# Patient Record
Sex: FEMALE | Race: WHITE | Employment: FULL TIME | URBAN - METROPOLITAN AREA
[De-identification: names, ages, dates, MRNs, and addresses within clinical notes are randomized per-mention and may not be internally consistent; named-entity substitution may affect disease eponyms.]

---

## 2017-01-27 ENCOUNTER — OFFICE VISIT (OUTPATIENT)
Dept: FAMILY MEDICINE | Facility: CLINIC | Age: 52
End: 2017-01-27
Payer: COMMERCIAL

## 2017-01-27 VITALS
HEART RATE: 95 BPM | HEIGHT: 62 IN | TEMPERATURE: 98 F | DIASTOLIC BLOOD PRESSURE: 76 MMHG | BODY MASS INDEX: 45.82 KG/M2 | SYSTOLIC BLOOD PRESSURE: 137 MMHG | OXYGEN SATURATION: 96 % | WEIGHT: 249 LBS

## 2017-01-27 DIAGNOSIS — Z12.11 SPECIAL SCREENING FOR MALIGNANT NEOPLASMS, COLON: ICD-10-CM

## 2017-01-27 DIAGNOSIS — E78.5 HYPERLIPIDEMIA WITH TARGET LDL LESS THAN 100: ICD-10-CM

## 2017-01-27 DIAGNOSIS — E11.9 CONTROLLED TYPE 2 DIABETES MELLITUS WITHOUT COMPLICATION, WITHOUT LONG-TERM CURRENT USE OF INSULIN (H): ICD-10-CM

## 2017-01-27 LAB — HBA1C MFR BLD: 6.7 % (ref 4.3–6)

## 2017-01-27 PROCEDURE — 83036 HEMOGLOBIN GLYCOSYLATED A1C: CPT | Performed by: INTERNAL MEDICINE

## 2017-01-27 PROCEDURE — 36415 COLL VENOUS BLD VENIPUNCTURE: CPT | Performed by: INTERNAL MEDICINE

## 2017-01-27 PROCEDURE — 99214 OFFICE O/P EST MOD 30 MIN: CPT | Performed by: INTERNAL MEDICINE

## 2017-01-27 RX ORDER — PRAVASTATIN SODIUM 10 MG
10 TABLET ORAL DAILY
Qty: 90 TABLET | Refills: 2 | Status: CANCELLED | OUTPATIENT
Start: 2017-01-27

## 2017-01-27 RX ORDER — LEVOFLOXACIN 750 MG/1
750 TABLET, FILM COATED ORAL DAILY
Qty: 10 TABLET | Refills: 0 | Status: CANCELLED | OUTPATIENT
Start: 2017-01-27

## 2017-01-27 RX ORDER — LANCETS 28 GAUGE
EACH MISCELLANEOUS 2 TIMES DAILY
Status: CANCELLED | OUTPATIENT
Start: 2017-01-27

## 2017-01-27 RX ORDER — METFORMIN HCL 500 MG
TABLET, EXTENDED RELEASE 24 HR ORAL
Qty: 360 TABLET | Refills: 1 | Status: SHIPPED | OUTPATIENT
Start: 2017-01-27 | End: 2017-07-13

## 2017-01-27 NOTE — PROGRESS NOTES
SUBJECTIVE:                                                    Mary Grigsby is a 51 year old female who presents to clinic today for the following health issues:       Controlled type 2 diabetes mellitus without complication, without long-term current use of insulin (H)  Hyperlipidemia with target LDL less than 100  Special screening for malignant neoplasms, colon     A routine follow up office visit with a patient with mild and well controlled diabetes mellitus. She has issues with an obese body habitus and is working on this as much as possible. She has successfully dropped 10 pounds since 6 months ago. Body mass index is 46.29 kg/(m^2).     Wt Readings from Last 5 Encounters:   01/27/17 249 lb (112.946 kg)   06/20/16 258 lb (117.028 kg)   06/10/16 258 lb (117.028 kg)   05/27/16 258 lb (117.028 kg)   05/13/16 257 lb 9.6 oz (116.847 kg)     A1C      6.7   1/27/2017  A1C      6.8   5/27/2016  A1C      6.4   9/24/2015  A1C      7.1   5/22/2015  A1C      6.2   3/20/2014    BP Readings from Last 3 Encounters:   01/27/17 137/76   05/27/16 136/70   05/13/16 135/77     GFR ESTIMATE   Date Value Ref Range Status   05/27/2016 >90  Non  GFR Calc   >60 mL/min/1.7m2 Final   05/22/2015 >90  Non  GFR Calc   >60 mL/min/1.7m2 Final   03/20/2014 88 >60 mL/min/1.7m2 Final     GFR ESTIMATE IF BLACK   Date Value Ref Range Status   05/27/2016 >90   GFR Calc   >60 mL/min/1.7m2 Final   05/22/2015 >90   GFR Calc   >60 mL/min/1.7m2 Final   03/20/2014 >90 >60 mL/min/1.7m2 Final     Current with all diabetes mellitus benchmarks.    Diabetes Follow-up      Patient is checking blood sugars: not at all    Diabetic concerns: None     Symptoms of hypoglycemia (low blood sugar): none     Paresthesias (numbness or burning in feet) or sores: No     Date of last diabetic eye exam: 2 years ago      She has had some moderate gastrointestinal symptoms with metformin ( Glucophage) by  taking the full 2 grams of metformin ( Glucophage) XR [ extended release ] at bedtime and so she's going to try cutting her dose to 1000 2 times a day. Further follow up depending on how things go with this.      Amount of exercise or physical activity: 4-5 days/week for an average of 15-30 minutes    Problems taking medications regularly: No    Medication side effects: none    Diet: regular (no restrictions), low salt, low fat/cholesterol and diabetic    Problem list and histories reviewed & adjusted, as indicated.  Additional history: as documented    Patient Active Problem List   Diagnosis     Hypertension goal BP (blood pressure) < 140/90     Ex-smoker     Family history of diabetes mellitus     Hyperlipidemia with target LDL less than 100     Seasonal allergic rhinitis     Rhinitis, allergic to other allergen     House dust mite allergy     obesity     Severe dysplasia of cervix (GUSTABO III)     Family history of thyroid disease     Cervical lymphadenopathy     Controlled type 2 diabetes mellitus without complication, without long-term current use of insulin (H)     Bilateral lower extremity edema     Past Surgical History   Procedure Laterality Date     Tonsillectomy & adenoidectomy  1973     age 8     Hc repair of nasal septum  2/3/09-per Dr. Harley     antrostomy, ethmoidectomy, septoplasty and turbinate reduction     Hc colp cervix/upper vagina w bx cervix  1/2010, 3/2011     neg dysplasia, GUSTABO 1-2     Cryotherapy  3/30/2011     cervical     Laparoscopic assisted hysterectomy vaginal  3/12/12     [facility]       Social History   Substance Use Topics     Smoking status: Former Smoker -- 0.50 packs/day for 20 years     Types: Cigarettes     Start date: 06/01/1980     Quit date: 02/24/2013     Smokeless tobacco: Never Used      Comment: still has occasionally      Alcohol Use: No     Family History   Problem Relation Age of Onset     CANCER Father      Other Cancer Father      Lung     Hypertension  Father      age 72     Hypertension Sister      age 35     Thyroid Disease Sister      CEREBROVASCULAR DISEASE Maternal Grandmother      Asthma Son      Thyroid Disease Sister      Thyroid Disease Sister      Thyroid Disease Sister      Glaucoma No family hx of      Macular Degeneration No family hx of      DIABETES Mother      Hypertension Mother      age 34     Hypertension Sister      Thyroid Disease Sister      Asthma Son          Current Outpatient Prescriptions   Medication Sig Dispense Refill     metFORMIN (GLUCOPHAGE-XR) 500 MG 24 hr tablet TAKE FOUR TABLETS BY MOUTH EVERY DAY WITH DINNER 360 tablet 1     levofloxacin (LEVAQUIN) 750 MG tablet Take 1 tablet (750 mg) by mouth daily 10 tablet 0     pravastatin (PRAVACHOL) 10 MG tablet TAKE ONE TABLET BY MOUTH EVERY DAY 90 tablet 2     hydrochlorothiazide 12.5 MG TABS TAKE ONE TABLET BY MOUTH EVERY MORNING 90 tablet 3     losartan (COZAAR) 100 MG tablet TAKE ONE TABLET BY MOUTH EVERY DAY 90 tablet 3     felodipine ER (PLENDIL) 5 MG 24 hr tablet TAKE ONE TABLET BY MOUTH EVERY DAY 90 tablet 3     Ibuprofen (ADVIL PO) Take by mouth as needed for moderate pain       glucose blood VI test strips strip 1 strip 2 times daily. 100 strip 11     Fexofenadine HCl (ALLEGRA PO) Take  by mouth.       aspirin 81 MG tablet Take 1 tablet by mouth daily. 1 tablet 3     FREESTYLE LANCETS MISC 1 Device 2 times daily. 100 each 11     Blood Glucose Calibration (FREESTYLE CONTROL SOLUTION) LIQD 1 drop. As needed 1 Bottle 3     Hypertonic Nasal Wash (SINUS RINSE BOTTLE KIT NA) Spray 1 Bottle in nostril as needed.       MULTI-VITAMIN OR 1 tablet daily       [DISCONTINUED] metFORMIN (GLUCOPHAGE-XR) 500 MG 24 hr tablet TAKE FOUR TABLETS BY MOUTH EVERY DAY WITH DINNER 120 tablet 0     Allergies   Allergen Reactions     Amoxicillin Hives     Augmentin      Erythromycin      Recent Labs   Lab Test  01/27/17   1107  05/27/16   0714  09/24/15   1605  05/22/15   0753  03/20/14   1621   "03/08/13   0709   08/19/11   0756  05/25/10   0742   A1C  6.7*  6.8*  6.4*  7.1*  6.2*  5.4   < >  6.7*   --    LDL   --   103*   --   90  129  113   < >  130*  151*   HDL   --   40*   --   44*  38*  43*   < >  35*  31*   TRIG   --   149   --   133   --   109   < >  116  161*   ALT   --    --    --    --    --   38   --   52*  44   CR   --   0.50*   --   0.54  0.71  0.59   < >  0.61  0.67   GFRESTIMATED   --   >90  Non  GFR Calc     --   >90  Non  GFR Calc    88  >90   < >  >90  >90   GFRESTBLACK   --   >90   GFR Calc     --   >90   GFR Calc    >90  >90   < >  >90  >90   POTASSIUM   --   3.7   --   3.6  3.7  3.9   < >  3.8  3.5   TSH   --    --   3.62  4.50*  5.09*   --    < >   --   3.90    < > = values in this interval not displayed.      BP Readings from Last 3 Encounters:   01/27/17 137/76   05/27/16 136/70   05/13/16 135/77    Wt Readings from Last 3 Encounters:   01/27/17 249 lb (112.946 kg)   06/20/16 258 lb (117.028 kg)   06/10/16 258 lb (117.028 kg)                  Labs reviewed in EPIC  Problem list, Medication list, Allergies, and Medical/Social/Surgical histories reviewed in [facility] and updated as appropriate.    ROS:  Constitutional, HEENT, cardiovascular, pulmonary, gi and gu systems are negative, except as otherwise noted.    OBJECTIVE:                                                    /76 mmHg  Pulse 95  Temp(Src) 98  F (36.7  C) (Oral)  Ht 5' 1.5\" (1.562 m)  Wt 249 lb (112.946 kg)  BMI 46.29 kg/m2  SpO2 96%  LMP 06/24/2011  Breastfeeding? No  Body mass index is 46.29 kg/(m^2).  GENERAL APPEARANCE: healthy, alert and no distress  RESP: lungs clear to auscultation - no rales, rhonchi or wheezes  CV: regular rates and rhythm, normal S1 S2, no S3 or S4 and no murmur, click or rub    Diagnostic test results:  Diagnostic Test Results:  Orders Placed This Encounter   Procedures     Hemoglobin A1c     Fecal colorectal cancer screen " (FIT)          ASSESSMENT/PLAN:                                                    1. Controlled type 2 diabetes mellitus without complication, without long-term current use of insulin (H)  Her diabetes mellitus is well controlled and she is doing well ! Trial of changing dosing intervals update me with your results via mychart or other means   - metFORMIN (GLUCOPHAGE-XR) 500 MG 24 hr tablet; TAKE FOUR TABLETS BY MOUTH EVERY DAY WITH DINNER  Dispense: 360 tablet; Refill: 1    2. Hyperlipidemia with target LDL less than 100  Controlled within acceptable limits , continue current plan of care       3. Special screening for malignant neoplasms, colon  She has continued to decline a colonoscopy but agrees to the fecal occult blood testing   - Fecal colorectal cancer screen (FIT); Future      Follow up with Provider - 6 months      Dipak Hooper MD  [facility]

## 2017-01-27 NOTE — NURSING NOTE
"Chief Complaint   Patient presents with     Diabetes     Medication Refill       Initial /76 mmHg  Pulse 95  Temp(Src) 98  F (36.7  C) (Oral)  Ht 5' 1.5\" (1.562 m)  Wt 249 lb (112.946 kg)  BMI 46.29 kg/m2  SpO2 96%  LMP 06/24/2011  Breastfeeding? No Estimated body mass index is 46.29 kg/(m^2) as calculated from the following:    Height as of this encounter: 5' 1.5\" (1.562 m).    Weight as of this encounter: 249 lb (112.946 kg).  BP completed using cuff size: jennifer Argueta      "

## 2017-01-27 NOTE — MR AVS SNAPSHOT
After Visit Summary   1/27/2017    Mary Grigsby    MRN: [id number]           Patient Information     Date Of Birth          1965        Visit Information        Provider Department      1/27/2017 10:50 AM Dipak Hooper MD [facility]        Today's Diagnoses     Controlled type 2 diabetes mellitus without complication, without long-term current use of insulin (H)         Hyperlipidemia with target LDL less than 100         Special screening for malignant neoplasms, colon            Follow-ups after your visit        Future tests that were ordered for you today     Open Future Orders        Priority Expected Expires Ordered    Fecal colorectal cancer screen (FIT) Routine 2/17/2017 4/21/2017 1/27/2017            Who to contact     If you have questions or need follow up information about today's clinic visit or your schedule please contact [facility] directly at [phone].  Normal or non-critical lab and imaging results will be communicated to you by Orbeushart, letter or phone within 4 business days after the clinic has received the results. If you do not hear from us within 7 days, please contact the clinic through Orbeushart or phone. If you have a critical or abnormal lab result, we will notify you by phone as soon as possible.  Submit refill requests through Shawarmanji or call your pharmacy and they will forward the refill request to us. Please allow 3 business days for your refill to be completed.          Additional Information About Your Visit        MyChart Information     Sh[employer] gives you secure access to your electronic health record. If you see a primary care provider, you can also send messages to your care team and make appointments. If you have questions, please call your primary care clinic.  If you do not have a primary care provider, please call [phone] and they will assist you.        Care EveryWhere ID     This is your Care EveryWhere ID. This  "could be used by other organizations to access your [place] medical records  [id number]        Your Vitals Were     Pulse Temperature Height BMI (Body Mass Index) Pulse Oximetry Last Period    95 98  F (36.7  C) (Oral) 5' 1.5\" (1.562 m) 46.29 kg/m2 96% 06/24/2011    Breastfeeding?                   No            Blood Pressure from Last 3 Encounters:   01/27/17 137/76   05/27/16 136/70   05/13/16 135/77    Weight from Last 3 Encounters:   01/27/17 249 lb (112.946 kg)   06/20/16 258 lb (117.028 kg)   06/10/16 258 lb (117.028 kg)              We Performed the Following     Hemoglobin A1c          Today's Medication Changes          These changes are accurate as of: 1/27/17 11:35 AM.  If you have any questions, ask your nurse or doctor.               These medicines have changed or have updated prescriptions.        Dose/Directions    metFORMIN 500 MG 24 hr tablet   Commonly known as:  GLUCOPHAGE-XR   This may have changed:  See the new instructions.   Used for:  Controlled type 2 diabetes mellitus without complication, without long-term current use of insulin (H)   Changed by:  Dipak Hooper MD        TAKE FOUR TABLETS BY MOUTH EVERY DAY WITH DINNER   Quantity:  360 tablet   Refills:  1            Where to get your medicines      These medications were sent to [facility] - [place], [place] - [address] [place] [address] [address], [facility] [address]     Phone:  [phone]    - metFORMIN 500 MG 24 hr tablet             Primary Care Provider Office Phone # Fax #    Dipak Hooper [phone]       [address] [address]        Thank you!     Thank you for choosing [facility]  for your care. Our goal is always to provide you with excellent care. Hearing back from our patients is one way we can continue to improve our services. Please take a few minutes to complete the written survey that you may receive in the mail " after your visit with us. Thank you!             Your Updated Medication List - Protect others around you: Learn how to safely use, store and throw away your medicines at www.disposemymeds.org.          This list is accurate as of: 1/27/17 11:35 AM.  Always use your most recent med list.                   Brand Name Dispense Instructions for use    ADVIL PO      Take by mouth as needed for moderate pain       ALLEGRA PO      Take  by mouth.       aspirin 81 MG tablet     1 tablet    Take 1 tablet by mouth daily.       blood glucose calibration solution     1 Bottle    1 drop. As needed       blood glucose monitoring lancets     100 each    1 Device 2 times daily.       blood glucose monitoring test strip    no brand specified    100 strip    1 strip 2 times daily.       felodipine ER 5 MG 24 hr tablet    PLENDIL    90 tablet    TAKE ONE TABLET BY MOUTH EVERY DAY       hydrochlorothiazide 12.5 MG Tabs tablet     90 tablet    TAKE ONE TABLET BY MOUTH EVERY MORNING       levofloxacin 750 MG tablet    LEVAQUIN    10 tablet    Take 1 tablet (750 mg) by mouth daily       losartan 100 MG tablet    COZAAR    90 tablet    TAKE ONE TABLET BY MOUTH EVERY DAY       metFORMIN 500 MG 24 hr tablet    GLUCOPHAGE-XR    360 tablet    TAKE FOUR TABLETS BY MOUTH EVERY DAY WITH DINNER       MULTI-VITAMIN PO      1 tablet daily       pravastatin 10 MG tablet    PRAVACHOL    90 tablet    TAKE ONE TABLET BY MOUTH EVERY DAY       SINUS RINSE BOTTLE KIT NA      Spray 1 Bottle in nostril as needed.

## 2017-01-27 NOTE — Clinical Note
[facility]  [address] [place] [address]    January 30, 2017    Mary Grigsby  [address]  [facility] [address]          Dear Mary,    We discussed this at your appointment . Things look good Mary, keep up the good work !     Results for orders placed or performed in visit on 01/27/17   Hemoglobin A1c   Result Value Ref Range    Hemoglobin A1C 6.7 (H) 4.3 - 6.0 %       If you have any questions or concerns, please me or my clinic team at [phone].      Sincerely,        Dipak Hooper MD/bt

## 2017-03-21 DIAGNOSIS — E78.5 HYPERLIPIDEMIA WITH TARGET LDL LESS THAN 100: ICD-10-CM

## 2017-03-21 NOTE — TELEPHONE ENCOUNTER
pravastatin (PRAVACHOL) 10 MG tablet     Last Written Prescription Date: 6/24/16  Last Fill Quantity: 90, # refills: 2  Last Office Visit with G, P or [facility] prescribing provider: 1/27/17       Lab Results   Component Value Date    CHOL 173 05/27/2016     Lab Results   Component Value Date    HDL 40 05/27/2016     Lab Results   Component Value Date     05/27/2016     03/20/2014     Lab Results   Component Value Date    TRIG 149 05/27/2016     Lab Results   Component Value Date    CHOLHDLRATIO 3.7 05/22/2015

## 2017-03-23 RX ORDER — PRAVASTATIN SODIUM 10 MG
TABLET ORAL
Qty: 90 TABLET | Refills: 0 | Status: SHIPPED | OUTPATIENT
Start: 2017-03-23 | End: 2017-06-20

## 2017-06-20 DIAGNOSIS — I10 ESSENTIAL HYPERTENSION WITH GOAL BLOOD PRESSURE LESS THAN 140/90: ICD-10-CM

## 2017-06-20 DIAGNOSIS — E78.5 HYPERLIPIDEMIA WITH TARGET LDL LESS THAN 100: ICD-10-CM

## 2017-06-21 RX ORDER — LOSARTAN POTASSIUM 100 MG/1
TABLET ORAL
Qty: 90 TABLET | Refills: 0 | Status: SHIPPED | OUTPATIENT
Start: 2017-06-21 | End: 2017-07-13

## 2017-06-21 RX ORDER — PRAVASTATIN SODIUM 10 MG
TABLET ORAL
Qty: 90 TABLET | Refills: 0 | Status: SHIPPED | OUTPATIENT
Start: 2017-06-21 | End: 2017-07-13

## 2017-06-21 RX ORDER — HYDROCHLOROTHIAZIDE 12.5 MG/1
TABLET ORAL
Qty: 90 TABLET | Refills: 0 | Status: SHIPPED | OUTPATIENT
Start: 2017-06-21 | End: 2017-07-13

## 2017-06-21 RX ORDER — FELODIPINE 5 MG/1
TABLET, EXTENDED RELEASE ORAL
Qty: 90 TABLET | Refills: 0 | Status: SHIPPED | OUTPATIENT
Start: 2017-06-21 | End: 2017-07-13

## 2017-06-21 NOTE — TELEPHONE ENCOUNTER
Prescription approved per [facility] Ref[facility].   Patient due for office visit for further refills-patient has visit scheduled on  7/13/17.  Yu Zimmer RN

## 2017-06-21 NOTE — TELEPHONE ENCOUNTER
felodipine ER (PLENDIL) 5 MG 24 hr tablet      Last Written Prescription Date: 6/23/2016  Last Fill Quantity: 90, # refills: 3    Last Office Visit with [facility] or  Health prescribing provider:  1/27/2017   Future Office Visit:    Next 5 appointments (look out 90 days)     Jul 13, 2017  3:10 PM CDT   MyChart Long with Dipak Hooper MD   [facility] ([facility]    [address] [facility] [address]   [phone]                    BP Readings from Last 3 Encounters:   01/27/17 137/76   05/27/16 136/70   05/13/16 135/77     pravastatin (PRAVACHOL) 10 MG tablet     Last Written Prescription Date: 3/23/2017  Last Fill Quantity: 90, # refills: 0  Last Office Visit with [facility] or  Health prescribing provider: 1/27/2017  Next 5 appointments (look out 90 days)     Jul 13, 2017  3:10 PM CDT   MyChart Long with Dipak Hooper MD   [facility] ([facility])    [address] [place] [address]   [phone]                   Lab Results   Component Value Date    CHOL 173 05/27/2016     Lab Results   Component Value Date    HDL 40 05/27/2016     Lab Results   Component Value Date     05/27/2016     Lab Results   Component Value Date    TRIG 149 05/27/2016     Lab Results   Component Value Date    CHOLHDLRATIO 3.7 05/22/2015     hydrochlorothiazide 12.5 MG TABS      Last Written Prescription Date: 6/23/2016  Last Fill Quantity: 90, # refills: 3  Last Office Visit with [facility] or  Health prescribing provider: 1/27/2017  Next 5 appointments (look out 90 days)     Jul 13, 2017  3:10 PM CDT   MyChart Long with Dipak Hooper MD   [facility] ([facility])    [address] [facility] [address]   [phone]                   Potassium   Date Value Ref Range Status   05/27/2016 3.7 3.4 - 5.3 mmol/L Final     Creatinine   Date Value Ref Range Status   05/27/2016 0.50 (L) 0.52 - 1.04 mg/dL Final     BP Readings  from Last 3 Encounters:   01/27/17 137/76   05/27/16 136/70   05/13/16 135/77     losartan (COZAAR) 100 MG tablet      Last Written Prescription Date: 6/23/2016  Last Fill Quantity: 90, # refills: 3  Last Office Visit with G, P or [facility] prescribing provider: 1/27/2017  Next 5 appointments (look out 90 days)     Jul 13, 2017  3:10 PM CDT   Neema Lord with Dipak Hooper MD   [facility] ([facility])    [address] [facility] [address]   [phone]                   Potassium   Date Value Ref Range Status   05/27/2016 3.7 3.4 - 5.3 mmol/L Final     Creatinine   Date Value Ref Range Status   05/27/2016 0.50 (L) 0.52 - 1.04 mg/dL Final     BP Readings from Last 3 Encounters:   01/27/17 137/76   05/27/16 136/70   05/13/16 135/77

## 2017-06-30 NOTE — PROGRESS NOTES
SUBJECTIVE:                                                    Mary Grigsby is a 52 year old female who presents to clinic today for the following health issues:    Diabetes Follow-up      Patient is checking blood sugars: rarely.  Results range from 60 to 170    Diabetic concerns: None     Symptoms of hypoglycemia (low blood sugar): shaky, Lightheaded     Paresthesias (numbness or burning in feet) or sores: No     Date of last diabetic eye exam: Due  Patient has been compliant with 500 MG Metformin tid. Has intermittent bilateral foot paresthesia. She does not believe an ophthalmologist will be covered with her insurance.   Lab Results   Component Value Date    A1C 6.9 07/13/2017    A1C 6.7 01/27/2017    A1C 6.8 05/27/2016    A1C 6.4 09/24/2015    A1C 7.1 05/22/2015     Allergies -- She notes she does not like to take allergy medication all of the time because they make her feel ill. Her allergies have been worse this year.    Hyperlipidemia Follow-Up    Rate your low fat/cholesterol diet?: good    Taking statin?  Yes, possible muscle aches from statin    Other lipid medications/supplements?:  none  Recent Labs   Lab Test  05/27/16   0714  05/22/15   0753   03/08/13   0709   CHOL  173  161   < >  178   HDL  40*  44*   < >  43*   LDL  103*  90   < >  113   TRIG  149  133   --   109   CHOLHDLRATIO   --   3.7   --   4.1    < > = values in this interval not displayed.     Hypertension Follow-up    Outpatient blood pressures are not being checked.    Low Salt Diet: no added salt  BP Readings from Last 3 Encounters:   07/13/17 128/74   01/27/17 137/76   05/27/16 136/70     Additional Notes -- Patient states she has an OB/GYN that she plans to follow up with about a pap smear and mammogram.    Problem list and histories reviewed & adjusted, as indicated.  Additional history: as documented    Patient Active Problem List   Diagnosis     Hypertension goal BP (blood pressure) < 140/90     Ex-smoker     Family history  of diabetes mellitus     Hyperlipidemia with target LDL less than 100     Seasonal allergic rhinitis     Rhinitis, allergic to other allergen     House dust mite allergy     obesity     Severe dysplasia of cervix (GUSTABO III)     Family history of thyroid disease     Cervical lymphadenopathy     Controlled type 2 diabetes mellitus without complication, without long-term current use of insulin (H)     Bilateral lower extremity edema     Past Surgical History:   Procedure Laterality Date     CRYOTHERAPY  3/30/2011    cervical     HC COLP CERVIX/UPPER VAGINA W BX CERVIX  1/2010, 3/2011    neg dysplasia, GUSTABO 1-2     HC REPAIR OF NASAL SEPTUM  2/3/09-per Dr. Harley    antrostomy, ethmoidectomy, septoplasty and turbinate reduction     LAPAROSCOPIC ASSISTED HYSTERECTOMY VAGINAL  3/12/12    [facility]     TONSILLECTOMY & ADENOIDECTOMY  1973    age 8       Social History   Substance Use Topics     Smoking status: Former Smoker     Packs/day: 0.50     Years: 20.00     Types: Cigarettes     Start date: 6/1/1980     Quit date: 2/24/2013     Smokeless tobacco: Never Used      Comment: still has occasionally      Alcohol use No     Family History   Problem Relation Age of Onset     CANCER Father      Other Cancer Father      Lung     Hypertension Father      age 72     Hypertension Sister      age 35     Thyroid Disease Sister      CEREBROVASCULAR DISEASE Maternal Grandmother      Asthma Son      Thyroid Disease Sister      Thyroid Disease Sister      Thyroid Disease Sister      DIABETES Mother      Hypertension Mother      age 34     Hypertension Sister      Thyroid Disease Sister      Asthma Son      Glaucoma No family hx of      Macular Degeneration No family hx of          Current Outpatient Prescriptions   Medication Sig Dispense Refill     losartan (COZAAR) 100 MG tablet Take 1 tablet (100 mg) by mouth daily 90 tablet 1     hydrochlorothiazide 12.5 MG TABS tablet TAKE ONE TABLET BY MOUTH EVERY DAY IN THE MORNING 90  tablet 1     pravastatin (PRAVACHOL) 10 MG tablet Take 1 tablet (10 mg) by mouth daily 90 tablet 1     felodipine ER (PLENDIL) 5 MG 24 hr tablet Take 1 tablet (5 mg) by mouth daily 90 tablet 1     metFORMIN (GLUCOPHAGE-XR) 500 MG 24 hr tablet TAKE FOUR TABLETS BY MOUTH EVERY DAY WITH DINNER 360 tablet 1     Ibuprofen (ADVIL PO) Take by mouth as needed for moderate pain       glucose blood VI test strips strip 1 strip 2 times daily. 100 strip 11     Fexofenadine HCl (ALLEGRA PO) Take  by mouth.       aspirin 81 MG tablet Take 1 tablet by mouth daily. 1 tablet 3     FREESTYLE LANCETS MISC 1 Device 2 times daily. 100 each 11     Blood Glucose Calibration (FREESTYLE CONTROL SOLUTION) LIQD 1 drop. As needed 1 Bottle 3     Hypertonic Nasal Wash (SINUS RINSE BOTTLE KIT NA) Spray 1 Bottle in nostril as needed.       MULTI-VITAMIN OR 1 tablet daily       [DISCONTINUED] losartan (COZAAR) 100 MG tablet TAKE ONE TABLET BY MOUTH EVERY DAY 90 tablet 0     [DISCONTINUED] hydrochlorothiazide 12.5 MG TABS tablet TAKE ONE TABLET BY MOUTH EVERY DAY IN THE MORNING 90 tablet 0     [DISCONTINUED] pravastatin (PRAVACHOL) 10 MG tablet TAKE ONE TABLET BY MOUTH EVERY DAY 90 tablet 0     [DISCONTINUED] felodipine ER (PLENDIL) 5 MG 24 hr tablet TAKE ONE TABLET BY MOUTH EVERY DAY 90 tablet 0     [DISCONTINUED] metFORMIN (GLUCOPHAGE-XR) 500 MG 24 hr tablet TAKE FOUR TABLETS BY MOUTH EVERY DAY WITH DINNER 360 tablet 1     levofloxacin (LEVAQUIN) 750 MG tablet Take 1 tablet (750 mg) by mouth daily (Patient not taking: Reported on 7/13/2017) 10 tablet 0     Labs reviewed in EPIC    Reviewed and updated as needed this visit by clinical staff  Tobacco  Allergies  Meds  Med Hx  Surg Hx  Fam Hx  Soc Hx      Reviewed and updated as needed this visit by Provider         ROS:  Constitutional, HEENT, cardiovascular, pulmonary, GI, , musculoskeletal, neuro, skin, endocrine and psych systems are negative, except as otherwise noted.    This document  serves as a record of the services and decisions personally performed and made by Dipak Hooper MD. It was created on their behalf by Jhon Story, a trained medical scribe. The creation of this document is based the provider's statements to the medical scribe.  Jhon Story July 13, 2017 3:49 PM    OBJECTIVE:     /74  Pulse 97  Temp 98.5  F (36.9  C) (Oral)  Wt 115.7 kg (255 lb)  LMP 06/24/2011  SpO2 97%  BMI 47.4 kg/m2  Body mass index is 47.4 kg/(m^2).  GENERAL: healthy, alert and no distress, morbidly obese   EYES: Eyes grossly normal to inspection, PERRL and conjunctivae and sclerae normal  RESP: lungs clear to auscultation - no rales, rhonchi or wheezes  CV: regular rate and rhythm, normal S1 S2, no S3 or S4, no murmur, click or rub, no peripheral edema and peripheral pulses strong  MS: no gross musculoskeletal defects noted, 2-3+ bilateral lower extremity edema  SKIN: no suspicious lesions or rashes to visible skin  NEURO: mentation intact and speech normal  PSYCH: mentation appears normal, affect normal/bright    Diagnostic Test Results:  Results for orders placed or performed in visit on 07/13/17   HEMOGLOBIN A1C   Result Value Ref Range    Hemoglobin A1C 6.9 (H) 4.3 - 6.0 %     ASSESSMENT/PLAN:     (Z12.11) Special screening for malignant neoplasms, colon  (primary encounter diagnosis)  Comment: FIT test   Plan: see orders section of this encounter     (Z12.31) Visit for screening mammogram  Comment: healthcare maintenance recommended   Plan: MA SCREENING DIGITAL BILAT - Future  (s+30),         Fecal colorectal cancer screen (FIT)            (Z13.89) Screening for diabetic peripheral neuropathy  Comment: no diabetes neuropathy   Plan: FOOT EXAM  NO CHARGE [75742.114], HEMOGLOBIN         A1C            (I10) Essential hypertension with goal blood pressure less than 140/90  Comment: controlled within acceptable limits    Plan: Albumin Random Urine Quantitative, losartan         (COZAAR) 100  MG tablet, hydrochlorothiazide         12.5 MG TABS tablet, felodipine ER (PLENDIL) 5         MG 24 hr tablet            (E78.5) Hyperlipidemia with target LDL less than 100  Comment: continue current plan of care     Plan: pravastatin (PRAVACHOL) 10 MG tablet, Lipid         panel reflex to direct LDL, CANCELED: Lipid         panel reflex to direct LDL            (E11.9) Controlled type 2 diabetes mellitus without complication, without long-term current use of insulin (H)  Comment: we emphasized the need for annual eye exams not for vision check but for screening per American Diabetes Association for diabetes retinopathy   Plan: Albumin Random Urine Quantitative, metFORMIN         (GLUCOPHAGE-XR) 500 MG 24 hr tablet,         OPHTHALMOLOGY ADULT REFERRAL, Basic metabolic         panel              Patient Instructions   - Follow up with me in 12 months.     - You can follow up for a lab check up in 6 months.    The information in this document, created by the medical scribe for me, accurately reflects the services I personally performed and the decisions made by me. I have reviewed and approved this document for accuracy.   MD Dipak Jefferson MD  [facility]

## 2017-07-07 ENCOUNTER — TELEPHONE (OUTPATIENT)
Dept: FAMILY MEDICINE | Facility: CLINIC | Age: 52
End: 2017-07-07

## 2017-07-13 ENCOUNTER — OFFICE VISIT (OUTPATIENT)
Dept: FAMILY MEDICINE | Facility: CLINIC | Age: 52
End: 2017-07-13
Payer: COMMERCIAL

## 2017-07-13 VITALS
TEMPERATURE: 98.5 F | BODY MASS INDEX: 47.4 KG/M2 | OXYGEN SATURATION: 97 % | SYSTOLIC BLOOD PRESSURE: 128 MMHG | HEART RATE: 97 BPM | WEIGHT: 255 LBS | DIASTOLIC BLOOD PRESSURE: 74 MMHG

## 2017-07-13 DIAGNOSIS — Z12.31 VISIT FOR SCREENING MAMMOGRAM: ICD-10-CM

## 2017-07-13 DIAGNOSIS — I10 ESSENTIAL HYPERTENSION WITH GOAL BLOOD PRESSURE LESS THAN 140/90: ICD-10-CM

## 2017-07-13 DIAGNOSIS — Z12.11 SPECIAL SCREENING FOR MALIGNANT NEOPLASMS, COLON: ICD-10-CM

## 2017-07-13 DIAGNOSIS — Z13.89 SCREENING FOR DIABETIC PERIPHERAL NEUROPATHY: ICD-10-CM

## 2017-07-13 DIAGNOSIS — E78.5 HYPERLIPIDEMIA WITH TARGET LDL LESS THAN 100: ICD-10-CM

## 2017-07-13 DIAGNOSIS — E11.9 CONTROLLED TYPE 2 DIABETES MELLITUS WITHOUT COMPLICATION, WITHOUT LONG-TERM CURRENT USE OF INSULIN (H): Primary | ICD-10-CM

## 2017-07-13 LAB — HBA1C MFR BLD: 6.9 % (ref 4.3–6)

## 2017-07-13 PROCEDURE — 82043 UR ALBUMIN QUANTITATIVE: CPT | Performed by: INTERNAL MEDICINE

## 2017-07-13 PROCEDURE — 99207 C FOOT EXAM  NO CHARGE: CPT | Performed by: INTERNAL MEDICINE

## 2017-07-13 PROCEDURE — 36415 COLL VENOUS BLD VENIPUNCTURE: CPT | Performed by: INTERNAL MEDICINE

## 2017-07-13 PROCEDURE — 99214 OFFICE O/P EST MOD 30 MIN: CPT | Performed by: INTERNAL MEDICINE

## 2017-07-13 PROCEDURE — 83036 HEMOGLOBIN GLYCOSYLATED A1C: CPT | Performed by: INTERNAL MEDICINE

## 2017-07-13 PROCEDURE — 80048 BASIC METABOLIC PNL TOTAL CA: CPT | Performed by: INTERNAL MEDICINE

## 2017-07-13 RX ORDER — HYDROCHLOROTHIAZIDE 12.5 MG/1
TABLET ORAL
Qty: 90 TABLET | Refills: 1 | Status: SHIPPED | OUTPATIENT
Start: 2017-07-13 | End: 2018-03-14

## 2017-07-13 RX ORDER — LOSARTAN POTASSIUM 100 MG/1
100 TABLET ORAL DAILY
Qty: 90 TABLET | Refills: 1 | Status: SHIPPED | OUTPATIENT
Start: 2017-07-13 | End: 2018-03-14

## 2017-07-13 RX ORDER — METFORMIN HCL 500 MG
TABLET, EXTENDED RELEASE 24 HR ORAL
Qty: 360 TABLET | Refills: 1 | Status: SHIPPED | OUTPATIENT
Start: 2017-07-13 | End: 2018-06-13

## 2017-07-13 RX ORDER — FELODIPINE 5 MG/1
5 TABLET, EXTENDED RELEASE ORAL DAILY
Qty: 90 TABLET | Refills: 1 | Status: SHIPPED | OUTPATIENT
Start: 2017-07-13 | End: 2018-03-14

## 2017-07-13 RX ORDER — PRAVASTATIN SODIUM 10 MG
10 TABLET ORAL DAILY
Qty: 90 TABLET | Refills: 1 | Status: SHIPPED | OUTPATIENT
Start: 2017-07-13 | End: 2018-03-14

## 2017-07-13 ASSESSMENT — PAIN SCALES - GENERAL: PAINLEVEL: NO PAIN (0)

## 2017-07-13 NOTE — NURSING NOTE
"Chief Complaint   Patient presents with     Hypertension     Diabetes       Initial /74  Pulse 97  Temp 98.5  F (36.9  C) (Oral)  Wt 255 lb (115.7 kg)  LMP 06/24/2011  SpO2 97%  BMI 47.4 kg/m2 Estimated body mass index is 47.4 kg/(m^2) as calculated from the following:    Height as of 1/27/17: 5' 1.5\" (1.562 m).    Weight as of this encounter: 255 lb (115.7 kg).  Medication Reconciliation: complete   Patricia Taveras MA    "

## 2017-07-13 NOTE — PATIENT INSTRUCTIONS
- Follow up with me in 12 months.     - You can follow up for a lab check up in 6 months.    [facility]    If you have any questions regarding to your visit please contact your care team:     Team Pink:   Clinic Hours Telephone Number   Internal Medicine:  Dr. Lizzie Wray NP       7am-7pm  Monday - Thursday   7am-5pm  Fridays  [phone]  (Appointment scheduling available 24/7)    Questions about your visit?  Team Line  [phone]   Urgent Care - [place] - 11am-9pm Monday-Friday Saturday-Sunday- 9am-5pm   [place] - 5pm-9pm Monday-Friday Saturday-Sunday- 9am-5pm  [phone] - [place]   [phone] - [place]       What options do I have for visits at the clinic other than the traditional office visit?  To expand how we care for you, many of our providers are utilizing electronic visits (e-visits) and telephone visits, when medically appropriate, for interactions with their patients rather than a visit in the clinic.   We also offer nurse visits for many medical concerns. Just like any other service, we will bill your insurance company for this type of visit based on time spent on the phone with your provider. Not all insurance companies cover these visits. Please check with your medical insurance if this type of visit is covered. You will be responsible for any charges that are not paid by your insurance.      E-visits via [employer]:  generally incur a $35.00 fee.  Telephone visits:  Time spent on the phone: *charged based on time that is spent on the phone in increments of 10 minutes. Estimated cost:   5-10 mins $30.00   11-20 mins. $59.00   21-30 mins. $85.00   Use [employer] (secure email communication and access to your chart) to send your primary care provider a message or make an appointment. Ask someone on your Team how to sign up for [employer].    For a Price Quote for your services, please call our Consumer Price Line  at [phone].    As always, Thank you for trusting us with your health care needs!    Discharged By:  AARON BRADY

## 2017-07-13 NOTE — LETTER
[facility]  [address] [place] [address]    July 17, 2017    Mary Grigsby  [address]  [place] [address]          Dear Mary,    These labs show us the following things ; everything is great, Things look good !     Results for orders placed or performed in visit on 07/13/17   HEMOGLOBIN A1C   Result Value Ref Range    Hemoglobin A1C 6.9 (H) 4.3 - 6.0 %   Albumin Random Urine Quantitative   Result Value Ref Range    Creatinine Urine 201 mg/dL    Albumin Urine mg/L 26 mg/L    Albumin Urine mg/g Cr 12.89 0 - 25 mg/g Cr   Basic metabolic panel   Result Value Ref Range    Sodium 138 133 - 144 mmol/L    Potassium 3.9 3.4 - 5.3 mmol/L    Chloride 101 94 - 109 mmol/L    Carbon Dioxide 29 20 - 32 mmol/L    Anion Gap 8 3 - 14 mmol/L    Glucose 159 (H) 70 - 99 mg/dL    Urea Nitrogen 13 7 - 30 mg/dL    Creatinine 0.74 0.52 - 1.04 mg/dL    GFR Estimate 83 >60 mL/min/1.7m2    GFR Estimate If Black >90   GFR Calc   >60 mL/min/1.7m2    Calcium 9.2 8.5 - 10.1 mg/dL       If you have any questions or concerns, please me or my clinic team at [phone].      Sincerely,        Dipak Hooper MD/bt

## 2017-07-13 NOTE — MR AVS SNAPSHOT
After Visit Summary   7/13/2017    Mary Grigsby    MRN: [id number]           Patient Information     Date Of Birth          1965        Visit Information        Provider Department      7/13/2017 3:10 PM Dipak Hooper MD [facility]        Today's Diagnoses     Special screening for malignant neoplasms, colon    -  1    Visit for screening mammogram        Screening for diabetic peripheral neuropathy        Essential hypertension with goal blood pressure less than 140/90        Hyperlipidemia with target LDL less than 100        Controlled type 2 diabetes mellitus without complication, without long-term current use of insulin (H)          Care Instructions    - Follow up with me in 12 months.     - You can follow up for a lab check up in 6 months.    [facility]    If you have any questions regarding to your visit please contact your care team:     Team Pink:   Clinic Hours Telephone Number   Internal Medicine:  Dr. Lizzie Wray NP       7am-7pm  Monday - Thursday   7am-5pm  Fridays  [phone]  (Appointment scheduling available 24/7)    Questions about your visit?  Team Line  [phone]   Urgent Care - [place] - 11am-9pm Monday-Friday Saturday-Sunday- 9am-5pm   [place] - 5pm-9pm Monday-Friday Saturday-Sunday- 9am-5pm  [phone] - [place]   [phone] - [place]       What options do I have for visits at the clinic other than the traditional office visit?  To expand how we care for you, many of our providers are utilizing electronic visits (e-visits) and telephone visits, when medically appropriate, for interactions with their patients rather than a visit in the clinic.   We also offer nurse visits for many medical concerns. Just like any other service, we will bill your insurance company for this type of visit based on time spent on the phone with your provider. Not all insurance  companies cover these visits. Please check with your medical insurance if this type of visit is covered. You will be responsible for any charges that are not paid by your insurance.      E-visits via [employer]:  generally incur a $35.00 fee.  Telephone visits:  Time spent on the phone: *charged based on time that is spent on the phone in increments of 10 minutes. Estimated cost:   5-10 mins $30.00   11-20 mins. $59.00   21-30 mins. $85.00   Use [employer] (secure email communication and access to your chart) to send your primary care provider a message or make an appointment. Ask someone on your Team how to sign up for [employer].    For a Price Quote for your services, please call our [employer] Media Line at [phone].    As always, Thank you for trusting us with your health care needs!    Discharged By:  AARON BRADY            Follow-ups after your visit        Additional Services     OPHTHALMOLOGY ADULT REFERRAL       Your provider has referred you to: [facility]: [facility] [place] [phone]   [email or web address]    Please be aware that coverage of these services is subject to the terms and limitations of your health insurance plan.  Call member services at your health plan with any benefit or coverage questions.      Please bring the following with you to your appointment:    (1) Any X-Rays, CTs or MRIs which have been performed.  Contact the facility where they were done to arrange for  prior to your scheduled appointment.    (2) List of current medications  (3) This referral request   (4) Any documents/labs given to you for this referral                  Future tests that were ordered for you today     Open Future Orders        Priority Expected Expires Ordered    MA SCREENING DIGITAL BILAT - Future  (s+30) Routine  6/30/2018 7/13/2017    Fecal colorectal cancer screen (FIT) Routine 8/3/2017 10/5/2017 7/13/2017            Who to contact     If you have questions or need follow up  information about today's clinic visit or your schedule please contact [facility] directly at [phone].  Normal or non-critical lab and imaging results will be communicated to you by [employer], letter or phone within 4 business days after the clinic has received the results. If you do not hear from us within 7 days, please contact the clinic through [employer] or phone. If you have a critical or abnormal lab result, we will notify you by phone as soon as possible.  Submit refill requests through [employer] or call your pharmacy and they will forward the refill request to us. Please allow 3 business days for your refill to be completed.          Additional Information About Your Visit        [employer] Information     [employer] gives you secure access to your electronic health record. If you see a primary care provider, you can also send messages to your care team and make appointments. If you have questions, please call your primary care clinic.  If you do not have a primary care provider, please call [phone] and they will assist you.        Care EveryWhere ID     This is your Care EveryWhere ID. This could be used by other organizations to access your [facility] medical records  [id number]        Your Vitals Were     Pulse Temperature Last Period Pulse Oximetry BMI (Body Mass Index)       97 98.5  F (36.9  C) (Oral) 06/24/2011 97% 47.4 kg/m2        Blood Pressure from Last 3 Encounters:   07/13/17 128/74   01/27/17 137/76   05/27/16 136/70    Weight from Last 3 Encounters:   07/13/17 255 lb (115.7 kg)   01/27/17 249 lb (112.9 kg)   06/20/16 258 lb (117 kg)              We Performed the Following     Albumin Random Urine Quantitative     Basic metabolic panel     FOOT EXAM  NO CHARGE [30447.114]     HEMOGLOBIN A1C     Lipid panel reflex to direct LDL     OPHTHALMOLOGY ADULT REFERRAL          Today's Medication Changes          These changes are accurate as of: 7/13/17  3:57 PM.  If you have any questions,  ask your nurse or doctor.               These medicines have changed or have updated prescriptions.        Dose/Directions    felodipine ER 5 MG 24 hr tablet   Commonly known as:  PLENDIL   This may have changed:  See the new instructions.   Used for:  Essential hypertension with goal blood pressure less than 140/90   Changed by:  Dipak Hooper MD        Dose:  5 mg   Take 1 tablet (5 mg) by mouth daily   Quantity:  90 tablet   Refills:  1       hydrochlorothiazide 12.5 MG Tabs tablet   This may have changed:  See the new instructions.   Used for:  Essential hypertension with goal blood pressure less than 140/90   Changed by:  Dipak Hooper MD        TAKE ONE TABLET BY MOUTH EVERY DAY IN THE MORNING   Quantity:  90 tablet   Refills:  1       losartan 100 MG tablet   Commonly known as:  COZAAR   This may have changed:  See the new instructions.   Used for:  Essential hypertension with goal blood pressure less than 140/90   Changed by:  Dipak Hooper MD        Dose:  100 mg   Take 1 tablet (100 mg) by mouth daily   Quantity:  90 tablet   Refills:  1       pravastatin 10 MG tablet   Commonly known as:  PRAVACHOL   This may have changed:  See the new instructions.   Used for:  Hyperlipidemia with target LDL less than 100   Changed by:  Dipak Hooper MD        Dose:  10 mg   Take 1 tablet (10 mg) by mouth daily   Quantity:  90 tablet   Refills:  1            Where to get your medicines      These medications were sent to [facility] [place]  [place] - [address] [place] [address] [address], [place] [address]     Phone:  [phone]     felodipine ER 5 MG 24 hr tablet    hydrochlorothiazide 12.5 MG Tabs tablet    losartan 100 MG tablet    metFORMIN 500 MG 24 hr tablet    pravastatin 10 MG tablet                Primary Care Provider Office Phone # Fax #    Dipak Hooper [phone]       [address] [address]        Equal Access to  Services     [facility]: Hadii kodak lai susannavalentina Sogerardoali, waaxda luqadaha, qaybta kaalmada juanitowilliamgarcia, lazarus mon . So [facility] [phone].    ATENCIÓN: Si habla ry, tiene a de leon disposición servicios gratuitos de asistencia lingüística. Llame al [phone].    We comply with applicable federal civil rights laws and [place] laws. We do not discriminate on the basis of race, color, national origin, age, disability sex, sexual orientation or gender identity.            Thank you!     Thank you for choosing [facility]  for your care. Our goal is always to provide you with excellent care. Hearing back from our patients is one way we can continue to improve our services. Please take a few minutes to complete the written survey that you may receive in the mail after your visit with us. Thank you!             Your Updated Medication List - Protect others around you: Learn how to safely use, store and throw away your medicines at www.disposemymeds.org.          This list is accurate as of: 7/13/17  3:57 PM.  Always use your most recent med list.                   Brand Name Dispense Instructions for use Diagnosis    ADVIL PO      Take by mouth as needed for moderate pain        ALLEGRA PO      Take  by mouth.        aspirin 81 MG tablet     1 tablet    Take 1 tablet by mouth daily.    Type 2 diabetes, HbA1c goal < 7% (H)       blood glucose calibration solution     1 Bottle    1 drop. As needed    Type 2 diabetes, HbA1c goal < 7% (H)       blood glucose monitoring lancets     100 each    1 Device 2 times daily.    Type 2 diabetes, HbA1c goal < 7% (H)       blood glucose monitoring test strip    no brand specified    100 strip    1 strip 2 times daily.    Prediabetes       felodipine ER 5 MG 24 hr tablet    PLENDIL    90 tablet    Take 1 tablet (5 mg) by mouth daily    Essential hypertension with goal blood pressure less than 140/90       hydrochlorothiazide 12.5 MG Tabs  tablet     90 tablet    TAKE ONE TABLET BY MOUTH EVERY DAY IN THE MORNING    Essential hypertension with goal blood pressure less than 140/90       levofloxacin 750 MG tablet    LEVAQUIN    10 tablet    Take 1 tablet (750 mg) by mouth daily    Acute sialoadenitis       losartan 100 MG tablet    COZAAR    90 tablet    Take 1 tablet (100 mg) by mouth daily    Essential hypertension with goal blood pressure less than 140/90       metFORMIN 500 MG 24 hr tablet    GLUCOPHAGE-XR    360 tablet    TAKE FOUR TABLETS BY MOUTH EVERY DAY WITH DINNER    Controlled type 2 diabetes mellitus without complication, without long-term current use of insulin (H)       MULTI-VITAMIN PO      1 tablet daily        pravastatin 10 MG tablet    PRAVACHOL    90 tablet    Take 1 tablet (10 mg) by mouth daily    Hyperlipidemia with target LDL less than 100       SINUS RINSE BOTTLE KIT NA      Spray 1 Bottle in nostril as needed.    Post-nasal drainage

## 2017-07-14 LAB
ANION GAP SERPL CALCULATED.3IONS-SCNC: 8 MMOL/L (ref 3–14)
BUN SERPL-MCNC: 13 MG/DL (ref 7–30)
CALCIUM SERPL-MCNC: 9.2 MG/DL (ref 8.5–10.1)
CHLORIDE SERPL-SCNC: 101 MMOL/L (ref 94–109)
CO2 SERPL-SCNC: 29 MMOL/L (ref 20–32)
CREAT SERPL-MCNC: 0.74 MG/DL (ref 0.52–1.04)
CREAT UR-MCNC: 201 MG/DL
GFR SERPL CREATININE-BSD FRML MDRD: 83 ML/MIN/1.7M2
GLUCOSE SERPL-MCNC: 159 MG/DL (ref 70–99)
MICROALBUMIN UR-MCNC: 26 MG/L
MICROALBUMIN/CREAT UR: 12.89 MG/G CR (ref 0–25)
POTASSIUM SERPL-SCNC: 3.9 MMOL/L (ref 3.4–5.3)
SODIUM SERPL-SCNC: 138 MMOL/L (ref 133–144)

## 2017-09-19 ENCOUNTER — RADIANT APPOINTMENT (OUTPATIENT)
Dept: MAMMOGRAPHY | Facility: CLINIC | Age: 52
End: 2017-09-19
Attending: INTERNAL MEDICINE
Payer: COMMERCIAL

## 2017-09-19 DIAGNOSIS — Z12.31 VISIT FOR SCREENING MAMMOGRAM: ICD-10-CM

## 2017-09-19 PROCEDURE — G0202 SCR MAMMO BI INCL CAD: HCPCS | Mod: TC

## 2017-09-29 ENCOUNTER — OFFICE VISIT (OUTPATIENT)
Dept: OPTOMETRY | Facility: CLINIC | Age: 52
End: 2017-09-29
Payer: COMMERCIAL

## 2017-09-29 DIAGNOSIS — H52.203 HYPEROPIA OF BOTH EYES WITH ASTIGMATISM AND PRESBYOPIA: Primary | ICD-10-CM

## 2017-09-29 DIAGNOSIS — H52.4 HYPEROPIA OF BOTH EYES WITH ASTIGMATISM AND PRESBYOPIA: Primary | ICD-10-CM

## 2017-09-29 DIAGNOSIS — H52.03 HYPEROPIA OF BOTH EYES WITH ASTIGMATISM AND PRESBYOPIA: Primary | ICD-10-CM

## 2017-09-29 DIAGNOSIS — E11.9 TYPE 2 DIABETES MELLITUS WITHOUT RETINOPATHY (H): ICD-10-CM

## 2017-09-29 PROCEDURE — 92015 DETERMINE REFRACTIVE STATE: CPT | Performed by: OPTOMETRIST

## 2017-09-29 PROCEDURE — 92014 COMPRE OPH EXAM EST PT 1/>: CPT | Performed by: OPTOMETRIST

## 2017-09-29 ASSESSMENT — REFRACTION_MANIFEST
OD_SPHERE: +1.00
OS_AXIS: 030
OS_SPHERE: +0.25
OS_CYLINDER: +0.75
OD_AXIS: 165
OD_ADD: +2.00
OD_CYLINDER: +0.25
OS_ADD: +2.00

## 2017-09-29 ASSESSMENT — SLIT LAMP EXAM - LIDS
COMMENTS: NORMAL
COMMENTS: NORMAL

## 2017-09-29 ASSESSMENT — VISUAL ACUITY
OS_SC+: -1
OD_SC: 20/120
OS_SC: 20/20
OD_SC: 20/30
OD_SC+: -1
OS_SC: 20/80 -1
METHOD: SNELLEN - LINEAR

## 2017-09-29 ASSESSMENT — REFRACTION_WEARINGRX
OD_SPHERE: +0.75
OS_CYLINDER: +0.25
OS_SPHERE: +0.25
OD_AXIS: 163
OD_CYLINDER: +0.25
OD_ADD: +1.75
OS_SPHERE: +2.00
OS_CYLINDER: +0.25
OD_AXIS: 163
OS_AXIS: 029
SPECS_TYPE: READING ONLY
OD_SPHERE: +2.50
OS_AXIS: 029
OS_ADD: +1.75
OD_CYLINDER: +0.25

## 2017-09-29 ASSESSMENT — CONF VISUAL FIELD
OD_NORMAL: 1
OS_NORMAL: 1

## 2017-09-29 ASSESSMENT — TONOMETRY
OS_IOP_MMHG: 19
IOP_METHOD: APPLANATION
OD_IOP_MMHG: 19

## 2017-09-29 ASSESSMENT — EXTERNAL EXAM - RIGHT EYE: OD_EXAM: NORMAL

## 2017-09-29 ASSESSMENT — CUP TO DISC RATIO
OS_RATIO: 0.25
OD_RATIO: 0.3

## 2017-09-29 ASSESSMENT — EXTERNAL EXAM - LEFT EYE: OS_EXAM: NORMAL

## 2017-09-29 NOTE — PROGRESS NOTES
Chief Complaint   Patient presents with     Eye Exam For Diabetes     Accompanied by self  Lab Results   Component Value Date    A1C 6.9 07/13/2017    A1C 6.7 01/27/2017    A1C 6.8 05/27/2016    A1C 6.4 09/24/2015    A1C 7.1 05/22/2015       Last Eye Exam: 3 years ago  Dilated Previously: Yes    What are you currently using to see?  Readers. +1.25    Distance Vision Acuity: Satisfied with vision    Near Vision Acuity: Not satisfied     Eye Comfort: itchy  Do you use eye drops? : No  Occupation or Hobbies:     Alysia Mendoza, Optometric Tech     Medical, surgical and family histories reviewed and updated 9/29/2017.       OBJECTIVE: See Ophthalmology exam    ASSESSMENT:    ICD-10-CM    1. Hyperopia of both eyes with astigmatism and presbyopia H52.03 EYE EXAM (SIMPLE-NONBILLABLE)    H52.203 REFRACTION    H52.4    2. Type 2 diabetes mellitus without retinopathy (H) E11.9 EYE EXAM (SIMPLE-NONBILLABLE)      PLAN:  Monitor, Keep blood sugar under control  Sent letter to primary care provider regarding diabetes.  Mary Grigsby aware  eye exam results will be sent to Dipak Hooper.  A final glasses prescription was given.  Allow time for adaptation.  The glasses may cause dizziness and affect depth perception for awhile.  Return to clinic 1 year for Comprehensive Vision Exam      Mary Mahmood O.D  [address] [place]  [address]    [phone]

## 2017-09-29 NOTE — LETTER
[facility]  Optometry Department      9/29/2017    Re:  Mary Grigsby  1965   [id number]    Dear Dr. Hooper,    Your patient was seen in our office on 9/29/2017 for a dilated diabetic eye exam.      Findings:    No Retinopathy  OU - Both    Comments:  Mary should return for a comprehensive eye exam in 1 year.    Sincerely,        Mary Mahmood O.D  [address]  [place]  [address]    [phone]

## 2017-09-29 NOTE — PATIENT INSTRUCTIONS
Monitor, Keep blood sugar under control  Sent letter to primary care provider regarding diabetes  A final glasses prescription was given.  Allow time for adaptation.  The glasses may cause dizziness and affect depth perception for awhile.  Return to clinic 1 year for Comprehensive Vision Exam      Mary Mahmood O.D  [address] [place]  [address]    [phone]

## 2017-09-29 NOTE — MR AVS SNAPSHOT
After Visit Summary   9/29/2017    Mary Grigsby    MRN: [id number]           Patient Information     Date Of Birth          1965        Visit Information        Provider Department      9/29/2017 10:20 AM Mary Mahmood OD [facility]        Today's Diagnoses     Hyperopia of both eyes with astigmatism and presbyopia    -  1    Type 2 diabetes mellitus without retinopathy (H)          Care Instructions          Monitor, Keep blood sugar under control  Sent letter to primary care provider regarding diabetes  A final glasses prescription was given.  Allow time for adaptation.  The glasses may cause dizziness and affect depth perception for awhile.  Return to clinic 1 year for Comprehensive Vision Exam      Mary Mahmood O.D  [facility]  [address] [facility]. [place]  [address]    [phone]                    Follow-ups after your visit        Follow-up notes from your care team     Return in about 1 year (around 9/29/2018) for Eye Exam.      Who to contact     If you have questions or need follow up information about today's clinic visit or your schedule please contact [facility] directly at [phone].  Normal or non-critical lab and imaging results will be communicated to you by MyChart, letter or phone within 4 business days after the clinic has received the results. If you do not hear from us within 7 days, please contact the clinic through [employer] or phone. If you have a critical or abnormal lab result, we will notify you by phone as soon as possible.  Submit refill requests through [employer] or call your pharmacy and they will forward the refill request to us. Please allow 3 business days for your refill to be completed.          Additional Information About Your Visit        MyChart Information     [employer] gives you secure access to your electronic health record. If you see a primary care provider, you can also send messages to your  care team and make appointments. If you have questions, please call your primary care clinic.  If you do not have a primary care provider, please call [phone] and they will assist you.        Care EveryWhere ID     This is your Care EveryWhere ID. This could be used by other organizations to access your [facility] medical records  [id number]        Your Vitals Were     Last Period                   06/24/2011            Blood Pressure from Last 3 Encounters:   07/13/17 128/74   01/27/17 137/76   05/27/16 136/70    Weight from Last 3 Encounters:   07/13/17 115.7 kg (255 lb)   01/27/17 112.9 kg (249 lb)   06/20/16 117 kg (258 lb)              We Performed the Following     EYE EXAM (SIMPLE-NONBILLABLE)     REFRACTION        Primary Care Provider Office Phone # Fax #    Dipak Hooper [phone]       [address] [facility] [address]        Equal Access to Services     [facility]: Hadii aad ku hadasho Soomaali, waaxda luqadaha, qaybta kaalmada adeegyada, waxay idiin hayevelynn estela mon . So [facility] [phone].    ATENCIÓN: Si habla español, tiene a de leon disposición servicios gratuitos de asistencia lingüística. Llame al [phone].    We comply with applicable federal civil rights laws and [place] laws. We do not discriminate on the basis of race, color, national origin, age, disability sex, sexual orientation or gender identity.            Thank you!     Thank you for choosing [facility]  for your care. Our goal is always to provide you with excellent care. Hearing back from our patients is one way we can continue to improve our services. Please take a few minutes to complete the written survey that you may receive in the mail after your visit with us. Thank you!             Your Updated Medication List - Protect others around you: Learn how to safely use, store and throw away your medicines at www.disposemymeds.org.          This list is accurate as of: 9/29/17  11:37 AM.  Always use your most recent med list.                   Brand Name Dispense Instructions for use Diagnosis    ADVIL PO      Take by mouth as needed for moderate pain        ALLEGRA PO      Take  by mouth.        aspirin 81 MG tablet     1 tablet    Take 1 tablet by mouth daily.    Type 2 diabetes, HbA1c goal < 7% (H)       blood glucose calibration solution     1 Bottle    1 drop. As needed    Type 2 diabetes, HbA1c goal < 7% (H)       blood glucose monitoring lancets     100 each    1 Device 2 times daily.    Type 2 diabetes, HbA1c goal < 7% (H)       blood glucose monitoring test strip    no brand specified    100 strip    1 strip 2 times daily.    Prediabetes       felodipine ER 5 MG 24 hr tablet    PLENDIL    90 tablet    Take 1 tablet (5 mg) by mouth daily    Essential hypertension with goal blood pressure less than 140/90       hydrochlorothiazide 12.5 MG Tabs tablet     90 tablet    TAKE ONE TABLET BY MOUTH EVERY DAY IN THE MORNING    Essential hypertension with goal blood pressure less than 140/90       levofloxacin 750 MG tablet    LEVAQUIN    10 tablet    Take 1 tablet (750 mg) by mouth daily    Acute sialoadenitis       losartan 100 MG tablet    COZAAR    90 tablet    Take 1 tablet (100 mg) by mouth daily    Essential hypertension with goal blood pressure less than 140/90       metFORMIN 500 MG 24 hr tablet    GLUCOPHAGE-XR    360 tablet    TAKE FOUR TABLETS BY MOUTH EVERY DAY WITH DINNER    Controlled type 2 diabetes mellitus without complication, without long-term current use of insulin (H)       MULTI-VITAMIN PO      1 tablet daily        pravastatin 10 MG tablet    PRAVACHOL    90 tablet    Take 1 tablet (10 mg) by mouth daily    Hyperlipidemia with target LDL less than 100       SINUS RINSE BOTTLE KIT NA      Spray 1 Bottle in nostril as needed.    Post-nasal drainage

## 2017-10-08 ENCOUNTER — HEALTH MAINTENANCE LETTER (OUTPATIENT)
Age: 52
End: 2017-10-08

## 2017-11-02 ENCOUNTER — TELEPHONE (OUTPATIENT)
Dept: INTERNAL MEDICINE | Facility: CLINIC | Age: 52
End: 2017-11-02

## 2017-11-02 NOTE — TELEPHONE ENCOUNTER
Panel Management Review      Patient has the following on her problem list: None      Composite cancer screening  Chart review shows that this patient is due/due soon for the following Colonoscopy or FIT  Summary:    Patient is due/failing the following:   FIT or COLONOSCOPY    Action needed:   Patient needs office visit for FIT or Colonoscopy.    Type of outreach:    Sent letter.    Questions for provider review:    None                                                                                                                                    Kayla Correa CMA       Chart routed to  .

## 2017-11-02 NOTE — LETTER
[address]  [place] [address] [phone]        November 2, 2017          Mary Grigsby,  [address]  [place] [address]        Dear Mary Grigsby      Monitoring and managing your preventative and chronic health conditions are very important to us. Our records indicate that you have not scheduled for a Colonoscopy  which was recommended by Dr. Hooper for Colon Cancer screening but see this was postponed or declined at one of your past office visits. We are sending you this letter to see if you are interested in completing a Colonoscopy yet or if you would rather complete a Fecal Colorectal test (FIT).      If you have received your health care elsewhere, please call the clinic so the information can be documented in your chart.    Please call [phone] or message us through your [employer] account to schedule an appointment or provide information for your chart.     Feel free to contact us if you have any questions or concerns!    I look forward to seeing you and working with you on your health care needs.     Sincerely,         Dipak Hooper / Kayla Correa, CMA

## 2018-03-14 DIAGNOSIS — E78.5 HYPERLIPIDEMIA WITH TARGET LDL LESS THAN 100: ICD-10-CM

## 2018-03-14 DIAGNOSIS — I10 ESSENTIAL HYPERTENSION WITH GOAL BLOOD PRESSURE LESS THAN 140/90: ICD-10-CM

## 2018-03-14 RX ORDER — HYDROCHLOROTHIAZIDE 12.5 MG/1
TABLET ORAL
Qty: 90 TABLET | Refills: 1 | Status: SHIPPED | OUTPATIENT
Start: 2018-03-14 | End: 2018-06-13

## 2018-03-14 RX ORDER — FELODIPINE 5 MG/1
TABLET, EXTENDED RELEASE ORAL
Qty: 90 TABLET | Refills: 1 | Status: SHIPPED | OUTPATIENT
Start: 2018-03-14 | End: 2018-06-13

## 2018-03-14 RX ORDER — LOSARTAN POTASSIUM 100 MG/1
TABLET ORAL
Qty: 90 TABLET | Refills: 1 | Status: SHIPPED | OUTPATIENT
Start: 2018-03-14 | End: 2018-06-13

## 2018-03-14 NOTE — TELEPHONE ENCOUNTER
"Prescription approved per [facility] Refill Protocol.   Requested Prescriptions   Pending Prescriptions Disp Refills     losartan (COZAAR) 100 MG tablet [Pharmacy Med Name: LOSARTAN POTASSIUM 100MG TABS] 90 tablet 1     Sig: TAKE ONE TABLET BY MOUTH EVERY DAY    Angiotensin-II Receptors Passed    3/14/2018  9:00 AM       Passed - Blood pressure under 140/90 in past 12 months    BP Readings from Last 3 Encounters:   07/13/17 128/74   01/27/17 137/76   05/27/16 136/70                Passed - Recent (12 mo) or future (30 days) visit within the authorizing provider's specialty    Patient had office visit in the last 12 months or has a visit in the next 30 days with authorizing provider or within the authorizing provider's specialty.  See \"Patient Info\" tab in inbasket, or \"Choose Columns\" in Meds & Orders section of the refill encounter.           Passed - Patient is age 18 or older       Passed - No active pregnancy on record       Passed - Normal serum creatinine on file in past 12 months    Recent Labs   Lab Test  07/13/17   1525   CR  0.74            Passed - Normal serum potassium on file in past 12 months    Recent Labs   Lab Test  07/13/17   1525   POTASSIUM  3.9                   Passed - No positive pregnancy test in past 12 months        hydrochlorothiazide 12.5 MG TABS tablet [Pharmacy Med Name: HYDROCHLOROTHIAZIDE 12.5MG TABS] 90 tablet 1     Sig: TAKE ONE TABLET BY MOUTH EVERY MORNING    Diuretics (Including Combos) Protocol Passed    3/14/2018  9:00 AM       Passed - Blood pressure under 140/90 in past 12 months    BP Readings from Last 3 Encounters:   07/13/17 128/74   01/27/17 137/76   05/27/16 136/70                Passed - Recent (12 mo) or future (30 days) visit within the authorizing provider's specialty    Patient had office visit in the last 12 months or has a visit in the next 30 days with authorizing provider or within the authorizing provider's specialty.  See \"Patient Info\" tab in inbasket, or \"Choose " "Columns\" in Meds & Orders section of the refill encounter.           Passed - Patient is age 18 or older       Passed - No active pregancy on record       Passed - Normal serum creatinine on file in past 12 months    Recent Labs   Lab Test  07/13/17   1525   CR  0.74             Passed - Normal serum potassium on file in past 12 months    Recent Labs   Lab Test  07/13/17   1525   POTASSIUM  3.9                   Passed - Normal serum sodium on file in past 12 months    Recent Labs   Lab Test  07/13/17   1525   NA  138             Passed - No positive pregnancy test in past 12 months                                        Passed - No abnormal creatine kinase in past 12 months    No lab results found.         Passed - Recent (12 mo) or future (30 days) visit within the authorizing provider's specialty    Patient had office visit in the last 12 months or has a visit in the next 30 days with authorizing provider or within the authorizing provider's specialty.  See \"Patient Info\" tab in inUniregistrysket, or \"Choose Columns\" in Meds & Orders section of the refill encounter.           Passed - Patient is age 18 or older       Passed - No active pregnancy on record       Passed - No positive pregnancy test in past 12 months        felodipine ER (PLENDIL) 5 MG 24 hr tablet [Pharmacy Med Name: FELODIPINE ER 5MG TB24] 90 tablet 1     Sig: TAKE ONE TABLET BY MOUTH EVERY DAY    Calcium Channel Blockers Protocol  Passed    3/14/2018  9:00 AM       Passed - Blood pressure under 140/90 in past 12 months    BP Readings from Last 3 Encounters:   07/13/17 128/74   01/27/17 137/76   05/27/16 136/70                Passed - Recent (12 mo) or future (30 days) visit within the authorizing provider's specialty    Patient had office visit in the last 12 months or has a visit in the next 30 days with authorizing provider or within the authorizing provider's specialty.  See \"Patient Info\" tab in inbasket, or \"Choose Columns\" in Meds & Orders section " of the refill encounter.           Passed - Patient is age 18 or older       Passed - No active pregnancy on record       Passed - Normal serum creatinine on file in past 12 months    Recent Labs   Lab Test  07/13/17   1525   CR  0.74            Passed - No positive pregnancy test in past 12 months

## 2018-03-14 NOTE — TELEPHONE ENCOUNTER
"Routing refill request to provider for review/approval because:  Labs not current:  LDL in last 12 months    Requested Prescriptions   Pending Prescriptions Disp Refills     pravastatin (PRAVACHOL) 10 MG tablet [Pharmacy Med Name: PRAVASTATIN SODIUM 10MG TABS] 90 tablet 1     Sig: TAKE ONE TABLET BY MOUTH EVERY DAY    Statins Protocol Failed    3/14/2018  9:00 AM       Failed - LDL on file in past 12 months    Recent Labs   Lab Test  05/27/16   0714   LDL  103*            Passed - No abnormal creatine kinase in past 12 months    No lab results found.         Passed - Recent (12 mo) or future (30 days) visit within the authorizing provider's specialty    Patient had office visit in the last 12 months or has a visit in the next 30 days with authorizing provider or within the authorizing provider's specialty.  See \"Patient Info\" tab in inbasket, or \"Choose Columns\" in Meds & Orders section of the refill encounter.           Passed - Patient is age 18 or older       Passed - No active pregnancy on record       Passed - No positive pregnancy test in past 12 months     Annita Love RN    "

## 2018-03-15 RX ORDER — PRAVASTATIN SODIUM 10 MG
TABLET ORAL
Qty: 90 TABLET | Refills: 1 | Status: SHIPPED | OUTPATIENT
Start: 2018-03-15 | End: 2018-06-13

## 2018-03-23 ENCOUNTER — OFFICE VISIT (OUTPATIENT)
Dept: OBGYN | Facility: CLINIC | Age: 53
End: 2018-03-23
Payer: COMMERCIAL

## 2018-03-23 VITALS
HEIGHT: 61 IN | OXYGEN SATURATION: 96 % | HEART RATE: 93 BPM | WEIGHT: 251.8 LBS | DIASTOLIC BLOOD PRESSURE: 78 MMHG | BODY MASS INDEX: 47.54 KG/M2 | SYSTOLIC BLOOD PRESSURE: 120 MMHG

## 2018-03-23 DIAGNOSIS — D06.9 SEVERE DYSPLASIA OF CERVIX (CIN III): ICD-10-CM

## 2018-03-23 DIAGNOSIS — B97.7 HIGH RISK HPV INFECTION: ICD-10-CM

## 2018-03-23 DIAGNOSIS — Z12.11 SPECIAL SCREENING FOR MALIGNANT NEOPLASMS, COLON: Primary | ICD-10-CM

## 2018-03-23 PROCEDURE — 87624 HPV HI-RISK TYP POOLED RSLT: CPT | Performed by: OBSTETRICS & GYNECOLOGY

## 2018-03-23 PROCEDURE — 88175 CYTOPATH C/V AUTO FLUID REDO: CPT | Performed by: OBSTETRICS & GYNECOLOGY

## 2018-03-23 PROCEDURE — 99396 PREV VISIT EST AGE 40-64: CPT | Performed by: OBSTETRICS & GYNECOLOGY

## 2018-03-23 NOTE — NURSING NOTE
"Chief Complaint   Patient presents with     Physical     Annual Female       Initial /78 (BP Location: Right arm, Cuff Size: Adult Large)  Pulse 93  Ht 5' 1\" (1.549 m)  Wt 251 lb 12.8 oz (114.2 kg)  LMP 06/24/2011  SpO2 96%  BMI 47.58 kg/m2 Estimated body mass index is 47.58 kg/(m^2) as calculated from the following:    Height as of this encounter: 5' 1\" (1.549 m).    Weight as of this encounter: 251 lb 12.8 oz (114.2 kg).  Medication Reconciliation: complete   AARON Randolph 3/23/2018         "

## 2018-03-23 NOTE — MR AVS SNAPSHOT
After Visit Summary   3/23/2018    Mary Grigsby    MRN: [id number]           Patient Information     Date Of Birth          1965        Visit Information        Provider Department      3/23/2018 7:30 AM Petey Peraza MD [facility]        Today's Diagnoses     Special screening for malignant neoplasms, colon    -  1    Severe dysplasia of cervix (GUSTABO III)        High risk HPV infection           Follow-ups after your visit        Follow-up notes from your care team     Return in about 1 year (around 3/23/2019) for Physical Exam.      Future tests that were ordered for you today     Open Future Orders        Priority Expected Expires Ordered    Fecal colorectal cancer screen (FIT) Routine 4/13/2018 6/15/2018 3/23/2018            Who to contact     If you have questions or need follow up information about today's clinic visit or your schedule please contact [facility] directly at [phone].  Normal or non-critical lab and imaging results will be communicated to you by MyChart, letter or phone within 4 business days after the clinic has received the results. If you do not hear from us within 7 days, please contact the clinic through [employer] or phone. If you have a critical or abnormal lab result, we will notify you by phone as soon as possible.  Submit refill requests through [employer] or call your pharmacy and they will forward the refill request to us. Please allow 3 business days for your refill to be completed.          Additional Information About Your Visit        MyChart Information     [employer] gives you secure access to your electronic health record. If you see a primary care provider, you can also send messages to your care team and make appointments. If you have questions, please call your primary care clinic.  If you do not have a primary care provider, please call [phone] and they will assist you.        Care EveryWhere ID     This is your  "Care EveryWhere ID. This could be used by other organizations to access your [facility] medical records  [id number]        Your Vitals Were     Pulse Height Last Period Pulse Oximetry BMI (Body Mass Index)       93 5' 1\" (1.549 m) 06/24/2011 96% 47.58 kg/m2        Blood Pressure from Last 3 Encounters:   03/23/18 120/78   07/13/17 128/74   01/27/17 137/76    Weight from Last 3 Encounters:   03/23/18 251 lb 12.8 oz (114.2 kg)   07/13/17 255 lb (115.7 kg)   01/27/17 249 lb (112.9 kg)              We Performed the Following     HPV High Risk Types DNA Cervical     Pap imaged thin layer diagnostic with HPV (select HPV order below)        Primary Care Provider Office Phone # Fax #    Dipak Hooper [phone] [address] [facility] [address]        Equal Access to Services     [facility] : Hadii aad ku hadasho Soomaali, waaxda luqadaha, qaybta kaalmada adeegyada, waxay nellyin hayviral mon . So [facility] [phone].    ATENCIÓN: Si wendy raza, tiene a de leon disposición servicios gratuitos de asistencia lingüística. Llame al [phone].    We comply with applicable federal civil rights laws and [place] laws. We do not discriminate on the basis of race, color, national origin, age, disability, sex, sexual orientation, or gender identity.            Thank you!     Thank you for choosing [facility]  for your care. Our goal is always to provide you with excellent care. Hearing back from our patients is one way we can continue to improve our services. Please take a few minutes to complete the written survey that you may receive in the mail after your visit with us. Thank you!             Your Updated Medication List - Protect others around you: Learn how to safely use, store and throw away your medicines at www.disposemymeds.org.          This list is accurate as of 3/23/18  8:21 AM.  Always use your most recent med list.                   Brand Name Dispense " Instructions for use Diagnosis    ADVIL PO      Take by mouth as needed for moderate pain        ALLEGRA PO      Take  by mouth.        aspirin 81 MG tablet     1 tablet    Take 1 tablet by mouth daily.    Type 2 diabetes, HbA1c goal < 7% (H)       blood glucose calibration solution     1 Bottle    1 drop. As needed    Type 2 diabetes, HbA1c goal < 7% (H)       blood glucose monitoring lancets     100 each    1 Device 2 times daily.    Type 2 diabetes, HbA1c goal < 7% (H)       blood glucose monitoring test strip    no brand specified    100 strip    1 strip 2 times daily.    Prediabetes       felodipine ER 5 MG 24 hr tablet    PLENDIL    90 tablet    TAKE ONE TABLET BY MOUTH EVERY DAY    Essential hypertension with goal blood pressure less than 140/90       hydrochlorothiazide 12.5 MG Tabs tablet     90 tablet    TAKE ONE TABLET BY MOUTH EVERY MORNING    Essential hypertension with goal blood pressure less than 140/90       levofloxacin 750 MG tablet    LEVAQUIN    10 tablet    Take 1 tablet (750 mg) by mouth daily    Acute sialoadenitis       losartan 100 MG tablet    COZAAR    90 tablet    TAKE ONE TABLET BY MOUTH EVERY DAY    Essential hypertension with goal blood pressure less than 140/90       metFORMIN 500 MG 24 hr tablet    GLUCOPHAGE-XR    360 tablet    TAKE FOUR TABLETS BY MOUTH EVERY DAY WITH DINNER    Controlled type 2 diabetes mellitus without complication, without long-term current use of insulin (H)       MULTI-VITAMIN PO      1 tablet daily        pravastatin 10 MG tablet    PRAVACHOL    90 tablet    TAKE ONE TABLET BY MOUTH EVERY DAY    Hyperlipidemia with target LDL less than 100       SINUS RINSE BOTTLE KIT NA      Spray 1 Bottle in nostril as needed.    Post-nasal drainage

## 2018-03-23 NOTE — PROGRESS NOTES
Mary Grigsby is a 52 year old female , who presents for annual exam.   No unusual bleeding, no incontinence, or unusual discharge.     Last cholesterol:   Recent Labs   Lab Test  16   0714  05/22/15   0753   13   0709   CHOL  173  161   < >  178   HDL  40*  44*   < >  43*   LDL  103*  90   < >  113   TRIG  149  133   --   109   CHOLHDLRATIO   --   3.7   --   4.1    < > = values in this interval not displayed.     Past Medical History:   Diagnosis Date     Cervical high risk HPV (human papillomavirus) test positive 2015, 16    type 16     Cervical lymphadenopathy 2012     Cervical lymphadenopathy 2012     Cervical lymphadenopathy 2012     Deviated septum     had septoplasty and endo. sinus surg.  per [facility]     Family history of diabetes mellitus     mother     Family history of stroke     multiple tiny cva's     House dust mite allergy      Hyperlipidemia LDL goal < 100      Hypertension goal BP (blood pressure) < 140/90      Obesity      Pap smear of cervix with ASCUS, cannot exclude HGSIL 3/2013     Papanicolaou smear of cervix with low grade squamous intraepithelial lesion (LGSIL) 2011, 10/2011    colp 3/22/11 - GUSTABO 1-2     Rhinitis, allergic to other allergen      Seasonal allergic rhinitis 08 skin tests    CR/DM/M/T/G/W     Smoking      Type 2 diabetes, HbA1c goal < 7% (H)        Past Surgical History:   Procedure Laterality Date     CRYOTHERAPY  3/30/2011    cervical     HC COLP CERVIX/UPPER VAGINA W BX CERVIX  2010, 3/2011    neg dysplasia, GUSTABO 1-2     HC REPAIR OF NASAL SEPTUM  2/3/09-per Dr. Harley    antrostomy, ethmoidectomy, septoplasty and turbinate reduction     LAPAROSCOPIC ASSISTED HYSTERECTOMY VAGINAL  3/12/12    [facility]     TONSILLECTOMY & ADENOIDECTOMY  1973    age 8       Obstetric History       T2      L2     SAB0   TAB0   Ectopic0   Multiple0   Live Births2       # Outcome Date GA Lbr Raffi/2nd Weight Sex Delivery  Anes PTL Lv   2 Term 09/15/93 40w0d  6 lb 12 oz (3.062 kg) M    DAYNE   1 Term 10/04/90 40w0d  7 lb 6 oz (3.345 kg) F    DAYNE        Gyn History:  Gynecological History         Patient's last menstrual period was 2011.     STD:  HPV/no PID/she has used the Mirena IUD      history of abnormal pap smear:  yes  Last pap:   Lab Results   Component Value Date    PAP NIL 2016       HIGH RISK HPV type 16 positive.        Current Outpatient Prescriptions   Medication Sig Dispense Refill     pravastatin (PRAVACHOL) 10 MG tablet TAKE ONE TABLET BY MOUTH EVERY DAY 90 tablet 1     losartan (COZAAR) 100 MG tablet TAKE ONE TABLET BY MOUTH EVERY DAY 90 tablet 1     hydrochlorothiazide 12.5 MG TABS tablet TAKE ONE TABLET BY MOUTH EVERY MORNING 90 tablet 1     felodipine ER (PLENDIL) 5 MG 24 hr tablet TAKE ONE TABLET BY MOUTH EVERY DAY 90 tablet 1     metFORMIN (GLUCOPHAGE-XR) 500 MG 24 hr tablet TAKE FOUR TABLETS BY MOUTH EVERY DAY WITH DINNER 360 tablet 1     Ibuprofen (ADVIL PO) Take by mouth as needed for moderate pain       glucose blood VI test strips strip 1 strip 2 times daily. 100 strip 11     Fexofenadine HCl (ALLEGRA PO) Take  by mouth.       aspirin 81 MG tablet Take 1 tablet by mouth daily. 1 tablet 3     FREESTYLE LANCETS MISC 1 Device 2 times daily. 100 each 11     Blood Glucose Calibration (FREESTYLE CONTROL SOLUTION) LIQD 1 drop. As needed 1 Bottle 3     Hypertonic Nasal Wash (SINUS RINSE BOTTLE KIT NA) Spray 1 Bottle in nostril as needed.       MULTI-VITAMIN OR 1 tablet daily       levofloxacin (LEVAQUIN) 750 MG tablet Take 1 tablet (750 mg) by mouth daily (Patient not taking: Reported on 2017) 10 tablet 0       Allergies   Allergen Reactions     Amoxicillin Hives     Augmentin      Erythromycin        Social History     Social History     Marital status:      Spouse name: Demetris     Number of children: 2     Years of education: N/A     Occupational History     [employer]   "    Social History Main Topics     Smoking status: Former Smoker     Packs/day: 0.50     Years: 20.00     Types: Cigarettes     Start date: 6/1/1980     Quit date: 2/24/2013     Smokeless tobacco: Never Used      Comment: still has occasionally      Alcohol use No     Drug use: No     Sexual activity: Yes     Partners: Male     Birth control/ protection: None     Other Topics Concern      Service No     Blood Transfusions No     Caffeine Concern No     Occupational Exposure No     Hobby Hazards No     Sleep Concern No     Stress Concern No     Special Diet No     Exercise Yes     1-3 times/week     Seat Belt Yes     Self-Exams Yes     Parent/Sibling W/ Cabg, Mi Or Angioplasty Before 65f 55m? No     Social History Narrative       Family History   Problem Relation Age of Onset     CANCER Father      Other Cancer Father      Lung     Hypertension Father      age 72     Hypertension Sister      age 35     Thyroid Disease Sister      CEREBROVASCULAR DISEASE Maternal Grandmother      Asthma Son      Thyroid Disease Sister      Thyroid Disease Sister      Thyroid Disease Sister      DIABETES Mother      Hypertension Mother      age 34     Hypertension Sister      Thyroid Disease Sister      Asthma Son      Glaucoma No family hx of      Macular Degeneration No family hx of          ROS:  All negative except as above.      EXAM:  /78 (BP Location: Right arm, Cuff Size: Adult Large)  Pulse 93  Ht 5' 1\" (1.549 m)  Wt 251 lb 12.8 oz (114.2 kg)  LMP 06/24/2011  SpO2 96%  BMI 47.58 kg/m2  General:  WNWD female, NAD  Alert  Oriented x 3  Gait:  Normal  Skin:  Normal skin turgor  Neurologic:  CN grossly intact, good sensation.    HEENT:  NC/AT, EOMI  Neck:  No masses palpated, symmetrical, carotids +2/4, no bruits heard  Heart:  RRR  Lungs:  Clear   Breasts:  Symmetrical, no dimpling noted, no masses palpated, no discharge expressed  Abdomen:  Non-tender, non-distended.  Vulva: No external lesions, normal hair " distribution, no adenopathy  BUS:  Normal, no masses noted  Urethra:  No hypermobility noted  Urethral meatus:  No masses noted  Vagina: Moist, pink, no abnormal discharge, well rugated, no lesions  Cervix: absent   Uterus: absent   Ovaries/Bimanual: No mass, non-tender, mobile  Perianal:  No masses noted.    Rectal exam:   Declined   Extremities:  No clubbing, cyanosis, or edema      ASSESSMENT/PLAN   Annual examination with pap smear   FIT test   Low fat diet, weight bearing exercises and self breast exams on a monthly basis have been recommended.  I have discussed with patient the risks, benefits, medications, treatment options and modalities.   I have instructed the patient to call or schedule a follow-up appointment if any problems.

## 2018-03-27 LAB
COPATH REPORT: NORMAL
PAP: NORMAL

## 2018-03-29 LAB
FINAL DIAGNOSIS: ABNORMAL
HPV HR 12 DNA CVX QL NAA+PROBE: NEGATIVE
HPV16 DNA SPEC QL NAA+PROBE: POSITIVE
HPV18 DNA SPEC QL NAA+PROBE: NEGATIVE
SPECIMEN DESCRIPTION: ABNORMAL
SPECIMEN SOURCE CVX/VAG CYTO: ABNORMAL

## 2018-06-13 NOTE — PROGRESS NOTES
SUBJECTIVE:   Mary Grigsyb is a 52 year old female who presents to clinic today for the following health issues:    Diabetes Follow-up    Denies numbness or paresthesias.   She would also like to have her thyroid and vitamin D labs done today.     BP Readings from Last 2 Encounters:   06/15/18 114/68   03/23/18 120/78     Hemoglobin A1C (%)   Date Value   06/15/2018 9.6 (H)   07/13/2017 6.9 (H)     LDL Cholesterol Calculated (mg/dL)   Date Value   05/27/2016 103 (H)   05/22/2015 90       Problem list and histories reviewed & adjusted, as indicated.  Additional history: as documented    Patient Active Problem List   Diagnosis     Hypertension goal BP (blood pressure) < 140/90     Ex-smoker     Family history of diabetes mellitus     Hyperlipidemia with target LDL less than 100     Seasonal allergic rhinitis     Rhinitis, allergic to other allergen     House dust mite allergy     obesity     Severe dysplasia of cervix (GUSTABO III)     Family history of thyroid disease     Cervical lymphadenopathy     Controlled type 2 diabetes mellitus without complication, without long-term current use of insulin (H)     Bilateral lower extremity edema     Past Surgical History:   Procedure Laterality Date     CRYOTHERAPY  3/30/2011    cervical     HC COLP CERVIX/UPPER VAGINA W BX CERVIX  1/2010, 3/2011    neg dysplasia, GUSTABO 1-2     HC REPAIR OF NASAL SEPTUM  2/3/09-per Dr. Harley    antrostomy, ethmoidectomy, septoplasty and turbinate reduction     LAPAROSCOPIC ASSISTED HYSTERECTOMY VAGINAL  3/12/12    [facility]     TONSILLECTOMY & ADENOIDECTOMY  1973    age 8       Social History   Substance Use Topics     Smoking status: Former Smoker     Packs/day: 0.50     Years: 20.00     Types: Cigarettes     Start date: 6/1/1980     Quit date: 2/24/2013     Smokeless tobacco: Never Used      Comment: still has occasionally      Alcohol use No     Family History   Problem Relation Age of Onset     CANCER Father      Other Cancer  Father      passed Nov.2016     Hypertension Father      age 72     Hypertension Sister      age 35     Thyroid Disease Sister      CEREBROVASCULAR DISEASE Maternal Grandmother      Asthma Son      Thyroid Disease Sister      Thyroid Disease Sister      Thyroid Disease Sister      DIABETES Mother      Hypertension Mother      age 34     Hypertension Sister      Thyroid Disease Sister      Asthma Son      Glaucoma No family hx of      Macular Degeneration No family hx of          Current Outpatient Prescriptions   Medication Sig Dispense Refill     aspirin 81 MG tablet Take 1 tablet by mouth daily. 1 tablet 3     Blood Glucose Calibration (FREESTYLE CONTROL SOLUTION) LIQD 1 drop. As needed 1 Bottle 3     felodipine ER (PLENDIL) 5 MG 24 hr tablet TAKE ONE TABLET BY MOUTH EVERY DAY 90 tablet 1     Fexofenadine HCl (ALLEGRA PO) Take  by mouth.       FREESTYLE LANCETS MISC 1 Device 2 times daily. 100 each 11     glucose blood VI test strips strip 1 strip 2 times daily. 100 strip 11     hydrochlorothiazide 12.5 MG TABS tablet TAKE ONE TABLET BY MOUTH EVERY MORNING 90 tablet 1     Hypertonic Nasal Wash (SINUS RINSE BOTTLE KIT NA) Spray 1 Bottle in nostril as needed.       Ibuprofen (ADVIL PO) Take by mouth as needed for moderate pain       losartan (COZAAR) 100 MG tablet TAKE ONE TABLET BY MOUTH EVERY DAY 90 tablet 1     metFORMIN (GLUCOPHAGE-XR) 500 MG 24 hr tablet TAKE FOUR TABLETS BY MOUTH EVERY DAY WITH DINNER 360 tablet 1     MULTI-VITAMIN OR 1 tablet daily       pravastatin (PRAVACHOL) 10 MG tablet TAKE ONE TABLET BY MOUTH EVERY DAY 90 tablet 1     Allergies   Allergen Reactions     Amoxicillin Hives     Augmentin      Erythromycin      BP Readings from Last 3 Encounters:   06/15/18 114/68   03/23/18 120/78   07/13/17 128/74    Wt Readings from Last 3 Encounters:   06/15/18 116.1 kg (256 lb)   03/23/18 114.2 kg (251 lb 12.8 oz)   07/13/17 115.7 kg (255 lb)         Reviewed and updated as needed this visit by clinical  "staff  Tobacco  Allergies  Meds  Med Hx  Surg Hx  Fam Hx  Soc Hx      Reviewed and updated as needed this visit by Provider       ROS:  Constitutional, HEENT, cardiovascular, pulmonary, GI, , musculoskeletal, neuro, skin, endocrine and psych systems are negative, except as otherwise noted.    This document serves as a record of the services and decisions personally performed and made by Dipak Hooper MD. It was created on his behalf by Bisi Beverly, a trained medical scribe. The creation of this document is based on the provider's statements to the medical scribe.  Bisi Beverly Hillary 15, 2018 11:01 AM    OBJECTIVE:     /68  Pulse 88  Temp 97.3  F (36.3  C) (Oral)  Resp 16  Ht 1.549 m (5' 1\")  Wt 116.1 kg (256 lb)  LMP 06/24/2011  SpO2 94%  BMI 48.37 kg/m2  Body mass index is 48.37 kg/(m^2).  GENERAL: healthy, alert and no distress, morbidly obese  RESP: lungs clear to auscultation - no rales, rhonchi or wheezes  CV: regular rate and rhythm, normal S1 S2, no S3 or S4, no murmur, click or rub, no peripheral edema and peripheral pulses strong  MS: no gross musculoskeletal defects noted  SKIN: no suspicious lesions or rashes to visible skin  PSYCH: mentation appears normal, affect normal/bright  DIABETIC FOOT:  Sensation- normal, Skin - Normal, and ROM - Normal    Diagnostic Test Results:  Results for orders placed or performed in visit on 06/15/18 (from the past 24 hour(s))   HEMOGLOBIN A1C   Result Value Ref Range    Hemoglobin A1C 9.6 (H) 0 - 5.6 %       ASSESSMENT/PLAN:         (E11.9) Controlled type 2 diabetes mellitus without complication, without long-term current use of insulin (H)  (primary encounter diagnosis)  Comment: disease has really jump upwards into out of control territory . Recommended adding a new medication, either Trulicity or Victoza. Will get her connected with the pharmacistNina, to review how to do an injection using the pens. Plan to recheck her A1c in 4 months.   Plan: " HEMOGLOBIN A1C, Albumin Random Urine         Quantitative with Creat Ratio, metFORMIN         (GLUCOPHAGE-XR) 500 MG 24 hr tablet, Basic         metabolic panel, dulaglutide (TRULICITY) 0.75         MG/0.5ML pen, dulaglutide (TRULICITY) 1.5         MG/0.5ML pen, MED THERAPY MANAGE REFERRAL,         Vitamin D Deficiency            (Z11.4) Screening for HIV (human immunodeficiency virus)  Comment: routine screening   Plan: HIV Screening, Vitamin D Deficiency            (Z13.89) Screening for diabetic peripheral neuropathy  Comment: no diabetes neuropathy   Plan: FOOT EXAM  NO CHARGE [24721.114]            (I10) Essential hypertension with goal blood pressure less than 140/90  Comment: controlled within acceptable limits   Plan: felodipine ER (PLENDIL) 5 MG 24 hr tablet,         hydrochlorothiazide 12.5 MG TABS tablet,         losartan (COZAAR) 100 MG tablet, Basic         metabolic panel, Vitamin D Deficiency            (E78.5) Hyperlipidemia with target LDL less than 100  Comment: controlled within acceptable limits . Continue current plan of care    Plan: Lipid panel reflex to direct LDL Non-fasting,         TSH WITH FREE T4 REFLEX, pravastatin         (PRAVACHOL) 10 MG tablet            (R53.83) Fatigue, unspecified type  Comment: multifactorial suspect but will check Vitamin D levels    Plan: Vitamin D Deficiency              The information in this document, created by the medical scribe for me, accurately reflects the services I personally performed and the decisions made by me. I have reviewed and approved this document for accuracy.     Dipak Hooper MD  [facility]

## 2018-06-15 ENCOUNTER — OFFICE VISIT (OUTPATIENT)
Dept: FAMILY MEDICINE | Facility: CLINIC | Age: 53
End: 2018-06-15
Payer: COMMERCIAL

## 2018-06-15 VITALS
RESPIRATION RATE: 16 BRPM | DIASTOLIC BLOOD PRESSURE: 68 MMHG | HEIGHT: 61 IN | OXYGEN SATURATION: 94 % | HEART RATE: 88 BPM | TEMPERATURE: 97.3 F | BODY MASS INDEX: 48.33 KG/M2 | SYSTOLIC BLOOD PRESSURE: 114 MMHG | WEIGHT: 256 LBS

## 2018-06-15 DIAGNOSIS — E55.9 VITAMIN D DEFICIENCY: ICD-10-CM

## 2018-06-15 DIAGNOSIS — R53.83 FATIGUE, UNSPECIFIED TYPE: ICD-10-CM

## 2018-06-15 DIAGNOSIS — Z11.4 SCREENING FOR HIV (HUMAN IMMUNODEFICIENCY VIRUS): ICD-10-CM

## 2018-06-15 DIAGNOSIS — E78.5 HYPERLIPIDEMIA WITH TARGET LDL LESS THAN 100: ICD-10-CM

## 2018-06-15 DIAGNOSIS — E11.9 CONTROLLED TYPE 2 DIABETES MELLITUS WITHOUT COMPLICATION, WITHOUT LONG-TERM CURRENT USE OF INSULIN (H): Primary | ICD-10-CM

## 2018-06-15 DIAGNOSIS — Z13.89 SCREENING FOR DIABETIC PERIPHERAL NEUROPATHY: ICD-10-CM

## 2018-06-15 DIAGNOSIS — I10 ESSENTIAL HYPERTENSION WITH GOAL BLOOD PRESSURE LESS THAN 140/90: ICD-10-CM

## 2018-06-15 LAB
ANION GAP SERPL CALCULATED.3IONS-SCNC: 10 MMOL/L (ref 3–14)
BUN SERPL-MCNC: 15 MG/DL (ref 7–30)
CALCIUM SERPL-MCNC: 9.3 MG/DL (ref 8.5–10.1)
CHLORIDE SERPL-SCNC: 97 MMOL/L (ref 94–109)
CHOLEST SERPL-MCNC: 177 MG/DL
CO2 SERPL-SCNC: 26 MMOL/L (ref 20–32)
CREAT SERPL-MCNC: 0.53 MG/DL (ref 0.52–1.04)
CREAT UR-MCNC: 128 MG/DL
DEPRECATED CALCIDIOL+CALCIFEROL SERPL-MC: 8 UG/L (ref 20–75)
GFR SERPL CREATININE-BSD FRML MDRD: >90 ML/MIN/1.7M2
GLUCOSE SERPL-MCNC: 333 MG/DL (ref 70–99)
HBA1C MFR BLD: 9.6 % (ref 0–5.6)
HDLC SERPL-MCNC: 36 MG/DL
HIV 1+2 AB+HIV1 P24 AG SERPL QL IA: NONREACTIVE
LDLC SERPL CALC-MCNC: 105 MG/DL
MICROALBUMIN UR-MCNC: 18 MG/L
MICROALBUMIN/CREAT UR: 14.06 MG/G CR (ref 0–25)
NONHDLC SERPL-MCNC: 141 MG/DL
POTASSIUM SERPL-SCNC: 3.7 MMOL/L (ref 3.4–5.3)
SODIUM SERPL-SCNC: 133 MMOL/L (ref 133–144)
T4 FREE SERPL-MCNC: 1 NG/DL (ref 0.76–1.46)
TRIGL SERPL-MCNC: 179 MG/DL
TSH SERPL DL<=0.005 MIU/L-ACNC: 4.51 MU/L (ref 0.4–4)

## 2018-06-15 PROCEDURE — 83036 HEMOGLOBIN GLYCOSYLATED A1C: CPT | Performed by: INTERNAL MEDICINE

## 2018-06-15 PROCEDURE — 36415 COLL VENOUS BLD VENIPUNCTURE: CPT | Performed by: INTERNAL MEDICINE

## 2018-06-15 PROCEDURE — 82043 UR ALBUMIN QUANTITATIVE: CPT | Performed by: INTERNAL MEDICINE

## 2018-06-15 PROCEDURE — 84439 ASSAY OF FREE THYROXINE: CPT | Performed by: INTERNAL MEDICINE

## 2018-06-15 PROCEDURE — 99214 OFFICE O/P EST MOD 30 MIN: CPT | Performed by: INTERNAL MEDICINE

## 2018-06-15 PROCEDURE — 80061 LIPID PANEL: CPT | Performed by: INTERNAL MEDICINE

## 2018-06-15 PROCEDURE — 82306 VITAMIN D 25 HYDROXY: CPT | Performed by: INTERNAL MEDICINE

## 2018-06-15 PROCEDURE — 84443 ASSAY THYROID STIM HORMONE: CPT | Performed by: INTERNAL MEDICINE

## 2018-06-15 PROCEDURE — 99207 C FOOT EXAM  NO CHARGE: CPT | Performed by: INTERNAL MEDICINE

## 2018-06-15 PROCEDURE — 87389 HIV-1 AG W/HIV-1&-2 AB AG IA: CPT | Performed by: INTERNAL MEDICINE

## 2018-06-15 PROCEDURE — 80048 BASIC METABOLIC PNL TOTAL CA: CPT | Performed by: INTERNAL MEDICINE

## 2018-06-15 RX ORDER — METFORMIN HCL 500 MG
TABLET, EXTENDED RELEASE 24 HR ORAL
Qty: 360 TABLET | Refills: 1 | Status: SHIPPED | OUTPATIENT
Start: 2018-06-15 | End: 2018-12-24

## 2018-06-15 RX ORDER — PRAVASTATIN SODIUM 10 MG
10 TABLET ORAL DAILY
Qty: 90 TABLET | Refills: 1 | Status: SHIPPED | OUTPATIENT
Start: 2018-06-15 | End: 2018-06-19 | Stop reason: DRUGHIGH

## 2018-06-15 RX ORDER — FELODIPINE 5 MG/1
5 TABLET, EXTENDED RELEASE ORAL DAILY
Qty: 90 TABLET | Refills: 1 | Status: ON HOLD | OUTPATIENT
Start: 2018-06-15 | End: 2018-10-05

## 2018-06-15 RX ORDER — HYDROCHLOROTHIAZIDE 12.5 MG/1
12.5 TABLET ORAL EVERY MORNING
Qty: 90 TABLET | Refills: 1 | Status: ON HOLD | OUTPATIENT
Start: 2018-06-15 | End: 2018-10-05

## 2018-06-15 RX ORDER — LOSARTAN POTASSIUM 100 MG/1
100 TABLET ORAL DAILY
Qty: 90 TABLET | Refills: 1 | Status: ON HOLD | OUTPATIENT
Start: 2018-06-15 | End: 2018-10-05

## 2018-06-15 NOTE — MR AVS SNAPSHOT
After Visit Summary   6/15/2018    Mary Grigsby    MRN: [id number]           Patient Information     Date Of Birth          1965        Visit Information        Provider Department      6/15/2018 10:10 AM Dipak Hooper MD [facility]        Today's Diagnoses     Controlled type 2 diabetes mellitus without complication, without long-term current use of insulin (H)    -  1    Screening for HIV (human immunodeficiency virus)        Screening for diabetic peripheral neuropathy        Essential hypertension with goal blood pressure less than 140/90        Hyperlipidemia with target LDL less than 100        Fatigue, unspecified type          Care Instructions    Lab Results   Component Value Date    A1C 9.6 06/15/2018    A1C 6.9 07/13/2017    A1C 6.7 01/27/2017    A1C 6.8 05/27/2016    A1C 6.4 09/24/2015     [facility]    If you have any questions regarding to your visit please contact your care team:     Team Pink:   Clinic Hours Telephone Number   Internal Medicine:  Dr. Lizzie Wray NP       7am-7pm  Monday - Thursday   7am-5pm  Fridays  [phone]  (Appointment scheduling available 24/7)    Questions about your recent visit?  Team Line  [phone]   Urgent Care - [place] - 11am-9pm Monday-Friday Saturday-Sunday- 9am-5pm   [place] - 5pm-9pm Monday-Friday Saturday-Sunday- 9am-5pm  [phone] - [place]  [phone] - [place]       What options do I have for a visit other than an office visit? We offer electronic visits (e-visits) and telephone visits, when medically appropriate.  Please check with your medical insurance to see if these types of visits are covered, as you will be responsible for any charges that are not paid by your insurance.      You can use [employer] (secure electronic communication) to access to your chart, send your primary care provider a message, or make an  appointment. Ask a team member how to get started.     For a price quote for your services, please call our Consumer Price Line at [phone] or our Imaging Cost estimation line at [phone] (for imaging tests).  Nina LUA CMA ([facility])            Follow-ups after your visit        Additional Services     MED THERAPY MANAGE REFERRAL       Your provider has referred you to: **[facility] Scheduling (numerous locations) [phone]   [email or web address]    Reason for Referral: initiation of (glucagon-like peptide-1) GLP-1 agonist therapy , training for injections    The [facility] department will contact you to schedule an appointment.  You may also schedule the appointment by calling [phone].  For [place] [facility] [place] patients, please call [phone] to confirm/schedule your appointment on the next business day.    This service is designed to help you get the most from your medications.  A specially trained Pharmacist will work closely with you and your providers to solve any questions, concerns, issues or problems related to your medications.    Please bring all of your prescription and non-prescription medications (such as vitamins, over-the-counter medications, and herbals) or a detailed medication list to your appointment.    If you have a glucose meter or other home monitoring information, please also bring this to your appointment (i.e. blood glucose log, blood pressure log, pain log, etc.).                  Who to contact     If you have questions or need follow up information about today's clinic visit or your schedule please contact [facility] directly at [phone].  Normal or non-critical lab and imaging results will be communicated to you by MyChart, letter or phone within 4 business days after the clinic has received the results. If you do not hear from us within 7 days,  "please contact the clinic through [employer] or phone. If you have a critical or abnormal lab result, we will notify you by phone as soon as possible.  Submit refill requests through [employer] or call your pharmacy and they will forward the refill request to us. Please allow 3 business days for your refill to be completed.          Additional Information About Your Visit        [employer] Information     [employer] gives you secure access to your electronic health record. If you see a primary care provider, you can also send messages to your care team and make appointments. If you have questions, please call your primary care clinic.  If you do not have a primary care provider, please call [phone] and they will assist you.        Care EveryWhere ID     This is your Care EveryWhere ID. This could be used by other organizations to access your [facility] medical records  [id number]        Your Vitals Were     Pulse Temperature Respirations Height Last Period Pulse Oximetry    88 97.3  F (36.3  C) (Oral) 16 5' 1\" (1.549 m) 06/24/2011 94%    BMI (Body Mass Index)                   48.37 kg/m2            Blood Pressure from Last 3 Encounters:   06/15/18 114/68   03/23/18 120/78   07/13/17 128/74    Weight from Last 3 Encounters:   06/15/18 256 lb (116.1 kg)   03/23/18 251 lb 12.8 oz (114.2 kg)   07/13/17 255 lb (115.7 kg)              We Performed the Following     Albumin Random Urine Quantitative with Creat Ratio     Basic metabolic panel     FOOT EXAM  NO CHARGE [66995.114]     HEMOGLOBIN A1C     HIV Screening     Lipid panel reflex to direct LDL Non-fasting     MED THERAPY MANAGE REFERRAL     TSH WITH FREE T4 REFLEX     Vitamin D Deficiency          Today's Medication Changes          These changes are accurate as of 6/15/18 11:17 AM.  If you have any questions, ask your nurse or doctor.               Start taking these medicines.        Dose/Directions    * dulaglutide 0.75 MG/0.5ML pen   Commonly known as:  TRULICITY "   Used for:  Controlled type 2 diabetes mellitus without complication, without long-term current use of insulin (H)   Started by:  Dipak Hooper MD        Dose:  0.75 mg   Inject 0.75 mg Subcutaneous every 7 days   Quantity:  0.5 mL   Refills:  0       * dulaglutide 1.5 MG/0.5ML pen   Commonly known as:  TRULICITY   Used for:  Controlled type 2 diabetes mellitus without complication, without long-term current use of insulin (H)   Started by:  Dipak Hooper MD        Dose:  1.5 mg   Inject 1.5 mg Subcutaneous every 7 days   Quantity:  0.5 mL   Refills:  2       * Notice:  This list has 2 medication(s) that are the same as other medications prescribed for you. Read the directions carefully, and ask your doctor or other care provider to review them with you.      These medicines have changed or have updated prescriptions.        Dose/Directions    felodipine ER 5 MG 24 hr tablet   Commonly known as:  PLENDIL   This may have changed:  See the new instructions.   Used for:  Essential hypertension with goal blood pressure less than 140/90   Changed by:  Dipak Hooper MD        Dose:  5 mg   Take 1 tablet (5 mg) by mouth daily   Quantity:  90 tablet   Refills:  1       hydrochlorothiazide 12.5 MG Tabs tablet   This may have changed:  See the new instructions.   Used for:  Essential hypertension with goal blood pressure less than 140/90   Changed by:  Dipak Hooper MD        Dose:  12.5 mg   Take 1 tablet (12.5 mg) by mouth every morning   Quantity:  90 tablet   Refills:  1       losartan 100 MG tablet   Commonly known as:  COZAAR   This may have changed:  See the new instructions.   Used for:  Essential hypertension with goal blood pressure less than 140/90   Changed by:  Dipak Hooper MD        Dose:  100 mg   Take 1 tablet (100 mg) by mouth daily   Quantity:  90 tablet   Refills:  1       pravastatin 10 MG tablet   Commonly known as:  PRAVACHOL   This may have changed:  See the new instructions.   Used for:   Hyperlipidemia with target LDL less than 100   Changed by:  Dipak Hooper MD        Dose:  10 mg   Take 1 tablet (10 mg) by mouth daily   Quantity:  90 tablet   Refills:  1            Where to get your medicines      These medications were sent to [facility] - [place], [place] - [address]  [address], [place] [address]     Phone:  [phone]     dulaglutide 0.75 MG/0.5ML pen    dulaglutide 1.5 MG/0.5ML pen    felodipine ER 5 MG 24 hr tablet    hydrochlorothiazide 12.5 MG Tabs tablet    losartan 100 MG tablet    metFORMIN 500 MG 24 hr tablet    pravastatin 10 MG tablet                Primary Care Provider Office Phone # Fax #    Dipak Hooper [phone] [address] [facility] [address]        Equal Access to Services     [facility]: Hadii aad ku hadasho Soomaali, waaxda luqadaha, qaybta kaalmada adeegyada, lazarus villegasin hayaacassius mcnamaraaradonna mon . So [facility] [phone].    ATENCIÓN: Si habla español, tiene a de leon disposición servicios gratuitos de asistencia lingüística. [facility] [phone].    We comply with applicable federal civil rights laws and [place] laws. We do not discriminate on the basis of race, color, national origin, age, disability, sex, sexual orientation, or gender identity.            Thank you!     Thank you for choosing [facility]  for your care. Our goal is always to provide you with excellent care. Hearing back from our patients is one way we can continue to improve our services. Please take a few minutes to complete the written survey that you may receive in the mail after your visit with us. Thank you!             Your Updated Medication List - Protect others around you: Learn how to safely use, store and throw away your medicines at www.disposemymeds.org.          This list is accurate as of 6/15/18 11:17 AM.  Always use your most recent med list.                   Brand Name Dispense  Instructions for use Diagnosis    ADVIL PO      Take by mouth as needed for moderate pain        ALLEGRA PO      Take  by mouth.        aspirin 81 MG tablet     1 tablet    Take 1 tablet by mouth daily.    Type 2 diabetes, HbA1c goal < 7% (H)       blood glucose calibration solution     1 Bottle    1 drop. As needed    Type 2 diabetes, HbA1c goal < 7% (H)       blood glucose monitoring lancets     100 each    1 Device 2 times daily.    Type 2 diabetes, HbA1c goal < 7% (H)       blood glucose monitoring test strip    no brand specified    100 strip    1 strip 2 times daily.    Prediabetes       * dulaglutide 0.75 MG/0.5ML pen    TRULICITY    0.5 mL    Inject 0.75 mg Subcutaneous every 7 days    Controlled type 2 diabetes mellitus without complication, without long-term current use of insulin (H)       * dulaglutide 1.5 MG/0.5ML pen    TRULICITY    0.5 mL    Inject 1.5 mg Subcutaneous every 7 days    Controlled type 2 diabetes mellitus without complication, without long-term current use of insulin (H)       felodipine ER 5 MG 24 hr tablet    PLENDIL    90 tablet    Take 1 tablet (5 mg) by mouth daily    Essential hypertension with goal blood pressure less than 140/90       hydrochlorothiazide 12.5 MG Tabs tablet     90 tablet    Take 1 tablet (12.5 mg) by mouth every morning    Essential hypertension with goal blood pressure less than 140/90       losartan 100 MG tablet    COZAAR    90 tablet    Take 1 tablet (100 mg) by mouth daily    Essential hypertension with goal blood pressure less than 140/90       metFORMIN 500 MG 24 hr tablet    GLUCOPHAGE-XR    360 tablet    TAKE FOUR TABLETS BY MOUTH EVERY DAY WITH DINNER    Controlled type 2 diabetes mellitus without complication, without long-term current use of insulin (H)       MULTI-VITAMIN PO      1 tablet daily        pravastatin 10 MG tablet    PRAVACHOL    90 tablet    Take 1 tablet (10 mg) by mouth daily    Hyperlipidemia with target LDL less than 100       SINUS  RINSE BOTTLE KIT NA      Spray 1 Bottle in nostril as needed.    Post-nasal drainage       * Notice:  This list has 2 medication(s) that are the same as other medications prescribed for you. Read the directions carefully, and ask your doctor or other care provider to review them with you.

## 2018-06-15 NOTE — PATIENT INSTRUCTIONS
Lab Results   Component Value Date    A1C 9.6 06/15/2018    A1C 6.9 07/13/2017    A1C 6.7 01/27/2017    A1C 6.8 05/27/2016    A1C 6.4 09/24/2015     [facility]    If you have any questions regarding to your visit please contact your care team:     Team Pink:   Clinic Hours Telephone Number   Internal Medicine:  Dr. Lizzie Wray, NP       7am-7pm  Monday - Thursday   7am-5pm  Fridays  [phone]  (Appointment scheduling available 24/7)    Questions about your recent visit?  Team Line  [phone]   Urgent Care - [place] and [place] - 11am-9pm Monday-Friday Saturday-Sunday- 9am-5pm   [place] - 5pm-9pm Monday-Friday Saturday-Sunday- 9am-5pm  [phone] - [place]  [phone] - [place]       What options do I have for a visit other than an office visit? We offer electronic visits (e-visits) and telephone visits, when medically appropriate.  Please check with your medical insurance to see if these types of visits are covered, as you will be responsible for any charges that are not paid by your insurance.      You can use [employer] (secure electronic communication) to access to your chart, send your primary care provider a message, or make an appointment. Ask a team member how to get started.     For a price quote for your services, please call our Consumer Price Line at [phone] or our Imaging Cost estimation line at [phone] (for imaging tests).  Nina LUA CMA ([facility])

## 2018-06-19 ENCOUNTER — OFFICE VISIT (OUTPATIENT)
Dept: PHARMACY | Facility: CLINIC | Age: 53
End: 2018-06-19
Payer: COMMERCIAL

## 2018-06-19 DIAGNOSIS — R52 INTERMITTENT PAIN: ICD-10-CM

## 2018-06-19 DIAGNOSIS — I10 HYPERTENSION GOAL BP (BLOOD PRESSURE) < 140/90: ICD-10-CM

## 2018-06-19 DIAGNOSIS — E11.65 TYPE 2 DIABETES MELLITUS WITH HYPERGLYCEMIA, WITHOUT LONG-TERM CURRENT USE OF INSULIN (H): ICD-10-CM

## 2018-06-19 DIAGNOSIS — E78.5 HYPERLIPIDEMIA WITH TARGET LDL LESS THAN 100: ICD-10-CM

## 2018-06-19 DIAGNOSIS — E55.9 VITAMIN D DEFICIENCY: Primary | ICD-10-CM

## 2018-06-19 DIAGNOSIS — J30.2 CHRONIC SEASONAL ALLERGIC RHINITIS, UNSPECIFIED TRIGGER: ICD-10-CM

## 2018-06-19 PROCEDURE — 99607 MTMS BY PHARM ADDL 15 MIN: CPT | Performed by: PHARMACIST

## 2018-06-19 PROCEDURE — 99605 MTMS BY PHARM NP 15 MIN: CPT | Performed by: PHARMACIST

## 2018-06-19 RX ORDER — PRAVASTATIN SODIUM 40 MG
40 TABLET ORAL DAILY
Qty: 90 TABLET | Refills: 1 | Status: ON HOLD | OUTPATIENT
Start: 2018-06-19 | End: 2018-10-05

## 2018-06-19 NOTE — MR AVS SNAPSHOT
After Visit Summary   6/19/2018    Mary Grigsby    MRN: [id number]           Patient Information     Date Of Birth          1965        Visit Information        Provider Department      6/19/2018 9:00 AM Nina Weiss, [facility]        Today's Diagnoses     Hyperlipidemia with target LDL less than 100    -  1      Care Instructions    Recommendations from today's MTM visit:                                                    MTM (medication therapy management) is a service provided by a clinical pharmacist designed to help you get the most of out of your medicines.   Today we reviewed what your medicines are for, how to know if they are working, that your medicines are safe and how to make your medicine regimen as easy as possible.     1.  Continue Vitamin D 3000 IU daily.  We should re-check your levels in about 3 months.    2.  Start Trulicity 0.75mg weekly.    3.  Change pravastatin to 40mg daily.    Next MTM visit:  Will touch base via [employer] in about 3 weeks    To schedule another MTM appointment, please call the clinic directly or you may call the MTM scheduling line at [phone] or toll-free at [phone].     My Clinical Pharmacist's contact information:                                                      It was a pleasure seeing you today!  Please feel free to contact me with any questions or concerns you have.      Nina Weiss, PharmD, [facility]  Medication Therapy Management Provider  Pager: [phone]     You may receive a survey about the MTM services you received.  I would appreciate your feedback to help me serve you better in the future. Please fill it out and return it when you can. Your comments will be anonymous.                     Follow-ups after your visit        Who to contact     If you have questions or need follow up information about today's clinic visit or your schedule please contact [facility] MTM directly at  [phone].  Normal or non-critical lab and imaging results will be communicated to you by [employer], letter or phone within 4 business days after the clinic has received the results. If you do not hear from us within 7 days, please contact the clinic through [employer] or phone. If you have a critical or abnormal lab result, we will notify you by phone as soon as possible.  Submit refill requests through [employer] or call your pharmacy and they will forward the refill request to us. Please allow 3 business days for your refill to be completed.          Additional Information About Your Visit        [employer] Information     [employer] gives you secure access to your electronic health record. If you see a primary care provider, you can also send messages to your care team and make appointments. If you have questions, please call your primary care clinic.  If you do not have a primary care provider, please call [phone] and they will assist you.        Care EveryWhere ID     This is your Care EveryWhere ID. This could be used by other organizations to access your [facility] medical records  [id number]        Your Vitals Were     Last Period                   06/24/2011            Blood Pressure from Last 3 Encounters:   06/15/18 114/68   03/23/18 120/78   07/13/17 128/74    Weight from Last 3 Encounters:   06/15/18 256 lb (116.1 kg)   03/23/18 251 lb 12.8 oz (114.2 kg)   07/13/17 255 lb (115.7 kg)              Today, you had the following     No orders found for display         Today's Medication Changes          These changes are accurate as of 6/19/18  9:25 AM.  If you have any questions, ask your nurse or doctor.               These medicines have changed or have updated prescriptions.        Dose/Directions    pravastatin 40 MG tablet   Commonly known as:  PRAVACHOL   This may have changed:    - medication strength  - how much to take   Used for:  Hyperlipidemia with target LDL less than 100        Dose:  40 mg   Take  1 tablet (40 mg) by mouth daily   Quantity:  90 tablet   Refills:  1            Where to get your medicines      These medications were sent to [facility] - [place], [place] - [address]  [address], [place] [address]     Phone:  [phone]     pravastatin 40 MG tablet                Primary Care Provider Office Phone # Fax #    Dipak Hooper [phone] [address]  [place] [address]        Equal Access to Services     [facility]: Hadii aad ku hadasho Soomaali, waaxda luqadaha, qaybta kaalmada adeegyada, waxay idiin hayaan adefam kharash elieser . So [facility] [phone].    ATENCIÓN: Si habla español, tiene a de leon disposición servicios gratuitos de asistencia lingüística. Llame al [phone].    We comply with applicable federal civil rights laws and [place] laws. We do not discriminate on the basis of race, color, national origin, age, disability, sex, sexual orientation, or gender identity.            Thank you!     Thank you for choosing [facility]  for your care. Our goal is always to provide you with excellent care. Hearing back from our patients is one way we can continue to improve our services. Please take a few minutes to complete the written survey that you may receive in the mail after your visit with us. Thank you!             Your Updated Medication List - Protect others around you: Learn how to safely use, store and throw away your medicines at www.disposemymeds.org.          This list is accurate as of 6/19/18  9:25 AM.  Always use your most recent med list.                   Brand Name Dispense Instructions for use Diagnosis    ADVIL PO      Take 600 mg by mouth daily as needed for moderate pain        ALLEGRA PO      Take 90 mg by mouth daily as needed        aspirin 81 MG tablet     1 tablet    Take 1 tablet by mouth daily.    Type 2 diabetes, HbA1c goal < 7% (H)       blood glucose calibration  solution     1 Bottle    1 drop. As needed    Type 2 diabetes, HbA1c goal < 7% (H)       blood glucose monitoring lancets     100 each    1 Device 2 times daily.    Type 2 diabetes, HbA1c goal < 7% (H)       blood glucose monitoring test strip    no brand specified    100 strip    1 strip 2 times daily.    Prediabetes       cholecalciferol 1000 UNIT tablet    vitamin D3     Take 3 tablets (3,000 Units) by mouth daily    Vitamin D deficiency       * dulaglutide 0.75 MG/0.5ML pen    TRULICITY    0.5 mL    Inject 0.75 mg Subcutaneous every 7 days    Controlled type 2 diabetes mellitus without complication, without long-term current use of insulin (H)       * dulaglutide 1.5 MG/0.5ML pen    TRULICITY    0.5 mL    Inject 1.5 mg Subcutaneous every 7 days    Controlled type 2 diabetes mellitus without complication, without long-term current use of insulin (H)       felodipine ER 5 MG 24 hr tablet    PLENDIL    90 tablet    Take 1 tablet (5 mg) by mouth daily    Essential hypertension with goal blood pressure less than 140/90       hydrochlorothiazide 12.5 MG Tabs tablet     90 tablet    Take 1 tablet (12.5 mg) by mouth every morning    Essential hypertension with goal blood pressure less than 140/90       losartan 100 MG tablet    COZAAR    90 tablet    Take 1 tablet (100 mg) by mouth daily    Essential hypertension with goal blood pressure less than 140/90       metFORMIN 500 MG 24 hr tablet    GLUCOPHAGE-XR    360 tablet    TAKE FOUR TABLETS BY MOUTH EVERY DAY WITH DINNER    Controlled type 2 diabetes mellitus without complication, without long-term current use of insulin (H)       pravastatin 40 MG tablet    PRAVACHOL    90 tablet    Take 1 tablet (40 mg) by mouth daily    Hyperlipidemia with target LDL less than 100       SINUS RINSE BOTTLE KIT NA      Spray 1 Bottle in nostril as needed.    Post-nasal drainage       * Notice:  This list has 2 medication(s) that are the same as other medications prescribed for you.  Read the directions carefully, and ask your doctor or other care provider to review them with you.

## 2018-06-19 NOTE — PROGRESS NOTES
SUBJECTIVE/OBJECTIVE:                           Mary Grigsby is a 52 year old female coming in for an initial visit for Medication Therapy Management.  She was self-referred to me, she is a [employer] Employee.     Chief Complaint: She scheduled an appointment as she was concerned about her low Vitamin D result she received yesterday.  Also needs education on starting Trulicity.  I am squeezing her in for an initial visit in a 30 minute time slot.    Personal Healthcare Goals: Improve diabetes control    Allergies/ADRs: Reviewed in Epic  Tobacco: History of tobacco dependence - quit 2013  Alcohol: not currently using  Caffeine: Not discussed today    PMH: Reviewed in Epic    Medication Adherence/Access: no issues reported    Vitamin D Deficiency: She was just recently diagnosed with Vitamin D deficiency.  She has started taking Vitamin D 3000 IU daily once she received the lab results back (she was not taking any Vitamin D prior to this).  She's tolerating this fine so far.  Vitamin D Deficiency Screening Results:  Lab Results   Component Value Date    VITDT 8 (L) 06/15/2018        Diabetes:  Pt currently taking metformin ER 2000mg/day, she had been taking 1000mg/day prior her last A1c result. Pt is experiencing the following side effects: diarrhea, GI upset when taking full 2000mg of metformin.  She says this isn't ideal, but somewhat tolerable.  Dr. Hooper prescribed Trulicity for her - she wonders what my thoughts are about this.  SMBG: Occasionally.   Ranges (patient reported): Highest she's seen is 210mg/dL.  She plans to start testing more now that she had such a high A1c result  Patient is not experiencing hypoglycemia  Recent symptoms of high blood sugar? fatigue  Eye exam: up to date  Foot exam: up to date  Microalbumin is < 30 mg/g. Pt is taking an ACEi/ARB.  Aspirin: Taking 81mg daily and denies side effects    Hypertension: Current medications include felodipine ER 5mg daily, HCTZ 12.5mg daily and  losartan 100mg daily.  Patient does not self-monitor BP.  Patient reports no current medication side effects.     Hyperlipidemia: Current therapy includes pravastatin 10mg once daily.  Pt reports no significant myalgias or other side effects.  The 10-year ASCVD risk score (Sinclairkitty SAHU Jr, et al., 2013) is: 4.2%    Values used to calculate the score:      Age: 52 years      Sex: Female      Is Non- : No      Diabetic: Yes      Tobacco smoker: No      Systolic Blood Pressure: 114 mmHg      Is BP treated: Yes      HDL Cholesterol: 36 mg/dL      Total Cholesterol: 177 mg/dL     Allergies: She takes Allegra 180mg - 1/2 tablet as needed (a whole tablet makes her groggy).  She also uses a saline rinse as needed.  She feels this is effective when needed and she denies side effects.    Pain:  She takes ibuprofen 600mg daily PRN, maybe a few times a week.  She feels this is effective and denies side effects.    Current labs include:  Today's Vitals: LMP 06/24/2011 Vitals were not taken today due to time constraints    BP Readings from Last 3 Encounters:   06/15/18 114/68   03/23/18 120/78   07/13/17 128/74     Lab Results   Component Value Date    A1C 9.6 06/15/2018   .  Lab Results   Component Value Date    CHOL 177 06/15/2018     Lab Results   Component Value Date    TRIG 179 06/15/2018     Lab Results   Component Value Date    HDL 36 06/15/2018     Lab Results   Component Value Date     06/15/2018       Liver Function Studies -   Recent Labs   Lab Test  03/08/13   0709   PROTTOTAL  6.6*   ALBUMIN  3.8*   BILITOTAL  0.3   ALKPHOS  112   AST  24   ALT  38       Lab Results   Component Value Date    UCRR 128 06/15/2018    MICROL 18 06/15/2018    UMALCR 14.06 06/15/2018       Last Basic Metabolic Panel:  Lab Results   Component Value Date     06/15/2018      Lab Results   Component Value Date    POTASSIUM 3.7 06/15/2018     Lab Results   Component Value Date    CHLORIDE 97 06/15/2018     Lab  Results   Component Value Date    BUN 15 06/15/2018     Lab Results   Component Value Date    CR 0.53 06/15/2018     GFR Estimate   Date Value Ref Range Status   06/15/2018 >90 >60 mL/min/1.7m2 Final     Comment:     Non  GFR Calc   07/13/2017 83 >60 mL/min/1.7m2 Final     Comment:     Non  GFR Calc   05/27/2016 >90  Non  GFR Calc   >60 mL/min/1.7m2 Final     Most Recent Immunizations   Administered Date(s) Administered     Pneumo Conj 13-V (2010&after) 05/12/2016     TD (ADULT, 7+) 02/05/2004       ASSESSMENT:                             Current medications were reviewed today.     Medication Adherence: good, no issues identified    Vitamin D Deficiency: Plan in place, levels should be re-checked in 8-12 weeks.    Diabetes: Needs Improvement. Patient is not meeting A1c goal of < 7%. Self monitoring of blood glucose is not at goal of fasting  mg/dL and post prandial < 180 mg/dL.  I agree with starting Trulicity, it may allow us to reduce her metformin down the road for tolerability, but will continue at current dose for now.     Hypertension: Stable. Patient is meeting BP goal of < 140/90mmHg.      Hyperlipidemia: Needs Improvement. Pt is not on moderate intensity statin which is indicated based on 2013 ACC/AHA guidelines for lipid management.       Allergies: Stable.    Pain:  Stable.  Will need to continue to monitor renal function with NSAID use.    PLAN:                            1.  Recommended continuing Vitamin D 3000 IU daily.  Levels should be re-checked in 8-12 weeks.  2.  Recommended starting Trulicity (Rx already sent in by PCP).  Reviewed injection technique, storage instructions and potential side effects.  3.  Increased pravastatin to 40mg daily.    I spent 30 minutes with this patient today. All changes were made via collaborative practice agreement with Dipak Hooper. A copy of the visit note was provided to the patient's primary care  provider.    Will follow up in 3 weeks to determine if she's tolerating Trulicity/can increase dose.    The patient was given a summary of these recommendations as an after visit summary.     Shiv GuerreroD, [facility]  Medication Therapy Management Provider  Pager: [phone]

## 2018-06-19 NOTE — PATIENT INSTRUCTIONS
Recommendations from today's MTM visit:                                                    MTM (medication therapy management) is a service provided by a clinical pharmacist designed to help you get the most of out of your medicines.   Today we reviewed what your medicines are for, how to know if they are working, that your medicines are safe and how to make your medicine regimen as easy as possible.     1.  Continue Vitamin D 3000 IU daily.  We should re-check your levels in about 3 months.    2.  Start Trulicity 0.75mg weekly.    3.  Change pravastatin to 40mg daily.    Next MTM visit:  Will touch base via [employer] in about 3 weeks    To schedule another MTM appointment, please call the clinic directly or you may call the MTM scheduling line at [phone] or toll-free at [phone].     My Clinical Pharmacist's contact information:                                                      It was a pleasure seeing you today!  Please feel free to contact me with any questions or concerns you have.      Nina Weiss, [place], [facility]  Medication Therapy Management Provider  Pager: [phone]     You may receive a survey about the MTM services you received.  I would appreciate your feedback to help me serve you better in the future. Please fill it out and return it when you can. Your comments will be anonymous.

## 2018-07-13 ENCOUNTER — TELEPHONE (OUTPATIENT)
Dept: FAMILY MEDICINE | Facility: CLINIC | Age: 53
End: 2018-07-13

## 2018-07-13 NOTE — LETTER
July 13, 2018          Mary Grigsby,  [address]  [place] [address]        Dear Mary Grigsby      Monitoring and managing your preventative and chronic health conditions are very important to us. Our records indicate that you have not scheduled for Colonoscopy, and a diabetic check on or after 10/  which was recommended by Dr. Hooper.      If you have received your health care elsewhere, please call the clinic so the information can be documented in your chart.    Please call [phone] or message us through your [employer] account to schedule an appointment or provide information for your chart.     Feel free to contact us if you have any questions or concerns!    I look forward to seeing you and working with you on your health care needs.     Sincerely,         Dipak Hooper / Nina LUA CMA ([facility])

## 2018-07-13 NOTE — TELEPHONE ENCOUNTER
Panel Management Review      Patient has the following on her problem list:     Diabetes    ASA: Passed    Last A1C  Lab Results   Component Value Date    A1C 9.6 06/15/2018    A1C 6.9 07/13/2017    A1C 6.7 01/27/2017    A1C 6.8 05/27/2016    A1C 6.4 09/24/2015     A1C tested: FAILED    Last LDL:    Lab Results   Component Value Date    CHOL 177 06/15/2018     Lab Results   Component Value Date    HDL 36 06/15/2018     Lab Results   Component Value Date     06/15/2018     Lab Results   Component Value Date    TRIG 179 06/15/2018     Lab Results   Component Value Date    CHOLHDLRATIO 3.7 05/22/2015     Lab Results   Component Value Date    NHDL 141 06/15/2018       Is the patient on a Statin? YES             Is the patient on Aspirin? YES    Medications     HMG CoA Reductase Inhibitors    pravastatin (PRAVACHOL) 40 MG tablet    Salicylates    aspirin 81 MG tablet          Last three blood pressure readings:  BP Readings from Last 3 Encounters:   06/15/18 114/68   03/23/18 120/78   07/13/17 128/74       Date of last diabetes office visit: 06/15/2018     Tobacco History:     History   Smoking Status     Former Smoker     Packs/day: 0.50     Years: 20.00     Types: Cigarettes     Start date: 6/1/1980     Quit date: 2/24/2013   Smokeless Tobacco     Never Used     Comment: still has occasionally            Composite cancer screening  Chart review shows that this patient is due/due soon for the following Colonoscopy  Summary:    Patient is due/failing the following:   COLONOSCOPY    Action needed:   Patient needs office visit for colon cancer screening, diabetic check after 09/15/2018.    Type of outreach:    Sent letter.    Questions for provider review:    None                                                                                                                                    Nina LUA CMA ([facility])       Chart routed to none .

## 2018-09-24 ENCOUNTER — HEALTH MAINTENANCE LETTER (OUTPATIENT)
Age: 53
End: 2018-09-24

## 2018-10-02 ENCOUNTER — TELEPHONE (OUTPATIENT)
Dept: INTERNAL MEDICINE | Facility: CLINIC | Age: 53
End: 2018-10-02

## 2018-10-02 ENCOUNTER — OFFICE VISIT (OUTPATIENT)
Dept: FAMILY MEDICINE | Facility: CLINIC | Age: 53
End: 2018-10-02
Payer: COMMERCIAL

## 2018-10-02 ENCOUNTER — RADIANT APPOINTMENT (OUTPATIENT)
Dept: ULTRASOUND IMAGING | Facility: CLINIC | Age: 53
End: 2018-10-02
Attending: INTERNAL MEDICINE
Payer: COMMERCIAL

## 2018-10-02 VITALS
DIASTOLIC BLOOD PRESSURE: 58 MMHG | SYSTOLIC BLOOD PRESSURE: 132 MMHG | TEMPERATURE: 98.1 F | HEART RATE: 94 BPM | RESPIRATION RATE: 18 BRPM | WEIGHT: 241 LBS | BODY MASS INDEX: 45.54 KG/M2 | OXYGEN SATURATION: 96 %

## 2018-10-02 DIAGNOSIS — R79.89 ELEVATED LIVER FUNCTION TESTS: ICD-10-CM

## 2018-10-02 DIAGNOSIS — E55.9 HYPOVITAMINOSIS D: ICD-10-CM

## 2018-10-02 DIAGNOSIS — E11.9 CONTROLLED TYPE 2 DIABETES MELLITUS WITHOUT COMPLICATION, WITHOUT LONG-TERM CURRENT USE OF INSULIN (H): ICD-10-CM

## 2018-10-02 DIAGNOSIS — R10.12 LUQ ABDOMINAL PAIN: Primary | ICD-10-CM

## 2018-10-02 DIAGNOSIS — R79.89 ELEVATED LIVER FUNCTION TESTS: Primary | ICD-10-CM

## 2018-10-02 LAB
ALBUMIN SERPL-MCNC: 3 G/DL (ref 3.4–5)
ALP SERPL-CCNC: 171 U/L (ref 40–150)
ALT SERPL W P-5'-P-CCNC: 717 U/L (ref 0–50)
ANION GAP SERPL CALCULATED.3IONS-SCNC: 10 MMOL/L (ref 3–14)
AST SERPL W P-5'-P-CCNC: 602 U/L (ref 0–45)
BASOPHILS # BLD AUTO: 0.1 10E9/L (ref 0–0.2)
BASOPHILS NFR BLD AUTO: 1.1 %
BILIRUB SERPL-MCNC: 8.1 MG/DL (ref 0.2–1.3)
BUN SERPL-MCNC: 26 MG/DL (ref 7–30)
CALCIUM SERPL-MCNC: 9.5 MG/DL (ref 8.5–10.1)
CHLORIDE SERPL-SCNC: 99 MMOL/L (ref 94–109)
CO2 SERPL-SCNC: 25 MMOL/L (ref 20–32)
CREAT SERPL-MCNC: 2.01 MG/DL (ref 0.52–1.04)
CRP SERPL-MCNC: 37.8 MG/L (ref 0–8)
DEPRECATED CALCIDIOL+CALCIFEROL SERPL-MC: 34 UG/L (ref 20–75)
DIFFERENTIAL METHOD BLD: ABNORMAL
EOSINOPHIL # BLD AUTO: 0.2 10E9/L (ref 0–0.7)
EOSINOPHIL NFR BLD AUTO: 1.5 %
ERYTHROCYTE [DISTWIDTH] IN BLOOD BY AUTOMATED COUNT: 15.5 % (ref 10–15)
ERYTHROCYTE [SEDIMENTATION RATE] IN BLOOD BY WESTERGREN METHOD: 18 MM/H (ref 0–30)
GFR SERPL CREATININE-BSD FRML MDRD: 26 ML/MIN/1.7M2
GLUCOSE SERPL-MCNC: 276 MG/DL (ref 70–99)
HBA1C MFR BLD: 7.9 % (ref 0–5.6)
HCT VFR BLD AUTO: 39 % (ref 35–47)
HGB BLD-MCNC: 13.7 G/DL (ref 11.7–15.7)
LYMPHOCYTES # BLD AUTO: 3.1 10E9/L (ref 0.8–5.3)
LYMPHOCYTES NFR BLD AUTO: 32.3 %
MCH RBC QN AUTO: 31.1 PG (ref 26.5–33)
MCHC RBC AUTO-ENTMCNC: 35.1 G/DL (ref 31.5–36.5)
MCV RBC AUTO: 89 FL (ref 78–100)
MONOCYTES # BLD AUTO: 0.9 10E9/L (ref 0–1.3)
MONOCYTES NFR BLD AUTO: 9.3 %
NEUTROPHILS # BLD AUTO: 5.4 10E9/L (ref 1.6–8.3)
NEUTROPHILS NFR BLD AUTO: 55.8 %
PLATELET # BLD AUTO: 260 10E9/L (ref 150–450)
POTASSIUM SERPL-SCNC: 3.2 MMOL/L (ref 3.4–5.3)
PROT SERPL-MCNC: 7.4 G/DL (ref 6.8–8.8)
RBC # BLD AUTO: 4.4 10E12/L (ref 3.8–5.2)
SODIUM SERPL-SCNC: 134 MMOL/L (ref 133–144)
WBC # BLD AUTO: 9.7 10E9/L (ref 4–11)

## 2018-10-02 PROCEDURE — 86140 C-REACTIVE PROTEIN: CPT | Performed by: INTERNAL MEDICINE

## 2018-10-02 PROCEDURE — 85652 RBC SED RATE AUTOMATED: CPT | Performed by: INTERNAL MEDICINE

## 2018-10-02 PROCEDURE — 80053 COMPREHEN METABOLIC PANEL: CPT | Performed by: INTERNAL MEDICINE

## 2018-10-02 PROCEDURE — 82306 VITAMIN D 25 HYDROXY: CPT | Performed by: INTERNAL MEDICINE

## 2018-10-02 PROCEDURE — 85025 COMPLETE CBC W/AUTO DIFF WBC: CPT | Performed by: INTERNAL MEDICINE

## 2018-10-02 PROCEDURE — 36415 COLL VENOUS BLD VENIPUNCTURE: CPT | Performed by: INTERNAL MEDICINE

## 2018-10-02 PROCEDURE — 99214 OFFICE O/P EST MOD 30 MIN: CPT | Performed by: INTERNAL MEDICINE

## 2018-10-02 PROCEDURE — 76700 US EXAM ABDOM COMPLETE: CPT

## 2018-10-02 PROCEDURE — 83036 HEMOGLOBIN GLYCOSYLATED A1C: CPT | Performed by: INTERNAL MEDICINE

## 2018-10-02 NOTE — PATIENT INSTRUCTIONS
[facility]    If you have any questions regarding to your visit please contact your care team:     Team Pink:   Clinic Hours Telephone Number   Internal Medicine:  Dr. Lizzie Wray NP 7am-7pm  Monday - Thursday   7am-5pm  Fridays  [phone]  (Appointment scheduling available 24/7)   Urgent Care - [place] and [facility] - 11am-9pm Monday-Friday Saturday-Sunday- 9am-5pm   [place] - 5pm-9pm Monday-Friday Saturday-Sunday- 9am-5pm  [phone] - [place]  [phone] - [place]       What options do I have for a visit other than an office visit? We offer electronic visits (e-visits) and telephone visits, when medically appropriate.  Please check with your medical insurance to see if these types of visits are covered, as you will be responsible for any charges that are not paid by your insurance.      You can use [employer] (secure electronic communication) to access to your chart, send your primary care provider a message, or make an appointment. Ask a team member how to get started.     For a price quote for your services, please call our Consumer Price Line at [phone] or our Imaging Cost estimation line at [phone] (for imaging tests).  Nina LUA CMA ([facility])

## 2018-10-02 NOTE — TELEPHONE ENCOUNTER
Pt states she was just seen this am. She was able to work today. She took the prilosec and that did help with the abdominal pain. She did not want to go to the ER for this. Dr. Hooper advised to do an abdominal US tonight or tomorrow am. Pt was provided number for imaging scheduling. Order placed.    Kala Rajan RN  [facility]

## 2018-10-02 NOTE — PROGRESS NOTES
SUBJECTIVE:   Mary Grigsby is a 53 year old female who presents to clinic today for the following health issues:    Early Satiety, abdominal pain, nausea.  Mary has a history of GERD. She reports a progressive worsening in early satiety, dull persistent abdominal pain and nauseas since 9/15/18. Last night she began vomiting about 1-2 hours after eating dinner which lasted throughout the night. Denies fevers, chills, diarrhea, bitter/acid taste. She has a hard time telling whether she is hungry or going to be sick. She says that when she had a stomach ulcer years ago, she also had symptoms of early satiety. She had reflux at that time and got relief with Prilosec and a little relief with antacids. Her nauseas are mitigated by lying on her side and remaining still and exacerbated by lying flat. She also has been eating peppermints. She doesn't drink alcohol, no longer smokes, and has reduced her soda intake to 1 Mountain Dew/day since beginning a new treatment for her diabetes. She is currently taking metformin and she began taking Trulicity in June. Got up to the full dose of Trulicity (dulaglutide) only since August so there is a correlation between Trulicity (dulaglutide) and her symptoms      Additional Information  Mary says that she logs her blood glucose at home. Over the last few days due to her issues with satiety she has been eating more fruit and drinking more juice than usual. She notes an increase in energy level after beginning Vitamin D supplements. Her blood pressure today was abnormal for her at 132/58.     Problem list and histories reviewed & adjusted, as indicated.  Additional history: as documented    Patient Active Problem List   Diagnosis     Hypertension goal BP (blood pressure) < 140/90     Ex-smoker     Family history of diabetes mellitus     Hyperlipidemia with target LDL less than 100     Seasonal allergic rhinitis     Rhinitis, allergic to other allergen     House dust mite  allergy     obesity     Severe dysplasia of cervix (GUSTABO III)     Family history of thyroid disease     Cervical lymphadenopathy     Controlled type 2 diabetes mellitus without complication, without long-term current use of insulin (H)     Bilateral lower extremity edema     Past Surgical History:   Procedure Laterality Date     CRYOTHERAPY  3/30/2011    cervical     HC COLP CERVIX/UPPER VAGINA W BX CERVIX  1/2010, 3/2011    neg dysplasia, GUSTABO 1-2     HC REPAIR OF NASAL SEPTUM  2/3/09-per Dr. Harley    antrostomy, ethmoidectomy, septoplasty and turbinate reduction     LAPAROSCOPIC ASSISTED HYSTERECTOMY VAGINAL  3/12/12    [facility]     TONSILLECTOMY & ADENOIDECTOMY  1973    age 8       Social History   Substance Use Topics     Smoking status: Former Smoker     Packs/day: 0.50     Years: 20.00     Types: Cigarettes     Start date: 6/1/1980     Quit date: 2/24/2013     Smokeless tobacco: Never Used      Comment: still has occasionally      Alcohol use No     Family History   Problem Relation Age of Onset     Cancer Father      Other Cancer Father      passed Nov.2016     Hypertension Father      age 72     Hypertension Sister      age 35     Thyroid Disease Sister      Cerebrovascular Disease Maternal Grandmother      Asthma Son      Thyroid Disease Sister      Thyroid Disease Sister      Thyroid Disease Sister      Diabetes Mother      Hypertension Mother      age 34     Hypertension Sister      Thyroid Disease Sister      Asthma Son      Glaucoma No family hx of      Macular Degeneration No family hx of          BP Readings from Last 3 Encounters:   10/02/18 132/58   06/15/18 114/68   03/23/18 120/78    Wt Readings from Last 3 Encounters:   10/02/18 109.3 kg (241 lb)   06/15/18 116.1 kg (256 lb)   03/23/18 114.2 kg (251 lb 12.8 oz)        Reviewed and updated as needed this visit by clinical staff       Reviewed and updated as needed this visit by Provider       ROS:  Constitutional, HEENT, cardiovascular,  pulmonary, GI, , musculoskeletal, neuro, skin, endocrine and psych systems are negative, except as otherwise noted.    This document serves as a record of the services and decisions personally performed and made by Dipak Hooper MD. It was created on his behalf by Moshe Arteaga, a trained medical scribe. The creation of this document is based on the provider's statements to the medical scribe.  Moshe Arteaga 9:05 AM October 2, 2018    OBJECTIVE:     /58  Pulse 94  Temp 98.1  F (36.7  C) (Oral)  Resp 18  Wt 109.3 kg (241 lb)  LMP 06/24/2011  SpO2 96%  BMI 45.54 kg/m2  Body mass index is 45.54 kg/(m^2).  GENERAL: healthy, alert and no distress  EYES: Eyes grossly normal to inspection, PERRL and conjunctivae and sclerae normal  ABDOMEN: mild tenderness to palpation of the left upper quadrant with no peritoneal signs or hepatosplenomegaly or masses   SKIN: no suspicious lesions or rashes  NEURO: Normal strength and tone, mentation intact and speech normal  PSYCH: mentation appears normal, affect normal/bright    Diagnostic Test Results:  No results found for this or any previous visit (from the past 24 hour(s)).    ASSESSMENT/PLAN:     (R10.12) LUQ abdominal pain  (primary encounter diagnosis)  Comment: we will check for infection / inflammation but in my heart of hearts I suspect that we are seeing gastrointestinal symptoms from (glucagon-like peptide-1) GLP-1 agonist  . Suggested that assuming her hemoglobin a1c  [ diabetes test ] is excellent we can try stopping the metformin ( Glucophage) and stay with Trulicity (dulaglutide) and start with daily proton pump inhibitor 40 milligrams Omeprazole [ Prilosec ] but if her symptoms aren't better in 1-2 weeks we may have to consider stopping the Trulicity (dulaglutide)   Plan: Comprehensive metabolic panel, CBC with         platelets differential, Erythrocyte         sedimentation rate auto, CRP inflammation            (E11.9) Controlled type 2 diabetes  mellitus without complication, without long-term current use of insulin (H)  Comment: as detailed above   Plan: Hemoglobin A1c            (E55.9) Hypovitaminosis D  Comment: she had a Vitamin D level of 8 at last check. She's now taking 3000 international units per day of vitamin D   Plan: Vitamin D Deficiency              See Patient Instructions    The information in this document, created by the medical scribe for me, accurately reflects the services I personally performed and the decisions made by me. I have reviewed and approved this document for accuracy prior to leaving the patient care area.  October 2, 2018 9:15 AM    Dipak Hooper MD  [facility]

## 2018-10-02 NOTE — TELEPHONE ENCOUNTER
Dr. Sandie cardenasd with writer regarding critical ALT/AST. He would like a status report on pt. Is she having abdominal pain/vomiting? Recommend pt go to the ER. Most likely gallbladder is the cause.  If pt declines to go to ER, abdominal US can be ordered but would need to be done tonight or tomorrow am.    Left message on answering machine for patient to call back to the RN hotline at [phone].    Kala Rajan RN  [facility]

## 2018-10-02 NOTE — MR AVS SNAPSHOT
After Visit Summary   10/2/2018    Mary Grigsby    MRN: [id number]           Patient Information     Date Of Birth          1965        Visit Information        Provider Department      10/2/2018 8:50 AM Dipak Hooper MD [facility]        Today's Diagnoses     LUQ abdominal pain    -  1    Controlled type 2 diabetes mellitus without complication, without long-term current use of insulin (H)        Hypovitaminosis D          Care Instructions    [facility]    If you have any questions regarding to your visit please contact your care team:     Team Pink:   Clinic Hours Telephone Number   Internal Medicine:  Dr. Lizzie Wray NP 7am-7pm  Monday - Thursday   7am-5pm  Fridays  [phone]  (Appointment scheduling available 24/7)   Urgent Care - [facility] - 11am-9pm Monday-Friday Saturday-Sunday- 9am-5pm   [place] - 5pm-9pm Monday-Friday Saturday-Sunday- 9am-5pm  [phone] - [place]  [phone] - [place]       What options do I have for a visit other than an office visit? We offer electronic visits (e-visits) and telephone visits, when medically appropriate.  Please check with your medical insurance to see if these types of visits are covered, as you will be responsible for any charges that are not paid by your insurance.      You can use Elite Daily (secure electronic communication) to access to your chart, send your primary care provider a message, or make an appointment. Ask a team member how to get started.     For a price quote for your services, please call our Consumer Price Line at [phone] or our Imaging Cost estimation line at [phone] (for imaging tests).  Nina LUA CMA ([facility])            Follow-ups after your visit        Who to contact     If you have questions or need follow up information about today's clinic visit or your schedule please contact [facility]  directly at [phone].  Normal or non-critical lab and imaging results will be communicated to you by MyChart, letter or phone within 4 business days after the clinic has received the results. If you do not hear from us within 7 days, please contact the clinic through TuManitashart or phone. If you have a critical or abnormal lab result, we will notify you by phone as soon as possible.  Submit refill requests through [employer] or call your pharmacy and they will forward the refill request to us. Please allow 3 business days for your refill to be completed.          Additional Information About Your Visit        TuManitashariWarda Information     Studio SBV gives you secure access to your electronic health record. If you see a primary care provider, you can also send messages to your care team and make appointments. If you have questions, please call your primary care clinic.  If you do not have a primary care provider, please call [phone] and they will assist you.        Care EveryWhere ID     This is your Care EveryWhere ID. This could be used by other organizations to access your [place] medical records  [id number]        Your Vitals Were     Pulse Temperature Respirations Last Period Pulse Oximetry BMI (Body Mass Index)    94 98.1  F (36.7  C) (Oral) 18 06/24/2011 96% 45.54 kg/m2       Blood Pressure from Last 3 Encounters:   10/02/18 132/58   06/15/18 114/68   03/23/18 120/78    Weight from Last 3 Encounters:   10/02/18 241 lb (109.3 kg)   06/15/18 256 lb (116.1 kg)   03/23/18 251 lb 12.8 oz (114.2 kg)              We Performed the Following     CBC with platelets differential     Comprehensive metabolic panel     CRP inflammation     Erythrocyte sedimentation rate auto     Hemoglobin A1c     Vitamin D Deficiency        Primary Care Provider Office Phone # Fax #    Dipak Hooper [phone] [address] [facility] [place] [address]        Equal Access to Services     SAWYER BOOKER: Jermaine lai  agustin [place], wacatarinada luqadaha, qaybta kaalmada vickie, lazaurs velazquezcassius liz. So [facility] [phone].    ATENCIÓN: Si habla ry, tiene a de leon disposición servicios gratuitos de asistencia lingüística. Sundar al [phone].    We comply with applicable federal civil rights laws and [place] laws. We do not discriminate on the basis of race, color, national origin, age, disability, sex, sexual orientation, or gender identity.            Thank you!     Thank you for choosing [facility]  for your care. Our goal is always to provide you with excellent care. Hearing back from our patients is one way we can continue to improve our services. Please take a few minutes to complete the written survey that you may receive in the mail after your visit with us. Thank you!             Your Updated Medication List - Protect others around you: Learn how to safely use, store and throw away your medicines at www.disposemymeds.org.          This list is accurate as of 10/2/18  9:26 AM.  Always use your most recent med list.                   Brand Name Dispense Instructions for use Diagnosis    ADVIL PO      Take 600 mg by mouth daily as needed for moderate pain        ALLEGRA PO      Take 90 mg by mouth daily as needed        aspirin 81 MG tablet     1 tablet    Take 1 tablet by mouth daily.    Type 2 diabetes, HbA1c goal < 7% (H)       blood glucose calibration solution     1 Bottle    1 drop. As needed    Type 2 diabetes, HbA1c goal < 7% (H)       blood glucose monitoring lancets     100 each    1 Device 2 times daily.    Type 2 diabetes, HbA1c goal < 7% (H)       blood glucose monitoring test strip    no brand specified    100 strip    1 strip 2 times daily.    Prediabetes       cholecalciferol 1000 UNIT tablet    vitamin D3     Take 3 tablets (3,000 Units) by mouth daily    Vitamin D deficiency       dulaglutide 1.5 MG/0.5ML pen    TRULICITY    0.5 mL    Inject 1.5 mg Subcutaneous every 7  days    Controlled type 2 diabetes mellitus without complication, without long-term current use of insulin (H)       felodipine ER 5 MG 24 hr tablet    PLENDIL    90 tablet    Take 1 tablet (5 mg) by mouth daily    Essential hypertension with goal blood pressure less than 140/90       hydrochlorothiazide 12.5 MG Tabs tablet     90 tablet    Take 1 tablet (12.5 mg) by mouth every morning    Essential hypertension with goal blood pressure less than 140/90       losartan 100 MG tablet    COZAAR    90 tablet    Take 1 tablet (100 mg) by mouth daily    Essential hypertension with goal blood pressure less than 140/90       metFORMIN 500 MG 24 hr tablet    GLUCOPHAGE-XR    360 tablet    TAKE FOUR TABLETS BY MOUTH EVERY DAY WITH DINNER    Controlled type 2 diabetes mellitus without complication, without long-term current use of insulin (H)       pravastatin 40 MG tablet    PRAVACHOL    90 tablet    Take 1 tablet (40 mg) by mouth daily    Hyperlipidemia with target LDL less than 100       SINUS RINSE BOTTLE KIT NA      Spray 1 Bottle in nostril as needed.    Post-nasal drainage

## 2018-10-03 ENCOUNTER — APPOINTMENT (OUTPATIENT)
Dept: CT IMAGING | Facility: CLINIC | Age: 53
DRG: 441 | End: 2018-10-03
Payer: COMMERCIAL

## 2018-10-03 ENCOUNTER — TELEPHONE (OUTPATIENT)
Dept: SURGERY | Facility: CLINIC | Age: 53
End: 2018-10-03

## 2018-10-03 ENCOUNTER — HOSPITAL ENCOUNTER (INPATIENT)
Facility: CLINIC | Age: 53
LOS: 2 days | Discharge: HOME | DRG: 441 | End: 2018-10-05
Attending: EMERGENCY MEDICINE | Admitting: INTERNAL MEDICINE
Payer: COMMERCIAL

## 2018-10-03 DIAGNOSIS — I10 ESSENTIAL HYPERTENSION WITH GOAL BLOOD PRESSURE LESS THAN 140/90: ICD-10-CM

## 2018-10-03 DIAGNOSIS — R79.89 ABNORMAL LFTS: ICD-10-CM

## 2018-10-03 DIAGNOSIS — R17 JAUNDICE: ICD-10-CM

## 2018-10-03 DIAGNOSIS — E11.9 TYPE 2 DIABETES, HBA1C GOAL < 7% (H): ICD-10-CM

## 2018-10-03 DIAGNOSIS — K85.90 ACUTE PANCREATITIS, UNSPECIFIED COMPLICATION STATUS, UNSPECIFIED PANCREATITIS TYPE: ICD-10-CM

## 2018-10-03 LAB
ALBUMIN SERPL-MCNC: 3.2 G/DL (ref 3.4–5)
ALBUMIN UR-MCNC: NEGATIVE MG/DL
ALP SERPL-CCNC: 172 U/L (ref 40–150)
ALT SERPL W P-5'-P-CCNC: 633 U/L (ref 0–50)
ANION GAP SERPL CALCULATED.3IONS-SCNC: 10 MMOL/L (ref 3–14)
APAP SERPL-MCNC: NORMAL MG/L (ref 10–20)
APPEARANCE UR: CLEAR
AST SERPL W P-5'-P-CCNC: 563 U/L (ref 0–45)
BACTERIA #/AREA URNS HPF: ABNORMAL /HPF
BASOPHILS # BLD AUTO: 0.1 10E9/L (ref 0–0.2)
BASOPHILS NFR BLD AUTO: 0.6 %
BILIRUB DIRECT SERPL-MCNC: 6.3 MG/DL (ref 0–0.2)
BILIRUB SERPL-MCNC: 7.9 MG/DL (ref 0.2–1.3)
BILIRUB UR QL STRIP: ABNORMAL
BUN SERPL-MCNC: 26 MG/DL (ref 7–30)
CALCIUM SERPL-MCNC: 9.2 MG/DL (ref 8.5–10.1)
CHLORIDE SERPL-SCNC: 101 MMOL/L (ref 94–109)
CK SERPL-CCNC: 53 U/L (ref 30–225)
CO2 SERPL-SCNC: 27 MMOL/L (ref 20–32)
COLOR UR AUTO: YELLOW
CREAT SERPL-MCNC: 1.81 MG/DL (ref 0.52–1.04)
DIFFERENTIAL METHOD BLD: ABNORMAL
EOSINOPHIL # BLD AUTO: 0.2 10E9/L (ref 0–0.7)
EOSINOPHIL NFR BLD AUTO: 1.3 %
ERYTHROCYTE [DISTWIDTH] IN BLOOD BY AUTOMATED COUNT: 15.7 % (ref 10–15)
FERRITIN SERPL-MCNC: 890 NG/ML (ref 8–252)
GFR SERPL CREATININE-BSD FRML MDRD: 29 ML/MIN/1.7M2
GGT SERPL-CCNC: 537 U/L (ref 0–40)
GLUCOSE BLDC GLUCOMTR-MCNC: 196 MG/DL (ref 70–99)
GLUCOSE SERPL-MCNC: 145 MG/DL (ref 70–99)
GLUCOSE UR STRIP-MCNC: NEGATIVE MG/DL
HCT VFR BLD AUTO: 42 % (ref 35–47)
HGB BLD-MCNC: 14.2 G/DL (ref 11.7–15.7)
HGB UR QL STRIP: ABNORMAL
IMM GRANULOCYTES # BLD: 0 10E9/L (ref 0–0.4)
IMM GRANULOCYTES NFR BLD: 0.2 %
INR PPP: 1.41 (ref 0.86–1.14)
KETONES UR STRIP-MCNC: NEGATIVE MG/DL
LACTATE SERPL-SCNC: 1.6 MMOL/L (ref 0.4–2)
LEUKOCYTE ESTERASE UR QL STRIP: ABNORMAL
LIPASE SERPL-CCNC: 1266 U/L (ref 73–393)
LYMPHOCYTES # BLD AUTO: 4.3 10E9/L (ref 0.8–5.3)
LYMPHOCYTES NFR BLD AUTO: 38 %
MAGNESIUM SERPL-MCNC: 1.9 MG/DL (ref 1.6–2.3)
MCH RBC QN AUTO: 31.3 PG (ref 26.5–33)
MCHC RBC AUTO-ENTMCNC: 33.8 G/DL (ref 31.5–36.5)
MCV RBC AUTO: 93 FL (ref 78–100)
MONOCYTES # BLD AUTO: 0.7 10E9/L (ref 0–1.3)
MONOCYTES NFR BLD AUTO: 6.5 %
MUCOUS THREADS #/AREA URNS LPF: PRESENT /LPF
NEUTROPHILS # BLD AUTO: 6.1 10E9/L (ref 1.6–8.3)
NEUTROPHILS NFR BLD AUTO: 53.4 %
NITRATE UR QL: NEGATIVE
NRBC # BLD AUTO: 0 10*3/UL
NRBC BLD AUTO-RTO: 0 /100
PH UR STRIP: 5.5 PH (ref 5–7)
PLATELET # BLD AUTO: 284 10E9/L (ref 150–450)
PLATELET # BLD EST: ABNORMAL 10*3/UL
POTASSIUM SERPL-SCNC: 3.5 MMOL/L (ref 3.4–5.3)
PROT SERPL-MCNC: 7.5 G/DL (ref 6.8–8.8)
RBC # BLD AUTO: 4.53 10E12/L (ref 3.8–5.2)
RBC #/AREA URNS AUTO: 3 /HPF (ref 0–2)
SODIUM SERPL-SCNC: 138 MMOL/L (ref 133–144)
SOURCE: ABNORMAL
SP GR UR STRIP: 1.01 (ref 1–1.03)
SQUAMOUS #/AREA URNS AUTO: 2 /HPF (ref 0–1)
UROBILINOGEN UR STRIP-MCNC: 2 MG/DL (ref 0–2)
WBC # BLD AUTO: 11.3 10E9/L (ref 4–11)
WBC #/AREA URNS AUTO: 24 /HPF (ref 0–5)

## 2018-10-03 PROCEDURE — 82565 ASSAY OF CREATININE: CPT | Performed by: PHYSICIAN ASSISTANT

## 2018-10-03 PROCEDURE — 74176 CT ABD & PELVIS W/O CONTRAST: CPT

## 2018-10-03 PROCEDURE — 82728 ASSAY OF FERRITIN: CPT | Performed by: STUDENT IN AN ORGANIZED HEALTH CARE EDUCATION/TRAINING PROGRAM

## 2018-10-03 PROCEDURE — 87088 URINE BACTERIA CULTURE: CPT | Performed by: PHYSICIAN ASSISTANT

## 2018-10-03 PROCEDURE — 82248 BILIRUBIN DIRECT: CPT | Performed by: STUDENT IN AN ORGANIZED HEALTH CARE EDUCATION/TRAINING PROGRAM

## 2018-10-03 PROCEDURE — 99207 ZZC APP CREDIT; MD BILLING SHARED VISIT: CPT | Performed by: PHYSICIAN ASSISTANT

## 2018-10-03 PROCEDURE — 85610 PROTHROMBIN TIME: CPT | Performed by: EMERGENCY MEDICINE

## 2018-10-03 PROCEDURE — 85610 PROTHROMBIN TIME: CPT | Performed by: STUDENT IN AN ORGANIZED HEALTH CARE EDUCATION/TRAINING PROGRAM

## 2018-10-03 PROCEDURE — 82977 ASSAY OF GGT: CPT | Performed by: STUDENT IN AN ORGANIZED HEALTH CARE EDUCATION/TRAINING PROGRAM

## 2018-10-03 PROCEDURE — 99285 EMERGENCY DEPT VISIT HI MDM: CPT | Mod: 25 | Performed by: EMERGENCY MEDICINE

## 2018-10-03 PROCEDURE — 82570 ASSAY OF URINE CREATININE: CPT | Performed by: PHYSICIAN ASSISTANT

## 2018-10-03 PROCEDURE — 12000001 ZZH R&B MED SURG/OB UMMC

## 2018-10-03 PROCEDURE — 80329 ANALGESICS NON-OPIOID 1 OR 2: CPT | Performed by: STUDENT IN AN ORGANIZED HEALTH CARE EDUCATION/TRAINING PROGRAM

## 2018-10-03 PROCEDURE — 84295 ASSAY OF SERUM SODIUM: CPT | Performed by: PHYSICIAN ASSISTANT

## 2018-10-03 PROCEDURE — 84300 ASSAY OF URINE SODIUM: CPT | Performed by: PHYSICIAN ASSISTANT

## 2018-10-03 PROCEDURE — 99223 1ST HOSP IP/OBS HIGH 75: CPT | Performed by: INTERNAL MEDICINE

## 2018-10-03 PROCEDURE — 80053 COMPREHEN METABOLIC PANEL: CPT | Performed by: STUDENT IN AN ORGANIZED HEALTH CARE EDUCATION/TRAINING PROGRAM

## 2018-10-03 PROCEDURE — 99284 EMERGENCY DEPT VISIT MOD MDM: CPT | Mod: Z6 | Performed by: EMERGENCY MEDICINE

## 2018-10-03 PROCEDURE — 87186 SC STD MICRODIL/AGAR DIL: CPT | Performed by: PHYSICIAN ASSISTANT

## 2018-10-03 PROCEDURE — 82550 ASSAY OF CK (CPK): CPT | Performed by: PHYSICIAN ASSISTANT

## 2018-10-03 PROCEDURE — 81001 URINALYSIS AUTO W/SCOPE: CPT | Performed by: PHYSICIAN ASSISTANT

## 2018-10-03 PROCEDURE — 82550 ASSAY OF CK (CPK): CPT | Performed by: STUDENT IN AN ORGANIZED HEALTH CARE EDUCATION/TRAINING PROGRAM

## 2018-10-03 PROCEDURE — 25000128 H RX IP 250 OP 636: Performed by: PHYSICIAN ASSISTANT

## 2018-10-03 PROCEDURE — 85025 COMPLETE CBC W/AUTO DIFF WBC: CPT | Performed by: STUDENT IN AN ORGANIZED HEALTH CARE EDUCATION/TRAINING PROGRAM

## 2018-10-03 PROCEDURE — 00000146 ZZHCL STATISTIC GLUCOSE BY METER IP

## 2018-10-03 PROCEDURE — 83735 ASSAY OF MAGNESIUM: CPT | Performed by: STUDENT IN AN ORGANIZED HEALTH CARE EDUCATION/TRAINING PROGRAM

## 2018-10-03 PROCEDURE — 87086 URINE CULTURE/COLONY COUNT: CPT | Performed by: PHYSICIAN ASSISTANT

## 2018-10-03 PROCEDURE — 83690 ASSAY OF LIPASE: CPT | Performed by: STUDENT IN AN ORGANIZED HEALTH CARE EDUCATION/TRAINING PROGRAM

## 2018-10-03 PROCEDURE — 83605 ASSAY OF LACTIC ACID: CPT | Performed by: PHYSICIAN ASSISTANT

## 2018-10-03 RX ORDER — LIDOCAINE 40 MG/G
CREAM TOPICAL
Status: DISCONTINUED | OUTPATIENT
Start: 2018-10-03 | End: 2018-10-05 | Stop reason: HOSPADM

## 2018-10-03 RX ORDER — ONDANSETRON 4 MG/1
4 TABLET, ORALLY DISINTEGRATING ORAL EVERY 6 HOURS PRN
Status: DISCONTINUED | OUTPATIENT
Start: 2018-10-03 | End: 2018-10-05 | Stop reason: HOSPADM

## 2018-10-03 RX ORDER — AMOXICILLIN 250 MG
2 CAPSULE ORAL 2 TIMES DAILY
Status: DISCONTINUED | OUTPATIENT
Start: 2018-10-03 | End: 2018-10-05 | Stop reason: HOSPADM

## 2018-10-03 RX ORDER — DEXTROSE MONOHYDRATE 25 G/50ML
25-50 INJECTION, SOLUTION INTRAVENOUS
Status: DISCONTINUED | OUTPATIENT
Start: 2018-10-03 | End: 2018-10-05 | Stop reason: HOSPADM

## 2018-10-03 RX ORDER — ONDANSETRON 2 MG/ML
4 INJECTION INTRAMUSCULAR; INTRAVENOUS EVERY 6 HOURS PRN
Status: DISCONTINUED | OUTPATIENT
Start: 2018-10-03 | End: 2018-10-05 | Stop reason: HOSPADM

## 2018-10-03 RX ORDER — NICOTINE POLACRILEX 4 MG
15-30 LOZENGE BUCCAL
Status: DISCONTINUED | OUTPATIENT
Start: 2018-10-03 | End: 2018-10-05 | Stop reason: HOSPADM

## 2018-10-03 RX ORDER — BISACODYL 5 MG
10 TABLET, DELAYED RELEASE (ENTERIC COATED) ORAL DAILY PRN
Status: DISCONTINUED | OUTPATIENT
Start: 2018-10-03 | End: 2018-10-05 | Stop reason: HOSPADM

## 2018-10-03 RX ORDER — AMOXICILLIN 250 MG
1 CAPSULE ORAL 2 TIMES DAILY
Status: DISCONTINUED | OUTPATIENT
Start: 2018-10-03 | End: 2018-10-05 | Stop reason: HOSPADM

## 2018-10-03 RX ORDER — NALOXONE HYDROCHLORIDE 0.4 MG/ML
.1-.4 INJECTION, SOLUTION INTRAMUSCULAR; INTRAVENOUS; SUBCUTANEOUS
Status: DISCONTINUED | OUTPATIENT
Start: 2018-10-03 | End: 2018-10-05 | Stop reason: HOSPADM

## 2018-10-03 RX ORDER — SODIUM CHLORIDE, SODIUM LACTATE, POTASSIUM CHLORIDE, CALCIUM CHLORIDE 600; 310; 30; 20 MG/100ML; MG/100ML; MG/100ML; MG/100ML
INJECTION, SOLUTION INTRAVENOUS CONTINUOUS
Status: DISCONTINUED | OUTPATIENT
Start: 2018-10-03 | End: 2018-10-05 | Stop reason: HOSPADM

## 2018-10-03 RX ORDER — BISACODYL 5 MG
5 TABLET, DELAYED RELEASE (ENTERIC COATED) ORAL DAILY PRN
Status: DISCONTINUED | OUTPATIENT
Start: 2018-10-03 | End: 2018-10-05 | Stop reason: HOSPADM

## 2018-10-03 RX ORDER — BISACODYL 5 MG
15 TABLET, DELAYED RELEASE (ENTERIC COATED) ORAL DAILY PRN
Status: DISCONTINUED | OUTPATIENT
Start: 2018-10-03 | End: 2018-10-05 | Stop reason: HOSPADM

## 2018-10-03 RX ADMIN — SODIUM CHLORIDE, POTASSIUM CHLORIDE, SODIUM LACTATE AND CALCIUM CHLORIDE 1000 ML: 600; 310; 30; 20 INJECTION, SOLUTION INTRAVENOUS at 22:14

## 2018-10-03 ASSESSMENT — ENCOUNTER SYMPTOMS
CHILLS: 0
FEVER: 0
SHORTNESS OF BREATH: 0
LIGHT-HEADEDNESS: 0
DYSPHORIC MOOD: 0
COUGH: 0
PALPITATIONS: 0
FATIGUE: 0
DYSURIA: 0
EYES NEGATIVE: 1

## 2018-10-03 NOTE — TELEPHONE ENCOUNTER
US has not been read yet. Called and left message at [facility] at [phone] and stated this should have been ordered as STAT. Asked if US can be read ASAP. Awaiting call back.    Kala Rajan RN  [facility]

## 2018-10-03 NOTE — LETTER
Transition Communication Hand-off for Care Transitions to Next Level of Care Provider    Name: Mary Grigsby  : 1965  MRN #: [id number]  Primary Care Provider: Dipak Hooper     Primary Clinic: [address] [place] [id number]     Reason for Hospitalization:  Jaundice [R17]  Abnormal LFTs [R94.5]  Acute pancreatitis, unspecified complication status, unspecified pancreatitis type [K85.90]  Admit Date/Time: 10/3/2018  2:06 PM  Discharge Date: 10/5  Payor Source: Payor: PREFERREDONE / Plan: PREFERREDONE PEAK ADMIN SERV OPEN ACCESS / Product Type: PPO /     Readmission Assessment Measure (TARA) Risk Score/category: 7/Medium         Reason for Communication Hand-off Referral: Multiple providers/specialties    Discharge Plan:  Patient discharged to home on 10/5 with outpatient follow up and labs. Patient aware of plan.      Concern for non-adherence with plan of care:   No    Follow-up specialty is recommended: Yes    Follow-up plan:  Future Appointments  Date Time Provider Department Center   10/9/2018 7:45 AM  [facility]   10/9/2018 9:00 AM Melissa Gutierrez MD [facility]   10/9/2018 12:00 PM U2A ROOM 5 [facility]   10/9/2018 1:30 PM [facility]       Any outstanding tests or procedures:        Radiology & Cardiology Orders     Future Labs/Procedures Complete By Expires    IR Liver Biopsy Percutaneous  10/8/2018 (Approximate) 10/5/2019                RAO Patel, BSN    [facility]    Medicine Group  [address] [place] [id number]    [email or web address]    Office: [phone] Pager: [phone]  To contact weekend RNCC, dial [phone] and enter pager number [phone] at prompt. This pager can not be contacted by text page or outside line.        AVS/Discharge Summary is the source of truth; this is a helpful guide for improved communication of patient story

## 2018-10-03 NOTE — IP AVS SNAPSHOT
[address] [address]    Phone:  [phone]                                       After Visit Summary   10/3/2018    Mary Grigsby    MRN: [id number]           After Visit Summary Signature Page     I have received my discharge instructions, and my questions have been answered. I have discussed any challenges I see with this plan with the nurse or doctor.    ..........................................................................................................................................  Patient/Patient Representative Signature      ..........................................................................................................................................  Patient Representative Print Name and Relationship to Patient    ..................................................               ................................................  Date                                   Time    ..........................................................................................................................................  Reviewed by Signature/Title    ...................................................              ..............................................  Date                                               Time          22EPIC Rev 08/18

## 2018-10-03 NOTE — TELEPHONE ENCOUNTER
Nurse Katie called and wanted to put in Mary for a appointment but I showed her labs and ultrasound to Dr Murray and he advised they were critical and she needed to go to the [facility] for additional testing. I called and spoke to patient and advised her to go to the . She agreed and will try to get her niece to give her a ride.. Maegan Taveras Cma

## 2018-10-03 NOTE — ED NOTES
[facility]   ED Nurse to Floor Handoff     Mary Grigsby is a 53 year old female who speaks English and lives with family members,  in a home  They arrived in the ED by car from home    ED Chief Complaint: Abnormal Labs    ED Dx;   Final diagnoses:   Jaundice   Abnormal LFTs   Acute pancreatitis, unspecified complication status, unspecified pancreatitis type         Needed?: No    Allergies:   Allergies   Allergen Reactions     Amoxicillin Hives     Augmentin      Erythromycin    .  Past Medical Hx:   Past Medical History:   Diagnosis Date     Cervical high risk HPV (human papillomavirus) test positive 4/2015, 5/13/16    type 16     Cervical lymphadenopathy 7/29/2012     Cervical lymphadenopathy 7/29/2012     Cervical lymphadenopathy 7/29/2012     Cervical lymphadenopathy      Deviated septum     had septoplasty and endo. sinus surg. 2/09 per [facility]     Family history of diabetes mellitus     mother     Family history of stroke     multiple tiny cva's     House dust mite allergy      Hyperlipidemia LDL goal < 100      Hypertension goal BP (blood pressure) < 140/90      Obesity      Pap smear of cervix with ASCUS, cannot exclude HGSIL 3/2013     Papanicolaou smear of cervix with low grade squamous intraepithelial lesion (LGSIL) 1/2011, 10/2011    colp 3/22/11 - GUSTABO 1-2     Rhinitis, allergic to other allergen      Seasonal allergic rhinitis 12/16/08 skin tests    CR/DM/M/T/G/W     Smoking      Type 2 diabetes, HbA1c goal < 7% (H)       Baseline Mental status: WDL  Current Mental Status changes: at basesline    Infection present or suspected this encounter: no  Sepsis suspected: No  Isolation type: No active isolations     Activity level - Baseline/Home:  Independent  Activity Level - Current:   Independent    Bariatric equipment needed?: No    In the ED these meds were given: Medications - No data to display    Drips running?  No    Home pump  No    Current  LDAs  Peripheral IV 10/03/18 Right Upper arm (Active)   Site Assessment WDL 10/3/2018  3:24 PM   Number of days:0       Labs results:   Labs Ordered and Resulted from Time of ED Arrival Up to the Time of Departure from the ED   CBC WITH PLATELETS DIFFERENTIAL - Abnormal; Notable for the following:        Result Value    WBC 11.3 (*)     RDW 15.7 (*)     All other components within normal limits   COMPREHENSIVE METABOLIC PANEL - Abnormal; Notable for the following:     Glucose 145 (*)     Creatinine 1.81 (*)     GFR Estimate 29 (*)     GFR Estimate If Black 35 (*)     Bilirubin Total 7.9 (*)     Albumin 3.2 (*)     Alkaline Phosphatase 172 (*)      (*)      (*)     All other components within normal limits   LIPASE - Abnormal; Notable for the following:     Lipase 1266 (*)     All other components within normal limits   BILIRUBIN DIRECT AND TOTAL   INR       Imaging Studies:   Recent Results (from the past 24 hour(s))   US Abdomen Complete    Narrative    ULTRASOUND ABDOMEN COMPLETE  10/2/2018 7:47 PM     HISTORY:  Elevated liver function tests.    FINDINGS:  Liver is normal in echogenicity, but is enlarged measuring  24 cm in AP dimension. Gallbladder contains gallstones, but the  gallbladder overall appears decompressed. Gallbladder wall thickening  is noted but may be nonspecific in the setting of incomplete  distention. Common bile duct is normal in diameter. Pancreas is normal  where visualized. Spleen is borderline enlarged measuring 12 cm in AP  dimension. Kidneys are normal in size. There is no hydronephrosis.  Proximal abdominal aorta and IVC are within normal limits.      Impression    IMPRESSION:    1. Cholelithiasis with nonspecific gallbladder wall thickening due to  incomplete distention. Followup hepatobiliary scan could be performed  to confirm cystic duct and common bile duct patency. No common bile  duct dilatation is evident.  2. Hepatomegaly with borderline splenomegaly. Followup as  "clinically  warranted.    PRISCILLA CAMPBELL MD   CT Abdomen Pelvis w/o Contrast    Narrative    PRELIMINARY REPORT - The following report is a preliminary  interpretation.      Impression    IMPRESSION: There is a small amount of free fluid within the abdomen  within the pelvis and anterior to the liver.        Recent vital signs:   /58  Pulse 86  Temp 98.6  F (37  C) (Oral)  Resp 16  Ht 1.549 m (5' 1\")  Wt 108.5 kg (239 lb 3.2 oz)  LMP 06/24/2011  SpO2 97%  BMI 45.2 kg/m2    Cardiac Rhythm: Normal Sinus  Pt needs tele? No  Skin/wound Issues: None    Code Status: Full Code    Pain control: good    Nausea control: good    Abnormal labs/tests/findings requiring intervention: elevated liver enzymes, sent from clinic after being seen yesterday, labs repeated today and showed same results. Belly CT showing miniimal amount of free fluid behind liver, GI would like to admit and potentially do ultrasound tomorrow. Needs to be NPO after midnight.     Family present during ED course? Yes   Family Comments/Social Situation comments: adult daughter present for ED course, pt is  but  is at home with other children.     Tasks needing completion: None    Nina Hamilton RN  ascom-- [phone]  [phone] [facility]  [phone] [facility] ED      "

## 2018-10-03 NOTE — ED PROVIDER NOTES
History     Chief Complaint   Patient presents with     Abnormal Labs     HPI  Mary Grigsby is a 53 year old female who presents following abnormal LFTs and RUQ U/S from primary care. Complains of 2 week history of early satiety, nausea, vomiting after eating, bloating, and abdominal pain. Pain described as epigastric, gnawing, 3/10, without radiation. Has history of similar abdominal pain chronically which she attributes to gastric ulcer. No changes in stooling pattern, consistency, appearance, but does note darker urine x 2 weeks. Denies fevers, diarrhea. Reports radiation of pain to RUQ since U/S yesterday. Reports 1 day history of jaundice and scleral icterus.     FH of HCV in father, sister. Multiple family members with cholecystitis.    Fully vaccinated against hepatitis - works at [employer].  30 year smoking history, currently 5-6 cigarettes/day    Obese abdomen. HM, becker's neg. Epigastric tenderness. RUQ tenderness. Jaundice, scleral icterus.     Labs 10/02/2018: Cr 2.01, Tbili 8.1, albumin 3.0, alk phos 171, , .   Abdominal U/S 10/02/2018: hepatomegaly, borderline splenomegaly, cholelithiasis with non-specific GB wall thickening.    Past Medical History:   Diagnosis Date     Cervical high risk HPV (human papillomavirus) test positive 4/2015, 5/13/16    type 16     Cervical lymphadenopathy 7/29/2012     Cervical lymphadenopathy 7/29/2012     Cervical lymphadenopathy 7/29/2012     Cervical lymphadenopathy      Deviated septum     had septoplasty and endo. sinus surg. 2/09 per [facility]     Family history of diabetes mellitus     mother     Family history of stroke     multiple tiny cva's     House dust mite allergy      Hyperlipidemia LDL goal < 100      Hypertension goal BP (blood pressure) < 140/90      Obesity      Pap smear of cervix with ASCUS, cannot exclude HGSIL 3/2013     Papanicolaou smear of cervix with low grade squamous intraepithelial lesion (LGSIL) 1/2011, 10/2011    colp  "3/22/11 - GUSTABO 1-2     Rhinitis, allergic to other allergen      Seasonal allergic rhinitis 12/16/08 skin tests    CR/DM/M/T/G/W     Smoking      Type 2 diabetes, HbA1c goal < 7% (H)      Social History     Social History     Marital status:      Spouse name: Demetris     Number of children: 2     Years of education: N/A     Occupational History     [employer]      Social History Main Topics     Smoking status: Former Smoker     Packs/day: 0.50     Years: 20.00     Types: Cigarettes     Start date: 6/1/1980     Quit date: 2/24/2013     Smokeless tobacco: Never Used      Comment: still has occasionally      Alcohol use No     Drug use: No     Sexual activity: Yes     Partners: Male     Birth control/ protection: None     Other Topics Concern      Service No     Blood Transfusions No     Caffeine Concern No     Occupational Exposure No     Hobby Hazards No     Sleep Concern No     Stress Concern No     Special Diet No     Exercise Yes     1-3 times/week     Seat Belt Yes     Self-Exams Yes     Parent/Sibling W/ Cabg, Mi Or Angioplasty Before 65f 55m? No     Social History Narrative       I have reviewed the Medications, Allergies, Past Medical and Surgical History, and Social History in the Epic system.    Review of Systems   Constitutional: Negative for chills, fatigue and fever.   Eyes: Negative.    Respiratory: Negative for cough and shortness of breath.    Cardiovascular: Negative for chest pain, palpitations and leg swelling.   Endocrine: Negative for polyuria.   Genitourinary: Negative for dysuria.   Neurological: Negative for light-headedness.   Psychiatric/Behavioral: Negative for dysphoric mood.       Physical Exam   BP: 117/40  Pulse: 86  Heart Rate: 97  Temp: 98.1  F (36.7  C)  Resp: 17  Height: 154.9 cm (5' 1\")  Weight: 108.5 kg (239 lb 3.2 oz)  SpO2: 97 %      Physical Exam   Constitutional: She is oriented to person, place, and time. She appears well-developed and " well-nourished. No distress.   HENT:   Head: Normocephalic and atraumatic.   Eyes: EOM are normal. Scleral icterus is present.   Neck: Normal range of motion.   Cardiovascular: Normal rate, regular rhythm and normal heart sounds.    Pulmonary/Chest: Effort normal and breath sounds normal. No respiratory distress. She has no wheezes. She has no rales. She exhibits no tenderness.   Abdominal: Soft. Bowel sounds are normal.   Obese abdomen. Soft  Hepatomegaly. Lainez's negative. Mildly tender RUQ. Epigastric tenderness. No splenomegaly. No rebound.   Musculoskeletal: Normal range of motion.   Neurological: She is alert and oriented to person, place, and time.   Skin: Skin is warm. She is not diaphoretic.   Jaundice   Psychiatric: She has a normal mood and affect.       ED Course     ED Course     Procedures          Medications - No data to display    Results for orders placed or performed during the hospital encounter of 10/03/18   CT Abdomen Pelvis w/o Contrast    Narrative    PRELIMINARY REPORT - The following report is a preliminary  interpretation.      Impression    IMPRESSION: There is a small amount of free fluid within the abdomen  within the pelvis and anterior to the liver.        5:47 PM discussed with GI fellow who recommended fractionated bilirubin and admission to medicine.  They will see the patient tomorrow and make recommendations for possible endoscopic ultrasound versus MRCP    Labs Ordered and Resulted from Time of ED Arrival Up to the Time of Departure from the ED   CBC WITH PLATELETS DIFFERENTIAL - Abnormal; Notable for the following:        Result Value    WBC 11.3 (*)     RDW 15.7 (*)     All other components within normal limits   COMPREHENSIVE METABOLIC PANEL - Abnormal; Notable for the following:     Glucose 145 (*)     Creatinine 1.81 (*)     GFR Estimate 29 (*)     GFR Estimate If Black 35 (*)     Bilirubin Total 7.9 (*)     Albumin 3.2 (*)     Alkaline Phosphatase 172 (*)      (*)       (*)     All other components within normal limits   LIPASE - Abnormal; Notable for the following:     Lipase 1266 (*)     All other components within normal limits   INR - Abnormal; Notable for the following:     INR 1.41 (*)     All other components within normal limits   BILIRUBIN DIRECT - Abnormal; Notable for the following:     Bilirubin Direct 6.3 (*)     All other components within normal limits            Assessments & Plan (with Medical Decision Making)   52 yo F with DM2, chronic abdominal pain, presents with 2 week history early satiety, nausea, vomiting, and 1 day history of jaundice in setting of abnormal LFTs and RUQ U/S outpatient yesterday. Differentials include pancreatic malignancy, choledocholithiasis, cholecystitis. VS normal, remains afebrile. Exam revealed jaundice, scleral icterus, epigastric tenderness as well as RUQ tenderness; Lainez's negative. CBC, CMP, lipase pending. CT abdomen pelvis pending.    I have reviewed the nursing notes.    I have reviewed the findings, diagnosis, plan and need for follow up with the patient.  This data collected with the Resident working in the Emergency Department.  Patient was seen and evaluated by myself and I repeated the history and physical exam with the patient.  The plan of care was discussed with them.  The key portions of the note including the entire assessment and plan reflect my documentation.  This is a 53-year-old female who does not drink alcohol.  Over the last month she has had progressive symptoms of early satiety, epigastric abdominal pain and now has developed jaundice.  Right upper quadrant ultrasound done shows no evidence for cholecystitis.  INR is pending fractionated bilirubin is pending I spoke to GI who recommends admission to medicine and she may have a endoscopic ultrasound done tomorrow.  My concern is for pancreatic obstructing mass.  She is in stable condition, pain is not severe.      New Prescriptions    No  medications on file       Final diagnoses:   Jaundice   Abnormal LFTs   Acute pancreatitis, unspecified complication status, unspecified pancreatitis type       10/3/2018   [facility], [place], EMERGENCY DEPARTMENT     Isaac Rea MD  10/03/18 1409

## 2018-10-03 NOTE — ED TRIAGE NOTES
Patient presents ambulatory from home with abnormal labs. Patient reports she was told to come to the ED from the clinic due to elevated AST and ALT labs.

## 2018-10-03 NOTE — IP AVS SNAPSHOT
MRN:[id number]                      After Visit Summary   10/3/2018    Mary Grigsby    MRN: [id number]           Thank you!     Thank you for choosing [facility] for your care. Our goal is always to provide you with excellent care. Hearing back from our patients is one way we can continue to improve our services. Please take a few minutes to complete the written survey that you may receive in the mail after you visit with us. Thank you!        Patient Information     Date Of Birth          1965        Designated Caregiver       Most Recent Value    Caregiver    Will someone help with your care after discharge? yes    Name of designated caregiver Rolando    Phone number of caregiver [phone]    Caregiver address [address] [place] [address]      About your hospital stay     You were admitted on:  October 3, 2018 You last received care in the:  [address] [facility]    You were discharged on:  October 5, 2018        Reason for your hospital stay       Patient admitted with abdominal pain, nausea, vomiting. On admission labs consistent with pancreatitis, also notable for significantly elevated TBili, transaminases. ERCP/EUS without evidence obstruction in biliary system, no evidence cholecystitis or cholangitis. Improves significantly with iv fluids and nothing by mouth.  Advanced diet and went well. IVF discontinued.                  Who to Call     For medical emergencies, please call 911.  For non-urgent questions about your medical care, please call your primary care provider or clinic, [phone]  For questions related to your surgery, please call your surgery clinic        Attending Provider     Provider Specialty    Isaac Rea MD Emergency Medicine    Candy, Live Taylor MD Internal Medicine    [facility], Beverly Banuelos MD Internal Medicine       Primary Care Provider Office Phone # Fax #    Dipak Hooper [phone]       When to contact  your care team       Call your primary doctor  Or present back to ED with fevers, chills, woserning abdominal pain, nausea, vomiting, change in urination.                  After Care Instructions     Activity       Your activity upon discharge: activity as tolerated            Diet       Follow this diet upon discharge: Orders Placed This Encounter      Low fiber/ low residue diet. Lots of fluids!            Discharge Instructions       1. STOP taking Trulicity, Pravastatin, and Ibuprofen    2. TEMPORARILY STOP TAKING: Losartan, Felodipine, Hydrochlorothiazide, Aspirin until liver biopsy and follow up with Dr. Gutierrez, discuss with her at that time if these can be re-started as your blood pressure was not high off of them and your Cr was not quite back to baseline. Also you have lost a lot of weight over the past couple of months and may not need as much medication.    3. Liver biopsy being scheduled for Tuesday 10/9    4. Follow up with Liver doctor Dr. Gutierrez being scheduled     5. Follow up with PCP in next 1-2 weeks    6. Labs Monday 10/8: CBC, CMP, INR            Monitor and record       fluid intake and output daily  Drink plenty of fluids and ensure that you are urinating several times a day                  Follow-up Appointments     Adult [facility] Follow-up and recommended labs and tests       Labs Monday: CBC, BMP, LFTs, INR    Follow up with primary care provider, Dipak Hooper, within 7 days to evaluate medication change, to evaluate treatment change and for hospital follow- up.  The following labs/tests are recommended: CBC, BMP.    Will have liver biopsy on 10/9, then will f/u with Jaylene Gutierrez after that      Appointments on [place] and/or [facility] (with [facility] provider or service). Call [phone] if you haven't heard regarding these appointments within 7 days of discharge.            Follow Up and recommended labs and tests       Follow up with Dr. Gutierrez of Hepatology for GI  recs. Should have repeat CBC, BMP, LFTs, INR at that time on Monday 10/8. Should have medications re-evaluated at that time (held anti-hypertensives on dishcarge as BP in 110s/70s, Cr was improved but not quite back to baseline. Should have this re-assessed.                  Your next 10 appointments already scheduled     Oct 09, 2018  7:45 AM CDT   Lab with  LAB    [facility] ([facility])    [address]  1st Floor  [facility] [address] [phone]            Oct 09, 2018  9:00 AM CDT   (Arrive by 8:45 AM)   New General Liver with Melissa Gutierrez MD   [facility] Hepatology ([facility])    [address]  [address]  [facility] [id number] [phone]            Oct 09, 2018 12:00 PM CDT   Procedure 6 hr with U2A ROOM 5   [address] [facility] ([facility])    [address] [address]               Oct 09, 2018  1:30 PM CDT   IR LIVER BIOPSY PERCUTANEOUS with UUIR7   [facility] [place], Interventional Radiology ([facility], [facility])    [address] [address] [phone]           The day of the exam:   Do not eat any solid food or milk products for 6 hours prior to the exam. You may drink clear liquids until 2 hours prior to the exam. Clear liquids include the following: water, Jell-O, clear broth, apple juice or any non-carbonated drink that you can see through (no pop!)   The morning of the exam you may take medications as directed with a sip of water.  Please wear loose clothing, such as a sweat suit or jogging clothes. Avoid snaps, zippers and other metal. We may ask you to undress and put on a hospital gown.  Please bring any scans or X-rays taken at other hospitals, if similar tests were done. Also bring a list of your medicines, including vitamins, minerals and over-the-counter drugs. It is safest  to leave personal items at home.  Someone will need to drive you to and from the hospital.  Tell your doctor in advance:   If you have allergies to x-ray contrast or iodine.   If you are or may be pregnant.   If you are taking Coumadin (or any other blood thinners) 5 days prior to the exam for any special instructions.   If you are diabetic to determine if your insulin needs have to be adjusted for the exam.  Your doctor will:   Need to do a history and physical within 30 days before this procedure.   Obtain necessary laboratory tests prior to the exam (CBCP, INR and PTT).  If you were given sedation, you cannot drive for 24 hours after the procedure, and an adult must be with you until then.  If you have any questions, please call the Imaging Department where you will have your exam. Directions, parking instructions, and other information are available on our website, [facility]imaging.              Future tests that were ordered for you     IR Liver Biopsy Percutaneous                 Pending Results     Date and Time Order Name Status Description    10/5/2018 1553 Hepatitis E Antibody IgG In process     10/5/2018 1553 Hepatitis E Antibody IgM In process     10/5/2018 1553 HSV IgM antibody In process     10/5/2018 1553 HSV 1 and 2 DNA by PCR In process     10/5/2018 1553 Hepatitis B core antibody In process     10/5/2018 1553 Hepatitis B surface antigen In process     10/5/2018 0001 CMV DNA quantification In process     10/5/2018 0001 EBV DNA PCR Quantitative Whole Blood In process     10/5/2018 0001 F Actin EIA with reflex In process     10/5/2018 0001 Mitochondrial M2 Antibody IgG In process     10/5/2018 0001 Anti Nuclear Mary IgG by IFA with Reflex In process     10/3/2018 2220 Urine Culture Aerobic Bacterial Preliminary             Statement of Approval     Ordered          10/05/18 1914  I have reviewed and agree with all the recommendations and orders detailed in this document.  EFFECTIVE NOW     Approved  "and electronically signed by:  Laure Estrada PA-C             Admission Information     Date & Time Provider Department Dept. Phone    10/3/2018 Beverly Yost MD [address] [facility] [phone]      Your Vitals Were     Blood Pressure Pulse Temperature Respirations Height Weight    129/42 (BP Location: Left arm) 77 96.8  F (36  C) (Oral) 18 1.549 m (5' 1\") 111.3 kg (245 lb 6 oz)    Last Period Pulse Oximetry BMI (Body Mass Index)             06/24/2011 96% 46.36 kg/m2         MyChart Information     [facility] gives you secure access to your electronic health record. If you see a primary care provider, you can also send messages to your care team and make appointments. If you have questions, please call your primary care clinic.  If you do not have a primary care provider, please call [phone] and they will assist you.        Care EveryWhere ID     This is your Care EveryWhere ID. This could be used by other organizations to access your [facility] medical records  [id number]        Equal Access to Services     JAYRO HAYNES : Hadii kodak [place], waaxda luwen, qaybta shajialjose john[place], [place]. So [facility] [phone].    ATENCIÓN: Si habla español, tiene a de leon disposición servicios gratuitos de asistencia lingüística. Sundar al [phone].    We comply with applicable federal civil rights laws and [place] laws. We do not discriminate on the basis of race, color, national origin, age, disability, sex, sexual orientation, or gender identity.               Review of your medicines      CONTINUE these medicines which may have CHANGED, or have new prescriptions. If we are uncertain of the size of tablets/capsules you have at home, strength may be listed as something that might have changed.        Dose / Directions    aspirin 81 MG tablet   This may have changed:  additional instructions   Used for:  Essential hypertension with goal blood pressure less " than 140/90        Dose:  81 mg   Take 1 tablet (81 mg) by mouth daily Indication: primary prevention for heart disease   Quantity:  1 tablet   Refills:  3         CONTINUE these medicines which have NOT CHANGED        Dose / Directions    ALLEGRA PO        Dose:  90 mg   Take 90 mg by mouth daily as needed   Refills:  0       blood glucose calibration solution   Used for:  Type 2 diabetes, HbA1c goal < 7% (H)        Dose:  1 drop   1 drop. As needed   Quantity:  1 Bottle   Refills:  3       blood glucose monitoring lancets   Used for:  Type 2 diabetes, HbA1c goal < 7% (H)        Dose:  1 Device   1 Device 2 times daily.   Quantity:  100 each   Refills:  11       blood glucose monitoring test strip   Commonly known as:  no brand specified   Used for:  Prediabetes        Dose:  1 strip   1 strip 2 times daily.   Quantity:  100 strip   Refills:  11       cholecalciferol 1000 UNIT tablet   Commonly known as:  vitamin D3   Used for:  Vitamin D deficiency        Dose:  3000 Units   Take 3 tablets (3,000 Units) by mouth daily   Refills:  0       felodipine ER 5 MG 24 hr tablet   Commonly known as:  PLENDIL   Used for:  Essential hypertension with goal blood pressure less than 140/90        Dose:  5 mg   Take 1 tablet (5 mg) by mouth daily   Quantity:  90 tablet   Refills:  1       hydrochlorothiazide 12.5 MG Tabs tablet   Used for:  Essential hypertension with goal blood pressure less than 140/90        Dose:  12.5 mg   Take 1 tablet (12.5 mg) by mouth every morning   Quantity:  90 tablet   Refills:  1       losartan 100 MG tablet   Commonly known as:  COZAAR   Used for:  Essential hypertension with goal blood pressure less than 140/90        Dose:  100 mg   Take 1 tablet (100 mg) by mouth daily   Quantity:  90 tablet   Refills:  1       metFORMIN 500 MG 24 hr tablet   Commonly known as:  GLUCOPHAGE-XR   Used for:  Controlled type 2 diabetes mellitus without complication, without long-term current use of insulin (H)         TAKE FOUR TABLETS BY MOUTH EVERY DAY WITH DINNER   Quantity:  360 tablet   Refills:  1       SINUS RINSE BOTTLE KIT NA   Used for:  Post-nasal drainage        Dose:  1 Bottle   Spray 1 Bottle in nostril as needed.   Refills:  0         STOP taking     ADVIL PO           dulaglutide 1.5 MG/0.5ML pen   Commonly known as:  TRULICITY           pravastatin 40 MG tablet   Commonly known as:  PRAVACHOL                    Protect others around you: Learn how to safely use, store and throw away your medicines at www.disposemymeds.org.             Medication List: This is a list of all your medications and when to take them. Check marks below indicate your daily home schedule. Keep this list as a reference.      Medications           Morning Afternoon Evening Bedtime As Needed    ALLEGRA PO   Take 90 mg by mouth daily as needed                                aspirin 81 MG tablet   Take 1 tablet (81 mg) by mouth daily Indication: primary prevention for heart disease                                blood glucose calibration solution   1 drop. As needed                                blood glucose monitoring lancets   1 Device 2 times daily.                                blood glucose monitoring test strip   Commonly known as:  no brand specified   1 strip 2 times daily.                                cholecalciferol 1000 UNIT tablet   Commonly known as:  vitamin D3   Take 3 tablets (3,000 Units) by mouth daily                                felodipine ER 5 MG 24 hr tablet   Commonly known as:  PLENDIL   Take 1 tablet (5 mg) by mouth daily                                hydrochlorothiazide 12.5 MG Tabs tablet   Take 1 tablet (12.5 mg) by mouth every morning                                losartan 100 MG tablet   Commonly known as:  COZAAR   Take 1 tablet (100 mg) by mouth daily                                metFORMIN 500 MG 24 hr tablet   Commonly known as:  GLUCOPHAGE-XR   TAKE FOUR TABLETS BY MOUTH EVERY DAY WITH DINNER                                 SINUS RINSE BOTTLE KIT NA   Spray 1 Bottle in nostril as needed.

## 2018-10-03 NOTE — PHARMACY-ADMISSION MEDICATION HISTORY
Admission medication history interview status for the 10/3/2018 admission is complete. See Epic admission navigator for allergy information, pharmacy, prior to admission medications and immunization status.     Medication history interview sources:  Patient Interview and VerifyRxBenefits    Changes made to PTA medication list (reason)  Added: None  Deleted: None  Changed: None    Additional medication history information (including reliability of information, actions taken by pharmacist):   - Patient is an excellent historian.   - Patient takes Trulicity 1.5 mg weekly on Tuesdays.     Prior to Admission medications    Medication Sig Last Dose Taking? Auth Provider   aspirin 81 MG tablet Take 1 tablet by mouth daily. 10/3/2018 at AM Yes Dipak Hooper MD   cholecalciferol (VITAMIN D3) 1000 UNIT tablet Take 3 tablets (3,000 Units) by mouth daily 10/2/2018 at PM Yes Dipak Hooper MD   felodipine ER (PLENDIL) 5 MG 24 hr tablet Take 1 tablet (5 mg) by mouth daily 10/3/2018 at AM Yes Dipak Hooper MD   hydrochlorothiazide 12.5 MG TABS tablet Take 1 tablet (12.5 mg) by mouth every morning 10/3/2018 at AM Yes Dipak Hooper MD   losartan (COZAAR) 100 MG tablet Take 1 tablet (100 mg) by mouth daily 10/3/2018 at AM Yes Dipak Hooper MD   metFORMIN (GLUCOPHAGE-XR) 500 MG 24 hr tablet TAKE FOUR TABLETS BY MOUTH EVERY DAY WITH DINNER 10/2/2018 at PM Yes Dipak Hooper MD   pravastatin (PRAVACHOL) 40 MG tablet Take 1 tablet (40 mg) by mouth daily 10/3/2018 at AM Yes Dipak Hooper MD   Blood Glucose Calibration (FREESTYLE CONTROL SOLUTION) LIQD 1 drop. As needed   Dipak Hooper MD   dulaglutide (TRULICITY) 1.5 MG/0.5ML pen Inject 1.5 mg Subcutaneous every 7 days 10/2/2018  Dipak Hooper MD   Fexofenadine HCl (ALLEGRA PO) Take 90 mg by mouth daily as needed   at PRN  Reported, Patient   FREESTYLE LANCETS MISC 1 Device 2 times daily.   Dipak Hooper MD   glucose blood VI test strips strip 1 strip 2 times daily.   Sandie  MD Dipak   Hypertonic Nasal Wash (SINUS RINSE BOTTLE KIT NA) Spray 1 Bottle in nostril as needed.  at PRN  Reported, Patient   Ibuprofen (ADVIL PO) Take 600 mg by mouth daily as needed for moderate pain   at PRN  Reported, Patient         Medication history completed by: Ivon Shirley Pharm.D. IV Student

## 2018-10-04 ENCOUNTER — ANESTHESIA (OUTPATIENT)
Dept: GASTROENTEROLOGY | Facility: CLINIC | Age: 53
DRG: 441 | End: 2018-10-04
Payer: COMMERCIAL

## 2018-10-04 ENCOUNTER — ANESTHESIA EVENT (OUTPATIENT)
Dept: GASTROENTEROLOGY | Facility: CLINIC | Age: 53
DRG: 441 | End: 2018-10-04
Payer: COMMERCIAL

## 2018-10-04 LAB
ALBUMIN SERPL-MCNC: 2.6 G/DL (ref 3.4–5)
ALP SERPL-CCNC: 142 U/L (ref 40–150)
ALT SERPL W P-5'-P-CCNC: 514 U/L (ref 0–50)
AMYLASE SERPL-CCNC: 274 U/L (ref 30–110)
ANION GAP SERPL CALCULATED.3IONS-SCNC: 8 MMOL/L (ref 3–14)
ANION GAP SERPL CALCULATED.3IONS-SCNC: 9 MMOL/L (ref 3–14)
APAP SERPL-MCNC: <5 UG/ML
AST SERPL W P-5'-P-CCNC: 508 U/L (ref 0–45)
BILIRUB SERPL-MCNC: 7.5 MG/DL (ref 0.2–1.3)
BUN SERPL-MCNC: 18 MG/DL (ref 7–30)
BUN SERPL-MCNC: 22 MG/DL (ref 7–30)
CALCIUM SERPL-MCNC: 8.7 MG/DL (ref 8.5–10.1)
CALCIUM SERPL-MCNC: 9 MG/DL (ref 8.5–10.1)
CHLORIDE SERPL-SCNC: 105 MMOL/L (ref 94–109)
CHLORIDE SERPL-SCNC: 106 MMOL/L (ref 94–109)
CO2 SERPL-SCNC: 27 MMOL/L (ref 20–32)
CO2 SERPL-SCNC: 28 MMOL/L (ref 20–32)
CREAT SERPL-MCNC: 1.14 MG/DL (ref 0.52–1.04)
CREAT SERPL-MCNC: 1.38 MG/DL (ref 0.52–1.04)
CREAT SERPL-MCNC: 1.65 MG/DL (ref 0.52–1.04)
CREAT UR-MCNC: 81 MG/DL
CRP SERPL-MCNC: 33 MG/L (ref 0–8)
ERYTHROCYTE [DISTWIDTH] IN BLOOD BY AUTOMATED COUNT: 15.8 % (ref 10–15)
FERRITIN SERPL-MCNC: 940 NG/ML (ref 8–252)
FOLATE SERPL-MCNC: 19.6 NG/ML
FRACT EXCRET NA UR+SERPL-RTO: 0.7 %
GFR SERPL CREATININE-BSD FRML MDRD: 33 ML/MIN/1.7M2
GFR SERPL CREATININE-BSD FRML MDRD: 40 ML/MIN/1.7M2
GFR SERPL CREATININE-BSD FRML MDRD: 50 ML/MIN/1.7M2
GLUCOSE BLDC GLUCOMTR-MCNC: 114 MG/DL (ref 70–99)
GLUCOSE BLDC GLUCOMTR-MCNC: 117 MG/DL (ref 70–99)
GLUCOSE BLDC GLUCOMTR-MCNC: 139 MG/DL (ref 70–99)
GLUCOSE BLDC GLUCOMTR-MCNC: 140 MG/DL (ref 70–99)
GLUCOSE BLDC GLUCOMTR-MCNC: 142 MG/DL (ref 70–99)
GLUCOSE BLDC GLUCOMTR-MCNC: 147 MG/DL (ref 70–99)
GLUCOSE SERPL-MCNC: 126 MG/DL (ref 70–99)
GLUCOSE SERPL-MCNC: 134 MG/DL (ref 70–99)
HAV IGM SERPL QL IA: NONREACTIVE
HBV SURFACE AB SERPL IA-ACNC: 0.45 M[IU]/ML
HCT VFR BLD AUTO: 36.2 % (ref 35–47)
HCV AB SERPL QL IA: NONREACTIVE
HGB BLD-MCNC: 12.3 G/DL (ref 11.7–15.7)
INR PPP: 1.5 (ref 0.86–1.14)
IRON SATN MFR SERPL: 29 % (ref 15–46)
IRON SATN MFR SERPL: 43 % (ref 15–46)
IRON SERPL-MCNC: 115 UG/DL (ref 35–180)
IRON SERPL-MCNC: 84 UG/DL (ref 35–180)
LIPASE SERPL-CCNC: 1747 U/L (ref 73–393)
MAGNESIUM SERPL-MCNC: 1.5 MG/DL (ref 1.6–2.3)
MCH RBC QN AUTO: 30.8 PG (ref 26.5–33)
MCHC RBC AUTO-ENTMCNC: 34 G/DL (ref 31.5–36.5)
MCV RBC AUTO: 91 FL (ref 78–100)
PHOSPHATE SERPL-MCNC: 3.5 MG/DL (ref 2.5–4.5)
PLATELET # BLD AUTO: 230 10E9/L (ref 150–450)
POTASSIUM SERPL-SCNC: 3.3 MMOL/L (ref 3.4–5.3)
POTASSIUM SERPL-SCNC: 3.8 MMOL/L (ref 3.4–5.3)
PROCALCITONIN SERPL-MCNC: 0.63 NG/ML
PROT SERPL-MCNC: 6.5 G/DL (ref 6.8–8.8)
RBC # BLD AUTO: 3.99 10E12/L (ref 3.8–5.2)
SODIUM SERPL-SCNC: 138 MMOL/L (ref 133–144)
SODIUM SERPL-SCNC: 141 MMOL/L (ref 133–144)
SODIUM SERPL-SCNC: 141 MMOL/L (ref 133–144)
SODIUM UR-SCNC: 46 MMOL/L
TIBC SERPL-MCNC: 267 UG/DL (ref 240–430)
TIBC SERPL-MCNC: 286 UG/DL (ref 240–430)
UPPER EUS: NORMAL
VIT B12 SERPL-MCNC: 2048 PG/ML (ref 193–986)
WBC # BLD AUTO: 8 10E9/L (ref 4–11)

## 2018-10-04 PROCEDURE — 00000146 ZZHCL STATISTIC GLUCOSE BY METER IP

## 2018-10-04 PROCEDURE — 83540 ASSAY OF IRON: CPT | Performed by: INTERNAL MEDICINE

## 2018-10-04 PROCEDURE — 25000128 H RX IP 250 OP 636: Performed by: NURSE ANESTHETIST, CERTIFIED REGISTERED

## 2018-10-04 PROCEDURE — 85027 COMPLETE CBC AUTOMATED: CPT | Performed by: PHYSICIAN ASSISTANT

## 2018-10-04 PROCEDURE — 83735 ASSAY OF MAGNESIUM: CPT | Performed by: INTERNAL MEDICINE

## 2018-10-04 PROCEDURE — 82728 ASSAY OF FERRITIN: CPT | Performed by: PHYSICIAN ASSISTANT

## 2018-10-04 PROCEDURE — 86803 HEPATITIS C AB TEST: CPT | Performed by: PHYSICIAN ASSISTANT

## 2018-10-04 PROCEDURE — 83550 IRON BINDING TEST: CPT | Performed by: INTERNAL MEDICINE

## 2018-10-04 PROCEDURE — 83550 IRON BINDING TEST: CPT | Performed by: PHYSICIAN ASSISTANT

## 2018-10-04 PROCEDURE — 37000009 ZZH ANESTHESIA TECHNICAL FEE, EACH ADDTL 15 MIN: Performed by: INTERNAL MEDICINE

## 2018-10-04 PROCEDURE — 84145 PROCALCITONIN (PCT): CPT | Performed by: PHYSICIAN ASSISTANT

## 2018-10-04 PROCEDURE — 37000008 ZZH ANESTHESIA TECHNICAL FEE, 1ST 30 MIN: Performed by: INTERNAL MEDICINE

## 2018-10-04 PROCEDURE — 83540 ASSAY OF IRON: CPT | Performed by: PHYSICIAN ASSISTANT

## 2018-10-04 PROCEDURE — 83735 ASSAY OF MAGNESIUM: CPT | Performed by: PHYSICIAN ASSISTANT

## 2018-10-04 PROCEDURE — 86706 HEP B SURFACE ANTIBODY: CPT | Performed by: PHYSICIAN ASSISTANT

## 2018-10-04 PROCEDURE — 0DJ08ZZ INSPECTION OF UPPER INTESTINAL TRACT, VIA NATURAL OR ARTIFICIAL OPENING ENDOSCOPIC: ICD-10-PCS | Performed by: INTERNAL MEDICINE

## 2018-10-04 PROCEDURE — 25000128 H RX IP 250 OP 636: Performed by: INTERNAL MEDICINE

## 2018-10-04 PROCEDURE — 43259 EGD US EXAM DUODENUM/JEJUNUM: CPT | Performed by: INTERNAL MEDICINE

## 2018-10-04 PROCEDURE — 86709 HEPATITIS A IGM ANTIBODY: CPT | Performed by: PHYSICIAN ASSISTANT

## 2018-10-04 PROCEDURE — 25000128 H RX IP 250 OP 636: Performed by: PHYSICIAN ASSISTANT

## 2018-10-04 PROCEDURE — 86140 C-REACTIVE PROTEIN: CPT | Performed by: PHYSICIAN ASSISTANT

## 2018-10-04 PROCEDURE — 12000001 ZZH R&B MED SURG/OB UMMC

## 2018-10-04 PROCEDURE — 25000125 ZZHC RX 250: Performed by: NURSE ANESTHETIST, CERTIFIED REGISTERED

## 2018-10-04 PROCEDURE — 83690 ASSAY OF LIPASE: CPT | Performed by: PHYSICIAN ASSISTANT

## 2018-10-04 PROCEDURE — 25000132 ZZH RX MED GY IP 250 OP 250 PS 637: Performed by: INTERNAL MEDICINE

## 2018-10-04 PROCEDURE — 82607 VITAMIN B-12: CPT | Performed by: PHYSICIAN ASSISTANT

## 2018-10-04 PROCEDURE — 82746 ASSAY OF FOLIC ACID SERUM: CPT | Performed by: PHYSICIAN ASSISTANT

## 2018-10-04 PROCEDURE — 99233 SBSQ HOSP IP/OBS HIGH 50: CPT | Performed by: INTERNAL MEDICINE

## 2018-10-04 PROCEDURE — 82150 ASSAY OF AMYLASE: CPT | Performed by: PHYSICIAN ASSISTANT

## 2018-10-04 PROCEDURE — 80053 COMPREHEN METABOLIC PANEL: CPT | Performed by: PHYSICIAN ASSISTANT

## 2018-10-04 PROCEDURE — 85610 PROTHROMBIN TIME: CPT | Performed by: PHYSICIAN ASSISTANT

## 2018-10-04 PROCEDURE — 80048 BASIC METABOLIC PNL TOTAL CA: CPT | Performed by: INTERNAL MEDICINE

## 2018-10-04 PROCEDURE — 36415 COLL VENOUS BLD VENIPUNCTURE: CPT | Performed by: PHYSICIAN ASSISTANT

## 2018-10-04 PROCEDURE — 25000125 ZZHC RX 250: Performed by: INTERNAL MEDICINE

## 2018-10-04 PROCEDURE — 84100 ASSAY OF PHOSPHORUS: CPT | Performed by: PHYSICIAN ASSISTANT

## 2018-10-04 RX ORDER — ONDANSETRON 2 MG/ML
4 INJECTION INTRAMUSCULAR; INTRAVENOUS EVERY 30 MIN PRN
Status: DISCONTINUED | OUTPATIENT
Start: 2018-10-04 | End: 2018-10-04 | Stop reason: HOSPADM

## 2018-10-04 RX ORDER — POTASSIUM CHLORIDE 7.45 MG/ML
10 INJECTION INTRAVENOUS
Status: COMPLETED | OUTPATIENT
Start: 2018-10-04 | End: 2018-10-04

## 2018-10-04 RX ORDER — SIMETHICONE
LIQUID (ML) MISCELLANEOUS PRN
Status: DISCONTINUED | OUTPATIENT
Start: 2018-10-04 | End: 2018-10-04 | Stop reason: HOSPADM

## 2018-10-04 RX ORDER — POTASSIUM CL/LIDO/0.9 % NACL 10MEQ/0.1L
10 INTRAVENOUS SOLUTION, PIGGYBACK (ML) INTRAVENOUS
Status: DISCONTINUED | OUTPATIENT
Start: 2018-10-04 | End: 2018-10-04

## 2018-10-04 RX ORDER — FENTANYL CITRATE 50 UG/ML
25-50 INJECTION, SOLUTION INTRAMUSCULAR; INTRAVENOUS
Status: DISCONTINUED | OUTPATIENT
Start: 2018-10-04 | End: 2018-10-04 | Stop reason: HOSPADM

## 2018-10-04 RX ORDER — LIDOCAINE HYDROCHLORIDE 40 MG/ML
INJECTION, SOLUTION RETROBULBAR PRN
Status: DISCONTINUED | OUTPATIENT
Start: 2018-10-04 | End: 2018-10-04

## 2018-10-04 RX ORDER — FENTANYL CITRATE 50 UG/ML
INJECTION, SOLUTION INTRAMUSCULAR; INTRAVENOUS PRN
Status: DISCONTINUED | OUTPATIENT
Start: 2018-10-04 | End: 2018-10-04

## 2018-10-04 RX ORDER — NALOXONE HYDROCHLORIDE 0.4 MG/ML
.1-.4 INJECTION, SOLUTION INTRAMUSCULAR; INTRAVENOUS; SUBCUTANEOUS
Status: ACTIVE | OUTPATIENT
Start: 2018-10-04 | End: 2018-10-05

## 2018-10-04 RX ORDER — LIDOCAINE 40 MG/G
CREAM TOPICAL
Status: DISCONTINUED | OUTPATIENT
Start: 2018-10-04 | End: 2018-10-04

## 2018-10-04 RX ORDER — PROPOFOL 10 MG/ML
INJECTION, EMULSION INTRAVENOUS PRN
Status: DISCONTINUED | OUTPATIENT
Start: 2018-10-04 | End: 2018-10-04

## 2018-10-04 RX ORDER — ONDANSETRON 4 MG/1
4 TABLET, ORALLY DISINTEGRATING ORAL EVERY 30 MIN PRN
Status: DISCONTINUED | OUTPATIENT
Start: 2018-10-04 | End: 2018-10-04 | Stop reason: HOSPADM

## 2018-10-04 RX ORDER — SODIUM CHLORIDE, SODIUM LACTATE, POTASSIUM CHLORIDE, CALCIUM CHLORIDE 600; 310; 30; 20 MG/100ML; MG/100ML; MG/100ML; MG/100ML
INJECTION, SOLUTION INTRAVENOUS CONTINUOUS
Status: DISCONTINUED | OUTPATIENT
Start: 2018-10-04 | End: 2018-10-04 | Stop reason: HOSPADM

## 2018-10-04 RX ORDER — PROPOFOL 10 MG/ML
INJECTION, EMULSION INTRAVENOUS CONTINUOUS PRN
Status: DISCONTINUED | OUTPATIENT
Start: 2018-10-04 | End: 2018-10-04

## 2018-10-04 RX ORDER — NALOXONE HYDROCHLORIDE 0.4 MG/ML
.1-.4 INJECTION, SOLUTION INTRAMUSCULAR; INTRAVENOUS; SUBCUTANEOUS
Status: DISCONTINUED | OUTPATIENT
Start: 2018-10-04 | End: 2018-10-04

## 2018-10-04 RX ORDER — GLYCOPYRROLATE 0.2 MG/ML
INJECTION, SOLUTION INTRAMUSCULAR; INTRAVENOUS PRN
Status: DISCONTINUED | OUTPATIENT
Start: 2018-10-04 | End: 2018-10-04

## 2018-10-04 RX ORDER — FLUMAZENIL 0.1 MG/ML
0.2 INJECTION, SOLUTION INTRAVENOUS
Status: ACTIVE | OUTPATIENT
Start: 2018-10-04 | End: 2018-10-05

## 2018-10-04 RX ORDER — ONDANSETRON 2 MG/ML
INJECTION INTRAMUSCULAR; INTRAVENOUS PRN
Status: DISCONTINUED | OUTPATIENT
Start: 2018-10-04 | End: 2018-10-04

## 2018-10-04 RX ADMIN — SODIUM CHLORIDE, POTASSIUM CHLORIDE, SODIUM LACTATE AND CALCIUM CHLORIDE: 600; 310; 30; 20 INJECTION, SOLUTION INTRAVENOUS at 09:34

## 2018-10-04 RX ADMIN — FENTANYL CITRATE 25 MCG: 50 INJECTION, SOLUTION INTRAMUSCULAR; INTRAVENOUS at 15:09

## 2018-10-04 RX ADMIN — POTASSIUM CHLORIDE 10 MEQ: 10 INJECTION, SOLUTION INTRAVENOUS at 13:19

## 2018-10-04 RX ADMIN — PROPOFOL 10 MG: 10 INJECTION, EMULSION INTRAVENOUS at 15:26

## 2018-10-04 RX ADMIN — POTASSIUM CHLORIDE 10 MEQ: 10 INJECTION, SOLUTION INTRAVENOUS at 14:40

## 2018-10-04 RX ADMIN — SODIUM CHLORIDE, POTASSIUM CHLORIDE, SODIUM LACTATE AND CALCIUM CHLORIDE: 600; 310; 30; 20 INJECTION, SOLUTION INTRAVENOUS at 04:35

## 2018-10-04 RX ADMIN — PROPOFOL 10 MG: 10 INJECTION, EMULSION INTRAVENOUS at 15:27

## 2018-10-04 RX ADMIN — POTASSIUM CHLORIDE 10 MEQ: 10 INJECTION, SOLUTION INTRAVENOUS at 12:12

## 2018-10-04 RX ADMIN — PROPOFOL 50 MCG/KG/MIN: 10 INJECTION, EMULSION INTRAVENOUS at 15:20

## 2018-10-04 RX ADMIN — LIDOCAINE HYDROCHLORIDE 1 ML: 40 INJECTION, SOLUTION RETROBULBAR; TOPICAL at 14:15

## 2018-10-04 RX ADMIN — LIDOCAINE HYDROCHLORIDE 1 ML: 40 INJECTION, SOLUTION RETROBULBAR; TOPICAL at 15:17

## 2018-10-04 RX ADMIN — SODIUM CHLORIDE, POTASSIUM CHLORIDE, SODIUM LACTATE AND CALCIUM CHLORIDE: 600; 310; 30; 20 INJECTION, SOLUTION INTRAVENOUS at 00:25

## 2018-10-04 RX ADMIN — SODIUM CHLORIDE, POTASSIUM CHLORIDE, SODIUM LACTATE AND CALCIUM CHLORIDE: 600; 310; 30; 20 INJECTION, SOLUTION INTRAVENOUS at 14:10

## 2018-10-04 RX ADMIN — SODIUM CHLORIDE, POTASSIUM CHLORIDE, SODIUM LACTATE AND CALCIUM CHLORIDE: 600; 310; 30; 20 INJECTION, SOLUTION INTRAVENOUS at 17:18

## 2018-10-04 RX ADMIN — Medication 2 G: at 20:16

## 2018-10-04 RX ADMIN — ONDANSETRON 4 MG: 2 INJECTION INTRAMUSCULAR; INTRAVENOUS at 14:14

## 2018-10-04 RX ADMIN — LIDOCAINE HYDROCHLORIDE 1 ML: 40 INJECTION, SOLUTION RETROBULBAR; TOPICAL at 15:16

## 2018-10-04 RX ADMIN — LIDOCAINE HYDROCHLORIDE 1 ML: 40 INJECTION, SOLUTION RETROBULBAR; TOPICAL at 15:14

## 2018-10-04 RX ADMIN — PROPOFOL 30 MG: 10 INJECTION, EMULSION INTRAVENOUS at 15:23

## 2018-10-04 RX ADMIN — FENTANYL CITRATE 25 MCG: 50 INJECTION, SOLUTION INTRAMUSCULAR; INTRAVENOUS at 15:16

## 2018-10-04 RX ADMIN — LIDOCAINE HYDROCHLORIDE 1 ML: 40 INJECTION, SOLUTION RETROBULBAR; TOPICAL at 15:19

## 2018-10-04 RX ADMIN — GLYCOPYRROLATE 0.3 MG: 0.2 INJECTION, SOLUTION INTRAMUSCULAR; INTRAVENOUS at 14:14

## 2018-10-04 RX ADMIN — PROPOFOL 10 MG: 10 INJECTION, EMULSION INTRAVENOUS at 15:25

## 2018-10-04 RX ADMIN — FENTANYL CITRATE 25 MCG: 50 INJECTION, SOLUTION INTRAMUSCULAR; INTRAVENOUS at 15:05

## 2018-10-04 RX ADMIN — LIDOCAINE HYDROCHLORIDE 1 ML: 40 INJECTION, SOLUTION RETROBULBAR; TOPICAL at 15:18

## 2018-10-04 ASSESSMENT — ACTIVITIES OF DAILY LIVING (ADL)
ADLS_ACUITY_SCORE: 9

## 2018-10-04 NOTE — CONSULTS
GASTROENTEROLOGY CONSULTATION      Date of Admission:  10/3/2018  Date of Service:   10/4/2018          ASSESSMENT AND RECOMMENDATIONS:   Assessment:  Mary Grigsby is a 53 year old female with HTN and DM who initially presented to clinic with 3 weeks of increasing epigastric to RUQ pain and nausea.  She was found to have significant liver failure and lipase elevation, consistent with pancreatitis.  Evaluated for biliary obstruction as a cause of pancreatitis and liver injury, however, EUS today did not show any cholelithiasis.  No CBD stones, and additionally, no stones seen in gallbladder, though this was commented on in RUQ U/S.  There was a gallbladder polyp noted.  She denies significant alcohol use.  She was started on dulaglutide recently, which could be contributing, though it is not a known medication to cause pancreatitis or hepatitis.  Triglycerides only mildly elevated in the past, plan to recheck.  Will also check IgG4.    Duration and quality of symptoms, laboratory abnormalities (including coagulopathy and hypoalbuminemia), hepatomegaly and splenomegaly all raise concern for liver disease driving this process.  HCV, HAV, and HIV testing negative.  Hep B surface antigen not checked.  Will need HBV vaccine at some point.  Ferritin is elevated, but this is also an acute phase reactant.  Will plan to check for other causes of liver disease, as below.  Again, may be related to drug injury, though dulaglutide is not known to cause liver injury.     Recommendations  - Pain control, per primary team  - Continue fluid resuscitation with LR  - Slowly advance diet as tolerated, starting with CLD  - Check TG and IgG4  - Check WILBERT, AMA, ASMA  - Check HBV surface antibody, CMV, EBV  - Hold dulaglutide      Gastroenterology follow up recommendations: Hepatology service will continue to follow    Thank you for involving us in this patient's care. Please do not hesitate to contact the GI service with any  questions or concerns.     Pt care plan discussed with Dr. Gastelum, GI staff physician.    Ashwini Gray MD  GI Fellow  [phone]  -------------------------------------------------------------------------------------------------------------------          Reason for Consult:   We were asked by Dr. Gupta to evaluate this patient with pancreatitis and liver failure    History is obtained from the patient and the medical record.          History of Present Illness:     Mary Grigsby is a 53 year old female with HTN and DM who initially presented to clinic with 3 weeks of increasing epigastric to RUQ pain and nausea.  She was asked to come in for laboratory abnormalities.  She reports feeling unwell for the last 3 weeks.  Has been nauseous and developed nonbloody emesis twice in the last week.  She also endorses early satiety and a feeling of abdominal fullness.  PO intake increased pain as well.  Daughter pointed out jaundice in the last few days, but she is unsure when it started.  Denies itching, confusion, fevers, SOB, CP, lightheadedness, syncope, dysuria, or confusion.  Smokes 1/2ppd.  Denies alcohol use, no history of heavy alcohol use.  Uses ibuprofen 600mg about once a week.  Denies tylenol use.  Reports family history of HCV.           Review of Systems:   A 10 point review of systems was performed and is negative except as noted in the HPI           Past Medical History:   Reviewed and edited as appropriate  Past Medical History:   Diagnosis Date     Cervical high risk HPV (human papillomavirus) test positive 4/2015, 5/13/16    type 16     Cervical lymphadenopathy 7/29/2012     Cervical lymphadenopathy 7/29/2012     Cervical lymphadenopathy 7/29/2012     Cervical lymphadenopathy      Deviated septum     had septoplasty and endo. sinus surg. 2/09 per [facility]     Family history of diabetes mellitus     mother     Family history of stroke     multiple tiny cva's     House dust mite allergy      Hyperlipidemia  LDL goal < 100      Hypertension goal BP (blood pressure) < 140/90      Obesity      Pap smear of cervix with ASCUS, cannot exclude HGSIL 3/2013     Papanicolaou smear of cervix with low grade squamous intraepithelial lesion (LGSIL) 1/2011, 10/2011    colp 3/22/11 - GUSTABO 1-2     Rhinitis, allergic to other allergen      Seasonal allergic rhinitis 12/16/08 skin tests    CR/DM/M/T/G/W     Smoking      Type 2 diabetes, HbA1c goal < 7% (H)             Past Surgical History:   Reviewed and edited as appropriate   Past Surgical History:   Procedure Laterality Date     CRYOTHERAPY  3/30/2011    cervical     HC COLP CERVIX/UPPER VAGINA W BX CERVIX  1/2010, 3/2011    neg dysplasia, GUSTABO 1-2     HC REPAIR OF NASAL SEPTUM  2/3/09-per Dr. Harley    antrostomy, ethmoidectomy, septoplasty and turbinate reduction     LAPAROSCOPIC ASSISTED HYSTERECTOMY VAGINAL  3/12/12    [facility]     TONSILLECTOMY & ADENOIDECTOMY  1973    age 8            Previous Endoscopy:   No results found for this or any previous visit.         Social History:   Reviewed and edited as appropriate  Social History     Social History     Marital status:      Spouse name: Demetris     Number of children: 2     Years of education: N/A     Occupational History     [employer]      Social History Main Topics     Smoking status: Former Smoker     Packs/day: 0.50     Years: 20.00     Types: Cigarettes     Start date: 6/1/1980     Quit date: 2/24/2013     Smokeless tobacco: Never Used      Comment: still has occasionally      Alcohol use No     Drug use: No     Sexual activity: Yes     Partners: Male     Birth control/ protection: None     Other Topics Concern      Service No     Blood Transfusions No     Caffeine Concern No     Occupational Exposure No     Hobby Hazards No     Sleep Concern No     Stress Concern No     Special Diet No     Exercise Yes     1-3 times/week     Seat Belt Yes     Self-Exams Yes     Parent/Sibling W/ Cabg,  Mi Or Angioplasty Before 65f 55m? No     Social History Narrative            Family History:   Reviewed and edited as appropriate  Family History   Problem Relation Age of Onset     Cancer Father      Other Cancer Father      passed Nov.2016     Hypertension Father      age 72     Hypertension Sister      age 35     Thyroid Disease Sister      Cerebrovascular Disease Maternal Grandmother      Asthma Son      Thyroid Disease Sister      Thyroid Disease Sister      Thyroid Disease Sister      Diabetes Mother      Hypertension Mother      age 34     Hypertension Sister      Thyroid Disease Sister      Asthma Son      Glaucoma No family hx of      Macular Degeneration No family hx of            Allergies:   Reviewed and edited as appropriate     Allergies   Allergen Reactions     Amoxicillin Hives     Augmentin      Erythromycin             Medications:     Prescriptions Prior to Admission   Medication Sig Dispense Refill Last Dose     aspirin 81 MG tablet Take 1 tablet by mouth daily. 1 tablet 3 10/3/2018 at AM     cholecalciferol (VITAMIN D3) 1000 UNIT tablet Take 3 tablets (3,000 Units) by mouth daily   10/2/2018 at PM     felodipine ER (PLENDIL) 5 MG 24 hr tablet Take 1 tablet (5 mg) by mouth daily 90 tablet 1 10/3/2018 at AM     hydrochlorothiazide 12.5 MG TABS tablet Take 1 tablet (12.5 mg) by mouth every morning 90 tablet 1 10/3/2018 at AM     losartan (COZAAR) 100 MG tablet Take 1 tablet (100 mg) by mouth daily 90 tablet 1 10/3/2018 at AM     metFORMIN (GLUCOPHAGE-XR) 500 MG 24 hr tablet TAKE FOUR TABLETS BY MOUTH EVERY DAY WITH DINNER 360 tablet 1 10/2/2018 at PM     pravastatin (PRAVACHOL) 40 MG tablet Take 1 tablet (40 mg) by mouth daily 90 tablet 1 10/3/2018 at AM     Blood Glucose Calibration (FREESTYLE CONTROL SOLUTION) LIQD 1 drop. As needed 1 Bottle 3 Taking     dulaglutide (TRULICITY) 1.5 MG/0.5ML pen Inject 1.5 mg Subcutaneous every 7 days 0.5 mL 2 10/2/2018     Fexofenadine HCl (ALLEGRA PO) Take 90 mg  "by mouth daily as needed     at PRN     FREESTYLE LANCETS MISC 1 Device 2 times daily. 100 each 11 Taking     glucose blood VI test strips strip 1 strip 2 times daily. 100 strip 11 Taking     Hypertonic Nasal Wash (SINUS RINSE BOTTLE KIT NA) Spray 1 Bottle in nostril as needed.    at PRN     Ibuprofen (ADVIL PO) Take 600 mg by mouth daily as needed for moderate pain     at PRN             Physical Exam:   /74  Pulse 75  Temp 97.3  F (36.3  C) (Oral)  Resp 22  Ht 1.549 m (5' 1\")  Wt 108.9 kg (240 lb 1.6 oz)  LMP 06/24/2011  SpO2 97%  BMI 45.37 kg/m2  Wt:   Wt Readings from Last 2 Encounters:   10/03/18 108.9 kg (240 lb 1.6 oz)   10/02/18 109.3 kg (241 lb)      Constitutional: Cooperative, not dyspneic/diaphoretic, no acute distress  Eyes: Sclera icteric  Ears/nose/mouth/throat: Mucus membranes moist, hearing intact  Neck: Supple  CV: RRR, no edema  Respiratory: Unlabored breathing  Abd: Soft, nontender, nondistended, no peritoneal signs, obese  Skin: Warm, perfused  Neuro: AAO, answers questions appropriately, no asterixis  Psych: Normal affect  MSK: No gross deformities           Data:   All available labs, imaging, and procedure notes were independently reviewed and interpreted, pertinent values are as follow:    BMP  Recent Labs  Lab 10/04/18  0540 10/03/18  2234 10/03/18  1525 10/02/18  0928    138 138 134   POTASSIUM 3.3*  --  3.5 3.2*   CHLORIDE 105  --  101 99   HOWARD 8.7  --  9.2 9.5   CO2 27  --  27 25   BUN 22  --  26 26   CR 1.38* 1.65* 1.81* 2.01*   *  --  145* 276*     CBC  Recent Labs  Lab 10/04/18  0540 10/03/18  1525 10/02/18  0928   WBC 8.0 11.3* 9.7   RBC 3.99 4.53 4.40   HGB 12.3 14.2 13.7   HCT 36.2 42.0 39.0   MCV 91 93 89   MCH 30.8 31.3 31.1   MCHC 34.0 33.8 35.1   RDW 15.8* 15.7* 15.5*    284 260     INR  Recent Labs  Lab 10/04/18  0540 10/03/18  1525   INR 1.50* 1.41*     LFTs  Recent Labs  Lab 10/04/18  0540 10/03/18  1525 10/02/18  0928   ALKPHOS 142 172* " 171*   * 563* 602*   * 633* 717*   BILITOTAL 7.5* 7.9* 8.1*   PROTTOTAL 6.5* 7.5 7.4   ALBUMIN 2.6* 3.2* 3.0*      PANC  Recent Labs  Lab 10/04/18  0540 10/03/18  1525   LIPASE 1747* 1266*   AMYLASE 274*  --        Imaging:  Reviewed    Seen and examined with GI fellow, agree with findings and recommendations.    Romaine Gastelum MD GI Staff

## 2018-10-04 NOTE — ANESTHESIA PREPROCEDURE EVALUATION
Anesthesia Plan      History & Physical Review  History and physical reviewed and following examination; no interval change.    ASA Status:  3 .    NPO Status:  > 6 hours    Plan for MAC with Intravenous induction. Maintenance will be TIVA.  Reason for MAC:  Difficulty with conscious sedation (QS)         Postoperative Care      Consents  Anesthetic plan, risks, benefits and alternatives discussed with:  Patient..        ANESTHESIA PREOP EVALUATION    NPO Status: NPO for Medical/Clinical Reasons Except for: Meds Yes, NPO    Procedure: Procedure(s):  EUS - Wound Class: II-Clean Contaminated    HPI: Presents for EUS.      PMHx/PSHx/ROS:  PAST MEDICAL HISTORY:   Past Medical History:   Diagnosis Date     Cervical high risk HPV (human papillomavirus) test positive 4/2015, 5/13/16    type 16     Cervical lymphadenopathy 7/29/2012     Cervical lymphadenopathy 7/29/2012     Cervical lymphadenopathy 7/29/2012     Cervical lymphadenopathy      Deviated septum     had septoplasty and endo. sinus surg. 2/09 per [facility]     Family history of diabetes mellitus     mother     Family history of stroke     multiple tiny cva's     House dust mite allergy      Hyperlipidemia LDL goal < 100      Hypertension goal BP (blood pressure) < 140/90      Obesity      Pap smear of cervix with ASCUS, cannot exclude HGSIL 3/2013     Papanicolaou smear of cervix with low grade squamous intraepithelial lesion (LGSIL) 1/2011, 10/2011    colp 3/22/11 - GUSTABO 1-2     Rhinitis, allergic to other allergen      Seasonal allergic rhinitis 12/16/08 skin tests    CR/DM/M/T/G/W     Smoking      Type 2 diabetes, HbA1c goal < 7% (H)        PAST SURGICAL HISTORY:   Past Surgical History:   Procedure Laterality Date     CRYOTHERAPY  3/30/2011    cervical     HC COLP CERVIX/UPPER VAGINA W BX CERVIX  1/2010, 3/2011    neg dysplasia, GUSTABO 1-2     HC REPAIR OF NASAL SEPTUM  2/3/09-per Dr. Harley    antrostomy, ethmoidectomy, septoplasty and turbinate  reduction     LAPAROSCOPIC ASSISTED HYSTERECTOMY VAGINAL  3/12/12    [facility]     TONSILLECTOMY & ADENOIDECTOMY  1973    age 8       FAMILY HISTORY:   Family History   Problem Relation Age of Onset     Cancer Father      Other Cancer Father      passed Nov.2016     Hypertension Father      age 72     Hypertension Sister      age 35     Thyroid Disease Sister      Cerebrovascular Disease Maternal Grandmother      Asthma Son      Thyroid Disease Sister      Thyroid Disease Sister      Thyroid Disease Sister      Diabetes Mother      Hypertension Mother      age 34     Hypertension Sister      Thyroid Disease Sister      Asthma Son      Glaucoma No family hx of      Macular Degeneration No family hx of          Past Anes Hx: No personal or family h/o anesthesia problems    Soc Hx:   Tobacco:   EtOH:     Allergies:   Allergies   Allergen Reactions     Amoxicillin Hives     Augmentin      Erythromycin        Meds:   Prescriptions Prior to Admission   Medication Sig Dispense Refill Last Dose     aspirin 81 MG tablet Take 1 tablet by mouth daily. 1 tablet 3 10/3/2018 at AM     cholecalciferol (VITAMIN D3) 1000 UNIT tablet Take 3 tablets (3,000 Units) by mouth daily   10/2/2018 at PM     felodipine ER (PLENDIL) 5 MG 24 hr tablet Take 1 tablet (5 mg) by mouth daily 90 tablet 1 10/3/2018 at AM     hydrochlorothiazide 12.5 MG TABS tablet Take 1 tablet (12.5 mg) by mouth every morning 90 tablet 1 10/3/2018 at AM     losartan (COZAAR) 100 MG tablet Take 1 tablet (100 mg) by mouth daily 90 tablet 1 10/3/2018 at AM     metFORMIN (GLUCOPHAGE-XR) 500 MG 24 hr tablet TAKE FOUR TABLETS BY MOUTH EVERY DAY WITH DINNER 360 tablet 1 10/2/2018 at PM     pravastatin (PRAVACHOL) 40 MG tablet Take 1 tablet (40 mg) by mouth daily 90 tablet 1 10/3/2018 at AM     Blood Glucose Calibration (FREESTYLE CONTROL SOLUTION) LIQD 1 drop. As needed 1 Bottle 3 Taking     dulaglutide (TRULICITY) 1.5 MG/0.5ML pen Inject 1.5 mg Subcutaneous every  7 days 0.5 mL 2 10/2/2018     Fexofenadine HCl (ALLEGRA PO) Take 90 mg by mouth daily as needed     at PRN     FREESTYLE LANCETS MISC 1 Device 2 times daily. 100 each 11 Taking     glucose blood VI test strips strip 1 strip 2 times daily. 100 strip 11 Taking     Hypertonic Nasal Wash (SINUS RINSE BOTTLE KIT NA) Spray 1 Bottle in nostril as needed.    at PRN     Ibuprofen (ADVIL PO) Take 600 mg by mouth daily as needed for moderate pain     at PRN       No current outpatient prescriptions on file.       Physical Exam:  VS: T 97.3, P 75, /74, R 22, SpO2 97% Weight   Wt Readings from Last 2 Encounters:   10/03/18 108.9 kg (240 lb 1.6 oz)   10/02/18 109.3 kg (241 lb)         Airway: MP 2, TM>3FB, Neck full ROM  Dentition: no loose teeth  Heart: RRR  Lungs: CTAB      BMP:  Lab Results   Component Value Date     10/04/2018      Lab Results   Component Value Date    POTASSIUM 3.3 10/04/2018     Lab Results   Component Value Date    CHLORIDE 105 10/04/2018     Lab Results   Component Value Date    HOWARD 8.7 10/04/2018     Lab Results   Component Value Date    CO2 27 10/04/2018     Lab Results   Component Value Date    BUN 22 10/04/2018     Lab Results   Component Value Date    CR 1.38 10/04/2018     Lab Results   Component Value Date     10/04/2018        CBC:  Lab Results   Component Value Date    WBC 8.0 10/04/2018     Lab Results   Component Value Date    HGB 12.3 10/04/2018     Lab Results   Component Value Date    HCT 36.2 10/04/2018     Lab Results   Component Value Date     10/04/2018        Coags/Type and Screen  Lab Results   Component Value Date    INR 1.50 10/04/2018     No results found for: PT  Type and Screen:      Assessment/Plan:  - ASA 3  - MAC with standard ASA monitors, IV induction, balanced anesthetic  - PIV  - Antibiotics per surgery  - PONV prophylaxis  -Arvind Smalls MD    10/4/2018  3:42 PM                        .

## 2018-10-04 NOTE — ANESTHESIA POSTPROCEDURE EVALUATION
Patient: Mary Coloradoel    Procedure(s):  EUS - Wound Class: II-Clean Contaminated    Diagnosis:r/o CBD stone  Diagnosis Additional Information: No value filed.    Anesthesia Type:  MAC    Note:  Anesthesia Post Evaluation    Patient location during evaluation: PACU  Patient participation: Able to fully participate in evaluation  Level of consciousness: awake  Pain management: adequate  Airway patency: patent  Cardiovascular status: acceptable  Respiratory status: acceptable  Hydration status: acceptable  PONV: none     Anesthetic complications: None          Last vitals:  Vitals:    10/04/18 1352 10/04/18 1353 10/04/18 1354   BP: 152/74     Pulse:      Resp:  15 22   Temp:      SpO2:  97% 97%         Electronically Signed By: Arvind Smalls MD  October 4, 2018  3:41 PM

## 2018-10-04 NOTE — SUMMARY OF CARE
Pt arrived to [facility] with shoes, a pair of socks, a shirt, pants, underwear, bra, wearing a ring, wearing 4 earrings. Daughter will take patients purse with other belongings. She declined to send anything to security.

## 2018-10-04 NOTE — H&P
[facility]    Internal Medicine History and Physical - Gold Service       Date of Admission:  10/3/2018    Assessment & Plan   Mray Grigsby is a 53 year old female  admitted on 10/3/2018. She has PMH of DMII, HTN, HLD, tobacco abuse and GERD who presented to PCP clinic 10/2/18 with ~ 2 week hx of progressive nausea, abdominal pain, early satiety and jaundice. Patient instructed to seek ED evaluation for lab abnormalities. ED evaluation reveals evaluation reveals elevated LFTs and lipase prompting Medicine admission for further evaluation and GI consult.       # Epigastric abdominal pain, N/V, Jaundice: Presented to PCP w/ ~ 2 week hx of epigastric pain, early satiety, nausea, non-bloody emesis and jaundice. No fever, chills, night sweats, unintentional weight loss, recent travel, ingestion, Etoh use, or recent medication changes. Labs revealed elevated LFT's in hepatocellular pattern. Abdominal US 10/2/18 w/Cholelithiasis and non-specific wall thickening, no CBD dilation evident; hepatomegaly w/ borderline splenomegaly. AST/ALT: 563/633 (602/717), Tbili 7.9 (8.1), Direct 6.3, , INR 1.41, albumin 3.0, Lipase 1266, Hgb 14.2, WBC 11.3, platelets 284 (appears hemoconcentrated), . No Identified dilation on imaging, though suspect obstructive pathology with possible medication side effect (trulicity started 6/2018). HepC non-reactive 5/2016. Treat pancreatitis and w/u elevated transaminases. Does have spider angiomata on cheeks and upper chest- possible sequale of etoh liver disease?. Meld Na on admission - 24. Cr 1.81, Tbili 7.9, Na 138, INR 1.40. No encephalopathy, INR less than 1.6- indicating acute liver injury.   - Trend Meld labs  - Add   - Non/contrast CT pending    - GI consult placed, please discuss with in am   - Acetaminophen level  - Hep A/B/C serologies- pending  - LR bolus and maintenance at 200 ml/hr  - NPO  - Zofran for nausea  - SL dilaudid 2 mg q4h  PRN  - UA/UC  - If patient spikes would obtain BCx2 and start zosyn for cholangitis/intraabdominal source  - Further w/u following GI recommendations and intervention  - CBC, CMP, Lipase in am   - Iron studies, B12/folate    # VINI: Cr 2.01 --> 1.81, BUN 26. BL Cr appears 0.55-0.70. Poor PO intake for past 2 weeks. Likely prerenal. Will give IVF bolus, check UA and FeNa.  - IVF per above  - UA/UCx, FeNA ordered  - Renally dose medications  - Avoid nephrotoxic agents as able  - Trend BMP     # DMII: A1c 7.9 (10/2/18). BG elevated on admission. Maintained on metformin and weekly trulicity PTA.  - Hold metformin and trulicity on admission, though if contributing to GI sx, would discontinue  - Medium sliding scale insulin  - BG checks q4h while NPO  - Hypoglycemia protocol    #HTN: BP stable on admission. PTA felodipine, hydrochlorothiazide, losartan.  - Hold in setting of VINI    # HLD: PTA statin  - Hold     # Tobacco abuse: Current 0.25 PPD smoker. Patient declined nicotine patch  - Recommend cessation     Diet: NPO for Medical/Clinical Reasons Except for: Meds  Fluids: IV LR @ 200 ml/hour  Vinson Catheter: not present    DVT Prophylaxis: Pneumatic Compression Devices  Code Status: No Order    Disposition Plan   Expected discharge: 2 - 3 days, recommended to prior living arrangement once GI consult with possible intervention.     Entered: Alexia Cloud PA-C 10/03/2018, 11:59 PM   Information in the above section will display in the discharge planner report.      The patient's care was discussed with the Attending Physician, Dr. Gupta.    Laure Estrada PA-C  Internal Medicine Hospitalist Service  [facility]  Pager: [phone]    Please see sticky note for cross cover information  ______________________________________________________________________    Chief Complaint   Epigastric abdominal pain, early satiety, N/V, fatigue, jaundice    History is obtained from the patient and  EMR    History of Present Illness   Mary Grigsby is a 53 year old female with PMH as outlined above who presented to the ED for evaluation of  ~ 2 week hx of progressive epigastric abdominal pain, Nausea, non-bloody emesis, early satiety and jaundice.     Patient reports that she has had chronic epigastric discomfort that she attributed to GERD or PUD, though over the past 2 weeks sx of epigastric pain have worsened and associated with early satiety, nausea, and non-bloody emesis, and jaundice that developed on ~ 10/1/18. This combination of sx has not been experienced previously and patient denies night sweats, unintentional weight loss (though has been dieting and lost 20 lbs since 6/2018), fever, chills, Etoh use, family hx of pancreatic cancer or GI malignancy, previous pancreatitis or liver disease. She presented to PCP clinic for evaluation as she has missed work on 2 occasions d/t sx and acutely developed scleral icterus and jaundice on ~ 10/1. Labs ordered and patient had Abdominal US for suspected gallbladder pathology, though no CBD dilation evident on US. Patient was contacted regarding significant lab abnormalities and recommendation for ED evaluation was discussed.     ED evaluation revealed elevated LFT's in hepatocellular pattern; elevated Tbili- predominantly direct, Low INR and albumin- indicating abnormal synthetic function; Lipase suggestive of acute pancreatitis; mildly elevated WBC, and VINI. We discussed plan for acute pancreatitis treatment, GI consult, monitoring of renal function and further evaluation as needed. Patient and daughter agreeable.    Patient does not endorse: headaches, changes in vision, chest pain, palpitations, upper respiratory symptoms of rhinorrhea or congestion, shortness of breath, cough, sputum production, wheezing,constipation, diarrhea, dysuria, edema, rashes, weakness, focal neurologic deficits, recent travel, illness, fever, chills.          Review of Systems    The 10 point Review of Systems is negative other than noted in the HPI or here.     Past Medical History    I have reviewed this patient's medical history and updated it with pertinent information if needed.   Past Medical History:   Diagnosis Date     Cervical high risk HPV (human papillomavirus) test positive 4/2015, 5/13/16    type 16     Cervical lymphadenopathy 7/29/2012     Cervical lymphadenopathy 7/29/2012     Cervical lymphadenopathy 7/29/2012     Cervical lymphadenopathy      Deviated septum     had septoplasty and endo. sinus surg. 2/09 per [facility]     Family history of diabetes mellitus     mother     Family history of stroke     multiple tiny cva's     House dust mite allergy      Hyperlipidemia LDL goal < 100      Hypertension goal BP (blood pressure) < 140/90      Obesity      Pap smear of cervix with ASCUS, cannot exclude HGSIL 3/2013     Papanicolaou smear of cervix with low grade squamous intraepithelial lesion (LGSIL) 1/2011, 10/2011    colp 3/22/11 - GUSTABO 1-2     Rhinitis, allergic to other allergen      Seasonal allergic rhinitis 12/16/08 skin tests    CR/DM/M/T/G/W     Smoking      Type 2 diabetes, HbA1c goal < 7% (H)         Past Surgical History   I have reviewed this patient's surgical history and updated it with pertinent information if needed.  Past Surgical History:   Procedure Laterality Date     CRYOTHERAPY  3/30/2011    cervical     HC COLP CERVIX/UPPER VAGINA W BX CERVIX  1/2010, 3/2011    neg dysplasia, GUSTABO 1-2     HC REPAIR OF NASAL SEPTUM  2/3/09-per Dr. Harley    antrostomy, ethmoidectomy, septoplasty and turbinate reduction     LAPAROSCOPIC ASSISTED HYSTERECTOMY VAGINAL  3/12/12    [facility]     TONSILLECTOMY & ADENOIDECTOMY  1973    age 8        Social History   Social History   Substance Use Topics     Smoking status: Former Smoker     Packs/day: 0.50     Years: 20.00     Types: Cigarettes     Start date: 6/1/1980     Quit date: 2/24/2013     Smokeless tobacco: Never Used       Comment: still has occasionally      Alcohol use No       Family History   I have reviewed this patient's family history and updated it with pertinent information if needed.   Family History   Problem Relation Age of Onset     Cancer Father      Other Cancer Father      passed      Hypertension Father      age 72     Hypertension Sister      age 35     Thyroid Disease Sister      Cerebrovascular Disease Maternal Grandmother      Asthma Son      Thyroid Disease Sister      Thyroid Disease Sister      Thyroid Disease Sister      Diabetes Mother      Hypertension Mother      age 34     Hypertension Sister      Thyroid Disease Sister      Asthma Son      Glaucoma No family hx of      Macular Degeneration No family hx of        Prior to Admission Medications   Prior to Admission Medications   Prescriptions Last Dose Informant Patient Reported? Taking?   Blood Glucose Calibration (FREESTYLE CONTROL SOLUTION) LIQD   No No   Si drop. As needed   FREESTYLE LANCETS MISC   No No   Si Device 2 times daily.   Fexofenadine HCl (ALLEGRA PO)  at PRN  Yes No   Sig: Take 90 mg by mouth daily as needed    Hypertonic Nasal Wash (SINUS RINSE BOTTLE KIT NA)  at PRN  Yes No   Sig: Spray 1 Bottle in nostril as needed.   Ibuprofen (ADVIL PO)  at PRN  Yes No   Sig: Take 600 mg by mouth daily as needed for moderate pain    aspirin 81 MG tablet 10/3/2018 at AM  Yes Yes   Sig: Take 1 tablet by mouth daily.   cholecalciferol (VITAMIN D3) 1000 UNIT tablet 10/2/2018 at PM  No Yes   Sig: Take 3 tablets (3,000 Units) by mouth daily   dulaglutide (TRULICITY) 1.5 MG/0.5ML pen 10/2/2018  No No   Sig: Inject 1.5 mg Subcutaneous every 7 days   felodipine ER (PLENDIL) 5 MG 24 hr tablet 10/3/2018 at AM  No Yes   Sig: Take 1 tablet (5 mg) by mouth daily   glucose blood VI test strips strip   No No   Si strip 2 times daily.   hydrochlorothiazide 12.5 MG TABS tablet 10/3/2018 at AM  No Yes   Sig: Take 1 tablet (12.5 mg) by mouth every  morning   losartan (COZAAR) 100 MG tablet 10/3/2018 at AM  No Yes   Sig: Take 1 tablet (100 mg) by mouth daily   metFORMIN (GLUCOPHAGE-XR) 500 MG 24 hr tablet 10/2/2018 at PM  No Yes   Sig: TAKE FOUR TABLETS BY MOUTH EVERY DAY WITH DINNER   pravastatin (PRAVACHOL) 40 MG tablet 10/3/2018 at AM  No Yes   Sig: Take 1 tablet (40 mg) by mouth daily      Facility-Administered Medications: None     Allergies   Allergies   Allergen Reactions     Amoxicillin Hives     Augmentin      Erythromycin        Physical Exam   Vital Signs: Temp: 97.8  F (36.6  C) Temp src: Oral BP: 135/59 Pulse: 80 Heart Rate: 68 Resp: 18 SpO2: 95 % O2 Device: None (Room air)    Weight: 240 lbs 1.6 oz      Physical Exam   Constitutional: Pleasant obese female sitting up in ED bed. Daughter at bedside.  Well nourished, well developed, resting comfortably   HEENT:   Head: Normocephalic and atraumatic.   Eyes: Bilateral scleral icterus. Pupils are equal, round, and reactive to light.  Pharynx has no erythema or exudate, mucous membranes are dry  Neck:   No adenopathy, no bony tenderness  Cardiovascular: Regular rate and rhythm without murmurs or gallops  Pulmonary/Chest: Clear to auscultation bilaterally, with no wheezes or retractions. No respiratory distress.  GI: Soft with good bowel sounds.  Non-tender, non-distended, with no guarding, no rebound, no peritoneal signs.   Back:  No bony or CVA tenderness   Musculoskeletal:  No edema or clubbing   Skin: Skin is warm and dry. Spider angiomata on cheeks and upper chest  Neurological: Alert and oriented to person, place, and time. Nonfocal exam  Psychiatric:  Normal mood and affect.      Data   Data reviewed today: I reviewed all medications, new labs and imaging results over the last 24 hours. I personally reviewed recent imaging, labs, progress notes      Recent Labs  Lab 10/03/18  1525 10/02/18  0928   WBC 11.3* 9.7   HGB 14.2 13.7   MCV 93 89    260   INR 1.41*  --     134   POTASSIUM 3.5  3.2*   CHLORIDE 101 99   CO2 27 25   BUN 26 26   CR 1.81* 2.01*   ANIONGAP 10 10   HOWARD 9.2 9.5   * 276*   ALBUMIN 3.2* 3.0*   PROTTOTAL 7.5 7.4   BILITOTAL 7.9* 8.1*   ALKPHOS 172* 171*   * 717*   * 602*   LIPASE 1266*  --

## 2018-10-04 NOTE — PLAN OF CARE
"Problem: Patient Care Overview  Goal: Plan of Care/Patient Progress Review  Outcome: No Change  Pt is a/o x 4 overnight. VSS.  LS clear. 97% RA. Denies pain. Reports abdominal \"fullness\". Gets up to BR with SBA. Is NPO for abdominal imaging this am. LR continues at 200cc/hr. Will continue to monitor pt.       "

## 2018-10-04 NOTE — PLAN OF CARE
Problem: Patient Care Overview  Goal: Plan of Care/Patient Progress Review  Outcome: No Change  Assumed cares [id number]: Pt A&O x4, VS stable on RA. Up independently in room, voiding spontaneously, no BM this shift. K+ replaced, will schedule recheck of K once patient returns to floor. PIV infusing LR @ 200 mL. Decreased rate to 100 until K+ is completed. No c/o pain, nausea or SOB this shift,  and 140. Went down for endoscopic ultrasound at 1330. Continue to monitor and update MD with changes.

## 2018-10-04 NOTE — ANESTHESIA CARE TRANSFER NOTE
Patient: Mary Grigsby    Procedure(s):  EUS - Wound Class: II-Clean Contaminated    Diagnosis: r/o CBD stone  Diagnosis Additional Information: No value filed.    Anesthesia Type:   No value filed.     Note:  Airway :Nasal Cannula  Patient transferred to:Phase II  Handoff Report: Identifed the Patient, Identified the Reponsible Provider, Reviewed the pertinent medical history, Discussed the surgical course, Reviewed Intra-OP anesthesia mangement and issues during anesthesia, Set expectations for post-procedure period and Allowed opportunity for questions and acknowledgement of understanding      Vitals: (Last set prior to Anesthesia Care Transfer)    CRNA VITALS  10/4/2018 1506 - 10/4/2018 1537      10/4/2018             NIBP: 131/68    Pulse: 82    NIBP Mean: 86    Ht Rate: 81    SpO2: 96 %                Electronically Signed By: DESTINY Gudino CRNA  October 4, 2018  3:37 PM

## 2018-10-04 NOTE — PLAN OF CARE
"Problem: Patient Care Overview  Goal: Plan of Care/Patient Progress Review  Outcome: Improving  Assumed cares from admission to unit 5B until 2300.    Pt arrived to unit 5B from ED around 2100.    Pt settled to room, belongings documented, and admission documentation completed.     /59 (BP Location: Right arm)  Pulse 80  Temp 97.8  F (36.6  C) (Oral)  Resp 18  Ht 1.549 m (5' 1\")  Wt 108.9 kg (240 lb 1.6 oz)  LMP 06/24/2011  SpO2 95%  BMI 45.37 kg/m2     Pain: Declines.  Neuro: A and O x 4.  Respiratory: Lung sounds clear and equal on RA.  Cardiac/Neurovascular: HR and pulses WDL. Trace LE edema.  GI/: Bowel sounds active. Pt does report abd fullness. Pt voided x 1 this shift and urine sample sent.  Nutrition: Pt NPO.  Activity: Up ind.  Skin: Jaundiced.  Lines: PIV in RUE, infusing 1 LR bolus currently, site WDL.     Plan: Pt to be NPO for abd imaging that is to occur tomorrow.          "

## 2018-10-04 NOTE — PHARMACY-CONSULT NOTE
Pharmacy Medication Consult    Pharmacy is consulting for medications that may be associated with the development of pancreatitis and liver injury.    Mary Grigsby is a 53 year old female who presents following abnormal LFTs, nausea, and epigastric pain.    Medications Associated with Pancreatitis  1. Trulicity- has <1% association with pancreatitis. Patient has recently started this medication in June 2018. Per package insert, if pancreatitis is suspected, use should be discontinued and therapy should not be restarted if pancreatitis is confirmed.  2. Hydrochlorothiazide- has association with pancreatitis in case reports, although frequency is not defined. Occurrence is typically associated with doses of 25 mg daily or greater and patient is taking 12.5 mg daily. Patient has been on this medication for years and is an unlikely cause of pancreatitis.  3. Ibuprofen- has <1% association with pancreatitis in case reports.   4. Pravastatin- has <1% association with pancreatitis in case reports.    Medications Associated with Liver Injury  1. Aspirin- moderate to high doses of aspirin are associated with hepatocellular injury and elevated ALT levels. Since patient is on 81 mg daily, aspirin may be an unlikely cause.  2. Ibuprofen- full doses (1132-2196 mg daily) are associated with hepatocellular injury and elevated ALT levels. Since patient only takes 600 mg daily as needed, it may be an unlikely cause of liver injury.  3. Losartan- has <1% association with hepatocellular injury, with an onset usually within 1-8 weeks of starting therapy. Patient has been taking losartan for years and is an unlikely cause of liver injury.  4. Pravastatin- has <1% association of hepatitis and elevated ALT levels. Elevations are more common with higher doses of pravastatin (80 mg daily).    Mandy King, PharmD IV Student

## 2018-10-04 NOTE — PROGRESS NOTES
[facility], [place]    Internal Medicine Progress Note - Gold Service      Assessment & Plan   Mary Grigsby is a 53 year old female  admitted on 10/3/2018. She has PMH of DMII, HTN, HLD, tobacco abuse and GERD who presented to PCP clinic 10/2/18 with ~ 2 week hx of progressive nausea, abdominal pain, early satiety and jaundice. Patient instructed to seek ED evaluation for lab abnormalities. ED evaluation reveals evaluation reveals elevated LFTs and lipase prompting Medicine admission for further evaluation and GI consult.        # Pancreatitis of unclear etiology:     Presented to PCP w/ ~ 2 week hx of epigastric pain, early satiety, nausea, non-bloody emesis and jaundice. No fever, chills, night sweats, unintentional weight loss, recent travel, ingestion, Etoh use, or recent medication changes. Labs revealed elevated LFT in mixed cholestatic and hepatocellular pattern.  Abdominal US 10/2/18 w/Cholelithiasis and non-specific wall thickening, no CBD dilation evident; hepatomegaly w/ borderline splenomegaly, CT findings c/w acute pancreatitis, no necrosis or fluid collections. No identified dilation on initial imaging. Lipase 1266 -> ~1700. EUS done 10/4 without any evidence of obstruction, no obstructing gallstones in CBD or in gallbladder.  Unclear etiology of pancreatitis at this point, unlikely gallstones. Recently started on Dulaglutide, though not typically known to cause pancreatitis or hepatitis. Minimal EtOH use. Does have HTN, DMII, HLD, though have always been fairly well controlled.   -- GI Panc/bili consulted, EUS as above  -- GI Hepatology now following   -- mIVF with LR @200cc/hr overnight  -- CLD and will advance in am as tolerated  -- Zofran prn for nausae  -- SL dilaudid 2 mg q4h PRN  -- Trend CBC, CMP  -- Triglycerides, IgG4 ordered for am per GI rec    # Acute Liver Failure:   Patient found to have significant transaminase, Tbili, alk phos elevation at PCP visit  10/2 (, , TBili 8.1, Alk Phos 171, Plts 260, INR 1.41, Na 134. On admission in setting of pancreatitis initially thought mixed picture possibly d/t choledocholithiasis or cholangitis resulting in pancreatitis. However, given absence of obstructive findings or gallstones on EUS, hepatomegaly and splenomegaly on imaging, and elevated INR presentation c/w primary liver etiology. Minimal EtOH use as above, HAV, HCV, HIV all neg. Ferritin elevated, though likely d/t acute inflammatory process. Could be drug injury, though Dulaglutide not typical culprit of DILI.   -- GI Hepatology following, appreciate recs  -- Tx for pancreatitis as above  -- HBV surface Ab, CMV, EBV ordered for am  -- WILBERT, AMA, ASMA ordered for arm  -- Continue to hold Dulaglutide  -- Trend MELD labs     MELD-Na score: 19 at 10/4/2018  5:17 PM  MELD score: 19 at 10/4/2018  5:17 PM  Calculated from:  Serum Creatinine: 1.14 mg/dL at 10/4/2018  5:17 PM  Serum Sodium: 141 mmol/L (Rounded to 137) at 10/4/2018  5:17 PM  Total Bilirubin: 7.5 mg/dL at 10/4/2018  5:40 AM  INR(ratio): 1.50 at 10/4/2018  5:40 AM  Age: 53 years       # VINI:   Cr 2.01 --> 1.81 -> 1.14 with IVF resuscitation. BUN 26. BL Cr appears 0.55-0.70. Poor PO intake for past 2 weeks. FeNa and UA, as well as response to IVF all c/w pre-renal etiology.   - IVF per above  - Renally dose medications  - Avoid nephrotoxic agents as able  - Trend BMP     # DMII:  A1c 7.9 (10/2/18). BG elevated on admission. Maintained on metformin and weekly trulicity PTA.  - Hold metformin and trulicity on admission, though if contributing to GI sx, would discontinue  - Medium sliding scale insulin, can increase as needed  - BG checks q4h while NPO  - Hypoglycemia protocol     #HTN:   BP stable on admission. PTA felodipine, hydrochlorothiazide, losartan.  - Hold in setting of VINI  - Can consider re-starting in am pending Cr and BP      # HLD: PTA statin  - Hold in setting of transaminase elevation       # Tobacco abuse:   Current 0.25 PPD smoker. Patient declined nicotine patch  - Recommend cessation     Diet: Clear Liquid Diet  Fluids: LR @200/hr  Vinson Catheter: not present    DVT Prophylaxis: Pneumatic Compression Devices  Code Status: Full Code    Disposition Plan   Expected discharge: 2 - 3 days, recommended to prior living arrangement once adequate pain management/ tolerating PO medications and medical workup for acute liver dysfunction completed.     Entered: Beverly Yost MD 10/04/2018, 6:29 PM   Information in the above section will display in the discharge planner report.      The patient's care was discussed with the Bedside Nurse, Patient and Patient's Family.    Beverly Yost MD  Internal Medicine Staff Hospitalist Service  [facility]  Pager: [phone]  Please see sticky note for cross cover information    Interval History   No acute events overnight. Patient reports that she continues to feel bloated, but pain is well controlled. Denies fevers, chills, nausea, vomiting. Is eager to discharge as soon as possible.       Data reviewed today: I reviewed all medications, new labs and imaging results over the last 24 hours. I personally reviewed no images or EKG's today.    Physical Exam   Vital Signs: Temp: 97.5  F (36.4  C) Temp src: Oral BP: 135/56 Pulse: 75 Heart Rate: 82 Resp: 18 SpO2: 92 % O2 Device: None (Room air)    Weight: 240 lbs 1.6 oz  General: AAOx3, NAD  HEENT: NC/AT, MMM, oropharynx clear, icteric sclera conjunctiva normal, no adenopathy  CV: RRR, normal S1S2, no murmur, clicks, rubs appreciated  Resp: Non-labored respirations, CTAB, good air movement, no wheezes, rhonchi  Abd: Soft, non-distended, non-tender, normoactive bs, no HSM, no masses appreciated  Extremities: wwp,  no pedal edema  Skin: telangiectasias on bilateral cheeks  Neuro: A/Ox3, CN2-12 intact, No lateralizing symptoms or focal neurologic deficits, no asterixis  Psych: Appropriate mood        Data     Recent Labs  Lab 10/04/18  1717 10/04/18  0540 10/03/18  2234 10/03/18  1525 10/02/18  0928   WBC  --  8.0  --  11.3* 9.7   HGB  --  12.3  --  14.2 13.7   MCV  --  91  --  93 89   PLT  --  230  --  284 260   INR  --  1.50*  --  1.41*  --     141 138 138 134   POTASSIUM 3.8 3.3*  --  3.5 3.2*   CHLORIDE 106 105  --  101 99   CO2 28 27  --  27 25   BUN 18 22  --  26 26   CR 1.14* 1.38* 1.65* 1.81* 2.01*   ANIONGAP 8 9  --  10 10   HOWARD 9.0 8.7  --  9.2 9.5   * 134*  --  145* 276*   ALBUMIN  --  2.6*  --  3.2* 3.0*   PROTTOTAL  --  6.5*  --  7.5 7.4   BILITOTAL  --  7.5*  --  7.9* 8.1*   ALKPHOS  --  142  --  172* 171*   ALT  --  514*  --  633* 717*   AST  --  508*  --  563* 602*   LIPASE  --  1747*  --  1266*  --        No results found for this or any previous visit (from the past 24 hour(s)).  Medications     lactated ringers 200 mL/hr at 10/04/18 1718     - MEDICATION INSTRUCTIONS -       - MEDICATION INSTRUCTIONS -         insulin aspart  1-6 Units Subcutaneous Q4H     magnesium sulfate  2 g Intravenous Once     senna-docusate  1 tablet Oral BID    Or     senna-docusate  2 tablet Oral BID     sodium chloride (PF)  3 mL Intracatheter Q8H

## 2018-10-05 ENCOUNTER — APPOINTMENT (OUTPATIENT)
Dept: ULTRASOUND IMAGING | Facility: CLINIC | Age: 53
DRG: 441 | End: 2018-10-05
Attending: INTERNAL MEDICINE
Payer: COMMERCIAL

## 2018-10-05 VITALS
RESPIRATION RATE: 18 BRPM | DIASTOLIC BLOOD PRESSURE: 42 MMHG | BODY MASS INDEX: 46.33 KG/M2 | HEIGHT: 61 IN | SYSTOLIC BLOOD PRESSURE: 129 MMHG | HEART RATE: 77 BPM | OXYGEN SATURATION: 96 % | WEIGHT: 245.37 LBS | TEMPERATURE: 96.8 F

## 2018-10-05 LAB
ALBUMIN SERPL-MCNC: 2.6 G/DL (ref 3.4–5)
ALP SERPL-CCNC: 133 U/L (ref 40–150)
ALT SERPL W P-5'-P-CCNC: 486 U/L (ref 0–50)
ANION GAP SERPL CALCULATED.3IONS-SCNC: 8 MMOL/L (ref 3–14)
AST SERPL W P-5'-P-CCNC: 538 U/L (ref 0–45)
BACTERIA SPEC CULT: ABNORMAL
BASOPHILS # BLD AUTO: 0.1 10E9/L (ref 0–0.2)
BASOPHILS NFR BLD AUTO: 0.9 %
BILIRUB DIRECT SERPL-MCNC: 6.2 MG/DL (ref 0–0.2)
BILIRUB SERPL-MCNC: 8.1 MG/DL (ref 0.2–1.3)
BUN SERPL-MCNC: 14 MG/DL (ref 7–30)
CALCIUM SERPL-MCNC: 8.9 MG/DL (ref 8.5–10.1)
CHLORIDE SERPL-SCNC: 105 MMOL/L (ref 94–109)
CHOLEST SERPL-MCNC: 82 MG/DL
CO2 SERPL-SCNC: 28 MMOL/L (ref 20–32)
CREAT SERPL-MCNC: 1.01 MG/DL (ref 0.52–1.04)
DIFFERENTIAL METHOD BLD: ABNORMAL
EOSINOPHIL # BLD AUTO: 0.2 10E9/L (ref 0–0.7)
EOSINOPHIL NFR BLD AUTO: 2.2 %
ERYTHROCYTE [DISTWIDTH] IN BLOOD BY AUTOMATED COUNT: 16.1 % (ref 10–15)
GFR SERPL CREATININE-BSD FRML MDRD: 57 ML/MIN/1.7M2
GLUCOSE BLDC GLUCOMTR-MCNC: 101 MG/DL (ref 70–99)
GLUCOSE BLDC GLUCOMTR-MCNC: 115 MG/DL (ref 70–99)
GLUCOSE BLDC GLUCOMTR-MCNC: 124 MG/DL (ref 70–99)
GLUCOSE BLDC GLUCOMTR-MCNC: 167 MG/DL (ref 70–99)
GLUCOSE SERPL-MCNC: 118 MG/DL (ref 70–99)
HCT VFR BLD AUTO: 36.4 % (ref 35–47)
HDLC SERPL-MCNC: 9 MG/DL
HGB BLD-MCNC: 12.2 G/DL (ref 11.7–15.7)
IGG SERPL-MCNC: 1410 MG/DL (ref 695–1620)
IGG1 SER-MCNC: 1140 MG/DL (ref 300–856)
IGG2 SER-MCNC: 235 MG/DL (ref 158–761)
IGG3 SER-MCNC: 62 MG/DL (ref 24–192)
IGG4 SER-MCNC: 31 MG/DL (ref 11–86)
IMM GRANULOCYTES # BLD: 0 10E9/L (ref 0–0.4)
IMM GRANULOCYTES NFR BLD: 0.1 %
INR PPP: 1.45 (ref 0.86–1.14)
LDLC SERPL CALC-MCNC: 33 MG/DL
LYMPHOCYTES # BLD AUTO: 2.9 10E9/L (ref 0.8–5.3)
LYMPHOCYTES NFR BLD AUTO: 35.6 %
Lab: ABNORMAL
MAGNESIUM SERPL-MCNC: 1.8 MG/DL (ref 1.6–2.3)
MCH RBC QN AUTO: 30.7 PG (ref 26.5–33)
MCHC RBC AUTO-ENTMCNC: 33.5 G/DL (ref 31.5–36.5)
MCV RBC AUTO: 92 FL (ref 78–100)
MONOCYTES # BLD AUTO: 0.7 10E9/L (ref 0–1.3)
MONOCYTES NFR BLD AUTO: 8.3 %
NEUTROPHILS # BLD AUTO: 4.3 10E9/L (ref 1.6–8.3)
NEUTROPHILS NFR BLD AUTO: 52.9 %
NONHDLC SERPL-MCNC: 72 MG/DL
NRBC # BLD AUTO: 0 10*3/UL
NRBC BLD AUTO-RTO: 0 /100
PHOSPHATE SERPL-MCNC: 3.6 MG/DL (ref 2.5–4.5)
PLATELET # BLD AUTO: 238 10E9/L (ref 150–450)
POTASSIUM SERPL-SCNC: 3.5 MMOL/L (ref 3.4–5.3)
PROT SERPL-MCNC: 6.5 G/DL (ref 6.8–8.8)
RBC # BLD AUTO: 3.97 10E12/L (ref 3.8–5.2)
SODIUM SERPL-SCNC: 142 MMOL/L (ref 133–144)
SPECIMEN SOURCE: ABNORMAL
TRIGL SERPL-MCNC: 196 MG/DL
WBC # BLD AUTO: 8.1 10E9/L (ref 4–11)

## 2018-10-05 PROCEDURE — 83516 IMMUNOASSAY NONANTIBODY: CPT | Performed by: INTERNAL MEDICINE

## 2018-10-05 PROCEDURE — 82784 ASSAY IGA/IGD/IGG/IGM EACH: CPT | Performed by: INTERNAL MEDICINE

## 2018-10-05 PROCEDURE — 87529 HSV DNA AMP PROBE: CPT | Performed by: INTERNAL MEDICINE

## 2018-10-05 PROCEDURE — 86790 VIRUS ANTIBODY NOS: CPT | Performed by: INTERNAL MEDICINE

## 2018-10-05 PROCEDURE — 80076 HEPATIC FUNCTION PANEL: CPT | Performed by: INTERNAL MEDICINE

## 2018-10-05 PROCEDURE — 86038 ANTINUCLEAR ANTIBODIES: CPT | Performed by: INTERNAL MEDICINE

## 2018-10-05 PROCEDURE — 93975 VASCULAR STUDY: CPT | Mod: TC

## 2018-10-05 PROCEDURE — 80061 LIPID PANEL: CPT | Performed by: INTERNAL MEDICINE

## 2018-10-05 PROCEDURE — 86694 HERPES SIMPLEX NES ANTBDY: CPT | Performed by: INTERNAL MEDICINE

## 2018-10-05 PROCEDURE — 86704 HEP B CORE ANTIBODY TOTAL: CPT | Performed by: INTERNAL MEDICINE

## 2018-10-05 PROCEDURE — 84100 ASSAY OF PHOSPHORUS: CPT | Performed by: INTERNAL MEDICINE

## 2018-10-05 PROCEDURE — 00000146 ZZHCL STATISTIC GLUCOSE BY METER IP

## 2018-10-05 PROCEDURE — 82787 IGG 1 2 3 OR 4 EACH: CPT | Performed by: INTERNAL MEDICINE

## 2018-10-05 PROCEDURE — 86039 ANTINUCLEAR ANTIBODIES (ANA): CPT | Performed by: INTERNAL MEDICINE

## 2018-10-05 PROCEDURE — 87799 DETECT AGENT NOS DNA QUANT: CPT | Performed by: INTERNAL MEDICINE

## 2018-10-05 PROCEDURE — 87340 HEPATITIS B SURFACE AG IA: CPT | Performed by: INTERNAL MEDICINE

## 2018-10-05 PROCEDURE — 86256 FLUORESCENT ANTIBODY TITER: CPT | Performed by: INTERNAL MEDICINE

## 2018-10-05 PROCEDURE — 25000128 H RX IP 250 OP 636: Performed by: PHYSICIAN ASSISTANT

## 2018-10-05 PROCEDURE — 36415 COLL VENOUS BLD VENIPUNCTURE: CPT | Performed by: INTERNAL MEDICINE

## 2018-10-05 PROCEDURE — 83735 ASSAY OF MAGNESIUM: CPT | Performed by: INTERNAL MEDICINE

## 2018-10-05 PROCEDURE — 85025 COMPLETE CBC W/AUTO DIFF WBC: CPT | Performed by: INTERNAL MEDICINE

## 2018-10-05 PROCEDURE — 85610 PROTHROMBIN TIME: CPT | Performed by: INTERNAL MEDICINE

## 2018-10-05 PROCEDURE — 80048 BASIC METABOLIC PNL TOTAL CA: CPT | Performed by: INTERNAL MEDICINE

## 2018-10-05 PROCEDURE — 99239 HOSP IP/OBS DSCHRG MGMT >30: CPT | Performed by: INTERNAL MEDICINE

## 2018-10-05 RX ORDER — FELODIPINE 5 MG/1
5 TABLET, EXTENDED RELEASE ORAL DAILY
Qty: 90 TABLET | Refills: 1 | COMMUNITY
Start: 2018-10-05 | End: 2018-12-24

## 2018-10-05 RX ORDER — POTASSIUM CHLORIDE 7.45 MG/ML
10 INJECTION INTRAVENOUS
Status: DISCONTINUED | OUTPATIENT
Start: 2018-10-05 | End: 2018-10-05 | Stop reason: HOSPADM

## 2018-10-05 RX ORDER — POTASSIUM CHLORIDE 750 MG/1
20-40 TABLET, EXTENDED RELEASE ORAL
Status: DISCONTINUED | OUTPATIENT
Start: 2018-10-05 | End: 2018-10-05 | Stop reason: HOSPADM

## 2018-10-05 RX ORDER — POTASSIUM CL/LIDO/0.9 % NACL 10MEQ/0.1L
10 INTRAVENOUS SOLUTION, PIGGYBACK (ML) INTRAVENOUS
Status: DISCONTINUED | OUTPATIENT
Start: 2018-10-05 | End: 2018-10-05

## 2018-10-05 RX ORDER — POTASSIUM CHLORIDE 1.5 G/1.58G
20-40 POWDER, FOR SOLUTION ORAL
Status: DISCONTINUED | OUTPATIENT
Start: 2018-10-05 | End: 2018-10-05 | Stop reason: HOSPADM

## 2018-10-05 RX ORDER — POTASSIUM CHLORIDE 29.8 MG/ML
20 INJECTION INTRAVENOUS
Status: DISCONTINUED | OUTPATIENT
Start: 2018-10-05 | End: 2018-10-05 | Stop reason: HOSPADM

## 2018-10-05 RX ORDER — HYDROCHLOROTHIAZIDE 12.5 MG/1
12.5 TABLET ORAL EVERY MORNING
Qty: 90 TABLET | Refills: 1 | COMMUNITY
Start: 2018-10-05 | End: 2018-12-24

## 2018-10-05 RX ORDER — MAGNESIUM SULFATE HEPTAHYDRATE 40 MG/ML
4 INJECTION, SOLUTION INTRAVENOUS EVERY 4 HOURS PRN
Status: DISCONTINUED | OUTPATIENT
Start: 2018-10-05 | End: 2018-10-05 | Stop reason: HOSPADM

## 2018-10-05 RX ORDER — LOSARTAN POTASSIUM 100 MG/1
100 TABLET ORAL DAILY
Qty: 90 TABLET | Refills: 1 | COMMUNITY
Start: 2018-10-05 | End: 2018-12-24

## 2018-10-05 RX ADMIN — SODIUM CHLORIDE, POTASSIUM CHLORIDE, SODIUM LACTATE AND CALCIUM CHLORIDE: 600; 310; 30; 20 INJECTION, SOLUTION INTRAVENOUS at 04:11

## 2018-10-05 RX ADMIN — SODIUM CHLORIDE, POTASSIUM CHLORIDE, SODIUM LACTATE AND CALCIUM CHLORIDE: 600; 310; 30; 20 INJECTION, SOLUTION INTRAVENOUS at 09:02

## 2018-10-05 RX ADMIN — SODIUM CHLORIDE, POTASSIUM CHLORIDE, SODIUM LACTATE AND CALCIUM CHLORIDE: 600; 310; 30; 20 INJECTION, SOLUTION INTRAVENOUS at 14:00

## 2018-10-05 ASSESSMENT — ACTIVITIES OF DAILY LIVING (ADL)
ADLS_ACUITY_SCORE: 9

## 2018-10-05 NOTE — PROGRESS NOTES
"      Hepatology Progress note  Mary Grigsby   MRN# [id number]     Age: 53 year old YOB: 1965       Referring provider: Beverly Yost  Attending Hepatologist: Dr. Gutierrez  CC: elevated liver tests       Assessment and Recommendation:   Assessment:   Ms. Grigsby is a 53-year-old with a history of DM II, HTN, and HLD who was admitted with elevated liver tests with hepatomegaly and steatosis, epigastric fullness and evidence of pancreatitis. EUS did not demonstrate signs of recent obstruction or cause of pancreatitis.       Recommendations:   1. Elevated liver tests  2. Hepatic steatosis  - Please perform doppler exam of liver to evaluate for thrombosis   - HbsAg, HbcAb, HEV, TSH/free T4 labs.   - AMA, f-actin, WILBERT, CMV, EBV pending.   - please draw HSV  - Liver biopsy ordered for Monday as outpatient, attempting to schedule at this time.   - Will schedule outpatient follow up with Dr. Gutierrez on 10/9 with lab apt prior.     Plan of care discussed with Dr. Gutierrez    Thank you for the opportunity to be involved in Mary Grigsby care. Please call with any questions or concerns.     DESTINY Jaimes, CNP  Inpatient Hepatology [facility]  [phone]               History of Present Illness:   Mary Grigsby is a 53 year old female with a history of DM II, HTN, and HLD who was advised to go to the ED with changes in labs. She reports approximately 3 weeks of epigastric \"fullness\", decreased appetite and recent vomiting. She was noted to have , , t. Bili 8.1 and lipase 1266. She did have RUQ pain that started following her abdominal US. She denies any fevers, acholic stools, melena, or hematochezia. She underwent an US abdomen with hepatomegaly and concerns for gallstones without CBD dilation. She then underwent a CT abdomen wo that demonstrated edema of the pancreas head, hepatic steatosis, and small amount of ascites. She underwent an EUS on 10/4 which did not have evidence " of biliary obstruction or cholecystitis. Her transaminases have been trending down with her bilirubin (mostly direct) has continued to be elevated. Initial work up was negative for HAV IgM, HCV, HIV, immunoglobulin subtypes and iron saturations.     Ms. Grigsby reports taking dulaglutide, initially starting with low dose in June and then increasing to maintenance dose in July with weekly dosing. She has had approximately 15 lb weight loss since last weighed in June and reports change in diet with eliminating soda. She has a history of cervical HPV with coloposcopies and s/p hysterectomy. She denies any swollen lymph nodes, chest pain, dizziness, fainting, bruising or bleeding.    Ms. Grigsby denies any potential for exposure to toxic substances or blood, recent travel, alcohol use or being around new animals.            Past Medical History:     Past Medical History:   Diagnosis Date     Cervical high risk HPV (human papillomavirus) test positive 4/2015, 5/13/16    type 16     Cervical lymphadenopathy 7/29/2012     Cervical lymphadenopathy 7/29/2012     Cervical lymphadenopathy 7/29/2012     Cervical lymphadenopathy      Deviated septum     had septoplasty and endo. sinus surg. 2/09 per [facility]     Family history of diabetes mellitus     mother     Family history of stroke     multiple tiny cva's     House dust mite allergy      Hyperlipidemia LDL goal < 100      Hypertension goal BP (blood pressure) < 140/90      Obesity      Pap smear of cervix with ASCUS, cannot exclude HGSIL 3/2013     Papanicolaou smear of cervix with low grade squamous intraepithelial lesion (LGSIL) 1/2011, 10/2011    colp 3/22/11 - GUSTABO 1-2     Rhinitis, allergic to other allergen      Seasonal allergic rhinitis 12/16/08 skin tests    CR/DM/M/T/G/W     Smoking      Type 2 diabetes, HbA1c goal < 7% (H)               Past Surgical History:     Past Surgical History:   Procedure Laterality Date     CRYOTHERAPY  3/30/2011    cervical      ESOPHAGOSCOPY, GASTROSCOPY, DUODENOSCOPY (EGD), COMBINED N/A 10/4/2018    Procedure: COMBINED ENDOSCOPIC ULTRASOUND, ESOPHAGOSCOPY, GASTROSCOPY, DUODENOSCOPY (EGD);  EUS;  Surgeon: John Carrera MD;  Location: [facility]     HC COLP CERVIX/UPPER VAGINA W BX CERVIX  1/2010, 3/2011    neg dysplasia, GUSTABO 1-2     HC REPAIR OF NASAL SEPTUM  2/3/09-per Dr. Harley    antrostomy, ethmoidectomy, septoplasty and turbinate reduction     LAPAROSCOPIC ASSISTED HYSTERECTOMY VAGINAL  3/12/12    [facility] [place]     TONSILLECTOMY & ADENOIDECTOMY  1973    age 8              Social History:     Social History   Substance Use Topics     Smoking status: Former Smoker     Packs/day: 0.50     Years: 20.00     Types: Cigarettes     Start date: 6/1/1980     Quit date: 2/24/2013     Smokeless tobacco: Never Used      Comment: still has occasionally      Alcohol use No             Family History:   The family history includes Asthma in her son and son; Cancer in her father; Cerebrovascular Disease in her maternal grandmother; Diabetes in her mother; Hypertension in her father, mother, sister, and sister; Other Cancer in her father; Thyroid Disease in her sister, sister, sister, sister, and sister. There is no history of Glaucoma or Macular Degeneration.             Immunizations:     Immunization History   Administered Date(s) Administered     Pneumo Conj 13-V (2010&after) 05/12/2016     TD (ADULT, 7+) 02/05/2004            Allergies:     Allergies   Allergen Reactions     Amoxicillin Hives     Augmentin      Erythromycin              Medications:   @  Prescriptions Prior to Admission   Medication Sig Dispense Refill Last Dose     aspirin 81 MG tablet Take 1 tablet by mouth daily. 1 tablet 3 10/3/2018 at AM     cholecalciferol (VITAMIN D3) 1000 UNIT tablet Take 3 tablets (3,000 Units) by mouth daily   10/2/2018 at PM     felodipine ER (PLENDIL) 5 MG 24 hr tablet Take 1 tablet (5 mg) by mouth daily 90 tablet 1 10/3/2018 at AM      "hydrochlorothiazide 12.5 MG TABS tablet Take 1 tablet (12.5 mg) by mouth every morning 90 tablet 1 10/3/2018 at AM     losartan (COZAAR) 100 MG tablet Take 1 tablet (100 mg) by mouth daily 90 tablet 1 10/3/2018 at AM     metFORMIN (GLUCOPHAGE-XR) 500 MG 24 hr tablet TAKE FOUR TABLETS BY MOUTH EVERY DAY WITH DINNER 360 tablet 1 10/2/2018 at PM     pravastatin (PRAVACHOL) 40 MG tablet Take 1 tablet (40 mg) by mouth daily 90 tablet 1 10/3/2018 at AM     Blood Glucose Calibration (FREESTYLE CONTROL SOLUTION) LIQD 1 drop. As needed 1 Bottle 3 Taking     dulaglutide (TRULICITY) 1.5 MG/0.5ML pen Inject 1.5 mg Subcutaneous every 7 days 0.5 mL 2 10/2/2018     Fexofenadine HCl (ALLEGRA PO) Take 90 mg by mouth daily as needed     at PRN     FREESTYLE LANCETS MISC 1 Device 2 times daily. 100 each 11 Taking     glucose blood VI test strips strip 1 strip 2 times daily. 100 strip 11 Taking     Hypertonic Nasal Wash (SINUS RINSE BOTTLE KIT NA) Spray 1 Bottle in nostril as needed.    at PRN     Ibuprofen (ADVIL PO) Take 600 mg by mouth daily as needed for moderate pain     at PRN   @          Review of Systems:    ROS: 10 point ROS neg other than the symptoms noted above in the HPI.          Physical Exam:   Blood pressure 116/48, pulse 77, temperature 96.7  F (35.9  C), temperature source Oral, resp. rate 18, height 1.549 m (5' 1\"), weight 111.3 kg (245 lb 6 oz), last menstrual period 06/24/2011, SpO2 95 %, not currently breastfeeding. Body mass index is 46.36 kg/(m^2).    Intake/Output Summary (Last 24 hours) at 10/05/18 1302  Last data filed at 10/05/18 0800   Gross per 24 hour   Intake          4266.67 ml   Output                0 ml   Net          4266.67 ml     General: In no acute distress, no facial muscle wasting  Neuro: AOx3, No asterixis  HEENT: PERRL mild scleral icterus, Nooral lesions. Small telangectasia's on cheeks.   Lymph:  Nocervical lymphadenoapthy  CV: S1/D0wskmetx murmurs, Skin warm and dry  Lungs: clear to " auscultation Respirations even and nonlabored on room air  Abd: obese, soft, nontender, nondistended. +BS  Extrem: trace lower peripehral edema  Skin: mild jaundice  Psych: pleasant         Data:     Lab Results   Component Value Date    WBC 8.1 10/05/2018     Lab Results   Component Value Date    RBC 3.97 10/05/2018     Lab Results   Component Value Date    HGB 12.2 10/05/2018     Lab Results   Component Value Date    HCT 36.4 10/05/2018     No components found for: MCT  Lab Results   Component Value Date    MCV 92 10/05/2018     Lab Results   Component Value Date    MCH 30.7 10/05/2018     Lab Results   Component Value Date    MCHC 33.5 10/05/2018     Lab Results   Component Value Date    RDW 16.1 10/05/2018     Lab Results   Component Value Date     10/05/2018       Last Basic Metabolic Panel:  Lab Results   Component Value Date     10/05/2018      Lab Results   Component Value Date    POTASSIUM 3.5 10/05/2018     Lab Results   Component Value Date    CHLORIDE 105 10/05/2018     Lab Results   Component Value Date    HOWARD 8.9 10/05/2018     Lab Results   Component Value Date    CO2 28 10/05/2018     Lab Results   Component Value Date    BUN 14 10/05/2018     Lab Results   Component Value Date    CR 1.01 10/05/2018     Lab Results   Component Value Date     10/05/2018       Liver Function Studies -   Recent Labs   Lab Test  10/05/18   0538   PROTTOTAL  6.5*   ALBUMIN  2.6*   BILITOTAL  8.1*   ALKPHOS  133   AST  538*   ALT  486*       Lab Results   Component Value Date    INR 1.45 10/05/2018    INR 1.50 10/04/2018    INR 1.41 10/03/2018            Previous Endoscopy:   None    IMAGING:  CT abd wo 10/3/18  IMPRESSION:   1. Diffuse hepatic steatosis.  2. Edematous pancreatic head, consistent with pancreatitis given  elevated lipase.  3. Cholelithiasis not well appreciated but better defined on  ultrasound.  4. Ascites in the pelvis and about the liver.  5. Consider MRCP for further  evaluation.      EUS 10/3/18  Impression:          - No suggestion of biliary obstruction to account for                        jaundice. This is suggestive of hepatic parenchymal                        disease.                        - Exam not suggestive of cholecystitis.                        - Very trace periduodenal ascites.     US abd 10/2/18  IMPRESSION:    1. Cholelithiasis with nonspecific gallbladder wall thickening due to  incomplete distention. Followup hepatobiliary scan could be performed  to confirm cystic duct and common bile duct patency. No common bile  duct dilatation is evident.  2. Hepatomegaly with borderline splenomegaly. Followup as clinically  warranted.

## 2018-10-05 NOTE — DISCHARGE SUMMARY
[facility], [place]    Internal Medicine Discharge Summary- Gold Service    Date of Admission:  10/3/2018  Date of Discharge:  10/5/2018  8:25 PM  Discharging Provider: Beverly Yost  Discharge Team: Gold 8    Discharge Diagnoses   # Pancreatitis  # Acute Liver Failure  # VINI  # DMII  # HTN  # HLD  # Tobacco Abuse:     Follow-ups Needed After Discharge   1. Follow up with Hepatology 10/9   = Labs at 7:45   = Appt with Dr. Gutierrez at 9 am   = Followed by Liver biopsy  2. Follow up with PCP within 1 week  3. Will need to discuss whether prior to admission BP meds should be re-started (Felodipine, Losartan, hydrochlorothiazide); were held on discharge as had been held in hospital, pt had been admitted with hypotension and VINI, and was having BPs in 110-120/70s off all meds with Cr not quite back to baseline, will need to re-evaluate start  4. Trulicity discontinued, will need to reevaluate alternative DMII therapy as outpatient    Hospital Course   Mary Grigsby was admitted on 10/3/2018. Pt with PMH of DMII, HTN, HLD, tobacco abuse and GERD who presented to PCP clinic 10/2/18 with ~ 2 week hx of progressive nausea, abdominal pain, early satiety and jaundice. Patient instructed to seek ED evaluation for lab abnormalities. ED evaluation reveals evaluation reveals elevated LFTs and lipase prompting Medicine admission for further evaluation and GI consult.     The following problems were addressed during her hospitalization:    # Pancreatitis of unclear etiology:    Presented to PCP w/ ~ 2 week hx of epigastric pain, early satiety, nausea, non-bloody emesis and jaundice. No fever, chills, night sweats, unintentional weight loss, recent travel, ingestion, Etoh use, or recent medication changes. Labs revealed elevated LFT in mixed cholestatic and hepatocellular pattern.  Abdominal US 10/2/18 w/Cholelithiasis and non-specific wall thickening, no CBD dilation evident; hepatomegaly w/  borderline splenomegaly, CT findings c/w acute pancreatitis, no necrosis or fluid collections. No identified dilation on initial imaging. Lipase 1266 -> ~1700. EUS done 10/4 without any evidence of obstruction, no obstructing gallstones in CBD or in gallbladder.  Unclear etiology of pancreatitis at this point, unlikely gallstones. Recently started on Dulaglutide, though not typically known to cause pancreatitis or hepatitis but may be etiology. TG checked and mildly elevated but not c/w triglyceride induced pancreatitis.. Minimal EtOH use. Does have HTN, DMII, HLD, though have always been fairly well controlled. Treated with aggressive IVF resuscitation and bowel rest. Within 24 hours pain had resolved and was eager to advance diet. Was able to tolerate regular diet and be off IVF at time of discharge. Ideally would have stayed until am to tolerate additional regular meal and night off IVF, but patient declined and was otherwise prepared to sign out AMA so discharged with clear instructions to drink plenty of fluids with close follow up and labs.   -- Labs, Hepatology appointment on Tuesday 10/9  -- Hold anti-hypertensives until follow up with physician  -- Discontinue Trulicity and re-assess with PCP        # Acute Liver Failure:   Patient found to have significant transaminase, Tbili, alk phos elevation at PCP visit 10/2 (, , TBili 8.1, Alk Phos 171, Plts 260, INR 1.41, Na 134. On admission in setting of pancreatitis initially thought mixed picture possibly d/t choledocholithiasis or cholangitis resulting in pancreatitis. However, given absence of obstructive findings or gallstones on EUS, hepatomegaly and splenomegaly on imaging, and elevated INR presentation c/w primary liver etiology. Minimal EtOH use as above, HAV, HBV, HCV, HIV all neg. Ferritin elevated, though likely d/t acute inflammatory process. Could be drug injury, though Dulaglutide not typical culprit of DILI.  Several lab tests still  pending at time of discharge, but patient adamant about leaving. Will f/u with Dr. Gutierrez of Hepatology 10/9 to review and for liver bx. MELD 19 on day of discharge.   Pending: Hep B core Ab, surface Abg,  HEV, EBV, CMV, HSV, WILBERT, ASMA, AMA  -- F/u with Dr. Gutierrez 10/9 with labs, will have liver biopsy same day          # VINI:   Cr 2.01 --> 1.81 -> 1.14 ->1.0 with IVF resuscitation. BUN 26. BL Cr appears 0.55-0.70. Poor PO intake for past 2 weeks. FeNa and UA, as well as response to IVF all c/w pre-renal etiology.  Not quite back to baseline at time of discharge. Also on multiple medications that could further exacerbate (Losartan, hydrochlorothiazide). Given that anti-hypertensives held while inpatient and BP were well controlled, could contibute to VINI and hypotension will continue to hold on discharge until f/u with repeat labs done.   -- Pt encouraged to drink fluids over the weekend until seen for repeat labs  -- HOLD anti-hypertensives until follow up      # DMII:  A1c 7.9 (10/2/18). BG elevated on admission. Maintained on metformin and weekly trulicity PTA. Trulicity held throughout.   - Trulicity discontinued at time of discharge       #HTN:   BP stable on admission. PTA felodipine, hydrochlorothiazide, losartan. Held throughout admission d/t VINI and hypotension. BPs well controlled (100-120/60-70s) off all meds, Cr not quite back to baseline at time of discharge, therefore all held on discharge to be re-assessed as outpatient.       # HLD: PTA statin. Held in setting of transaminase elevation, discontinued at time of discharge. Re-evaluated as outpatient.       # Tobacco abuse:   Current 0.25 PPD smoker. Patient declined nicotine patch. Recommend cessation, Declined any referral to help with cessation.     Consultations This Hospital Stay   MEDICATION HISTORY IP PHARMACY CONSULT  GI PANCREATICOBILIARY ADULT IP CONSULT  PHARMACY IP CONSULT     Code Status   Full Code    Time Spent on this Encounter   I,  Beverly Yost, personally saw the patient today and spent greater than 30 minutes discharging this patient.       Beverly Yost MD  Internal Medicine Staff Hospitalist Service  [facility]  Pager: [phone]  ______________________________________________________________________    Physical Exam   Vital Signs: Temp: 96.8  F (36  C) Temp src: Oral BP: 129/42 Pulse: 77 Heart Rate: 75 Resp: 18 SpO2: 96 % O2 Device: None (Room air)    Weight: 245 lbs 5.95 oz    General Appearance: Alert and oriented, moving around room independently, appears comfortable  Respiratory: Non-labored respirations on RA, lungs CTAB, no wheezes/crackles  Cardiovascular: RRR, normal S1, S2  GI: Mild bloating but soft, non-tender to palpation, normoactive bowel sounds  Skin: Slightly jaundiced, telangiectasias on face, otherwise without any abnormalities    Significant Results and Procedures   Most Recent 3 CBC's:  Recent Labs   Lab Test  10/05/18   0538  10/04/18   0540  10/03/18   1525   WBC  8.1  8.0  11.3*   HGB  12.2  12.3  14.2   MCV  92  91  93   PLT  238  230  284     Most Recent 3 BMP's:  Recent Labs   Lab Test  10/05/18   0538  10/04/18   1717  10/04/18   0540   NA  142  141  141   POTASSIUM  3.5  3.8  3.3*   CHLORIDE  105  106  105   CO2  28  28  27   BUN  14  18  22   CR  1.01  1.14*  1.38*   ANIONGAP  8  8  9   HOWARD  8.9  9.0  8.7   GLC  118*  126*  134*     Most Recent 2 LFT's:  Recent Labs   Lab Test  10/05/18   0538  10/04/18   0540   AST  538*  508*   ALT  486*  514*   ALKPHOS  133  142   BILITOTAL  8.1*  7.5*     Most Recent 3 INR's:  Recent Labs   Lab Test  10/05/18   0538  10/04/18   0540  10/03/18   1525   INR  1.45*  1.50*  1.41*       Pending Results   These results will be followed up by Dr. Gutierrez of Hepatology at clinic visit scheduled for Tuesday 10/9  Unresulted Labs Ordered in the Past 30 Days of this Admission     Date and Time Order Name Status Description    10/5/2018 1553 Hepatitis E  Antibody IgG In process     10/5/2018 1553 Hepatitis E Antibody IgM In process     10/5/2018 1553 HSV IgM antibody In process     10/5/2018 1553 HSV 1 and 2 DNA by PCR In process     10/5/2018 1553 Hepatitis B core antibody In process     10/5/2018 1553 Hepatitis B surface antigen In process     10/5/2018 0001 CMV DNA quantification In process     10/5/2018 0001 EBV DNA PCR Quantitative Whole Blood In process     10/5/2018 0001 F Actin EIA with reflex In process     10/5/2018 0001 Mitochondrial M2 Antibody IgG In process     10/5/2018 0001 Anti Nuclear Mary IgG by IFA with Reflex In process     10/3/2018 2220 Urine Culture Aerobic Bacterial Preliminary              Primary Care Physician   Dipak Hooper    Discharge Disposition   Discharged to home  Condition at discharge: Satisfactory    Discharge Orders     IR Liver Biopsy Percutaneous     Reason for your hospital stay   Patient admitted with abdominal pain, nausea, vomiting. On admission labs consistent with pancreatitis, also notable for significantly elevated TBili, transaminases. ERCP/EUS without evidence obstruction in biliary system, no evidence cholecystitis or cholangitis. Improves significantly with iv fluids and nothing by mouth.  Advanced diet and went well. IVF discontinued.     Activity   Your activity upon discharge: activity as tolerated     Monitor and record   fluid intake and output daily  Drink plenty of fluids and ensure that you are urinating several times a day     When to contact your care team   Call your primary doctor  Or present back to ED with fevers, chills, woserning abdominal pain, nausea, vomiting, change in urination.     Adult [facility] Follow-up and recommended labs and tests   Labs Tuesday: CBC, BMP, LFTs, INR    Follow up with primary care provider, Dipak Hooper, within 7 days to evaluate medication change, to evaluate treatment change and for hospital follow- up.  The following labs/tests are recommended: CBC, BMP.    Will see  Dr. Gutierrez 10.9 and liver biopsy after that      Appointments on [place] and/or [facility] (with [facility] or [facility] provider or service). Call [phone] if you haven't heard regarding these appointments within 7 days of discharge.     Discharge Instructions   1. STOP taking Trulicity, Pravastatin, and Ibuprofen    2. TEMPORARILY STOP TAKING: Losartan, Felodipine, Hydrochlorothiazide, Aspirin until liver biopsy and follow up with Dr. Gutierrez, discuss with her at that time if these can be re-started as your blood pressure was not high off of them and your Cr was not quite back to baseline. Also you have lost a lot of weight over the past couple of months and may not need as much medication.    3. Liver biopsy being scheduled for Tuesday 10/9    4. Follow up with Liver doctor Dr. Gutierrez on 10/9     5. Follow up with PCP in next 1-2 weeks    6. Labs Tuesday 10/9     Follow Up and recommended labs and tests   Follow up with Dr. Gutierrez of Hepatology for GI recs. Should have repeat CBC, BMP, LFTs, INR at that time on Monday 10/8. Should have medications re-evaluated at that time (held anti-hypertensives on dishcarge as BP in 110s/70s, Cr was improved but not quite back to baseline. Should have this re-assessed.     Full Code     Diet   Follow this diet upon discharge: Orders Placed This Encounter     Low fiber/ low residue diet. Lots of fluids!       Discharge Medications   Current Discharge Medication List      CONTINUE these medications which have CHANGED    Details   felodipine ER (PLENDIL) 5 MG 24 hr tablet Take 1 tablet (5 mg) by mouth daily  Qty: 90 tablet, Refills: 1    Comments: HOLD until liver biopsy and repeat labs -> if Cr all the way back to baseline and BP elevated ok to re-start  Associated Diagnoses: Essential hypertension with goal blood pressure less than 140/90      hydrochlorothiazide 12.5 MG TABS tablet Take 1 tablet (12.5 mg) by mouth every morning  Qty: 90 tablet, Refills: 1    Comments:  HOLD until liver biopsy and repeat labs -> if Cr all the way back to baseline and BP elevated ok to re-start  Associated Diagnoses: Essential hypertension with goal blood pressure less than 140/90      losartan (COZAAR) 100 MG tablet Take 1 tablet (100 mg) by mouth daily  Qty: 90 tablet, Refills: 1    Comments: HOLD until liver biopsy and repeat labs -> if Cr all the way back to baseline and BP elevated ok to re-start  Associated Diagnoses: Essential hypertension with goal blood pressure less than 140/90      aspirin 81 MG tablet Take 1 tablet (81 mg) by mouth daily Indication: primary prevention for heart disease  Qty: 1 tablet, Refills: 3    Comments: HOLD until after liver biopsy  Associated Diagnoses: Essential hypertension with goal blood pressure less than 140/90         CONTINUE these medications which have NOT CHANGED    Details   cholecalciferol (VITAMIN D3) 1000 UNIT tablet Take 3 tablets (3,000 Units) by mouth daily    Comments: Will purchase OTC  Associated Diagnoses: Vitamin D deficiency      metFORMIN (GLUCOPHAGE-XR) 500 MG 24 hr tablet TAKE FOUR TABLETS BY MOUTH EVERY DAY WITH DINNER  Qty: 360 tablet, Refills: 1    Associated Diagnoses: Controlled type 2 diabetes mellitus without complication, without long-term current use of insulin (H)      Blood Glucose Calibration (FREESTYLE CONTROL SOLUTION) LIQD 1 drop. As needed  Qty: 1 Bottle, Refills: 3    Comments: Pt will call to fill.  Associated Diagnoses: Type 2 diabetes, HbA1c goal < 7% (H)      Fexofenadine HCl (ALLEGRA PO) Take 90 mg by mouth daily as needed       FREESTYLE LANCETS MISC 1 Device 2 times daily.  Qty: 100 each, Refills: 11    Comments: Lancets for lancing device that comes with Freestyle Lite meter  Associated Diagnoses: Type 2 diabetes, HbA1c goal < 7% (H)      glucose blood VI test strips strip 1 strip 2 times daily.  Qty: 100 strip, Refills: 11    Associated Diagnoses: Prediabetes      Hypertonic Nasal Wash (SINUS RINSE BOTTLE KIT  NA) Spray 1 Bottle in nostril as needed.    Associated Diagnoses: Post-nasal drainage         STOP taking these medications       dulaglutide (TRULICITY) 1.5 MG/0.5ML pen Comments:   Reason for Stopping:         Ibuprofen (ADVIL PO) Comments:   Reason for Stopping:         pravastatin (PRAVACHOL) 40 MG tablet Comments:   Reason for Stopping:             Allergies   Allergies   Allergen Reactions     Amoxicillin Hives     Augmentin      Erythromycin

## 2018-10-05 NOTE — PLAN OF CARE
"Problem: Patient Care Overview  Goal: Plan of Care/Patient Progress Review  Outcome: Improving  Pt A&O x4, VS stable on RA. Up independently in room. Voiding spontaneously, BM x2 this shift. LR infusing through PIV @ 200 mL/hr, per MD, rate may decrease this evening.  and 115. Advanced to full liquid diet, tolerated well. No nausea/vomiting reported after PO intake, pt just states she feels \"full\". Patient very interested in going home tonight, MD recommends one more day of fluids, but is willing to compromise. Possible discharge this evening or tomorrow. Continue to monitor and update MD with changes.      "

## 2018-10-05 NOTE — PROGRESS NOTES
Care Coordinator Progress Note    Admission Date/Time:  10/3/2018  Attending MD:  Beverly Yost MD    Data  Chart reviewed, discussed with interdisciplinary team.   Patient was admitted for:    Jaundice  Abnormal LFTs  Acute pancreatitis, unspecified complication status, unspecified pancreatitis type.    Concerns with insurance coverage for discharge needs: None.  Current Living Situation: Patient lives with family and lives with spouse.  Support System: Supportive and Involved  Services Involved: None on admission  Transportation at Discharge: Family or friend will provide  Transportation to Medical Appointments:   - Not applicable  Barriers to Discharge: Medical stability    Coordination of Care and Referrals: Provided patient/family with options for NA.        Assessment  Patient is a 53yr old female with HTN, DMII, HLD, tobacco abuse and GERD who wasa admitted for evaluation of nausea, abdominal pain, early satiety, and jaundice. Writer introduced self and role of RNCC. 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 She lives with her  and adult child. She is independent and has no in home services. Patient is eager to discharge, her family will provide transport at that time. No RNCC needs anticipated.     Plan  Anticipated Discharge Date:  TBD  Anticipated Discharge Plan:  Home    Gale Meyer RNCC, BSN    [facility]  [address] [place] [id number]    [email or web address]    Office: [phone] Pager: [phone]  To contact weekend RNCC, dial [phone] and enter pager number [phone] at prompt. This pager can not be contacted by text page or outside line.

## 2018-10-05 NOTE — PLAN OF CARE
Problem: Patient Care Overview  Goal: Plan of Care/Patient Progress Review  Outcome: Improving  Denies pain or nausea. Lactated ringers are infusing in right PIV at 200 ml/hour. Voiding frequently per patient. Independent with mobility in and out of bed. Blood glucose was 101 at 0200. AVSS. Continue with current plan of nursing care, and update MD with concerns as needed.

## 2018-10-05 NOTE — PLAN OF CARE
"Problem: Patient Care Overview  Goal: Plan of Care/Patient Progress Review  Outcome: Improving  Assumed cares 1500 to 2300.    /58 (BP Location: Left arm)  Pulse 72  Temp 98.5  F (36.9  C) (Oral)  Resp 18  Ht 1.549 m (5' 1\")  Wt 111.3 kg (245 lb 6 oz)  LMP 06/24/2011  SpO2 94%  BMI 46.36 kg/m2    Pain: Declines.  Neuro: A and O x 4.  Respiratory: Lung sounds clear and equal on RA.  Cardiac/Neurovascular: HR and pulses WDL. Trace LE edema.  GI/: Bowel sounds active. Pt reports abd fullness. Adequate UOP.  Nutrition: Pt advanced to clears. Good intake, tolerated well.  around dinner and 114 before bed.  Activity: Up ind.  Skin: Jaundiced.  Lines: PIV in RUE, infusing LR at 200/hr, site WDL.  Events this shift: Endoscopic US performed, per  received it appears there are no stones in bile duct.      Plan: Continue to monitor.         "

## 2018-10-06 LAB
CMV DNA SPEC NAA+PROBE-ACNC: NORMAL [IU]/ML
CMV DNA SPEC NAA+PROBE-LOG#: NORMAL {LOG_IU}/ML
HSV1 DNA SPEC QL NAA+PROBE: NEGATIVE
HSV2 DNA SPEC QL NAA+PROBE: NEGATIVE
SMA IGG SER-ACNC: 34 UNITS (ref 0–19)
SPECIMEN SOURCE: NORMAL
SPECIMEN SOURCE: NORMAL

## 2018-10-06 NOTE — PROGRESS NOTES
Focus: Discharge  Pt very eager to leave this evening. Provider requested pt stay inpatient one more night but pt declined. Provider agreed to discharge if pt tolerated regular meal for dinner. Pt ate low fiber diet for dinner and denied abdominal pain, N/V. PIV removed. Discharge instructions reviewed with pt. While reviewing instructions a few questions were noted: 1. When should pt have labs drawn the 8th or the 9th? 2.  What meds are supposed to be held? Provider specifically told her verbally not to take Trulicity but in the written orders more meds were written to be held until speaking with provider. Also, the meds that were written to be held were then listed as meds to continue to take at home. And 3. Can the lab draw be done closer to her home. Provider was contacted (SHRUTHI Estrada [id number]) but was unable to answer questions and stated she would have Dr. Yost call pt tomorrow to answer her questions. Pt was agreeable to this plan. Pt was instructed to call unit [facility] if she had not heard from Dr. Yost by tomorrow afternoon. Pt left unit 5B 1945 via independent ambulation.

## 2018-10-07 LAB
HEV IGG SER QL: NEGATIVE
HEV IGM SER QL: NEGATIVE
SMOOTH MUSCLE ABY IGG TITER: NORMAL

## 2018-10-08 ENCOUNTER — TELEPHONE (OUTPATIENT)
Dept: INTERVENTIONAL RADIOLOGY/VASCULAR | Facility: CLINIC | Age: 53
End: 2018-10-08

## 2018-10-08 ENCOUNTER — TELEPHONE (OUTPATIENT)
Dept: GASTROENTEROLOGY | Facility: CLINIC | Age: 53
End: 2018-10-08

## 2018-10-08 ENCOUNTER — PATIENT OUTREACH (OUTPATIENT)
Dept: CARE COORDINATION | Facility: CLINIC | Age: 53
End: 2018-10-08

## 2018-10-08 DIAGNOSIS — R94.5 ABNORMAL RESULTS OF LIVER FUNCTION STUDIES: Primary | ICD-10-CM

## 2018-10-08 LAB
ANA PAT SER IF-IMP: ABNORMAL
ANA SER QL IF: POSITIVE
ANA TITR SER IF: ABNORMAL {TITER}
HBV CORE AB SERPL QL IA: NONREACTIVE
HBV SURFACE AG SERPL QL IA: NONREACTIVE
MITOCHONDRIA M2 IGG SER-ACNC: 1 U/ML

## 2018-10-08 NOTE — TELEPHONE ENCOUNTER
"  Follow up call to patient from her discharge on Friday. She states she has been feeling well and slowly advancing diet. This causes some \"fullness\" similar to prior to admission but does not have nausea, pain or vomiting. She denies fevers, other abdominal pain, diarrhea. She plans to go to have a liver biopsy on 10/9 at [facility] with IR.    Discussed need for repeat lab testing and will have a lab apt tomorrow. Discussed with Dr. Gutierrez regarding apt for 10/9. She was able to talk with patient on Friday and would not have any further information prior to liver biopsy. Will cancel apt with Dr. Gutierrez on 10/9 and adjust lab apt for 1045. Ms. Grigsby verbalized understanding of the change.      Reviewed recent labs that were pending at discharge:     Ref. Range 10/5/2018 16:43   Hep B Surface Agn Latest Ref Range: NR^Nonreactive  Nonreactive   Hepatitis B Core Mary Latest Ref Range: NR^Nonreactive  Nonreactive   Hepatitis E IgG Latest Ref Range: Negative    Negative   Hepatitis E IgM Latest Ref Range: Negative    Negative   HSV Specimen Type Unknown Plasma, EDTA anti...   HSV Type 1 PCR Latest Ref Range: NEG^Negative  Negative   HSV Type 2 PCR Latest Ref Range: NEG^Negative  Negative      Ref. Range 10/3/2018 22:20 10/3/2018 22:34 10/4/2018 05:40 10/4/2018 17:17 10/5/2018 05:38   F-Actin Antibody IgG Latest Ref Range: 0 - 19 Units     34 (H)      Ref. Range 10/5/2018 05:38   Smooth Muscle Mary IgG Titer Latest Ref Range: <1:20     <1:20     WILBERT interpretation Positive (A) NEG^Negative  Final 10/05/2018  5:38 AM 51      Comment:                                        Reference range:   <1:40  NEGATIVE   1:40 - 1:80  BORDERLINE POSITIVE   >1:80 POSITIVE        WILBERT pattern 1 NUCLEOLAR    Final 10/05/2018  5:38 AM 51     WILBERT titer 1 1:640             DESTINY Jaimes, CNP  Inpatient hepatology [facility]  [phone]  "

## 2018-10-09 ENCOUNTER — MYC MEDICAL ADVICE (OUTPATIENT)
Dept: FAMILY MEDICINE | Facility: CLINIC | Age: 53
End: 2018-10-09

## 2018-10-09 ENCOUNTER — APPOINTMENT (OUTPATIENT)
Dept: MEDSURG UNIT | Facility: CLINIC | Age: 53
End: 2018-10-09
Attending: INTERNAL MEDICINE
Payer: COMMERCIAL

## 2018-10-09 ENCOUNTER — TELEPHONE (OUTPATIENT)
Dept: GASTROENTEROLOGY | Facility: CLINIC | Age: 53
End: 2018-10-09

## 2018-10-09 ENCOUNTER — RESULT FOLLOW UP (OUTPATIENT)
Dept: GASTROENTEROLOGY | Facility: CLINIC | Age: 53
End: 2018-10-09

## 2018-10-09 ENCOUNTER — HOSPITAL ENCOUNTER (OUTPATIENT)
Facility: CLINIC | Age: 53
Discharge: HOME | End: 2018-10-09
Attending: INTERNAL MEDICINE | Admitting: RADIOLOGY
Payer: COMMERCIAL

## 2018-10-09 ENCOUNTER — APPOINTMENT (OUTPATIENT)
Dept: INTERVENTIONAL RADIOLOGY/VASCULAR | Facility: CLINIC | Age: 53
End: 2018-10-09
Attending: NURSE PRACTITIONER
Payer: COMMERCIAL

## 2018-10-09 VITALS
HEART RATE: 94 BPM | TEMPERATURE: 97.8 F | HEIGHT: 61 IN | SYSTOLIC BLOOD PRESSURE: 142 MMHG | WEIGHT: 244.71 LBS | RESPIRATION RATE: 16 BRPM | DIASTOLIC BLOOD PRESSURE: 62 MMHG | OXYGEN SATURATION: 97 % | BODY MASS INDEX: 46.2 KG/M2

## 2018-10-09 DIAGNOSIS — K75.4 AUTOIMMUNE HEPATITIS (H): Primary | ICD-10-CM

## 2018-10-09 DIAGNOSIS — R79.89 ABNORMAL LFTS: ICD-10-CM

## 2018-10-09 DIAGNOSIS — R17 JAUNDICE: ICD-10-CM

## 2018-10-09 DIAGNOSIS — R94.5 ABNORMAL RESULTS OF LIVER FUNCTION STUDIES: ICD-10-CM

## 2018-10-09 LAB
ALBUMIN SERPL-MCNC: 3.1 G/DL (ref 3.4–5)
ALP SERPL-CCNC: 177 U/L (ref 40–150)
ALT SERPL W P-5'-P-CCNC: 503 U/L (ref 0–50)
ANION GAP SERPL CALCULATED.3IONS-SCNC: 7 MMOL/L (ref 3–14)
AST SERPL W P-5'-P-CCNC: 582 U/L (ref 0–45)
BILIRUB DIRECT SERPL-MCNC: 9.5 MG/DL (ref 0–0.2)
BILIRUB SERPL-MCNC: 12.1 MG/DL (ref 0.2–1.3)
BUN SERPL-MCNC: 7 MG/DL (ref 7–30)
CALCIUM SERPL-MCNC: 8.8 MG/DL (ref 8.5–10.1)
CHLORIDE SERPL-SCNC: 105 MMOL/L (ref 94–109)
CO2 SERPL-SCNC: 25 MMOL/L (ref 20–32)
CREAT SERPL-MCNC: 0.77 MG/DL (ref 0.52–1.04)
EBV DNA # SPEC NAA+PROBE: ABNORMAL {COPIES}/ML
EBV DNA SPEC NAA+PROBE-LOG#: 4.2 {LOG_COPIES}/ML
ERYTHROCYTE [DISTWIDTH] IN BLOOD BY AUTOMATED COUNT: 18.4 % (ref 10–15)
GFR SERPL CREATININE-BSD FRML MDRD: 78 ML/MIN/1.7M2
GLUCOSE SERPL-MCNC: 153 MG/DL (ref 70–99)
HCT VFR BLD AUTO: 42.9 % (ref 35–47)
HGB BLD-MCNC: 14.4 G/DL (ref 11.7–15.7)
INR PPP: 1.38 (ref 0.86–1.14)
MCH RBC QN AUTO: 30.6 PG (ref 26.5–33)
MCHC RBC AUTO-ENTMCNC: 33.6 G/DL (ref 31.5–36.5)
MCV RBC AUTO: 91 FL (ref 78–100)
PLATELET # BLD AUTO: 280 10E9/L (ref 150–450)
POTASSIUM SERPL-SCNC: 3.5 MMOL/L (ref 3.4–5.3)
PROT SERPL-MCNC: 8 G/DL (ref 6.8–8.8)
RBC # BLD AUTO: 4.7 10E12/L (ref 3.8–5.2)
SODIUM SERPL-SCNC: 138 MMOL/L (ref 133–144)
WBC # BLD AUTO: 9.9 10E9/L (ref 4–11)

## 2018-10-09 PROCEDURE — 88307 TISSUE EXAM BY PATHOLOGIST: CPT | Performed by: PHYSICIAN ASSISTANT

## 2018-10-09 PROCEDURE — 88313 SPECIAL STAINS GROUP 2: CPT | Performed by: PHYSICIAN ASSISTANT

## 2018-10-09 PROCEDURE — 99152 MOD SED SAME PHYS/QHP 5/>YRS: CPT

## 2018-10-09 PROCEDURE — 25000128 H RX IP 250 OP 636: Performed by: PHYSICIAN ASSISTANT

## 2018-10-09 PROCEDURE — 27210732 IR LIVER BIOPSY PERCUTANEOUS

## 2018-10-09 PROCEDURE — 25000125 ZZHC RX 250: Performed by: PHYSICIAN ASSISTANT

## 2018-10-09 PROCEDURE — 40000168 ZZH STATISTIC PP CARE STAGE 3

## 2018-10-09 PROCEDURE — 88365 INSITU HYBRIDIZATION (FISH): CPT | Performed by: PHYSICIAN ASSISTANT

## 2018-10-09 RX ORDER — FENTANYL CITRATE 50 UG/ML
25-50 INJECTION, SOLUTION INTRAMUSCULAR; INTRAVENOUS EVERY 5 MIN PRN
Status: DISCONTINUED | OUTPATIENT
Start: 2018-10-09 | End: 2018-10-09 | Stop reason: HOSPADM

## 2018-10-09 RX ORDER — PREDNISONE 10 MG/1
40 TABLET ORAL DAILY
Qty: 30 TABLET | Refills: 3 | Status: SHIPPED | OUTPATIENT
Start: 2018-10-09 | End: 2018-11-15

## 2018-10-09 RX ORDER — NALOXONE HYDROCHLORIDE 0.4 MG/ML
.1-.4 INJECTION, SOLUTION INTRAMUSCULAR; INTRAVENOUS; SUBCUTANEOUS
Status: DISCONTINUED | OUTPATIENT
Start: 2018-10-09 | End: 2018-10-09 | Stop reason: HOSPADM

## 2018-10-09 RX ORDER — SODIUM CHLORIDE 9 MG/ML
INJECTION, SOLUTION INTRAVENOUS CONTINUOUS
Status: DISCONTINUED | OUTPATIENT
Start: 2018-10-09 | End: 2018-10-09 | Stop reason: HOSPADM

## 2018-10-09 RX ORDER — LIDOCAINE 40 MG/G
CREAM TOPICAL
Status: DISCONTINUED | OUTPATIENT
Start: 2018-10-09 | End: 2018-10-09 | Stop reason: HOSPADM

## 2018-10-09 RX ORDER — FLUMAZENIL 0.1 MG/ML
0.2 INJECTION, SOLUTION INTRAVENOUS
Status: DISCONTINUED | OUTPATIENT
Start: 2018-10-09 | End: 2018-10-09 | Stop reason: HOSPADM

## 2018-10-09 RX ADMIN — SODIUM CHLORIDE: 9 INJECTION, SOLUTION INTRAVENOUS at 13:04

## 2018-10-09 RX ADMIN — FENTANYL CITRATE 50 MCG: 50 INJECTION, SOLUTION INTRAMUSCULAR; INTRAVENOUS at 15:06

## 2018-10-09 RX ADMIN — MIDAZOLAM 2 MG: 1 INJECTION INTRAMUSCULAR; INTRAVENOUS at 15:06

## 2018-10-09 RX ADMIN — LIDOCAINE HYDROCHLORIDE 7 ML: 10 INJECTION, SOLUTION EPIDURAL; INFILTRATION; INTRACAUDAL; PERINEURAL at 15:07

## 2018-10-09 NOTE — PROCEDURES
Interventional Radiology Brief Post Procedure Note    Procedure: IR LIVER BIOPSY PERCUTANEOUS    Proceduralist: Laure Mcdonnell MS, PATracieC    Assistant: None    Time Out: Prior to the start of the procedure and with procedural staff participation, I verbally confirmed the patient s identity using two indicators, relevant allergies, that the procedure was appropriate and matched the consent or emergent situation, and that the correct equipment/implants were available. Immediately prior to starting the procedure I conducted the Time Out with the procedural staff and re-confirmed the patient s name, procedure, and site/side. (The Joint Commission universal protocol was followed.)  Yes    Medications   Medication Event Details Admin User Admin Time       Sedation: IR Nurse Monitored Care   Post Procedure Summary:  Prior to the start of the procedure and with procedural staff participation, I verbally confirmed the patient s identity using two indicators, relevant allergies, that the procedure was appropriate and matched the consent or emergent situation, and that the correct equipment/implants were available. Immediately prior to starting the procedure I conducted the Time Out with the procedural staff and re-confirmed the patient s name, procedure, and site/side. (The Joint Commission universal protocol was followed.)  Yes       Sedatives: Fentanyl and Midazolam (Versed)    Vital signs, airway and pulse oximetry were monitored and remained stable throughout the procedure and sedation was maintained until the procedure was complete.  The patient was monitored by staff until sedation discharge criteria were met.    Patient tolerance: Patient tolerated the procedure well with no immediate complications.    Time of sedation in minutes: 15 Minutes minutes from beginning to end of physician one to one monitoring.      Findings: Completed ultrasound-guided random liver biopsy. 2 passes yielded 2 cores sent to pathology in  preservative. Gelfoam in tract.    Estimated Blood Loss: Minimal    Fluoroscopy Time: 0 minute(s)    SPECIMENS: Core needle biopsy specimens sent for pathological analysis    Complications: 1. None     Condition: Stable    Plan: Follow up per primary team    Comments: See dictated procedure note for full details.    Laure Mcdonnell PA-C

## 2018-10-09 NOTE — PROGRESS NOTES
Interventional Radiology Pre-Procedure Sedation Assessment   Time of Assessment: 1:55 PM    Expected Level: Moderate Sedation    Indication: Sedation is required for the following type of Procedure: Biopsy    Sedation and procedural consent: Risks, benefits and alternatives were discussed with Patient and Relative daughter    PO Intake: Appropriately NPO for procedure    ASA Class: Class 2 - MILD SYSTEMIC DISEASE, NO ACUTE PROBLEMS, NO FUNCTIONAL LIMITATIONS.    Mallampati: Grade 2:  Soft palate, base of uvula, tonsillar pillars, and portion of posterior pharyngeal wall visible    Lungs: Lungs Clear with good breath sounds bilaterally    Heart: Normal heart sounds and rate    History and physical reviewed and no updates needed. I have reviewed the lab findings, diagnostic data, medications, and the plan for sedation. I have determined this patient to be an appropriate candidate for the planned sedation and procedure and have reassessed the patient IMMEDIATELY PRIOR to sedation and procedure.    Laure Mcdonnell PA-C

## 2018-10-09 NOTE — IP AVS SNAPSHOT
MRN:[id number]                      After Visit Summary   10/9/2018    Mary Grigsby    MRN: [id number]           Visit Information        Department      10/9/2018 11:51 AM [address] [facility] [place]          Review of your medicines      UNREVIEWED medicines. Ask your doctor about these medicines        Dose / Directions    ALLEGRA PO        Dose:  90 mg   Take 90 mg by mouth daily as needed   Refills:  0       aspirin 81 MG tablet   Used for:  Essential hypertension with goal blood pressure less than 140/90        Dose:  81 mg   Take 1 tablet (81 mg) by mouth daily Indication: primary prevention for heart disease   Quantity:  1 tablet   Refills:  3       cholecalciferol 1000 UNIT tablet   Commonly known as:  vitamin D3   Used for:  Vitamin D deficiency        Dose:  3000 Units   Take 3 tablets (3,000 Units) by mouth daily   Refills:  0       felodipine ER 5 MG 24 hr tablet   Commonly known as:  PLENDIL   Used for:  Essential hypertension with goal blood pressure less than 140/90        Dose:  5 mg   Take 1 tablet (5 mg) by mouth daily   Quantity:  90 tablet   Refills:  1       hydrochlorothiazide 12.5 MG Tabs tablet   Used for:  Essential hypertension with goal blood pressure less than 140/90        Dose:  12.5 mg   Take 1 tablet (12.5 mg) by mouth every morning   Quantity:  90 tablet   Refills:  1       losartan 100 MG tablet   Commonly known as:  COZAAR   Used for:  Essential hypertension with goal blood pressure less than 140/90        Dose:  100 mg   Take 1 tablet (100 mg) by mouth daily   Quantity:  90 tablet   Refills:  1       metFORMIN 500 MG 24 hr tablet   Commonly known as:  GLUCOPHAGE-XR   Used for:  Controlled type 2 diabetes mellitus without complication, without long-term current use of insulin (H)        TAKE FOUR TABLETS BY MOUTH EVERY DAY WITH DINNER   Quantity:  360 tablet   Refills:  1       predniSONE 10 MG tablet   Commonly known as:  DELTASONE   Used for:  Autoimmune  hepatitis (H)        Dose:  40 mg   Take 4 tablets (40 mg) by mouth daily   Quantity:  30 tablet   Refills:  3       SINUS RINSE BOTTLE KIT NA   Used for:  Post-nasal drainage        Dose:  1 Bottle   Spray 1 Bottle in nostril as needed.   Refills:  0         CONTINUE these medicines which have NOT CHANGED        Dose / Directions    blood glucose calibration solution   Used for:  Type 2 diabetes, HbA1c goal < 7% (H)        Dose:  1 drop   1 drop. As needed   Quantity:  1 Bottle   Refills:  3       blood glucose monitoring lancets   Used for:  Type 2 diabetes, HbA1c goal < 7% (H)        Dose:  1 Device   1 Device 2 times daily.   Quantity:  100 each   Refills:  11       blood glucose monitoring test strip   Commonly known as:  no brand specified   Used for:  Prediabetes        Dose:  1 strip   1 strip 2 times daily.   Quantity:  100 strip   Refills:  11            Where to get your medicines      These medications were sent to [facility] - [address]  [address] [address] [place] [address]     Phone:  [phone]     predniSONE 10 MG tablet                Protect others around you: Learn how to safely use, store and throw away your medicines at www.disposemymeds.org.         Follow-ups after your visit         Care Instructions        Further instructions from your care team       [facility]    Interventional Radiology  Patient Instructions Following Biopsy of Liver    AFTER YOU GO HOME  ? If you were given sedation DO NOT drive or operate machinery at home or at work for at least 24 hours  ? DO relax and take it easy for 48 hours, no strenuous activity for 24 hours  ? DO drink plenty of fluids  ? DO resume your regular diet, unless otherwise instructed by your Primary Physician  ? Keep the dressing dry and in place for 24 hours.  ? DO NOT SMOKE FOR AT LEAST 24 HOURS, if you have been given any medications that were to help you relax or sedate you during  your procedure  ? DO NOT drink alcoholic beverages the day of your procedure  ? DO NOT do any strenuous exercise or lifting (> 10 lbs) for at least 7 days following your procedure  ? DO NOT take a bath or shower for at least 12 hours following your procedure  ? Remove dressing after shower the next day. Replace with Band aid for 2 days.  Never leave a wet dressing in place.  ? DO NOT make any important or legal decisions for 24 hours following your procedure  ? There should be minimum drainage from the biopsy site    CALL THE PHYSICIAN IF:  ? You start bleeding from the procedure site.  If you do start to bleed from that site, lie down flat and hold pressure on the site for a minimum of 10 minutes.  Your physician will tell you if you need to return to the hospital  ? You develop nausea or vomiting  ? You have excessive swelling, redness, or tenderness at the site  ? You have drainage that looks like it is infected.  ? You experience severe pain  ? You develop hives or a rash or unexplained itching  ? You develop shortness of breath  ? You develop a temperature of 101 degrees F or greater  ? You develop chest pain or cough up blood, lightheadedness or fainting        [facility] INTERVENTIONAL RADIOLOGY DEPARTMENT  Procedure Physician:  Laure Mcdonnell PA-C                                     Date of procedure: October 9, 2018  Telephone Numbers: [phone] Monday-Friday 8:00 am to 4:30 pm  [phone] After 4:30 pm Monday-Friday, Weekends & Holidays.   Ask for the Interventional Radiologist on call.  Someone is on call 24 hrs/day  [facility] toll free number: [phone] Monday-Friday 8:00 am to 4:30 pm  [facility] Emergency Dept: [phone]           Additional Information About Your Visit        [facility] Information     [facility] gives you secure access to your electronic health record. If you see a primary care provider, you can also send messages to your care team and make appointments. If you have questions, please call your  "primary care clinic.  If you do not have a primary care provider, please call [phone] and they will assist you.        Care EveryWhere ID     This is your Care EveryWhere ID. This could be used by other organizations to access your [facility] medical records  [id number]        Your Vitals Were     Blood Pressure Pulse Temperature Respirations Height Weight    138/58 94 97.8  F (36.6  C) (Oral) 16 1.549 m (5' 1\") 111 kg (244 lb 11.4 oz)    Last Period Pulse Oximetry BMI (Body Mass Index)             06/24/2011 94% 46.24 kg/m2          Primary Care Provider Office Phone # Fax #    Dipak Hooper [phone]      Equal Access to Services     SAWYER HAYNES : Hadii kodak mullinso Sogerardoali, waaxda luqadaha, qaybta kaalmada adeegyada, lazarus hill. So [facility] [phone].    ATENCIÓN: Si habla español, tiene a de leon disposición servicios gratuitos de asistencia lingüística. [facility] [phone].    We comply with applicable federal civil rights laws and [place] laws. We do not discriminate on the basis of race, color, national origin, age, disability, sex, sexual orientation, or gender identity.            Thank you!     Thank you for choosing [facility] for your care. Our goal is always to provide you with excellent care. Hearing back from our patients is one way we can continue to improve our services. Please take a few minutes to complete the written survey that you may receive in the mail after you visit with us. Thank you!             Medication List: This is a list of all your medications and when to take them. Check marks below indicate your daily home schedule. Keep this list as a reference.      Medications           Morning Afternoon Evening Bedtime As Needed    ALLEGRA PO   Take 90 mg by mouth daily as needed                                aspirin 81 MG tablet   Take 1 tablet (81 mg) by mouth daily Indication: primary prevention for heart disease                      "           blood glucose calibration solution   1 drop. As needed                                blood glucose monitoring lancets   1 Device 2 times daily.                                blood glucose monitoring test strip   Commonly known as:  no brand specified   1 strip 2 times daily.                                cholecalciferol 1000 UNIT tablet   Commonly known as:  vitamin D3   Take 3 tablets (3,000 Units) by mouth daily                                felodipine ER 5 MG 24 hr tablet   Commonly known as:  PLENDIL   Take 1 tablet (5 mg) by mouth daily                                hydrochlorothiazide 12.5 MG Tabs tablet   Take 1 tablet (12.5 mg) by mouth every morning                                losartan 100 MG tablet   Commonly known as:  COZAAR   Take 1 tablet (100 mg) by mouth daily                                metFORMIN 500 MG 24 hr tablet   Commonly known as:  GLUCOPHAGE-XR   TAKE FOUR TABLETS BY MOUTH EVERY DAY WITH DINNER                                predniSONE 10 MG tablet   Commonly known as:  DELTASONE   Take 4 tablets (40 mg) by mouth daily                                SINUS RINSE BOTTLE KIT NA   Spray 1 Bottle in nostril as needed.

## 2018-10-09 NOTE — PROGRESS NOTES
Update:    Reviewed labs obtained on 10/9 and recent autoimmune markers with Dr. Gutierrez. Ms. Grigsby currently in IR with plans to undergo a percutaneous liver biopsy. Met with patient in the pre-operative area prior to her procedure.     Labs:     Ref. Range 10/9/2018 10:53   Sodium Latest Ref Range: 133 - 144 mmol/L 138   Potassium Latest Ref Range: 3.4 - 5.3 mmol/L 3.5   Chloride Latest Ref Range: 94 - 109 mmol/L 105   Carbon Dioxide Latest Ref Range: 20 - 32 mmol/L 25   Urea Nitrogen Latest Ref Range: 7 - 30 mg/dL 7   Creatinine Latest Ref Range: 0.52 - 1.04 mg/dL 0.77   GFR Estimate Latest Ref Range: >60 mL/min/1.7m2 78   GFR Estimate If Black Latest Ref Range: >60 mL/min/1.7m2 >90   Calcium Latest Ref Range: 8.5 - 10.1 mg/dL 8.8   Anion Gap Latest Ref Range: 3 - 14 mmol/L 7   Albumin Latest Ref Range: 3.4 - 5.0 g/dL 3.1 (L)   Protein Total Latest Ref Range: 6.8 - 8.8 g/dL 8.0   Bilirubin Total Latest Ref Range: 0.2 - 1.3 mg/dL 12.1 (H)   Alkaline Phosphatase Latest Ref Range: 40 - 150 U/L 177 (H)   ALT Latest Ref Range: 0 - 50 U/L 503 (HH)   AST Latest Ref Range: 0 - 45 U/L 582 (HH)   Bilirubin Direct Latest Ref Range: 0.0 - 0.2 mg/dL 9.5 (H)   Glucose Latest Ref Range: 70 - 99 mg/dL 153 (H)   WBC Latest Ref Range: 4.0 - 11.0 10e9/L 9.9   Hemoglobin Latest Ref Range: 11.7 - 15.7 g/dL 14.4   Hematocrit Latest Ref Range: 35.0 - 47.0 % 42.9   Platelet Count Latest Ref Range: 150 - 450 10e9/L 280   RBC Count Latest Ref Range: 3.8 - 5.2 10e12/L 4.70   MCV Latest Ref Range: 78 - 100 fl 91   MCH Latest Ref Range: 26.5 - 33.0 pg 30.6   MCHC Latest Ref Range: 31.5 - 36.5 g/dL 33.6   RDW Latest Ref Range: 10.0 - 15.0 % 18.4 (H)   INR Latest Ref Range: 0.86 - 1.14  1.38 (H)       Advised Ms. Grigsby that autoimmune markers and current labs are suspicious of autoimmune hepatitis. Will plan to start on prednisone 40 mg daily to start following her liver biopsy. Will repeat labs on Thursday at her local clinic. Lab  orders placed and prescription sent to  pharmacy.    Reviewed with Dr. Carrera that she did not have evidence of acute pancreatitis on EUS.     She has not been taking any medications for her DM. Advised that she should follow her blood sugars closely and follow up with her PCP. She continues to slowly advance her diet due to symptoms. She will call with questions. Dr. Gutierrez plans to schedule upcoming appointment in the outpatient hepatology clinic.     DESTINY Jaimes, CNP  Inpatient Hepatology [facility]  [phone]

## 2018-10-09 NOTE — PROGRESS NOTES
Arrived from home for a liver biopsy.  VSS.  Denies pain.  PIV placed.  Labs resulted.  H&P current.  Needs consent.  Resting in bed.

## 2018-10-09 NOTE — DISCHARGE INSTRUCTIONS
[facility]    Interventional Radiology  Patient Instructions Following Biopsy of Liver    AFTER YOU GO HOME  ? If you were given sedation DO NOT drive or operate machinery at home or at work for at least 24 hours  ? DO relax and take it easy for 48 hours, no strenuous activity for 24 hours  ? DO drink plenty of fluids  ? DO resume your regular diet, unless otherwise instructed by your Primary Physician  ? Keep the dressing dry and in place for 24 hours.  ? DO NOT SMOKE FOR AT LEAST 24 HOURS, if you have been given any medications that were to help you relax or sedate you during your procedure  ? DO NOT drink alcoholic beverages the day of your procedure  ? DO NOT do any strenuous exercise or lifting (> 10 lbs) for at least 7 days following your procedure  ? DO NOT take a bath or shower for at least 12 hours following your procedure  ? Remove dressing after shower the next day. Replace with Band aid for 2 days.  Never leave a wet dressing in place.  ? DO NOT make any important or legal decisions for 24 hours following your procedure  ? There should be minimum drainage from the biopsy site    CALL THE PHYSICIAN IF:  ? You start bleeding from the procedure site.  If you do start to bleed from that site, lie down flat and hold pressure on the site for a minimum of 10 minutes.  Your physician will tell you if you need to return to the hospital  ? You develop nausea or vomiting  ? You have excessive swelling, redness, or tenderness at the site  ? You have drainage that looks like it is infected.  ? You experience severe pain  ? You develop hives or a rash or unexplained itching  ? You develop shortness of breath  ? You develop a temperature of 101 degrees F or greater  ? You develop chest pain or cough up blood, lightheadedness or fainting        [facility] INTERVENTIONAL RADIOLOGY DEPARTMENT  Procedure Physician:  Laure Mcdonnell PA-C                                     Date of procedure: October 9,  2018  Telephone Numbers: [phone] Monday-Friday 8:00 am to 4:30 pm  [phone] After 4:30 pm Monday-Friday, Weekends & Holidays.   Ask for the Interventional Radiologist on call.  Someone is on call 24 hrs/day  [facility] toll free number: [phone] Monday-Friday 8:00 am to 4:30 pm  [facility] Emergency Dept: [phone]

## 2018-10-09 NOTE — PROGRESS NOTES
Returned from IR s/p liver biopsy.  VSS.  Denies pain.  RUQ biopsy site covered with a primapore, CDI.

## 2018-10-09 NOTE — IP AVS SNAPSHOT
[address] [address]                                       After Visit Summary   10/9/2018    Mary Grigsby    MRN: [id number]           After Visit Summary Signature Page     I have received my discharge instructions, and my questions have been answered. I have discussed any challenges I see with this plan with the nurse or doctor.    ..........................................................................................................................................  Patient/Patient Representative Signature      ..........................................................................................................................................  Patient Representative Print Name and Relationship to Patient    ..................................................               ................................................  Date                                   Time    ..........................................................................................................................................  Reviewed by Signature/Title    ...................................................              ..............................................  Date                                               Time          22EPIC Rev 08/18

## 2018-10-09 NOTE — PROGRESS NOTES
Tolerated bedrest without issues.  Tolerated food, fluids, ambulation and urination all without problems.  Denies pain.  RUQ biopsy site CDI.  Reviewed discharge instructions with patient.  PIV removed.  Discharged to home with niece.

## 2018-10-09 NOTE — TELEPHONE ENCOUNTER
DATE:  10/9/2018   TIME OF RECEIPT FROM LAB:  1138  LAB TEST:  ;   LAB VALUE:  ;   RESULTS GIVEN WITH READ-BACK TO: Rashaun Winter RN  Labs consistent with recent values. Patient scheduled today for a percutaneous liver biopsy. Will need follow up in Hepatology arranged post biopsy to get established in clinic, go over results and plan of care.

## 2018-10-10 ENCOUNTER — PATIENT OUTREACH (OUTPATIENT)
Dept: CARE COORDINATION | Facility: CLINIC | Age: 53
End: 2018-10-10

## 2018-10-10 LAB — HSV 1+2 IGM SER-IMP: 0.95 INDEX VALUE (ref 0–0.89)

## 2018-10-10 ASSESSMENT — ACTIVITIES OF DAILY LIVING (ADL): DEPENDENT_IADLS:: INDEPENDENT

## 2018-10-10 NOTE — TELEPHONE ENCOUNTER
Patient called.  Appointment scheduled for tomorrow, Thursday, October 11th at 3:30 p.m.  Siri Mar,

## 2018-10-10 NOTE — PROGRESS NOTES
Clinic Care Coordination Contact    Clinic Care Coordination Contact  OUTREACH    Referral Information:  Referral Source: IP Handoff    Primary Diagnosis: GI Disorders    Chief Complaint   Patient presents with     Clinic Care Coordination - Post Hospital     RN        [place] Utilization:   Clinic Utilization  Difficulty keeping appointments:: No  Compliance Concerns: No  No-Show Concerns: No  No PCP office visit in Past Year: No  Utilization    Last refreshed: 10/10/2018  6:48 AM:  No Show Count (past year) 0       Last refreshed: 10/10/2018  6:48 AM:  ED visits 0       Last refreshed: 10/10/2018  6:48 AM:  Hospital admissions 1          Current as of: 10/10/2018  6:48 AM         Clinical Concerns:    Current Medical Concerns:  Patient diagnosed with autoimmune hepatitis.    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      Current Behavioral Concerns: None    Education Provided to patient: na   Pain  Pain (GOAL):: No  Health Maintenance Reviewed: Up to date    Clinical Pathway: None    Medication Management:  Independent     Functional Status:  Dependent ADLs:: Independent  Dependent IADLs:: Independent  Bed or wheelchair confined:: No  Mobility Status: Independent  Fallen 2 or more times in the past year?: No  Any fall with injury in the past year?: No    Living Situation:  Current living arrangement:: I live in a private home with family  [facility]    Diet/Exercise/Sleep:  Diet:: Diabetic diet  Inadequate nutrition (GOAL):: No  Food Insecurity: No  Tube Feeding: No  Exercise:: Currently not exercising  Inadequate activity/exercise (GOAL):: No  Significant changes in sleep pattern (GOAL): No    Transportation:  Transportation concerns (GOAL):: No  Transportation means:: Accessible car, Regular car     Psychosocial:  Episcopalian or spiritual beliefs that impact treatment:: No  Mental health DX:: No  Mental health management concern (GOAL):: No  Informal Support system:: Family,  Friends, Spouse     Financial/Insurance:   Financial/Insurance concerns (GOAL):: No     Resources and Interventions:  Current Resources:    ;   Community Resources: None  Supplies used at home:: None  Equipment Currently Used at Home: none    Advance Care Plan/Directive  Advanced Care Plans/Directives on file:: No    Referrals Placed: None  Patient/Caregiver understanding: Patient understands     Future Appointments              Tomorrow Dipak Hooper MD [facility]        Plan: Patient does not feel the need to have clinic care coordination. She will contact care coordinator is she has needs in the future.     Will not open to care coordination.    Sandra Gar RN, CCM - Care Coordinator     10/10/2018    9:37 AM  [phone]

## 2018-10-11 ENCOUNTER — APPOINTMENT (OUTPATIENT)
Dept: LAB | Facility: CLINIC | Age: 53
End: 2018-10-11
Payer: COMMERCIAL

## 2018-10-11 ENCOUNTER — OFFICE VISIT (OUTPATIENT)
Dept: FAMILY MEDICINE | Facility: CLINIC | Age: 53
End: 2018-10-11
Payer: COMMERCIAL

## 2018-10-11 VITALS
WEIGHT: 237 LBS | SYSTOLIC BLOOD PRESSURE: 150 MMHG | HEIGHT: 61 IN | TEMPERATURE: 97.1 F | DIASTOLIC BLOOD PRESSURE: 80 MMHG | RESPIRATION RATE: 16 BRPM | OXYGEN SATURATION: 98 % | BODY MASS INDEX: 44.75 KG/M2 | HEART RATE: 93 BPM

## 2018-10-11 DIAGNOSIS — K85.90 ACUTE PANCREATITIS, UNSPECIFIED COMPLICATION STATUS, UNSPECIFIED PANCREATITIS TYPE: Primary | ICD-10-CM

## 2018-10-11 DIAGNOSIS — I10 HYPERTENSION GOAL BP (BLOOD PRESSURE) < 140/90: ICD-10-CM

## 2018-10-11 DIAGNOSIS — E78.5 HYPERLIPIDEMIA WITH TARGET LDL LESS THAN 100: ICD-10-CM

## 2018-10-11 DIAGNOSIS — E11.9 CONTROLLED TYPE 2 DIABETES MELLITUS WITHOUT COMPLICATION, WITHOUT LONG-TERM CURRENT USE OF INSULIN (H): ICD-10-CM

## 2018-10-11 DIAGNOSIS — K72.00 ACUTE LIVER FAILURE WITHOUT HEPATIC COMA: ICD-10-CM

## 2018-10-11 DIAGNOSIS — K75.4 AUTOIMMUNE HEPATITIS (H): ICD-10-CM

## 2018-10-11 LAB
COPATH REPORT: NORMAL
ERYTHROCYTE [DISTWIDTH] IN BLOOD BY AUTOMATED COUNT: 19.6 % (ref 10–15)
HCT VFR BLD AUTO: 39.2 % (ref 35–47)
HGB BLD-MCNC: 13.3 G/DL (ref 11.7–15.7)
INR PPP: 1.44 (ref 0.86–1.14)
MCH RBC QN AUTO: 30.5 PG (ref 26.5–33)
MCHC RBC AUTO-ENTMCNC: 33.9 G/DL (ref 31.5–36.5)
MCV RBC AUTO: 90 FL (ref 78–100)
PLATELET # BLD AUTO: 303 10E9/L (ref 150–450)
RBC # BLD AUTO: 4.36 10E12/L (ref 3.8–5.2)
WBC # BLD AUTO: 13.2 10E9/L (ref 4–11)

## 2018-10-11 PROCEDURE — 80076 HEPATIC FUNCTION PANEL: CPT | Performed by: NURSE PRACTITIONER

## 2018-10-11 PROCEDURE — 99496 TRANSJ CARE MGMT HIGH F2F 7D: CPT | Performed by: INTERNAL MEDICINE

## 2018-10-11 PROCEDURE — 80048 BASIC METABOLIC PNL TOTAL CA: CPT | Performed by: NURSE PRACTITIONER

## 2018-10-11 PROCEDURE — 85610 PROTHROMBIN TIME: CPT | Performed by: NURSE PRACTITIONER

## 2018-10-11 PROCEDURE — 85027 COMPLETE CBC AUTOMATED: CPT | Performed by: NURSE PRACTITIONER

## 2018-10-11 PROCEDURE — 36415 COLL VENOUS BLD VENIPUNCTURE: CPT | Performed by: NURSE PRACTITIONER

## 2018-10-11 NOTE — MR AVS SNAPSHOT
After Visit Summary   10/11/2018    Mary Grigsby    MRN: [id number]           Patient Information     Date Of Birth          1965        Visit Information        Provider Department      10/11/2018 3:30 PM Dipak Hooper MD [facility]        Today's Diagnoses     Acute pancreatitis, unspecified complication status, unspecified pancreatitis type    -  1    Acute liver failure without hepatic coma        Hypertension goal BP (blood pressure) < 140/90        Hyperlipidemia with target LDL less than 100        Controlled type 2 diabetes mellitus without complication, without long-term current use of insulin (H)        Autoimmune hepatitis (H)          Care Instructions    [facility]    If you have any questions regarding to your visit please contact your care team:     Team Pink:   Clinic Hours Telephone Number   Internal Medicine:  Dr. Lizzie Wray NP 7am-7pm  Monday - Thursday   7am-5pm  Fridays  [phone]  (Appointment scheduling available 24/7)   Urgent Care - [place] and [facility] - 11am-9pm Monday-Friday Saturday-Sunday- 9am-5pm   [place] - 5pm-9pm Monday-Friday Saturday-Sunday- 9am-5pm  [phone] - [place]  [phone] - [place]       What options do I have for a visit other than an office visit? We offer electronic visits (e-visits) and telephone visits, when medically appropriate.  Please check with your medical insurance to see if these types of visits are covered, as you will be responsible for any charges that are not paid by your insurance.      You can use [employer] (secure electronic communication) to access to your chart, send your primary care provider a message, or make an appointment. Ask a team member how to get started.     For a price quote for your services, please call our Consumer Price Line at [phone] or our Imaging Cost estimation line at [phone] (for imaging  tests).  Nina LUA CMA ([facility])            Follow-ups after your visit        Additional Services     MED THERAPY MANAGE REFERRAL       Your provider has referred you to: [facility]: [facility] [place] [phone]   [email or web address]    Reason for Referral: help with insulinization    The [place] Medication Therapy Management department will contact you to schedule an appointment.  You may also schedule the appointment by calling [phone].  For [place] patients, please call [phone] to confirm/schedule your appointment on the next business day.    This service is designed to help you get the most from your medications.  A specially trained Pharmacist will work closely with you and your providers to solve any questions, concerns, issues or problems related to your medications.    Please bring all of your prescription and non-prescription medications (such as vitamins, over-the-counter medications, and herbals) or a detailed medication list to your appointment.    If you have a glucose meter or other home monitoring information, please also bring this to your appointment (i.e. blood glucose log, blood pressure log, pain log, etc.).                  Who to contact     If you have questions or need follow up information about today's clinic visit or your schedule please contact [facility] directly at [phone].  Normal or non-critical lab and imaging results will be communicated to you by MyChart, letter or phone within 4 business days after the clinic has received the results. If you do not hear from us within 7 days, please contact the clinic through MyChart or phone. If you have a critical or abnormal lab result, we will notify you by phone as soon as possible.  Submit refill requests through [employer] or call your pharmacy and they will forward the refill request to us. Please allow 3 business days for your refill to be  "completed.          Additional Information About Your Visit        [employer] Information     [email or web address] gives you secure access to your electronic health record. If you see a primary care provider, you can also send messages to your care team and make appointments. If you have questions, please call your primary care clinic.  If you do not have a primary care provider, please call [phone] and they will assist you.        Care EveryWhere ID     This is your Care EveryWhere ID. This could be used by other organizations to access your [place] medical records  [id number]        Your Vitals Were     Pulse Temperature Respirations Height Last Period Pulse Oximetry    93 97.1  F (36.2  C) (Oral) 16 5' 1\" (1.549 m) 06/24/2011 98%    BMI (Body Mass Index)                   44.78 kg/m2            Blood Pressure from Last 3 Encounters:   10/11/18 150/80   10/09/18 142/62   10/05/18 129/42    Weight from Last 3 Encounters:   10/11/18 237 lb (107.5 kg)   10/09/18 244 lb 11.4 oz (111 kg)   10/04/18 245 lb 6 oz (111.3 kg)              We Performed the Following     MED THERAPY MANAGE REFERRAL        Primary Care Provider Office Phone # Fax #    Dipak Hooper [phone]       [address] [facility] [address]        Equal Access to Services     [facility]: Hadii aad ku hadasho Soomaali, waaxda luqadaha, qaybta kaalmada adeegyada, waxay alessandro hayviral mon . So [facility] [phone].    ATENCIÓN: Si habla español, tiene a de leon disposición servicios gratuitos de asistencia lingüística. Llame al [phone].    We comply with applicable federal civil rights laws and [place] laws. We do not discriminate on the basis of race, color, national origin, age, disability, sex, sexual orientation, or gender identity.            Thank you!     Thank you for choosing [facility]  for your care. Our goal is always to provide you with excellent care. Hearing back from our patients is one way " we can continue to improve our services. Please take a few minutes to complete the written survey that you may receive in the mail after your visit with us. Thank you!             Your Updated Medication List - Protect others around you: Learn how to safely use, store and throw away your medicines at www.disposemymeds.org.          This list is accurate as of 10/11/18  4:10 PM.  Always use your most recent med list.                   Brand Name Dispense Instructions for use Diagnosis    ALLEGRA PO      Take 90 mg by mouth daily as needed        aspirin 81 MG tablet     1 tablet    Take 1 tablet (81 mg) by mouth daily Indication: primary prevention for heart disease    Essential hypertension with goal blood pressure less than 140/90       blood glucose calibration solution     1 Bottle    1 drop. As needed    Type 2 diabetes, HbA1c goal < 7% (H)       blood glucose monitoring lancets     100 each    1 Device 2 times daily.    Type 2 diabetes, HbA1c goal < 7% (H)       blood glucose monitoring test strip    no brand specified    100 strip    1 strip 2 times daily.    Prediabetes       cholecalciferol 1000 UNIT tablet    vitamin D3     Take 3 tablets (3,000 Units) by mouth daily    Vitamin D deficiency       felodipine ER 5 MG 24 hr tablet    PLENDIL    90 tablet    Take 1 tablet (5 mg) by mouth daily    Essential hypertension with goal blood pressure less than 140/90       hydrochlorothiazide 12.5 MG Tabs tablet     90 tablet    Take 1 tablet (12.5 mg) by mouth every morning    Essential hypertension with goal blood pressure less than 140/90       losartan 100 MG tablet    COZAAR    90 tablet    Take 1 tablet (100 mg) by mouth daily    Essential hypertension with goal blood pressure less than 140/90       metFORMIN 500 MG 24 hr tablet    GLUCOPHAGE-XR    360 tablet    TAKE FOUR TABLETS BY MOUTH EVERY DAY WITH DINNER    Controlled type 2 diabetes mellitus without complication, without long-term current use of insulin  (H)       predniSONE 10 MG tablet    DELTASONE    30 tablet    Take 4 tablets (40 mg) by mouth daily    Autoimmune hepatitis (H)       SINUS RINSE BOTTLE KIT NA      Spray 1 Bottle in nostril as needed.    Post-nasal drainage

## 2018-10-11 NOTE — PROGRESS NOTES
SUBJECTIVE:   Mary Grigsby is a 53 year old female who presents to clinic today for the following health issues:      Hospital Follow-up Visit:    Hospital/Nursing Home/IP Rehab Facility: [facility]  Date of Admission: 10/03/2018  Date of Discharge: 10/05/2018  Reason(s) for Admission: # Pancreatitis  # Acute Liver Failure  # VINI  # DMII  # HTN  # HLD  # Tobacco Abuse:                Problems taking medications regularly:  None       Medication changes since discharge: Only taking prednisone, was given potassium and magnisium       Problems adhering to non-medication therapy:  None    Summary of hospitalization:  CareEverywhere information obtained and reviewed  Diagnostic Tests/Treatments reviewed.  Follow up needed: with [facility] Hepatologist   Other Healthcare Providers Involved in Patient s Care:          see Epic  Update since discharge: improved.     Post Discharge Medication Reconciliation: discharge medications reconciled and changed, per note/orders (see AVS).  Plan of care communicated with patient     Coding guidelines for this visit:  Type of Medical   Decision Making Face-to-Face Visit       within 7 Days of discharge Face-to-Face Visit        within 14 days of discharge   Moderate Complexity [id number]   High Complexity [id number]          Trulicity and pravastatin have been stopped since hospital visit as this was believed to be the cause of her pancreatitis. She reports not taking any of her medications except for prednisone since last Tuesday. She says that upon being discharged she was given the diagnosis of autoimmune pancreatitis. She notes an improvement in her symptoms since being discharged including more normal bowel movements and bowel sounds.     Diabetes  She reports a sharp rise in blood glucose readings as of yesterday due to the current prednisone dosage. She will follow up with Nina Partida diabetes educator in order to discuss further diabetes treatment  including the possibility of beginning Lantus insulin. She has lost approximately 20 lbs over the last 4 months. I also stopped metformin today.     Wt Readings from Last 5 Encounters:   10/11/18 107.5 kg (237 lb)   10/09/18 111 kg (244 lb 11.4 oz)   10/04/18 111.3 kg (245 lb 6 oz)   10/02/18 109.3 kg (241 lb)   06/15/18 116.1 kg (256 lb)       Problem list and histories reviewed & adjusted, as indicated.  Additional history: as documented  Medications list reviewed with patient today    Patient Active Problem List   Diagnosis     Hypertension goal BP (blood pressure) < 140/90     Ex-smoker     Family history of diabetes mellitus     Hyperlipidemia with target LDL less than 100     Seasonal allergic rhinitis     Rhinitis, allergic to other allergen     House dust mite allergy     obesity     Severe dysplasia of cervix (GUSTABO III)     Family history of thyroid disease     Cervical lymphadenopathy     Controlled type 2 diabetes mellitus without complication, without long-term current use of insulin (H)     Bilateral lower extremity edema     Hypovitaminosis D     Pancreatitis     Past Surgical History:   Procedure Laterality Date     CRYOTHERAPY  3/30/2011    cervical     ESOPHAGOSCOPY, GASTROSCOPY, DUODENOSCOPY (EGD), COMBINED N/A 10/4/2018    Procedure: COMBINED ENDOSCOPIC ULTRASOUND, ESOPHAGOSCOPY, GASTROSCOPY, DUODENOSCOPY (EGD);  EUS;  Surgeon: John Carrera MD;  Location: [facility]     HC COLP CERVIX/UPPER VAGINA W BX CERVIX  1/2010, 3/2011    neg dysplasia, GUSTABO 1-2     HC REPAIR OF NASAL SEPTUM  2/3/09-per Dr. Harley    antrostomy, ethmoidectomy, septoplasty and turbinate reduction     LAPAROSCOPIC ASSISTED HYSTERECTOMY VAGINAL  3/12/12    [facility]     TONSILLECTOMY & ADENOIDECTOMY  1973    age 8       Social History   Substance Use Topics     Smoking status: Former Smoker     Packs/day: 0.50     Years: 20.00     Types: Cigarettes     Start date: 6/1/1980     Quit date: 2/24/2013     Smokeless  "tobacco: Never Used      Comment: still has occasionally      Alcohol use No     Family History   Problem Relation Age of Onset     Cancer Father      Other Cancer Father      passed Nov.2016     Hypertension Father      age 72     Hypertension Sister      age 35     Thyroid Disease Sister      Cerebrovascular Disease Maternal Grandmother      Asthma Son      Thyroid Disease Sister      Thyroid Disease Sister      Thyroid Disease Sister      Diabetes Mother      Hypertension Mother      age 34     Hypertension Sister      Thyroid Disease Sister      Asthma Son      Glaucoma No family hx of      Macular Degeneration No family hx of          BP Readings from Last 3 Encounters:   10/11/18 150/80   10/09/18 142/62   10/05/18 129/42    Wt Readings from Last 3 Encounters:   10/11/18 107.5 kg (237 lb)   10/09/18 111 kg (244 lb 11.4 oz)   10/04/18 111.3 kg (245 lb 6 oz)        Reviewed and updated as needed this visit by clinical staff       Reviewed and updated as needed this visit by Provider       ROS:  Constitutional, HEENT, cardiovascular, pulmonary, GI, , musculoskeletal, neuro, skin, endocrine and psych systems are negative, except as otherwise noted.    This document serves as a record of the services and decisions personally performed and made by Dipak Hooper MD. It was created on his behalf by Moshe Arteaga, a trained medical scribe. The creation of this document is based on the provider's statements to the medical scribe.  Moshe Arteaga 3:45 PM October 11, 2018    OBJECTIVE:     /80  Pulse 93  Temp 97.1  F (36.2  C) (Oral)  Resp 16  Ht 1.549 m (5' 1\")  Wt 107.5 kg (237 lb)  LMP 06/24/2011  SpO2 98%  BMI 44.78 kg/m2  Body mass index is 44.78 kg/(m^2).  GENERAL: healthy, alert and no distress  EYES: Eyes grossly normal to inspection, PERRL and conjunctivae and sclerae normal  SKIN: no suspicious lesions or rashes to visible skin  NEURO: Normal strength and tone, mentation intact and speech " normal  PSYCH: mentation appears normal, affect normal/bright  ABDOMEN - mild tenderness to palpation but no peritoneal signs. Band aid from liver biopsy  . Within normal limits heart and lung exams    Diagnostic Test Results:  No results found for this or any previous visit (from the past 24 hour(s)).    ASSESSMENT/PLAN:     (K85.90) Acute pancreatitis, unspecified complication status, unspecified pancreatitis type  (primary encounter diagnosis)  Comment: I spent most of this office visit in review of this patients complicated acute clinical presentation which eventually appears to be due to autoimmune hepatitis   Plan: her abdominal CT scan revealed swelling with the head of the pancreas but this was shown to be secondary top liver disease it would appear. She is now being treated with a hefty dosage of prednisone and has further follow up with [facility] Hepatologist     (K72.00) Acute liver failure without hepatic coma  Comment: as detailed above   Plan: see discharge summary from [facility] . She's now status post liver biopsy  But the results are not yet back     (I10) Hypertension goal BP (blood pressure) < 140/90  Comment:   BP Readings from Last 3 Encounters:   10/11/18 150/80   10/09/18 142/62   10/05/18 129/42     Plan: she had been help off of her felodipine (Plendil) and hydrochlorothiazide and losartan ( Cozaar ) , I think these medications should be restarted as well as her aspirin     (E78.5) Hyperlipidemia with target LDL less than 100  Comment: the pravastatin was stopped. I am unaware of   To what degree we feel a statin medication could be implicated with acute liver failure  Plan: will postpone decisions on this and ask for opinion from Hepatologist     (E11.9) Controlled type 2 diabetes mellitus without complication, without long-term current use of insulin (H)  Comment: this is a concerning situation. We have stopped Trulicity (dulaglutide)  and stopped metformin ( Glucophage) and patient is now on whopping dosages of prednisone. While she had some acute weight loss during this serious illness, nevertheless we can expect to see patient with gradually rising blood glucose readings and this can't be good. As of yet my main weapon for her diabetes mellitus treatment would unfortunately just need to be insulin probably both rapid acting insulin like humalog and also a long acting like Lantus (Insulin Glargine), this would all be new for patient so I am getting her in for a medication therapy management consult . Meanwhile while bounce of of Hepatologist   Plan: MED THERAPY MANAGE REFERRAL            (K75.4) Autoimmune hepatitis (H)  Comment: the presumed diagnosis at this point   Plan: follow up with Hepatologist     See Patient Instructions    The information in this document, created by the medical scribe for me, accurately reflects the services I personally performed and the decisions made by me. I have reviewed and approved this document for accuracy prior to leaving the patient care area.  October 11, 2018 4:00 PM    Dipak Hooper MD  [facility]

## 2018-10-11 NOTE — Clinical Note
HI, just wondered about any medication for diabetes mellitus, if you think something is best for this patient and when could she go to a statin medication !

## 2018-10-11 NOTE — PATIENT INSTRUCTIONS
[facility]    If you have any questions regarding to your visit please contact your care team:     Team Pink:   Clinic Hours Telephone Number   Internal Medicine:  Dr. Lizzie Wray NP 7am-7pm  Monday - Thursday   7am-5pm  Fridays  [phone]  (Appointment scheduling available 24/7)   Urgent Care - [place] and [facility] - 11am-9pm Monday-Friday Saturday-Sunday- 9am-5pm   [place] - 5pm-9pm Monday-Friday Saturday-Sunday- 9am-5pm  [phone] - [place]  [phone] - [place]       What options do I have for a visit other than an office visit? We offer electronic visits (e-visits) and telephone visits, when medically appropriate.  Please check with your medical insurance to see if these types of visits are covered, as you will be responsible for any charges that are not paid by your insurance.      You can use [employer] (secure electronic communication) to access to your chart, send your primary care provider a message, or make an appointment. Ask a team member how to get started.     For a price quote for your services, please call our Consumer Price Line at [phone] or our Imaging Cost estimation line at [phone] (for imaging tests).  Nina LUA CMA ([facility])

## 2018-10-12 ENCOUNTER — TELEPHONE (OUTPATIENT)
Dept: GASTROENTEROLOGY | Facility: CLINIC | Age: 53
End: 2018-10-12

## 2018-10-12 LAB
ALBUMIN SERPL-MCNC: 3.2 G/DL (ref 3.4–5)
ALP SERPL-CCNC: 181 U/L (ref 40–150)
ALT SERPL W P-5'-P-CCNC: 389 U/L (ref 0–50)
ANION GAP SERPL CALCULATED.3IONS-SCNC: 10 MMOL/L (ref 3–14)
AST SERPL W P-5'-P-CCNC: 312 U/L (ref 0–45)
BILIRUB DIRECT SERPL-MCNC: 6.1 MG/DL (ref 0–0.2)
BILIRUB SERPL-MCNC: 7.8 MG/DL (ref 0.2–1.3)
BUN SERPL-MCNC: 16 MG/DL (ref 7–30)
CALCIUM SERPL-MCNC: 8.9 MG/DL (ref 8.5–10.1)
CHLORIDE SERPL-SCNC: 106 MMOL/L (ref 94–109)
CO2 SERPL-SCNC: 23 MMOL/L (ref 20–32)
CREAT SERPL-MCNC: 0.64 MG/DL (ref 0.52–1.04)
GFR SERPL CREATININE-BSD FRML MDRD: >90 ML/MIN/1.7M2
GLUCOSE SERPL-MCNC: 253 MG/DL (ref 70–99)
POTASSIUM SERPL-SCNC: 4.1 MMOL/L (ref 3.4–5.3)
PROT SERPL-MCNC: 7.9 G/DL (ref 6.8–8.8)
SODIUM SERPL-SCNC: 139 MMOL/L (ref 133–144)

## 2018-10-12 NOTE — TELEPHONE ENCOUNTER
Received results:    Labs:     Ref. Range 10/11/2018 16:23   Sodium Latest Ref Range: 133 - 144 mmol/L 139   Potassium Latest Ref Range: 3.4 - 5.3 mmol/L 4.1   Chloride Latest Ref Range: 94 - 109 mmol/L 106   Carbon Dioxide Latest Ref Range: 20 - 32 mmol/L 23   Urea Nitrogen Latest Ref Range: 7 - 30 mg/dL 16   Creatinine Latest Ref Range: 0.52 - 1.04 mg/dL 0.64   GFR Estimate Latest Ref Range: >60 mL/min/1.7m2 >90   GFR Estimate If Black Latest Ref Range: >60 mL/min/1.7m2 >90   Calcium Latest Ref Range: 8.5 - 10.1 mg/dL 8.9   Anion Gap Latest Ref Range: 3 - 14 mmol/L 10   Albumin Latest Ref Range: 3.4 - 5.0 g/dL 3.2 (L)   Protein Total Latest Ref Range: 6.8 - 8.8 g/dL 7.9   Bilirubin Total Latest Ref Range: 0.2 - 1.3 mg/dL 7.8 (H)   Alkaline Phosphatase Latest Ref Range: 40 - 150 U/L 181 (H)   ALT Latest Ref Range: 0 - 50 U/L 389 (H)   AST Latest Ref Range: 0 - 45 U/L 312 (H)   Bilirubin Direct Latest Ref Range: 0.0 - 0.2 mg/dL 6.1 (H)   Glucose Latest Ref Range: 70 - 99 mg/dL 253 (H)   WBC Latest Ref Range: 4.0 - 11.0 10e9/L 13.2 (H)   Hemoglobin Latest Ref Range: 11.7 - 15.7 g/dL 13.3   Hematocrit Latest Ref Range: 35.0 - 47.0 % 39.2   Platelet Count Latest Ref Range: 150 - 450 10e9/L 303   RBC Count Latest Ref Range: 3.8 - 5.2 10e12/L 4.36   MCV Latest Ref Range: 78 - 100 fl 90   MCH Latest Ref Range: 26.5 - 33.0 pg 30.5   MCHC Latest Ref Range: 31.5 - 36.5 g/dL 33.9   RDW Latest Ref Range: 10.0 - 15.0 % 19.6 (H)   INR Latest Ref Range: 0.86 - 1.14  1.44 (H)     Liver biopsy 10/9/18  FINAL DIAGNOSIS:   LIVER, NEEDLE BIOPSY:   - Active chronic hepatitis with features most suggestive of autoimmune   hepatitis   - Can not rule out stage 3-4 fibrosis     Slight improvement in transaminases and bilirubin improved. She has been on prednisone since 10/9. Will plan to have follow up in outpatient hepatology clinic with Dr. Gutierrez. Discussed with her that she will need to remain on 40 mg po daily prednisone until  instructed to change. Answered questions about next steps.     DESTINY Jaimes, CNP  Inpatient Hepatology [facility]  [phone]

## 2018-10-15 ENCOUNTER — TELEPHONE (OUTPATIENT)
Dept: GASTROENTEROLOGY | Facility: CLINIC | Age: 53
End: 2018-10-15

## 2018-10-15 ENCOUNTER — DOCUMENTATION ONLY (OUTPATIENT)
Dept: LAB | Facility: CLINIC | Age: 53
End: 2018-10-15

## 2018-10-15 DIAGNOSIS — K75.4 AUTOIMMUNE HEPATITIS (H): Primary | ICD-10-CM

## 2018-10-15 NOTE — PROGRESS NOTES
Please place or confirm orders for upcoming lab appointment on 10-16-18, standing liver function.    Thank you  Jaz Connelly MA  lab

## 2018-10-15 NOTE — TELEPHONE ENCOUNTER
Writer spoke to pt regarding Dr. Gutierrez's new orders as follows: Discussed liver biopsy with her.  Discussed side effects of prednisone and azathioprine.   Plan: labs today or tomorrow.  LFTs, INR, CBC, BMP, TPMT, lipase   Continue prednisone 40 mg po daily until I see labs. Once TPMT is back will start azathioprine.   Will see her as scheduled on November 6. At that visit, please get fibrosis scan.  Pt plans to get blood drawn tomorrow and coming in @ 8am the day of November 6th for fibrosis scan.

## 2018-10-16 DIAGNOSIS — K75.4 AUTOIMMUNE HEPATITIS (H): ICD-10-CM

## 2018-10-16 LAB
ALBUMIN SERPL-MCNC: 3.4 G/DL (ref 3.4–5)
ALP SERPL-CCNC: 233 U/L (ref 40–150)
ALT SERPL W P-5'-P-CCNC: 306 U/L (ref 0–50)
ANION GAP SERPL CALCULATED.3IONS-SCNC: 10 MMOL/L (ref 3–14)
AST SERPL W P-5'-P-CCNC: 141 U/L (ref 0–45)
BILIRUB DIRECT SERPL-MCNC: 3.4 MG/DL (ref 0–0.2)
BILIRUB SERPL-MCNC: 4.1 MG/DL (ref 0.2–1.3)
BUN SERPL-MCNC: 35 MG/DL (ref 7–30)
CALCIUM SERPL-MCNC: 9.7 MG/DL (ref 8.5–10.1)
CHLORIDE SERPL-SCNC: 102 MMOL/L (ref 94–109)
CO2 SERPL-SCNC: 25 MMOL/L (ref 20–32)
CREAT SERPL-MCNC: 1.05 MG/DL (ref 0.52–1.04)
ERYTHROCYTE [DISTWIDTH] IN BLOOD BY AUTOMATED COUNT: 18.3 % (ref 10–15)
GFR SERPL CREATININE-BSD FRML MDRD: 55 ML/MIN/1.7M2
GLUCOSE SERPL-MCNC: 203 MG/DL (ref 70–99)
HCT VFR BLD AUTO: 42.5 % (ref 35–47)
HGB BLD-MCNC: 14.7 G/DL (ref 11.7–15.7)
INR PPP: 1.24 (ref 0.86–1.14)
LIPASE SERPL-CCNC: 1058 U/L (ref 73–393)
MCH RBC QN AUTO: 31.1 PG (ref 26.5–33)
MCHC RBC AUTO-ENTMCNC: 34.6 G/DL (ref 31.5–36.5)
MCV RBC AUTO: 90 FL (ref 78–100)
PLATELET # BLD AUTO: 333 10E9/L (ref 150–450)
POTASSIUM SERPL-SCNC: 3.2 MMOL/L (ref 3.4–5.3)
PROT SERPL-MCNC: 7.6 G/DL (ref 6.8–8.8)
RBC # BLD AUTO: 4.73 10E12/L (ref 3.8–5.2)
SODIUM SERPL-SCNC: 137 MMOL/L (ref 133–144)
WBC # BLD AUTO: 21.7 10E9/L (ref 4–11)

## 2018-10-16 PROCEDURE — 83690 ASSAY OF LIPASE: CPT | Performed by: INTERNAL MEDICINE

## 2018-10-16 PROCEDURE — 85610 PROTHROMBIN TIME: CPT | Performed by: INTERNAL MEDICINE

## 2018-10-16 PROCEDURE — 80048 BASIC METABOLIC PNL TOTAL CA: CPT | Performed by: INTERNAL MEDICINE

## 2018-10-16 PROCEDURE — 82657 ENZYME CELL ACTIVITY: CPT | Mod: 90 | Performed by: INTERNAL MEDICINE

## 2018-10-16 PROCEDURE — 99000 SPECIMEN HANDLING OFFICE-LAB: CPT | Performed by: INTERNAL MEDICINE

## 2018-10-16 PROCEDURE — 36415 COLL VENOUS BLD VENIPUNCTURE: CPT | Performed by: INTERNAL MEDICINE

## 2018-10-16 PROCEDURE — 80076 HEPATIC FUNCTION PANEL: CPT | Performed by: INTERNAL MEDICINE

## 2018-10-16 PROCEDURE — 85027 COMPLETE CBC AUTOMATED: CPT | Performed by: INTERNAL MEDICINE

## 2018-10-17 ENCOUNTER — MYC MEDICAL ADVICE (OUTPATIENT)
Dept: FAMILY MEDICINE | Facility: CLINIC | Age: 53
End: 2018-10-17

## 2018-10-17 ENCOUNTER — TELEPHONE (OUTPATIENT)
Dept: GASTROENTEROLOGY | Facility: CLINIC | Age: 53
End: 2018-10-17

## 2018-10-17 DIAGNOSIS — K75.4 AUTOIMMUNE HEPATITIS (H): Primary | ICD-10-CM

## 2018-10-17 NOTE — TELEPHONE ENCOUNTER
Instructed pt the following per Dr. Gutierrez: Liver tests are improved.   Elevated white blood count is from prednisone.   Go ahead and decrease prednisone to 30 mg daily.   Repeat LFTs, lipase, BMP in 1 week.   Pt able to repeat instructions.

## 2018-10-18 ENCOUNTER — OFFICE VISIT (OUTPATIENT)
Dept: PHARMACY | Facility: CLINIC | Age: 53
End: 2018-10-18
Payer: COMMERCIAL

## 2018-10-18 DIAGNOSIS — E11.9 CONTROLLED TYPE 2 DIABETES MELLITUS WITHOUT COMPLICATION, WITHOUT LONG-TERM CURRENT USE OF INSULIN (H): ICD-10-CM

## 2018-10-18 DIAGNOSIS — K75.4 AUTOIMMUNE HEPATITIS (H): ICD-10-CM

## 2018-10-18 DIAGNOSIS — I10 HYPERTENSION GOAL BP (BLOOD PRESSURE) < 140/90: ICD-10-CM

## 2018-10-18 DIAGNOSIS — K85.90 ACUTE PANCREATITIS, UNSPECIFIED COMPLICATION STATUS, UNSPECIFIED PANCREATITIS TYPE: Primary | ICD-10-CM

## 2018-10-18 DIAGNOSIS — E78.5 HYPERLIPIDEMIA WITH TARGET LDL LESS THAN 100: ICD-10-CM

## 2018-10-18 PROCEDURE — 99606 MTMS BY PHARM EST 15 MIN: CPT | Performed by: PHARMACIST

## 2018-10-18 PROCEDURE — 99607 MTMS BY PHARM ADDL 15 MIN: CPT | Performed by: PHARMACIST

## 2018-10-18 NOTE — PROGRESS NOTES
"SUBJECTIVE/OBJECTIVE:                Mary Grigsby is a 53 year old female coming in for a transitions of care visit.  She was discharged from [facility] on 10/5/18 for pancreatitis, acute liver failure, VINI.     Chief Complaint: High blood sugars.  F/up from our visit on 6/19/18 and myChart correspondence between now and then.    Tobacco: Unclear, Mary has reported she quit in 2013 although hospital notes indicated she was smoking 1/4 PPD   Alcohol: not currently using    Medication Adherence/Access: no issues reported    Pancreatitis/Acute Liver Failure: Is now thought to be secondary to autoimmune hepatitis.  She's working with [facility] hepatology clinic (Dr. Gutierrez).  Current medications include prednisone 30mg daily (dose reduced today) and the hope is to start her on azathioprine once TPMT lab result is back.  She's gradually feeling better, says she's not back to baseline but slowly improving.    Diabetes:  Pt currently taking metformin XL 2000mg/day (re-started on Monday with authorization from hepatology clinic) - 500mg with breakfast, 500mg with lunch and 1000mg with dinner. Pt is not experiencing side effects, she feels  the administration throughout the day seems to be helping.  SMBG: Several times daily.   Ranges (from patient's glucose log): AM readings 130s; increasing to 200->300mg/dL during the day  Patient is not experiencing hypoglycemia  Recent symptoms of high blood sugar? Confused, \"discombulated,\" general malaise with higher BG readings  Eye exam: due  Foot exam: up to date  ACEi/ARB: Yes: losartan 100mg.   Urine Albumin:   Lab Results   Component Value Date    UMALCR 14.06 06/15/2018   Aspirin: Taking 81mg daily and denies side effects (just recently resumed)    Lab Results   Component Value Date    A1C 7.9 10/02/2018    A1C 9.6 06/15/2018    A1C 6.9 07/13/2017    A1C 6.7 01/27/2017    A1C 6.8 05/27/2016     Hypertension: Current medications include felodipine 5mg daily, " hydrochlorothiazide 12.5mg daily and losartan 100mg daily.  These were all recently resumed.  Patient does not self-monitor BP.  Patient reports no current medication side effects.      Hyperlipidemia: Current therapy includes no current medications, statin therapy was discontinued during hospital stay and she says she's been asked not to resume right now.    LDL Cholesterol Calculated   Date Value Ref Range Status   10/05/2018 33 <100 mg/dL Final     Comment:     Desirable:       <100 mg/dl      ASSESSMENT:                 Current medications were reviewed today.      Medication Adherence: good, no issues identified    Pancreatitis/Liver Failure: Improved, plan in place.    Diabetes: Needs Improvement. Patient is not meeting A1c goal of < 7%. Self monitoring of blood glucose is not at goal of fasting  mg/dL and post prandial < 180 mg/dL.  I would be ok watching and seeing how her sugars respond to the lower dose of prednisone and resuming metformin just a few days ago, but she's feeling unwell with high sugars and she prefers to start something ASAP to help bring them down.  NPH insulin is a nice option to help control BG high secondary to prednisone use as the peak effect of NPH mimics the peak rise in BG from prednisone.       Hypertension: Needs Improvement. Patient is not meeting BP goal of < 140/90mmHg.  We unfortunately did not re-check today; will have to make sure she does this soon.  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    Hyperlipidemia: Will need to continue to monitor/re-assess for possibility of resuming statin therapy with guidance of hepatology.    PLAN:                1.  Started NPH insulin 10 units daily with breakfast and prednisone.  Trained on injection technique.  Educated on s/s hypoglycemia and how to treat, as well as when to seek medical attention.    I spent 45 minutes with this patient today. All changes were made via collaborative practice agreement with   Sandie. A copy of the visit note was provided to the patient's primary care provider.    Will follow up in 3-4 days via myChart or in person (she has an appt scheduled on 10/23 which she will cancel if she's seeing improved BG results).    The patient declined a summary of these recommendations as an after visit summary.    Nina Weiss, PharmD, [facility]  Medication Therapy Management Provider  Pager: [phone]

## 2018-10-18 NOTE — MR AVS SNAPSHOT
After Visit Summary   10/18/2018    Mary Grigsby    MRN: [id number]           Patient Information     Date Of Birth          1965        Visit Information        Provider Department      10/18/2018 10:30 AM Nina Weiss [facility]        Today's Diagnoses     Acute pancreatitis, unspecified complication status, unspecified pancreatitis type    -  1    Autoimmune hepatitis (H)        Controlled type 2 diabetes mellitus without complication, without long-term current use of insulin (H)        Hypertension goal BP (blood pressure) < 140/90        Hyperlipidemia with target LDL less than 100           Follow-ups after your visit        Your next 10 appointments already scheduled     Oct 23, 2018  7:45 AM CDT   LAB with [facility]   [address] [address]   [phone]           Please do not eat 10-12 hours before your appointment if you are coming in fasting for labs on lipids, cholesterol, or glucose (sugar). This does not apply to pregnant women. Water, hot tea and black coffee (with nothing added) are okay. Do not drink other fluids, diet soda or chew gum.            Oct 23, 2018  8:00 AM CDT   Office Visit with Nina Weiss [facility] ([address] [id number] [phone]           Bring a current list of meds and any records pertaining to this visit. For Physicals, please bring immunization records and any forms needing to be filled out. Please arrive 10 minutes early to complete paperwork.            Oct 30, 2018  7:00 AM CDT   LAB with [facility]   [address] [address]   [phone]           Please do not eat 10-12 hours before your appointment if you are coming in fasting for labs on lipids, cholesterol, or glucose (sugar). This does  not apply to pregnant women. Water, hot tea and black coffee (with nothing added) are okay. Do not drink other fluids, diet soda or chew gum.            Nov 06, 2018  8:00 AM CST   (Arrive by 7:45 AM)   FIBROSIS SCAN with  FIBROSIS SCAN   [facility] ([facility])    [address]  [address]  [facility] [address]   [phone]            Nov 06, 2018  8:30 AM CST   (Arrive by 8:15 AM)   New General Liver with Melissa Gutierrez MD   [facility] ([facility])    [address]  [address]  [facility] [address]   [phone]              Future tests that were ordered for you today     Open Future Orders        Priority Expected Expires Ordered    Hepatic panel Routine 10/24/2018 10/31/2018 10/17/2018    Lipase Routine 10/24/2018 10/31/2018 10/17/2018    Basic metabolic panel Routine 10/24/2018 10/31/2018 10/17/2018            Who to contact     If you have questions or need follow up information about today's clinic visit or your schedule please contact [facility] directly at [phone].  Normal or non-critical lab and imaging results will be communicated to you by MyChart, letter or phone within 4 business days after the clinic has received the results. If you do not hear from us within 7 days, please contact the clinic through Bioformix[employer] or phone. If you have a critical or abnormal lab result, we will notify you by phone as soon as possible.  Submit refill requests through Bundle It or call your pharmacy and they will forward the refill request to us. Please allow 3 business days for your refill to be completed.          Additional Information About Your Visit        [employer] Information     Bundle It gives you secure access to your electronic health record. If you see a primary care provider, you can also send messages to your care team and make appointments. If you have questions, please call your primary  care clinic.  If you do not have a primary care provider, please call [phone] and they will assist you.        Care EveryWhere ID     This is your Care EveryWhere ID. This could be used by other organizations to access your [facility] medical records  [id number]        Your Vitals Were     Last Period                   06/24/2011            Blood Pressure from Last 3 Encounters:   10/11/18 150/80   10/09/18 142/62   10/05/18 129/42    Weight from Last 3 Encounters:   10/11/18 237 lb (107.5 kg)   10/09/18 244 lb 11.4 oz (111 kg)   10/04/18 245 lb 6 oz (111.3 kg)              Today, you had the following     No orders found for display         Today's Medication Changes          These changes are accurate as of 10/18/18  3:55 PM.  If you have any questions, ask your nurse or doctor.               Start taking these medicines.        Dose/Directions    insulin isophane human 100 UNIT/ML injection   Commonly known as:  HumuLIN N KWIKPEN   Used for:  Controlled type 2 diabetes mellitus without complication, without long-term current use of insulin (H)   Started by:  Nina Weiss RPH        Dose:  10 Units   Inject 10 Units Subcutaneous every morning With breakfast   Quantity:  15 mL   Refills:  0       insulin pen needle 32G X 4 MM   Commonly known as:  BD JEANNE U/F   Used for:  Controlled type 2 diabetes mellitus without complication, without long-term current use of insulin (H)   Started by:  Nina Weiss RPH        Use once daily as directed.   Quantity:  100 each   Refills:  3         These medicines have changed or have updated prescriptions.        Dose/Directions    cholecalciferol 1000 UNIT tablet   Commonly known as:  vitamin D3   This may have changed:  when to take this   Used for:  Vitamin D deficiency        Dose:  3000 Units   Take 3 tablets (3,000 Units) by mouth daily   Refills:  0       predniSONE 10 MG tablet   Commonly known as:  DELTASONE   This may have changed:  how much to take   Used  for:  Autoimmune hepatitis (H)        Dose:  40 mg   Take 4 tablets (40 mg) by mouth daily   Quantity:  30 tablet   Refills:  3            Where to get your medicines      These medications were sent to [facility] [place] - [address]  [address], [place] [address]     Phone:  [phone]     insulin isophane human 100 UNIT/ML injection    insulin pen needle 32G X 4 MM                Primary Care Provider Office Phone # Fax #    Dipak Hooper [phone] [address]  [place] [address]        Equal Access to Services     [facility]: Hadii aad ku hadasho Soomaali, waaxda luqadaha, qaybta kaalmada adeegyada, waxay nellyin hayaan estela mon . So [facility] [phone].    ATENCIÓN: Si habla español, tiene a de leon disposición servicios gratuitos de asistencia lingüística. Llame al [phone].    We comply with applicable federal civil rights laws and [place] laws. We do not discriminate on the basis of race, color, national origin, age, disability, sex, sexual orientation, or gender identity.            Thank you!     Thank you for choosing [facility]  for your care. Our goal is always to provide you with excellent care. Hearing back from our patients is one way we can continue to improve our services. Please take a few minutes to complete the written survey that you may receive in the mail after your visit with us. Thank you!             Your Updated Medication List - Protect others around you: Learn how to safely use, store and throw away your medicines at www.disposemymeds.org.          This list is accurate as of 10/18/18  3:55 PM.  Always use your most recent med list.                   Brand Name Dispense Instructions for use Diagnosis    ALLEGRA PO      Take 90 mg by mouth daily as needed        aspirin 81 MG tablet     1 tablet    Take 1 tablet (81 mg) by mouth daily Indication: primary prevention for  heart disease    Essential hypertension with goal blood pressure less than 140/90       blood glucose calibration solution     1 Bottle    1 drop. As needed    Type 2 diabetes, HbA1c goal < 7% (H)       blood glucose monitoring lancets     100 each    1 Device 2 times daily.    Type 2 diabetes, HbA1c goal < 7% (H)       blood glucose monitoring test strip    no brand specified    100 strip    1 strip 2 times daily.    Prediabetes       cholecalciferol 1000 UNIT tablet    vitamin D3     Take 3 tablets (3,000 Units) by mouth daily    Vitamin D deficiency       felodipine ER 5 MG 24 hr tablet    PLENDIL    90 tablet    Take 1 tablet (5 mg) by mouth daily    Essential hypertension with goal blood pressure less than 140/90       hydrochlorothiazide 12.5 MG Tabs tablet     90 tablet    Take 1 tablet (12.5 mg) by mouth every morning    Essential hypertension with goal blood pressure less than 140/90       insulin isophane human 100 UNIT/ML injection    HumuLIN N KWIKPEN    15 mL    Inject 10 Units Subcutaneous every morning With breakfast    Controlled type 2 diabetes mellitus without complication, without long-term current use of insulin (H)       insulin pen needle 32G X 4 MM    BD JEANNE U/F    100 each    Use once daily as directed.    Controlled type 2 diabetes mellitus without complication, without long-term current use of insulin (H)       losartan 100 MG tablet    COZAAR    90 tablet    Take 1 tablet (100 mg) by mouth daily    Essential hypertension with goal blood pressure less than 140/90       metFORMIN 500 MG 24 hr tablet    GLUCOPHAGE-XR    360 tablet    TAKE FOUR TABLETS BY MOUTH EVERY DAY WITH DINNER    Controlled type 2 diabetes mellitus without complication, without long-term current use of insulin (H)       predniSONE 10 MG tablet    DELTASONE    30 tablet    Take 4 tablets (40 mg) by mouth daily    Autoimmune hepatitis (H)       SINUS RINSE BOTTLE KIT NA      Spray 1 Bottle in nostril as needed.     Post-nasal drainage       STATIN NOT PRESCRIBED (INTENTIONAL)      Statin not prescribed intentionally due to Active liver disease (liver failure, cirrhosis, hepatitis)

## 2018-10-19 LAB — TPMT BLD-CCNC: 30.2 U/ML (ref 24–44)

## 2018-10-23 DIAGNOSIS — K75.4 AUTOIMMUNE HEPATITIS (H): ICD-10-CM

## 2018-10-23 LAB
ALBUMIN SERPL-MCNC: 3.3 G/DL (ref 3.4–5)
ALP SERPL-CCNC: 166 U/L (ref 40–150)
ALT SERPL W P-5'-P-CCNC: 194 U/L (ref 0–50)
ANION GAP SERPL CALCULATED.3IONS-SCNC: 8 MMOL/L (ref 3–14)
AST SERPL W P-5'-P-CCNC: 84 U/L (ref 0–45)
BILIRUB DIRECT SERPL-MCNC: 1.9 MG/DL (ref 0–0.2)
BILIRUB SERPL-MCNC: 2.9 MG/DL (ref 0.2–1.3)
BUN SERPL-MCNC: 27 MG/DL (ref 7–30)
CALCIUM SERPL-MCNC: 9.5 MG/DL (ref 8.5–10.1)
CHLORIDE SERPL-SCNC: 102 MMOL/L (ref 94–109)
CO2 SERPL-SCNC: 28 MMOL/L (ref 20–32)
CREAT SERPL-MCNC: 0.77 MG/DL (ref 0.52–1.04)
GFR SERPL CREATININE-BSD FRML MDRD: 79 ML/MIN/1.7M2
GLUCOSE SERPL-MCNC: 173 MG/DL (ref 70–99)
LIPASE SERPL-CCNC: 855 U/L (ref 73–393)
POTASSIUM SERPL-SCNC: 3.6 MMOL/L (ref 3.4–5.3)
PROT SERPL-MCNC: 6.9 G/DL (ref 6.8–8.8)
SODIUM SERPL-SCNC: 138 MMOL/L (ref 133–144)

## 2018-10-23 PROCEDURE — 80076 HEPATIC FUNCTION PANEL: CPT | Performed by: INTERNAL MEDICINE

## 2018-10-23 PROCEDURE — 83690 ASSAY OF LIPASE: CPT | Performed by: INTERNAL MEDICINE

## 2018-10-23 PROCEDURE — 36415 COLL VENOUS BLD VENIPUNCTURE: CPT | Performed by: INTERNAL MEDICINE

## 2018-10-23 PROCEDURE — 80048 BASIC METABOLIC PNL TOTAL CA: CPT | Performed by: INTERNAL MEDICINE

## 2018-10-24 ENCOUNTER — TELEPHONE (OUTPATIENT)
Dept: GASTROENTEROLOGY | Facility: CLINIC | Age: 53
End: 2018-10-24

## 2018-10-24 DIAGNOSIS — K85.90 ACUTE PANCREATITIS, UNSPECIFIED COMPLICATION STATUS, UNSPECIFIED PANCREATITIS TYPE: Primary | ICD-10-CM

## 2018-10-24 NOTE — TELEPHONE ENCOUNTER
Left a message for pt to call me back due to the following orders per Dr. Gutierrez: Liver tests are better.   Lipase is still elevated.   Please hold prednisone at 30 mg for now and repeat LFTs and lipase next week.  Will await return call.

## 2018-10-29 ENCOUNTER — TELEPHONE (OUTPATIENT)
Dept: FAMILY MEDICINE | Facility: CLINIC | Age: 53
End: 2018-10-29

## 2018-10-29 NOTE — TELEPHONE ENCOUNTER
Reason for Call:  Form, our goal is to have forms completed with 72 hours, however, some forms may require a visit or additional information.    Type of letter, form or note:  FMLA    Who is the form from?: Patient    Where did the form come from: Patient or family brought in       What clinic location was the form placed at?: [facility]    Where the form was placed: 's Box    What number is listed as a contact on the form?: [phone]       Additional comments: none    Call taken on 10/29/2018 at 12:01 PM by Vanessa Fagan

## 2018-10-30 DIAGNOSIS — K85.90 ACUTE PANCREATITIS, UNSPECIFIED COMPLICATION STATUS, UNSPECIFIED PANCREATITIS TYPE: ICD-10-CM

## 2018-10-30 LAB
ALBUMIN SERPL-MCNC: 3.2 G/DL (ref 3.4–5)
ALP SERPL-CCNC: 146 U/L (ref 40–150)
ALT SERPL W P-5'-P-CCNC: 133 U/L (ref 0–50)
AST SERPL W P-5'-P-CCNC: 51 U/L (ref 0–45)
BILIRUB DIRECT SERPL-MCNC: 1 MG/DL (ref 0–0.2)
BILIRUB SERPL-MCNC: 1.3 MG/DL (ref 0.2–1.3)
LIPASE SERPL-CCNC: 639 U/L (ref 73–393)
PROT SERPL-MCNC: 6.4 G/DL (ref 6.8–8.8)

## 2018-10-30 PROCEDURE — 80076 HEPATIC FUNCTION PANEL: CPT | Performed by: INTERNAL MEDICINE

## 2018-10-30 PROCEDURE — 36415 COLL VENOUS BLD VENIPUNCTURE: CPT | Performed by: INTERNAL MEDICINE

## 2018-10-30 PROCEDURE — 83690 ASSAY OF LIPASE: CPT | Performed by: INTERNAL MEDICINE

## 2018-11-01 ENCOUNTER — TELEPHONE (OUTPATIENT)
Dept: GASTROENTEROLOGY | Facility: CLINIC | Age: 53
End: 2018-11-01

## 2018-11-01 ENCOUNTER — ALLIED HEALTH/NURSE VISIT (OUTPATIENT)
Dept: NURSING | Facility: CLINIC | Age: 53
End: 2018-11-01
Payer: COMMERCIAL

## 2018-11-01 VITALS — DIASTOLIC BLOOD PRESSURE: 52 MMHG | SYSTOLIC BLOOD PRESSURE: 122 MMHG

## 2018-11-01 DIAGNOSIS — Z01.30 BP CHECK: Primary | ICD-10-CM

## 2018-11-01 DIAGNOSIS — K85.90 ACUTE PANCREATITIS: Primary | ICD-10-CM

## 2018-11-01 NOTE — NURSING NOTE
Mary Grigsby is a 53 year old patient who comes in today for a Blood Pressure check.  Initial BP:  /52  LMP 06/24/2011     Data Unavailable  Disposition: results routed to provider  Kala AMIN CMA ([facility])

## 2018-11-01 NOTE — MR AVS SNAPSHOT
After Visit Summary   11/1/2018    Mary Grigsby    MRN: [id number]           Patient Information     Date Of Birth          1965        Visit Information        Provider Department      11/1/2018 11:00 AM [facility]        Today's Diagnoses     BP check    -  1       Follow-ups after your visit        Your next 10 appointments already scheduled     Nov 05, 2018  7:15 AM CST   LAB with [facility]   [facility] ([facility])    [address] [place] [address]  [place] [address] [phone]           Please do not eat 10-12 hours before your appointment if you are coming in fasting for labs on lipids, cholesterol, or glucose (sugar). This does not apply to pregnant women. Water, hot tea and black coffee (with nothing added) are okay. Do not drink other fluids, diet soda or chew gum.            Nov 06, 2018  8:00 AM CST   (Arrive by 7:45 AM)   FIBROSIS SCAN with  FIBROSIS SCAN   [facility] ([facility])    [address] [place] [address] [address]  [address] [place] [id number] [phone]            Nov 06, 2018  8:30 AM CST   (Arrive by 8:15 AM)   New General Liver with Melissa Gutierrez MD   [facility] ([facility])    [address] [place] [address]  [address] [place] [id number] [phone]              Future tests that were ordered for you today     Open Future Orders        Priority Expected Expires Ordered    Hepatic panel Routine 11/5/2018 11/26/2018 11/1/2018    Basic metabolic panel Routine 11/5/2018 11/26/2018 11/1/2018    CBC with platelets Routine 11/5/2018 11/26/2018 11/1/2018    INR Routine 11/5/2018 11/26/2018 11/1/2018    Lipase Routine 11/5/2018 11/26/2018 11/1/2018            Who to contact     If you have questions or need follow up information about today's clinic visit or your schedule please contact [facility] directly at [phone].  Normal or  non-critical lab and imaging results will be communicated to you by Bantu LLChart, letter or phone within 4 business days after the clinic has received the results. If you do not hear from us within 7 days, please contact the clinic through [employer] or phone. If you have a critical or abnormal lab result, we will notify you by phone as soon as possible.  Submit refill requests through [employer] or call your pharmacy and they will forward the refill request to us. Please allow 3 business days for your refill to be completed.          Additional Information About Your Visit        [employer] Information     [employer] gives you secure access to your electronic health record. If you see a primary care provider, you can also send messages to your care team and make appointments. If you have questions, please call your primary care clinic.  If you do not have a primary care provider, please call [phone] and they will assist you.        Care EveryWhere ID     This is your Care EveryWhere ID. This could be used by other organizations to access your [facility] medical records  [id number]        Your Vitals Were     Last Period                   06/24/2011            Blood Pressure from Last 3 Encounters:   11/01/18 122/52   10/11/18 150/80   10/09/18 142/62    Weight from Last 3 Encounters:   10/11/18 237 lb (107.5 kg)   10/09/18 244 lb 11.4 oz (111 kg)   10/04/18 245 lb 6 oz (111.3 kg)              Today, you had the following     No orders found for display         Today's Medication Changes          These changes are accurate as of 11/1/18 11:56 AM.  If you have any questions, ask your nurse or doctor.               These medicines have changed or have updated prescriptions.        Dose/Directions    cholecalciferol 1000 UNIT tablet   Commonly known as:  vitamin D3   This may have changed:  when to take this   Used for:  Vitamin D deficiency        Dose:  3000 Units   Take 3 tablets (3,000 Units) by mouth daily   Refills:  0        predniSONE 10 MG tablet   Commonly known as:  DELTASONE   This may have changed:  how much to take   Used for:  Autoimmune hepatitis (H)        Dose:  40 mg   Take 4 tablets (40 mg) by mouth daily   Quantity:  30 tablet   Refills:  3                Primary Care Provider Office Phone # Fax #    Dipak Hooper [phone] [address] [address]        Equal Access to Services     [facility] : Hadii aad ku hadasho Soomaali, waaxda luqadaha, qaybta kaalmada adeegyada, waxay idiin hayaan adeeg khsbsh la'evelynn . So [facility] [phone].    ATENCIÓN: Si habla español, tiene a de leon disposición servicios gratuitos de asistencia lingüística. Llame al [phone].    We comply with applicable federal civil rights laws and [place] laws. We do not discriminate on the basis of race, color, national origin, age, disability, sex, sexual orientation, or gender identity.            Thank you!     Thank you for choosing [facility]  for your care. Our goal is always to provide you with excellent care. Hearing back from our patients is one way we can continue to improve our services. Please take a few minutes to complete the written survey that you may receive in the mail after your visit with us. Thank you!             Your Updated Medication List - Protect others around you: Learn how to safely use, store and throw away your medicines at www.disposemymeds.org.          This list is accurate as of 11/1/18 11:56 AM.  Always use your most recent med list.                   Brand Name Dispense Instructions for use Diagnosis    ALLEGRA PO      Take 90 mg by mouth daily as needed        aspirin 81 MG tablet     1 tablet    Take 1 tablet (81 mg) by mouth daily Indication: primary prevention for heart disease    Essential hypertension with goal blood pressure less than 140/90       blood glucose calibration solution     1 Bottle    1 drop. As needed    Type 2 diabetes, HbA1c goal < 7% (H)        blood glucose monitoring lancets     100 each    1 Device 2 times daily.    Type 2 diabetes, HbA1c goal < 7% (H)       blood glucose monitoring test strip    no brand specified    100 strip    1 strip 2 times daily.    Prediabetes       cholecalciferol 1000 UNIT tablet    vitamin D3     Take 3 tablets (3,000 Units) by mouth daily    Vitamin D deficiency       felodipine ER 5 MG 24 hr tablet    PLENDIL    90 tablet    Take 1 tablet (5 mg) by mouth daily    Essential hypertension with goal blood pressure less than 140/90       hydrochlorothiazide 12.5 MG Tabs tablet     90 tablet    Take 1 tablet (12.5 mg) by mouth every morning    Essential hypertension with goal blood pressure less than 140/90       insulin isophane human 100 UNIT/ML injection    HumuLIN N KWIKPEN    15 mL    Inject 10 Units Subcutaneous every morning With breakfast    Controlled type 2 diabetes mellitus without complication, without long-term current use of insulin (H)       insulin pen needle 32G X 4 MM    BD JEANNE U/F    100 each    Use once daily as directed.    Controlled type 2 diabetes mellitus without complication, without long-term current use of insulin (H)       losartan 100 MG tablet    COZAAR    90 tablet    Take 1 tablet (100 mg) by mouth daily    Essential hypertension with goal blood pressure less than 140/90       metFORMIN 500 MG 24 hr tablet    GLUCOPHAGE-XR    360 tablet    TAKE FOUR TABLETS BY MOUTH EVERY DAY WITH DINNER    Controlled type 2 diabetes mellitus without complication, without long-term current use of insulin (H)       predniSONE 10 MG tablet    DELTASONE    30 tablet    Take 4 tablets (40 mg) by mouth daily    Autoimmune hepatitis (H)       SINUS RINSE BOTTLE KIT NA      Spray 1 Bottle in nostril as needed.    Post-nasal drainage       STATIN NOT PRESCRIBED (INTENTIONAL)      Statin not prescribed intentionally due to Active liver disease (liver failure, cirrhosis, hepatitis)

## 2018-11-01 NOTE — TELEPHONE ENCOUNTER
Writer instructed pt of the following orders per Dr. Gutierrez: Liver tests are improving.   Pancreas test still elevated but improving.   Decrease prednisone from 30 mg daily down to 20 mg daily.   Repeat LFTs and lipase next week.   Patient able to repeat instructions.

## 2018-11-05 ENCOUNTER — PATIENT OUTREACH (OUTPATIENT)
Dept: CARE COORDINATION | Facility: CLINIC | Age: 53
End: 2018-11-05

## 2018-11-05 DIAGNOSIS — K85.90 ACUTE PANCREATITIS: ICD-10-CM

## 2018-11-05 LAB
ALBUMIN SERPL-MCNC: 3.4 G/DL (ref 3.4–5)
ALP SERPL-CCNC: 120 U/L (ref 40–150)
ALT SERPL W P-5'-P-CCNC: 124 U/L (ref 0–50)
ANION GAP SERPL CALCULATED.3IONS-SCNC: 8 MMOL/L (ref 3–14)
AST SERPL W P-5'-P-CCNC: 59 U/L (ref 0–45)
BILIRUB DIRECT SERPL-MCNC: 0.9 MG/DL (ref 0–0.2)
BILIRUB SERPL-MCNC: 1.4 MG/DL (ref 0.2–1.3)
BUN SERPL-MCNC: 14 MG/DL (ref 7–30)
CALCIUM SERPL-MCNC: 9.6 MG/DL (ref 8.5–10.1)
CHLORIDE SERPL-SCNC: 101 MMOL/L (ref 94–109)
CO2 SERPL-SCNC: 32 MMOL/L (ref 20–32)
CREAT SERPL-MCNC: 0.62 MG/DL (ref 0.52–1.04)
ERYTHROCYTE [DISTWIDTH] IN BLOOD BY AUTOMATED COUNT: 17.3 % (ref 10–15)
GFR SERPL CREATININE-BSD FRML MDRD: >90 ML/MIN/1.7M2
GLUCOSE SERPL-MCNC: 156 MG/DL (ref 70–99)
HCT VFR BLD AUTO: 40.4 % (ref 35–47)
HGB BLD-MCNC: 13.6 G/DL (ref 11.7–15.7)
INR PPP: 1.09 (ref 0.86–1.14)
LIPASE SERPL-CCNC: 472 U/L (ref 73–393)
MCH RBC QN AUTO: 31.8 PG (ref 26.5–33)
MCHC RBC AUTO-ENTMCNC: 33.7 G/DL (ref 31.5–36.5)
MCV RBC AUTO: 94 FL (ref 78–100)
PLATELET # BLD AUTO: 184 10E9/L (ref 150–450)
POTASSIUM SERPL-SCNC: 3.1 MMOL/L (ref 3.4–5.3)
PROT SERPL-MCNC: 6.6 G/DL (ref 6.8–8.8)
RBC # BLD AUTO: 4.28 10E12/L (ref 3.8–5.2)
SODIUM SERPL-SCNC: 141 MMOL/L (ref 133–144)
WBC # BLD AUTO: 12.3 10E9/L (ref 4–11)

## 2018-11-05 PROCEDURE — 80076 HEPATIC FUNCTION PANEL: CPT | Performed by: INTERNAL MEDICINE

## 2018-11-05 PROCEDURE — 80048 BASIC METABOLIC PNL TOTAL CA: CPT | Performed by: INTERNAL MEDICINE

## 2018-11-05 PROCEDURE — 83690 ASSAY OF LIPASE: CPT | Performed by: INTERNAL MEDICINE

## 2018-11-05 PROCEDURE — 36415 COLL VENOUS BLD VENIPUNCTURE: CPT | Performed by: INTERNAL MEDICINE

## 2018-11-05 PROCEDURE — 85610 PROTHROMBIN TIME: CPT | Performed by: INTERNAL MEDICINE

## 2018-11-05 PROCEDURE — 85027 COMPLETE CBC AUTOMATED: CPT | Performed by: INTERNAL MEDICINE

## 2018-11-06 ENCOUNTER — OFFICE VISIT (OUTPATIENT)
Dept: GASTROENTEROLOGY | Facility: CLINIC | Age: 53
End: 2018-11-06
Attending: INTERNAL MEDICINE
Payer: COMMERCIAL

## 2018-11-06 VITALS
HEART RATE: 89 BPM | OXYGEN SATURATION: 98 % | TEMPERATURE: 98.1 F | BODY MASS INDEX: 43.61 KG/M2 | HEIGHT: 61 IN | DIASTOLIC BLOOD PRESSURE: 78 MMHG | WEIGHT: 231 LBS | SYSTOLIC BLOOD PRESSURE: 123 MMHG

## 2018-11-06 DIAGNOSIS — K75.4 AUTOIMMUNE HEPATITIS (H): ICD-10-CM

## 2018-11-06 PROCEDURE — G0463 HOSPITAL OUTPT CLINIC VISIT: HCPCS | Mod: ZF

## 2018-11-06 PROCEDURE — 91200 LIVER ELASTOGRAPHY: CPT | Mod: ZF

## 2018-11-06 ASSESSMENT — PAIN SCALES - GENERAL: PAINLEVEL: NO PAIN (0)

## 2018-11-06 NOTE — NURSING NOTE
"Chief Complaint   Patient presents with     Consult     Autoimmune Hepatitis      /78  Pulse 89  Temp 98.1  F (36.7  C) (Oral)  Ht 1.549 m (5' 1\")  Wt 104.8 kg (231 lb)  LMP 06/24/2011  SpO2 98%  BMI 43.65 kg/m2  Dmitry Ram, LEANA    "

## 2018-11-06 NOTE — LETTER
11/6/2018       RE: Mary Grigsby  [address]  [place] [address]     Dear Colleague,    Thank you for referring your patient, Mary Grigsby, to the [facility] HEPATOLOGY at [facility]. Please see a copy of my visit note below.    [facility] follow up     A/P  53 year old yo female with AIH, now on prednisone 20 mg for the last 4 days. LFTs improving. Will remain on 20 mg for now and I am waiting for lipase to normalize before starting azathioprine. Discussed bx, dx, prognosis, follow up plan and medication including side effects.   Fibrosis scan today correlates with biopsy which showed state 3-4. Expect reduction with AIH treatment.  Weekly labs for now until lipase normalized then will start azathioprine.  Reviewed healthy habits. She does not drink. Encouraged weight loss. Discussed diet and healthy habits    RTC 1 year, labs and US for screening every 6 months.  Declined the flu shot.  This was a 35 minute visit, over 50% counseling and coordination of care.    Melissa Gutierrez MD  Hepatology/Liver Transplant  Medical Director, Liver Transplantation  [facility]  ========================================================================  S:  53 year old you female with AIH, diagnosed in October 2018. She was hospitalized 10/3-10/5/18 at [facility].  Diagnosis was made when she had elevated liver tests. Bili got up to 12 and ALT up to 700.     She was also diagnosed with pancreatitis at the time, unclear etiology. EUS was negative for gallstones. Pancreatic parenchyma had likely fatty infiltration.  Pain resolved after 24 hours and lipase is coming down. Peak was 1747, now 472.    She was started on prednisone 40 mg when her biopsy came back and I have been weaning it down as liver tests improved. It increased her blood sugars which are now under control with insulin.    She does not drink alcohol. She is working on losing  "weight. Asks what she can do and eat to keep her liver healthy.    CT 10/3/18   1. Diffuse hepatic steatosis.  2. Edematous pancreatic head, consistent with pancreatitis given  elevated lipase.  3. Cholelithiasis not well appreciated but better defined on  ultrasound.  4. Ascites in the pelvis and about the liver.  5. Consider MRCP for further evaluation.    LIVER, NEEDLE BIOPSY:   - Active chronic hepatitis with features most suggestive of autoimmune   hepatitis   - Can not rule out stage 3-4 fibrosis   - See microscopic description     MELD-Na score: 9 at 11/5/2018  7:19 AM  MELD score: 9 at 11/5/2018  7:19 AM  Calculated from:  Serum Creatinine: 0.62 mg/dL (Rounded to 1) at 11/5/2018  7:19 AM  Serum Sodium: 141 mmol/L (Rounded to 137) at 11/5/2018  7:19 AM  Total Bilirubin: 1.4 mg/dL at 11/5/2018  7:19 AM  INR(ratio): 1.09 at 11/5/2018  7:19 AM  Age: 53 years    Recent Labs   Lab Test  11/05/18   0719   PROTTOTAL  6.6*   ALBUMIN  3.4   BILITOTAL  1.4*   ALKPHOS  120   AST  59*   ALT  124*       Doing well.  No new health problems. No new family history. No F/N/V/D/abd pain. Getting blood sugars under control with insulin.    SHx: Works full time at a [employer]. Here with her daughter.     O:  /78  Pulse 89  Temp 98.1  F (36.7  C) (Oral)  Ht 1.549 m (5' 1\")  Wt 104.8 kg (231 lb)  LMP 06/24/2011  SpO2 98%  BMI 43.65 kg/m2  Gen: No acute distress  Head: Normocephalic atraumatic  Eyes: Sclera anicteric  Neck: No cervical lymphadenopathy  Respiratory: Clear to auscultation bilaterally, no overt wheezing or rales  CV: RRR   Abdomen:  Soft, nontender, nondistended, normal bowel sounds  Extrem: No edema  Skin: No jaundice, spider angiomata or palmar erythema.  No rashes.   Neuro: Alert and oriented. No asterixis.    MSK: Grossly Intact  Psych: Normal speech, normal affect    Current Outpatient Prescriptions   Medication     aspirin 81 MG tablet     Blood Glucose Calibration (FREESTYLE CONTROL SOLUTION) " LIQD     cholecalciferol (VITAMIN D3) 1000 UNIT tablet     felodipine ER (PLENDIL) 5 MG 24 hr tablet     FREESTYLE LANCETS MISC     glucose blood VI test strips strip     hydrochlorothiazide 12.5 MG TABS tablet     insulin isophane human (HUMULIN N KWIKPEN) 100 UNIT/ML injection     insulin pen needle (BD JEANNE U/F) 32G X 4 MM     losartan (COZAAR) 100 MG tablet     metFORMIN (GLUCOPHAGE-XR) 500 MG 24 hr tablet     predniSONE (DELTASONE) 10 MG tablet     STATIN NOT PRESCRIBED, INTENTIONAL,     Fexofenadine HCl (ALLEGRA PO)     Hypertonic Nasal Wash (SINUS RINSE BOTTLE KIT NA)     No current facility-administered medications for this visit.        Again, thank you for allowing me to participate in the care of your patient.      Sincerely,    Melissa Gutierrez MD

## 2018-11-06 NOTE — PROGRESS NOTES
[facility] Liver Clinic follow up     A/P  53 year old yo female with AIH, now on prednisone 20 mg for the last 4 days. LFTs improving. Will remain on 20 mg for now and I am waiting for lipase to normalize before starting azathioprine. Discussed bx, dx, prognosis, follow up plan and medication including side effects.   Fibrosis scan today correlates with biopsy which showed state 3-4. Expect reduction with AIH treatment.  Weekly labs for now until lipase normalized then will start azathioprine.  Reviewed healthy habits. She does not drink. Encouraged weight loss. Discussed diet and healthy habits    RTC 1 year, labs and US for screening every 6 months.  Declined the flu shot.  This was a 35 minute visit, over 50% counseling and coordination of care.    Melissa Gutierrez MD  Hepatology/Liver Transplant  Medical Director, Liver Transplantation  [facility]  ========================================================================  S:  53 year old you female with AIH, diagnosed in October 2018. She was hospitalized 10/3-10/5/18 at [facility].  Diagnosis was made when she had elevated liver tests. Bili got up to 12 and ALT up to 700.     She was also diagnosed with pancreatitis at the time, unclear etiology. EUS was negative for gallstones. Pancreatic parenchyma had likely fatty infiltration.  Pain resolved after 24 hours and lipase is coming down. Peak was 1747, now 472.    She was started on prednisone 40 mg when her biopsy came back and I have been weaning it down as liver tests improved. It increased her blood sugars which are now under control with insulin.    She does not drink alcohol. She is working on losing weight. Asks what she can do and eat to keep her liver healthy.    CT 10/3/18   1. Diffuse hepatic steatosis.  2. Edematous pancreatic head, consistent with pancreatitis given  elevated lipase.  3. Cholelithiasis not well appreciated but better defined on  ultrasound.  4. Ascites in the  "pelvis and about the liver.  5. Consider MRCP for further evaluation.    LIVER, NEEDLE BIOPSY:   - Active chronic hepatitis with features most suggestive of autoimmune   hepatitis   - Can not rule out stage 3-4 fibrosis   - See microscopic description     MELD-Na score: 9 at 11/5/2018  7:19 AM  MELD score: 9 at 11/5/2018  7:19 AM  Calculated from:  Serum Creatinine: 0.62 mg/dL (Rounded to 1) at 11/5/2018  7:19 AM  Serum Sodium: 141 mmol/L (Rounded to 137) at 11/5/2018  7:19 AM  Total Bilirubin: 1.4 mg/dL at 11/5/2018  7:19 AM  INR(ratio): 1.09 at 11/5/2018  7:19 AM  Age: 53 years    Recent Labs   Lab Test  11/05/18   0719   PROTTOTAL  6.6*   ALBUMIN  3.4   BILITOTAL  1.4*   ALKPHOS  120   AST  59*   ALT  124*       Doing well.  No new health problems. No new family history. No F/N/V/D/abd pain. Getting blood sugars under control with insulin.    SHx: Works full time at a [employer]. Here with her daughter.     O:  /78  Pulse 89  Temp 98.1  F (36.7  C) (Oral)  Ht 1.549 m (5' 1\")  Wt 104.8 kg (231 lb)  LMP 06/24/2011  SpO2 98%  BMI 43.65 kg/m2  Gen: No acute distress  Head: Normocephalic atraumatic  Eyes: Sclera anicteric  Neck: No cervical lymphadenopathy  Respiratory: Clear to auscultation bilaterally, no overt wheezing or rales  CV: RRR   Abdomen:  Soft, nontender, nondistended, normal bowel sounds  Extrem: No edema  Skin: No jaundice, spider angiomata or palmar erythema.  No rashes.   Neuro: Alert and oriented. No asterixis.    MSK: Grossly Intact  Psych: Normal speech, normal affect    Current Outpatient Prescriptions   Medication     aspirin 81 MG tablet     Blood Glucose Calibration (FREESTYLE CONTROL SOLUTION) LIQD     cholecalciferol (VITAMIN D3) 1000 UNIT tablet     felodipine ER (PLENDIL) 5 MG 24 hr tablet     FREESTYLE LANCETS MISC     glucose blood VI test strips strip     hydrochlorothiazide 12.5 MG TABS tablet     insulin isophane human (HUMULIN N KWIKPEN) 100 UNIT/ML injection     " insulin pen needle (BD JEANNE U/F) 32G X 4 MM     losartan (COZAAR) 100 MG tablet     metFORMIN (GLUCOPHAGE-XR) 500 MG 24 hr tablet     predniSONE (DELTASONE) 10 MG tablet     STATIN NOT PRESCRIBED, INTENTIONAL,     Fexofenadine HCl (ALLEGRA PO)     Hypertonic Nasal Wash (SINUS RINSE BOTTLE KIT NA)     No current facility-administered medications for this visit.

## 2018-11-06 NOTE — MR AVS SNAPSHOT
After Visit Summary   11/6/2018    Mary Grigsby    MRN: [id number]           Patient Information     Date Of Birth          1965        Visit Information        Provider Department      11/6/2018 8:30 AM Melissa Gutierrez MD [facility] Hepatology        Today's Diagnoses     Autoimmune hepatitis (H)           Follow-ups after your visit        Follow-up notes from your care team     Return in about 6 months (around 5/6/2019).      Your next 10 appointments already scheduled     May 14, 2019 11:00 AM CDT   (Arrive by 10:45 AM)   Return General Liver with Melissa Gutierrez MD   [facility] Hepatology ([facility])    [address]  [address]  [facility] [address] [phone]              Future tests that were ordered for you today     Open Standing Orders        Priority Remaining Interval Expires Ordered    Hepatic panel Routine 4/4 Weekly 1/6/2019 11/6/2018    CBC with platelets Routine 4/4 Weekly 1/6/2019 11/6/2018    Lipase Routine 4/4 Weekly 1/6/2019 11/6/2018            Who to contact     If you have questions or need follow up information about today's clinic visit or your schedule please contact [facility] HEPATOLOGY directly at [phone].  Normal or non-critical lab and imaging results will be communicated to you by [employer], letter or phone within 4 business days after the clinic has received the results. If you do not hear from us within 7 days, please contact the clinic through [employer] or phone. If you have a critical or abnormal lab result, we will notify you by phone as soon as possible.  Submit refill requests through [employer] or call your pharmacy and they will forward the refill request to us. Please allow 3 business days for your refill to be completed.          Additional Information About Your Visit        MyChart Information     [employer] gives you secure access to your electronic health record. If you see a primary care  "provider, you can also send messages to your care team and make appointments. If you have questions, please call your primary care clinic.  If you do not have a primary care provider, please call [phone] and they will assist you.        Care EveryWhere ID     This is your Care EveryWhere ID. This could be used by other organizations to access your [facility] medical records  [id number]        Your Vitals Were     Pulse Temperature Height Last Period Pulse Oximetry BMI (Body Mass Index)    89 98.1  F (36.7  C) (Oral) 1.549 m (5' 1\") 06/24/2011 98% 43.65 kg/m2       Blood Pressure from Last 3 Encounters:   11/06/18 123/78   11/01/18 122/52   10/11/18 150/80    Weight from Last 3 Encounters:   11/06/18 104.8 kg (231 lb)   10/11/18 107.5 kg (237 lb)   10/09/18 111 kg (244 lb 11.4 oz)                 Today's Medication Changes          These changes are accurate as of 11/6/18  8:58 AM.  If you have any questions, ask your nurse or doctor.               These medicines have changed or have updated prescriptions.        Dose/Directions    cholecalciferol 1000 UNIT tablet   Commonly known as:  vitamin D3   This may have changed:  when to take this   Used for:  Vitamin D deficiency        Dose:  3000 Units   Take 3 tablets (3,000 Units) by mouth daily   Refills:  0       predniSONE 10 MG tablet   Commonly known as:  DELTASONE   This may have changed:  how much to take   Used for:  Autoimmune hepatitis (H)        Dose:  40 mg   Take 4 tablets (40 mg) by mouth daily   Quantity:  30 tablet   Refills:  3                Primary Care Provider Office Phone # Fax #    Dipak Hooper [phone] [address] [address]        Equal Access to Services     SAWYER HAYNES AH: Jermaine Montgomery, jeannette munoz, luiza johnston, lazarus hill. So [facility] [phone].    ATENCIÓN: Si habla español, tiene a de leon disposición servicios gratuitos de " asistencia lingüística. Sundar al [phone].    We comply with applicable federal civil rights laws and [place] laws. We do not discriminate on the basis of race, color, national origin, age, disability, sex, sexual orientation, or gender identity.            Thank you!     Thank you for choosing [facility] HEPATOLOGY  for your care. Our goal is always to provide you with excellent care. Hearing back from our patients is one way we can continue to improve our services. Please take a few minutes to complete the written survey that you may receive in the mail after your visit with us. Thank you!             Your Updated Medication List - Protect others around you: Learn how to safely use, store and throw away your medicines at www.disposemymeds.org.          This list is accurate as of 11/6/18  8:58 AM.  Always use your most recent med list.                   Brand Name Dispense Instructions for use Diagnosis    ALLEGRA PO      Take 90 mg by mouth daily as needed        aspirin 81 MG tablet     1 tablet    Take 1 tablet (81 mg) by mouth daily Indication: primary prevention for heart disease    Essential hypertension with goal blood pressure less than 140/90       blood glucose calibration solution     1 Bottle    1 drop. As needed    Type 2 diabetes, HbA1c goal < 7% (H)       blood glucose monitoring lancets     100 each    1 Device 2 times daily.    Type 2 diabetes, HbA1c goal < 7% (H)       blood glucose monitoring test strip    no brand specified    100 strip    1 strip 2 times daily.    Prediabetes       cholecalciferol 1000 UNIT tablet    vitamin D3     Take 3 tablets (3,000 Units) by mouth daily    Vitamin D deficiency       felodipine ER 5 MG 24 hr tablet    PLENDIL    90 tablet    Take 1 tablet (5 mg) by mouth daily    Essential hypertension with goal blood pressure less than 140/90       hydrochlorothiazide 12.5 MG Tabs tablet     90 tablet    Take 1 tablet (12.5 mg) by mouth every morning    Essential  hypertension with goal blood pressure less than 140/90       insulin isophane human 100 UNIT/ML injection    HumuLIN N KWIKPEN    15 mL    Inject 10 Units Subcutaneous every morning With breakfast    Controlled type 2 diabetes mellitus without complication, without long-term current use of insulin (H)       insulin pen needle 32G X 4 MM    BD JEANNE U/F    100 each    Use once daily as directed.    Controlled type 2 diabetes mellitus without complication, without long-term current use of insulin (H)       losartan 100 MG tablet    COZAAR    90 tablet    Take 1 tablet (100 mg) by mouth daily    Essential hypertension with goal blood pressure less than 140/90       metFORMIN 500 MG 24 hr tablet    GLUCOPHAGE-XR    360 tablet    TAKE FOUR TABLETS BY MOUTH EVERY DAY WITH DINNER    Controlled type 2 diabetes mellitus without complication, without long-term current use of insulin (H)       predniSONE 10 MG tablet    DELTASONE    30 tablet    Take 4 tablets (40 mg) by mouth daily    Autoimmune hepatitis (H)       SINUS RINSE BOTTLE KIT NA      Spray 1 Bottle in nostril as needed.    Post-nasal drainage       STATIN NOT PRESCRIBED (INTENTIONAL)      Statin not prescribed intentionally due to Active liver disease (liver failure, cirrhosis, hepatitis)

## 2018-11-09 ENCOUNTER — OFFICE VISIT (OUTPATIENT)
Dept: FAMILY MEDICINE | Facility: CLINIC | Age: 53
End: 2018-11-09
Payer: COMMERCIAL

## 2018-11-09 VITALS
BODY MASS INDEX: 44.59 KG/M2 | SYSTOLIC BLOOD PRESSURE: 122 MMHG | RESPIRATION RATE: 16 BRPM | HEART RATE: 87 BPM | WEIGHT: 236 LBS | OXYGEN SATURATION: 97 % | DIASTOLIC BLOOD PRESSURE: 64 MMHG | TEMPERATURE: 97.1 F

## 2018-11-09 DIAGNOSIS — L03.113 CELLULITIS OF RIGHT UPPER EXTREMITY: ICD-10-CM

## 2018-11-09 DIAGNOSIS — Z12.31 VISIT FOR SCREENING MAMMOGRAM: ICD-10-CM

## 2018-11-09 DIAGNOSIS — I80.8 THROMBOPHLEBITIS ARM: ICD-10-CM

## 2018-11-09 DIAGNOSIS — Z12.4 SCREENING FOR MALIGNANT NEOPLASM OF CERVIX: ICD-10-CM

## 2018-11-09 DIAGNOSIS — Z23 NEED FOR PROPHYLACTIC VACCINATION AND INOCULATION AGAINST INFLUENZA: ICD-10-CM

## 2018-11-09 DIAGNOSIS — E66.01 MORBID OBESITY DUE TO EXCESS CALORIES (H): ICD-10-CM

## 2018-11-09 DIAGNOSIS — M25.521 RIGHT ELBOW PAIN: Primary | ICD-10-CM

## 2018-11-09 PROCEDURE — 99214 OFFICE O/P EST MOD 30 MIN: CPT | Performed by: INTERNAL MEDICINE

## 2018-11-09 RX ORDER — SULFAMETHOXAZOLE/TRIMETHOPRIM 800-160 MG
1 TABLET ORAL 2 TIMES DAILY
Qty: 28 TABLET | Refills: 3 | Status: SHIPPED | OUTPATIENT
Start: 2018-11-09 | End: 2020-08-28

## 2018-11-09 NOTE — PATIENT INSTRUCTIONS
[facility]    If you have any questions regarding to your visit please contact your care team:     Team Pink:   Clinic Hours Telephone Number   Internal Medicine:  Dr. Lizzie Wray NP 7am-7pm  Monday - Thursday   7am-5pm  Fridays  [phone]  (Appointment scheduling available 24/7)   Urgent Care - [place] and [facility] - 11am-9pm Monday-Friday Saturday-Sunday- 9am-5pm   [place] - 5pm-9pm Monday-Friday Saturday-Sunday- 9am-5pm  [phone] - [place]  [phone] - [place]       What options do I have for a visit other than an office visit? We offer electronic visits (e-visits) and telephone visits, when medically appropriate.  Please check with your medical insurance to see if these types of visits are covered, as you will be responsible for any charges that are not paid by your insurance.      You can use [employer] (secure electronic communication) to access to your chart, send your primary care provider a message, or make an appointment. Ask a team member how to get started.     For a price quote for your services, please call our Consumer Price Line at [phone] or our Imaging Cost estimation line at [phone] (for imaging tests).

## 2018-11-09 NOTE — PROGRESS NOTES
"  SUBJECTIVE:   Mary Grigsby is a 53 year old female who presents to clinic today for the following health issues:    Right elbow Swelling and Pain  She reports right elbow has become increasingly swollen and sore since she first noted these symptoms on Wednesday morning 48 hours ago. Her pain is relatively constant and substantial , even her sweater rubbing on her elbow causes her pain but it can present in sharp sudden bursts and she describes it as \"having a mind of its own.\" This morning she woke up and her entire right forearm was erythematous. She is unable to straighten or bend her arm at this time due to pain. Patient denies associated symptoms of fever and denies injury. She gets weekly blood draws and had a draw from her right arm on Monday she says. She has been using a heating pack in order to mitigate her pain with some relief. It's a noteworthy comment that she's on chronic prednisone for right now secondary to a new diagnosis of autoimmune hepatitis . This is going fine right now and she will likely be transitioned over to azaTHIOprine (IMURAN) or other non-steroid medication soon    Problem list and histories reviewed & adjusted, as indicated.  Additional history: as documented    Patient Active Problem List   Diagnosis     Hypertension goal BP (blood pressure) < 140/90     Ex-smoker     Family history of diabetes mellitus     Hyperlipidemia with target LDL less than 100     Seasonal allergic rhinitis     Rhinitis, allergic to other allergen     House dust mite allergy     obesity     Severe dysplasia of cervix (GUSTABO III)     Family history of thyroid disease     Cervical lymphadenopathy     Controlled type 2 diabetes mellitus without complication, without long-term current use of insulin (H)     Bilateral lower extremity edema     Hypovitaminosis D     Pancreatitis     Autoimmune hepatitis (H)     Past Surgical History:   Procedure Laterality Date     CRYOTHERAPY  3/30/2011    cervical     " ESOPHAGOSCOPY, GASTROSCOPY, DUODENOSCOPY (EGD), COMBINED N/A 10/4/2018    Procedure: COMBINED ENDOSCOPIC ULTRASOUND, ESOPHAGOSCOPY, GASTROSCOPY, DUODENOSCOPY (EGD);  EUS;  Surgeon: John Carrera MD;  Location: [facility]     HC COLP CERVIX/UPPER VAGINA W BX CERVIX  1/2010, 3/2011    neg dysplasia, GUSTABO 1-2     HC REPAIR OF NASAL SEPTUM  2/3/09-per Dr. Harley    antrostomy, ethmoidectomy, septoplasty and turbinate reduction     LAPAROSCOPIC ASSISTED HYSTERECTOMY VAGINAL  3/12/12    [facility]     TONSILLECTOMY & ADENOIDECTOMY  1973    age 8       Social History   Substance Use Topics     Smoking status: Former Smoker     Packs/day: 0.50     Years: 20.00     Types: Cigarettes     Start date: 6/1/1980     Quit date: 2/24/2013     Smokeless tobacco: Never Used      Comment: still has occasionally      Alcohol use No     Family History   Problem Relation Age of Onset     Cancer Father      Other Cancer Father      passed Nov.2016     Hypertension Father      age 72     Hypertension Sister      age 35     Thyroid Disease Sister      Cerebrovascular Disease Maternal Grandmother      Asthma Son      Thyroid Disease Sister      Thyroid Disease Sister      Thyroid Disease Sister      Diabetes Mother      Hypertension Mother      age 34     Hypertension Sister      Thyroid Disease Sister      Asthma Son      Glaucoma No family hx of      Macular Degeneration No family hx of          BP Readings from Last 3 Encounters:   11/09/18 122/64   11/06/18 123/78   11/01/18 122/52    Wt Readings from Last 3 Encounters:   11/09/18 107 kg (236 lb)   11/06/18 104.8 kg (231 lb)   10/11/18 107.5 kg (237 lb)        Reviewed and updated as needed this visit by clinical staff       Reviewed and updated as needed this visit by Provider       ROS:  Constitutional, HEENT, cardiovascular, pulmonary, GI, , musculoskeletal, neuro, skin, endocrine and psych systems are negative, except as otherwise noted.    This document serves as a  record of the services and decisions personally performed and made by Dipak Hooper MD. It was created on his behalf by Moshe Arteaga, a trained medical scribe. The creation of this document is based on the provider's statements to the medical scribe.  Moshe Arteaga 12:37 PM November 9, 2018    OBJECTIVE:     /64  Pulse 87  Temp 97.1  F (36.2  C) (Oral)  Resp 16  Wt 107 kg (236 lb)  LMP 06/24/2011  SpO2 97%  BMI 44.59 kg/m2  Body mass index is 44.59 kg/(m^2).  GENERAL: healthy, alert and no distress  EYES: Eyes grossly normal to inspection, PERRL and conjunctivae and sclerae normal  MS: Right elbow is swollen, painful to touch directly on the olecranon process. There is nothing more then very tiny fluid accumulation and olecranon bursitis is not consistent with this exam. There's generalized swelling and warmth to touch with her entire forearm. No clear cut findings of cellulitis   SKIN: Right elbow is warm to the touch, erythematous  NEURO: Normal strength and tone, mentation intact and speech normal  PSYCH: mentation appears normal, affect normal/bright  LYMPH: axillary: no adenopathy  epitrochliar: no adenopathy    Diagnostic Test Results:  No results found for this or any previous visit (from the past 24 hour(s)).    ASSESSMENT/PLAN:     (M25.521) Right elbow pain  (primary encounter diagnosis)  Comment: her pain seems odd to me , no trauma and no prior history. Not entirely clear to me what is the root cause of this problem of elbow inflammation   Plan: need to exclude superficial thrombophlebitis and will obtain ultrasound to exclude abnormal fluid collections / abscess    (I80.8) Thrombophlebitis arm  Comment: as above   Plan: awake right upper doppler study to rule out acute deep vein thrombosis / superficial thrombophlebitis     (L03.113) Cellulitis of right upper extremity  Comment: it's impossible to completely exclude the possibility that this entire clinical presentation is consistent  with cellulitis and treatment with antibiotic is appropriate .   Plan: sulfamethoxazole-trimethoprim (BACTRIM         DS/SEPTRA DS) 800-160 MG per tablet        Unfortunately because of her autoimmune hepatitis she's not allowed to take non-steroidal anti-inflammatory drugs which would definitely be beneficial with this setting. Otherwise, ice treatment and arm elevation is recommended     (Z12.31) Visit for screening mammogram  Comment:   Plan:     (Z12.4) Screening for malignant neoplasm of cervix  Comment:   Plan:     (Z23) Need for prophylactic vaccination and inoculation against influenza  Comment:   Plan:     See Patient Instructions    The information in this document, created by the medical scribe for me, accurately reflects the services I personally performed and the decisions made by me. I have reviewed and approved this document for accuracy prior to leaving the patient care area.  November 9, 2018 12:44 PM    Dipak Hooper MD  [facility]

## 2018-11-09 NOTE — MR AVS SNAPSHOT
After Visit Summary   11/9/2018    Mary Grigsby    MRN: [id number]           Patient Information     Date Of Birth          1965        Visit Information        Provider Department      11/9/2018 12:10 PM Dipak Hopoer MD [facility]        Today's Diagnoses     Right elbow pain    -  1    Thrombophlebitis arm        Cellulitis of right upper extremity        Visit for screening mammogram        Screening for malignant neoplasm of cervix        Need for prophylactic vaccination and inoculation against influenza          Care Instructions    [facility]    If you have any questions regarding to your visit please contact your care team:     Team Pink:   Clinic Hours Telephone Number   Internal Medicine:  Dr. Lizzie Wray NP 7am-7pm  Monday - Thursday   7am-5pm  Fridays  [phone]  (Appointment scheduling available 24/7)   Urgent Care - [place] - 11am-9pm Monday-Friday Saturday-Sunday- 9am-5pm   [place] - 5pm-9pm Monday-Friday Saturday-Sunday- 9am-5pm  [phone] - [place]  [phone] - [place]       What options do I have for a visit other than an office visit? We offer electronic visits (e-visits) and telephone visits, when medically appropriate.  Please check with your medical insurance to see if these types of visits are covered, as you will be responsible for any charges that are not paid by your insurance.      You can use Flipswap (secure electronic communication) to access to your chart, send your primary care provider a message, or make an appointment. Ask a team member how to get started.     For a price quote for your services, please call our Consumer Price Line at [phone] or our Imaging Cost estimation line at [phone] (for imaging tests).            Follow-ups after your visit        Your next 10 appointments already scheduled     Nov 12, 2018  8:00 AM [facility]   Lab  visit with [facility]   [facility]    [address]  [place] [address]   [phone]           Please do not eat 10-12 hours before your appointment if you are coming in fasting for labs on lipids, cholesterol, or glucose (sugar). Does not apply to pregnant women.  Water with medications is okay. Do not drink coffee or other fluids.  If you have concerns about taking your medications, please send a message by clicking on Secure Messaging, Message Your Care Team.            Nov 19, 2018  7:45 AM CST   Lab visit with [facility] LAB   [facility] ([facility])    [address] [place] [address]  [place] [address]   [phone]           Please do not eat 10-12 hours before your appointment if you are coming in fasting for labs on lipids, cholesterol, or glucose (sugar). Does not apply to pregnant women.  Water with medications is okay. Do not drink coffee or other fluids.  If you have concerns about taking your medications, please send a message by clicking on Secure Messaging, Message Your Care Team.            Nov 26, 2018  7:00 AM CST   Lab visit with  LAB   [facility] ([facility])    [address] [place] [address]  [place] [address]   [phone]           Please do not eat 10-12 hours before your appointment if you are coming in fasting for labs on lipids, cholesterol, or glucose (sugar). Does not apply to pregnant women.  Water with medications is okay. Do not drink coffee or other fluids.  If you have concerns about taking your medications, please send a message by clicking on Secure Messaging, Message Your Care Team.            Dec 03, 2018  7:00 AM CST   Lab visit with  LAB   [facility] ([facility])    [address]  [place] [address]   [phone]           Please do not eat 10-12 hours before your appointment if you are coming in fasting for labs on lipids, cholesterol, or  glucose (sugar). Does not apply to pregnant women.  Water with medications is okay. Do not drink coffee or other fluids.  If you have concerns about taking your medications, please send a message by clicking on Secure Messaging, Message Your Care Team.            Dec 20, 2018 11:00 AM CST   MA SCREENING DIGITAL BILATERAL with FKMA1   [facility] ([facility])    [address] [place] [id number] [phone]           How do I prepare for my exam? (Food and drink instructions) No Food and Drink Restrictions.  How do I prepare for my exam? (Other instructions) Do not use any powder, lotion or deodorant under your arms or on your breast. If you do, we will ask you to remove it before your exam.  What should I wear: Wear comfortable, two-piece clothing.  How long does the exam take: Most scans will take 15 minutes.  What should I bring: Bring any previous mammograms from other facilities or have them mailed to the breast center.  Do I need a :  No  is needed.  What do I need to tell my doctor: If you have any allergies, tell your care team.  What should I do after the exam: No restrictions, You may resume normal activities.  What is this test: This test is an x-ray of the breast to look for breast disease. The breast is pressed between two plates to flatten and spread the tissue. An X-ray is taken of the breast from different angles.  Who should I call with questions: If you have any questions, please call the Imaging Department where you will have your exam. Directions, parking instructions, and other information is available on our website, [email or web address].  Other information about my exam Three-dimensional (3D) mammograms are available at [facility] locations in [facility], [place], [place], [facility], [place], [facility] and [place]. [facility] locations include [place] and the [facility] in [place].  Benefits of 3D  "mammograms include * Improved rate of cancer detection * Decreases your chance of having to go back for more tests, which means fewer: * \"False-positive\" results (This means that there is an abnormal area but it isn't cancer.) * Invasive testing procedures, such as a biopsy or surgery * Can provide clearer images of the breast if you have dense breast tissue.  *3D mammography is an optional exam that anyone can have with a 2D mammogram. It doesn't replace or take the place of a 2D mammogram. 2D mammograms remain an effective screening test for all women.  Not all insurance companies cover the cost of a 3D mammogram. Check with your insurance.            Dec 21, 2018  7:30 AM CST   [facility] OB-GYN Annual Physical with Petey Peraza MD   [facility] ([facility]    [address] [place] [id number] [phone]            May 14, 2019 11:00 AM CDT   (Arrive by 10:45 AM)   Return General Liver with Melissa Gutierrez MD   [facility] Hepatology ([facility] [place])    [address] [place] [address]  [address]  [facility] [address] [phone]              Who to contact     If you have questions or need follow up information about today's clinic visit or your schedule please contact [facility] directly at [phone].  Normal or non-critical lab and imaging results will be communicated to you by GlassBoxhart, letter or phone within 4 business days after the clinic has received the results. If you do not hear from us within 7 days, please contact the clinic through Pickup Servicest or phone. If you have a critical or abnormal lab result, we will notify you by phone as soon as possible.  Submit refill requests through Prezto or call your pharmacy and they will forward the refill request to us. Please allow 3 business days for your refill to be completed.          Additional Information About Your Visit        Prezto Information     Prezto gives you " secure access to your electronic health record. If you see a primary care provider, you can also send messages to your care team and make appointments. If you have questions, please call your primary care clinic.  If you do not have a primary care provider, please call [phone] and they will assist you.        Care EveryWhere ID     This is your Care EveryWhere ID. This could be used by other organizations to access your [facility] medical records  [id number]        Your Vitals Were     Pulse Temperature Respirations Last Period Pulse Oximetry BMI (Body Mass Index)    87 97.1  F (36.2  C) (Oral) 16 06/24/2011 97% 44.59 kg/m2       Blood Pressure from Last 3 Encounters:   11/09/18 122/64   11/06/18 123/78   11/01/18 122/52    Weight from Last 3 Encounters:   11/09/18 236 lb (107 kg)   11/06/18 231 lb (104.8 kg)   10/11/18 237 lb (107.5 kg)              Today, you had the following     No orders found for display         Today's Medication Changes          These changes are accurate as of 11/9/18 12:50 PM.  If you have any questions, ask your nurse or doctor.               Start taking these medicines.        Dose/Directions    sulfamethoxazole-trimethoprim 800-160 MG per tablet   Commonly known as:  BACTRIM DS/SEPTRA DS   Used for:  Cellulitis of right upper extremity   Started by:  Dipak Hooper MD        Dose:  1 tablet   Take 1 tablet by mouth 2 times daily   Quantity:  28 tablet   Refills:  3         These medicines have changed or have updated prescriptions.        Dose/Directions    cholecalciferol 1000 UNIT tablet   Commonly known as:  vitamin D3   This may have changed:  when to take this   Used for:  Vitamin D deficiency        Dose:  3000 Units   Take 3 tablets (3,000 Units) by mouth daily   Refills:  0       predniSONE 10 MG tablet   Commonly known as:  DELTASONE   This may have changed:  how much to take   Used for:  Autoimmune hepatitis (H)        Dose:  40 mg   Take 4 tablets (40 mg) by mouth daily    Quantity:  30 tablet   Refills:  3            Where to get your medicines      These medications were sent to [facility] [place], [place] - [address]  [address], [place] [address]     Phone:  [phone]     sulfamethoxazole-trimethoprim 800-160 MG per tablet                Primary Care Provider Office Phone # Fax #    Dipak Hooper [phone] [address]  [place] [address]        Equal Access to Services     [facility]: Hadii aad ku hadasho Soomaali, waaxda luqadaha, qaybta kaalmada adeegyada, waxay idiin hayaan adeeg kharadonna mon . So [facility] [phone].    ATENCIÓN: Si habla español, tiene a de leon disposición servicios gratuitos de asistencia lingüística. Llame al [phone].    We comply with applicable federal civil rights laws and [place] laws. We do not discriminate on the basis of race, color, national origin, age, disability, sex, sexual orientation, or gender identity.            Thank you!     Thank you for choosing [facility]  for your care. Our goal is always to provide you with excellent care. Hearing back from our patients is one way we can continue to improve our services. Please take a few minutes to complete the written survey that you may receive in the mail after your visit with us. Thank you!             Your Updated Medication List - Protect others around you: Learn how to safely use, store and throw away your medicines at www.disposemymeds.org.          This list is accurate as of 11/9/18 12:50 PM.  Always use your most recent med list.                   Brand Name Dispense Instructions for use Diagnosis    ALLEGRA PO      Take 90 mg by mouth daily as needed        aspirin 81 MG tablet     1 tablet    Take 1 tablet (81 mg) by mouth daily Indication: primary prevention for heart disease    Essential hypertension with goal blood pressure less than 140/90       blood glucose calibration  solution     1 Bottle    1 drop. As needed    Type 2 diabetes, HbA1c goal < 7% (H)       blood glucose monitoring lancets     100 each    1 Device 2 times daily.    Type 2 diabetes, HbA1c goal < 7% (H)       blood glucose monitoring test strip    no brand specified    100 strip    1 strip 2 times daily.    Prediabetes       cholecalciferol 1000 UNIT tablet    vitamin D3     Take 3 tablets (3,000 Units) by mouth daily    Vitamin D deficiency       felodipine ER 5 MG 24 hr tablet    PLENDIL    90 tablet    Take 1 tablet (5 mg) by mouth daily    Essential hypertension with goal blood pressure less than 140/90       hydrochlorothiazide 12.5 MG Tabs tablet     90 tablet    Take 1 tablet (12.5 mg) by mouth every morning    Essential hypertension with goal blood pressure less than 140/90       insulin isophane human 100 UNIT/ML injection    HumuLIN N KWIKPEN    15 mL    Inject 10 Units Subcutaneous every morning With breakfast    Controlled type 2 diabetes mellitus without complication, without long-term current use of insulin (H)       insulin pen needle 32G X 4 MM    BD JEANNE U/F    100 each    Use once daily as directed.    Controlled type 2 diabetes mellitus without complication, without long-term current use of insulin (H)       losartan 100 MG tablet    COZAAR    90 tablet    Take 1 tablet (100 mg) by mouth daily    Essential hypertension with goal blood pressure less than 140/90       metFORMIN 500 MG 24 hr tablet    GLUCOPHAGE-XR    360 tablet    TAKE FOUR TABLETS BY MOUTH EVERY DAY WITH DINNER    Controlled type 2 diabetes mellitus without complication, without long-term current use of insulin (H)       predniSONE 10 MG tablet    DELTASONE    30 tablet    Take 4 tablets (40 mg) by mouth daily    Autoimmune hepatitis (H)       SINUS RINSE BOTTLE KIT NA      Spray 1 Bottle in nostril as needed.    Post-nasal drainage       STATIN NOT PRESCRIBED (INTENTIONAL)      Statin not prescribed intentionally due to Active  liver disease (liver failure, cirrhosis, hepatitis)        sulfamethoxazole-trimethoprim 800-160 MG per tablet    BACTRIM DS/SEPTRA DS    28 tablet    Take 1 tablet by mouth 2 times daily    Cellulitis of right upper extremity

## 2018-11-10 ENCOUNTER — RADIANT APPOINTMENT (OUTPATIENT)
Dept: ULTRASOUND IMAGING | Facility: CLINIC | Age: 53
End: 2018-11-10
Attending: INTERNAL MEDICINE
Payer: COMMERCIAL

## 2018-11-10 DIAGNOSIS — L03.113 CELLULITIS OF RIGHT UPPER EXTREMITY: ICD-10-CM

## 2018-11-10 DIAGNOSIS — M25.521 RIGHT ELBOW PAIN: ICD-10-CM

## 2018-11-10 DIAGNOSIS — I80.8 THROMBOPHLEBITIS ARM: ICD-10-CM

## 2018-11-10 PROCEDURE — 93971 EXTREMITY STUDY: CPT | Mod: RT

## 2018-11-12 DIAGNOSIS — K75.4 AUTOIMMUNE HEPATITIS (H): Primary | ICD-10-CM

## 2018-11-12 DIAGNOSIS — K75.4 AUTOIMMUNE HEPATITIS (H): ICD-10-CM

## 2018-11-12 LAB
ALBUMIN SERPL-MCNC: 3.3 G/DL (ref 3.4–5)
ALP SERPL-CCNC: 119 U/L (ref 40–150)
ALT SERPL W P-5'-P-CCNC: 85 U/L (ref 0–50)
AST SERPL W P-5'-P-CCNC: 39 U/L (ref 0–45)
BILIRUB DIRECT SERPL-MCNC: 0.6 MG/DL (ref 0–0.2)
BILIRUB SERPL-MCNC: 0.9 MG/DL (ref 0.2–1.3)
ERYTHROCYTE [DISTWIDTH] IN BLOOD BY AUTOMATED COUNT: 17.1 % (ref 10–15)
HCT VFR BLD AUTO: 39.4 % (ref 35–47)
HGB BLD-MCNC: 13.4 G/DL (ref 11.7–15.7)
LIPASE SERPL-CCNC: 338 U/L (ref 73–393)
MCH RBC QN AUTO: 32.4 PG (ref 26.5–33)
MCHC RBC AUTO-ENTMCNC: 34 G/DL (ref 31.5–36.5)
MCV RBC AUTO: 95 FL (ref 78–100)
PLATELET # BLD AUTO: 176 10E9/L (ref 150–450)
PROT SERPL-MCNC: 6.4 G/DL (ref 6.8–8.8)
RBC # BLD AUTO: 4.14 10E12/L (ref 3.8–5.2)
WBC # BLD AUTO: 9.5 10E9/L (ref 4–11)

## 2018-11-12 PROCEDURE — 83690 ASSAY OF LIPASE: CPT | Performed by: INTERNAL MEDICINE

## 2018-11-12 PROCEDURE — 85027 COMPLETE CBC AUTOMATED: CPT | Performed by: INTERNAL MEDICINE

## 2018-11-12 PROCEDURE — 80076 HEPATIC FUNCTION PANEL: CPT | Performed by: INTERNAL MEDICINE

## 2018-11-12 PROCEDURE — 36415 COLL VENOUS BLD VENIPUNCTURE: CPT | Performed by: INTERNAL MEDICINE

## 2018-11-12 NOTE — PROGRESS NOTES
Dear Mary,    Your recent test results are attached.      No blood clot noted.    If you have any questions please feel free to contact [phone] or myself via SweetPerkt.    Sincerely,  Trini Wray, CNP

## 2018-11-13 ENCOUNTER — MYC MEDICAL ADVICE (OUTPATIENT)
Dept: GASTROENTEROLOGY | Facility: CLINIC | Age: 53
End: 2018-11-13

## 2018-11-13 DIAGNOSIS — K75.4 AUTOIMMUNE HEPATITIS (H): ICD-10-CM

## 2018-11-14 DIAGNOSIS — K75.4 AUTOIMMUNE HEPATITIS (H): Primary | ICD-10-CM

## 2018-11-15 RX ORDER — PREDNISONE 10 MG/1
20 TABLET ORAL DAILY
Qty: 30 TABLET | Refills: 3 | Status: SHIPPED | OUTPATIENT
Start: 2018-11-15 | End: 2018-11-20

## 2018-11-15 NOTE — TELEPHONE ENCOUNTER
[facility] Call Center    Phone Message    May a detailed message be left on voicemail: yes    Reason for Call: Other:  [facility] is returning Talia's call from yesterday - please call them back as soon as possible      Action Taken: Message routed to:  [facility] ([facility]): [phone]

## 2018-11-19 ENCOUNTER — HEALTH MAINTENANCE LETTER (OUTPATIENT)
Age: 53
End: 2018-11-19

## 2018-11-19 DIAGNOSIS — K75.4 AUTOIMMUNE HEPATITIS (H): ICD-10-CM

## 2018-11-19 LAB
ALBUMIN SERPL-MCNC: 3.4 G/DL (ref 3.4–5)
ALP SERPL-CCNC: 134 U/L (ref 40–150)
ALT SERPL W P-5'-P-CCNC: 81 U/L (ref 0–50)
AST SERPL W P-5'-P-CCNC: 41 U/L (ref 0–45)
BILIRUB DIRECT SERPL-MCNC: 0.5 MG/DL (ref 0–0.2)
BILIRUB SERPL-MCNC: 0.7 MG/DL (ref 0.2–1.3)
ERYTHROCYTE [DISTWIDTH] IN BLOOD BY AUTOMATED COUNT: 16.8 % (ref 10–15)
HCT VFR BLD AUTO: 41.9 % (ref 35–47)
HGB BLD-MCNC: 14.3 G/DL (ref 11.7–15.7)
LIPASE SERPL-CCNC: 387 U/L (ref 73–393)
MCH RBC QN AUTO: 32.4 PG (ref 26.5–33)
MCHC RBC AUTO-ENTMCNC: 34.1 G/DL (ref 31.5–36.5)
MCV RBC AUTO: 95 FL (ref 78–100)
PLATELET # BLD AUTO: 218 10E9/L (ref 150–450)
PROT SERPL-MCNC: 6.7 G/DL (ref 6.8–8.8)
RBC # BLD AUTO: 4.41 10E12/L (ref 3.8–5.2)
WBC # BLD AUTO: 12.7 10E9/L (ref 4–11)

## 2018-11-19 PROCEDURE — 83690 ASSAY OF LIPASE: CPT | Performed by: INTERNAL MEDICINE

## 2018-11-19 PROCEDURE — 80076 HEPATIC FUNCTION PANEL: CPT | Performed by: INTERNAL MEDICINE

## 2018-11-19 PROCEDURE — 36415 COLL VENOUS BLD VENIPUNCTURE: CPT | Performed by: INTERNAL MEDICINE

## 2018-11-19 PROCEDURE — 85027 COMPLETE CBC AUTOMATED: CPT | Performed by: INTERNAL MEDICINE

## 2018-11-20 ENCOUNTER — TELEPHONE (OUTPATIENT)
Dept: GASTROENTEROLOGY | Facility: CLINIC | Age: 53
End: 2018-11-20

## 2018-11-20 DIAGNOSIS — K75.4 AUTOIMMUNE HEPATITIS (H): Primary | ICD-10-CM

## 2018-11-20 DIAGNOSIS — K75.4 AUTOIMMUNE HEPATITIS (H): ICD-10-CM

## 2018-11-20 RX ORDER — AZATHIOPRINE 50 MG/1
25 TABLET ORAL DAILY
Qty: 30 TABLET | Refills: 3 | Status: SHIPPED | OUTPATIENT
Start: 2018-11-20 | End: 2018-11-27 | Stop reason: DRUGHIGH

## 2018-11-20 RX ORDER — PREDNISONE 10 MG/1
15 TABLET ORAL DAILY
Qty: 30 TABLET | Refills: 3 | Status: SHIPPED | OUTPATIENT
Start: 2018-11-20 | End: 2018-12-04

## 2018-11-20 NOTE — TELEPHONE ENCOUNTER
Writer informed pt of new orders through my chart and Dr. Gutierrez's orders as follows: Labs continue to look better.   Start the Imuran at 25 mg po daily. Decrease the prednisone to 15 mg po daily.   Once week: LFTs, CBC and lipase.

## 2018-11-26 ENCOUNTER — HEALTH MAINTENANCE LETTER (OUTPATIENT)
Age: 53
End: 2018-11-26

## 2018-11-26 DIAGNOSIS — K75.4 AUTOIMMUNE HEPATITIS (H): ICD-10-CM

## 2018-11-26 LAB
ALBUMIN SERPL-MCNC: 3.3 G/DL (ref 3.4–5)
ALP SERPL-CCNC: 120 U/L (ref 40–150)
ALT SERPL W P-5'-P-CCNC: 63 U/L (ref 0–50)
AST SERPL W P-5'-P-CCNC: 33 U/L (ref 0–45)
BILIRUB DIRECT SERPL-MCNC: 0.3 MG/DL (ref 0–0.2)
BILIRUB SERPL-MCNC: 0.7 MG/DL (ref 0.2–1.3)
ERYTHROCYTE [DISTWIDTH] IN BLOOD BY AUTOMATED COUNT: 16.1 % (ref 10–15)
HCT VFR BLD AUTO: 42.3 % (ref 35–47)
HGB BLD-MCNC: 14.2 G/DL (ref 11.7–15.7)
LIPASE SERPL-CCNC: 365 U/L (ref 73–393)
MCH RBC QN AUTO: 32.4 PG (ref 26.5–33)
MCHC RBC AUTO-ENTMCNC: 33.6 G/DL (ref 31.5–36.5)
MCV RBC AUTO: 97 FL (ref 78–100)
PLATELET # BLD AUTO: 219 10E9/L (ref 150–450)
PROT SERPL-MCNC: 6.8 G/DL (ref 6.8–8.8)
RBC # BLD AUTO: 4.38 10E12/L (ref 3.8–5.2)
WBC # BLD AUTO: 12.5 10E9/L (ref 4–11)

## 2018-11-26 PROCEDURE — 83690 ASSAY OF LIPASE: CPT | Performed by: INTERNAL MEDICINE

## 2018-11-26 PROCEDURE — 36415 COLL VENOUS BLD VENIPUNCTURE: CPT | Performed by: INTERNAL MEDICINE

## 2018-11-26 PROCEDURE — 80076 HEPATIC FUNCTION PANEL: CPT | Performed by: INTERNAL MEDICINE

## 2018-11-26 PROCEDURE — 85027 COMPLETE CBC AUTOMATED: CPT | Performed by: INTERNAL MEDICINE

## 2018-11-27 ENCOUNTER — TELEPHONE (OUTPATIENT)
Dept: GASTROENTEROLOGY | Facility: CLINIC | Age: 53
End: 2018-11-27

## 2018-11-27 DIAGNOSIS — K75.4 AUTOIMMUNE HEPATITIS (H): Primary | ICD-10-CM

## 2018-11-27 NOTE — TELEPHONE ENCOUNTER
Instructed pt the following per Dr. Gutierrez: Blood tests are looking good.   Decrease prednisone to 10 mg po daily and increase Imuran to 25 mg po bid.   Liver tests and CBC in 1 week.  Pt able to repeat instructions.

## 2018-12-03 ENCOUNTER — MYC REFILL (OUTPATIENT)
Dept: FAMILY MEDICINE | Facility: CLINIC | Age: 53
End: 2018-12-03

## 2018-12-03 DIAGNOSIS — K75.4 AUTOIMMUNE HEPATITIS (H): ICD-10-CM

## 2018-12-03 DIAGNOSIS — R73.03 PREDIABETES: ICD-10-CM

## 2018-12-03 LAB
ALBUMIN SERPL-MCNC: 3.3 G/DL (ref 3.4–5)
ALP SERPL-CCNC: 106 U/L (ref 40–150)
ALT SERPL W P-5'-P-CCNC: 50 U/L (ref 0–50)
AST SERPL W P-5'-P-CCNC: 30 U/L (ref 0–45)
BILIRUB DIRECT SERPL-MCNC: 0.3 MG/DL (ref 0–0.2)
BILIRUB SERPL-MCNC: 0.7 MG/DL (ref 0.2–1.3)
ERYTHROCYTE [DISTWIDTH] IN BLOOD BY AUTOMATED COUNT: 15.3 % (ref 10–15)
HCT VFR BLD AUTO: 41.3 % (ref 35–47)
HGB BLD-MCNC: 13.7 G/DL (ref 11.7–15.7)
LIPASE SERPL-CCNC: 280 U/L (ref 73–393)
MCH RBC QN AUTO: 32.6 PG (ref 26.5–33)
MCHC RBC AUTO-ENTMCNC: 33.2 G/DL (ref 31.5–36.5)
MCV RBC AUTO: 98 FL (ref 78–100)
PLATELET # BLD AUTO: 195 10E9/L (ref 150–450)
PROT SERPL-MCNC: 6.7 G/DL (ref 6.8–8.8)
RBC # BLD AUTO: 4.2 10E12/L (ref 3.8–5.2)
WBC # BLD AUTO: 11.1 10E9/L (ref 4–11)

## 2018-12-03 PROCEDURE — 83690 ASSAY OF LIPASE: CPT | Performed by: INTERNAL MEDICINE

## 2018-12-03 PROCEDURE — 36415 COLL VENOUS BLD VENIPUNCTURE: CPT | Performed by: INTERNAL MEDICINE

## 2018-12-03 PROCEDURE — 80076 HEPATIC FUNCTION PANEL: CPT | Performed by: INTERNAL MEDICINE

## 2018-12-03 PROCEDURE — 85027 COMPLETE CBC AUTOMATED: CPT | Performed by: INTERNAL MEDICINE

## 2018-12-03 NOTE — TELEPHONE ENCOUNTER
Message from Jamgot:  Original authorizing provider: MD Mary Jefferson would like a refill of the following medications:  glucose blood VI test strips strip [Dipak Hooper MD]    Preferred pharmacy: [facility], [place] - [address]    Comment:  I am almost out of test strips I need to have a new script for these

## 2018-12-04 DIAGNOSIS — K75.4 AUTOIMMUNE HEPATITIS (H): ICD-10-CM

## 2018-12-04 RX ORDER — AZATHIOPRINE 50 MG/1
50 TABLET ORAL DAILY
Qty: 90 TABLET | Refills: 1 | Status: SHIPPED | OUTPATIENT
Start: 2018-12-04 | End: 2019-05-14

## 2018-12-04 RX ORDER — PREDNISONE 5 MG/1
5 TABLET ORAL DAILY
Qty: 90 TABLET | Refills: 1 | Status: SHIPPED | OUTPATIENT
Start: 2018-12-04 | End: 2018-12-24

## 2018-12-05 ENCOUNTER — TELEPHONE (OUTPATIENT)
Dept: INTERNAL MEDICINE | Facility: CLINIC | Age: 53
End: 2018-12-05

## 2018-12-05 DIAGNOSIS — E11.9 CONTROLLED TYPE 2 DIABETES MELLITUS WITHOUT COMPLICATION, WITHOUT LONG-TERM CURRENT USE OF INSULIN (H): ICD-10-CM

## 2018-12-05 DIAGNOSIS — E11.9 CONTROLLED TYPE 2 DIABETES MELLITUS WITHOUT COMPLICATION, WITHOUT LONG-TERM CURRENT USE OF INSULIN (H): Primary | ICD-10-CM

## 2018-12-05 RX ORDER — BLOOD-GLUCOSE CONTROL, NORMAL
EACH MISCELLANEOUS
Qty: 1 EACH | Refills: 11 | COMMUNITY
Start: 2018-12-05

## 2018-12-05 RX ORDER — BLOOD-GLUCOSE CONTROL, NORMAL
EACH MISCELLANEOUS
Qty: 1 EACH | Refills: 11 | Status: SHIPPED | OUTPATIENT
Start: 2018-12-05 | End: 2018-12-05

## 2018-12-05 NOTE — TELEPHONE ENCOUNTER
Per Samantha with [facility] pharmacy, patient is at the pharmacy  Patient is reporting that she has been checking BG more frequently, up to 6 times a day d/t being on prednisone and was advised that this can increase her BG so she should monitor BG more closely  Patient is running out of test strips and needs a new prescription for meter, strips, and lancets to be sent with increased testing frequency  Samantha does not think insurance will cover 6 times a day testing  Samantha suggested sending in supplies for TID testing    Sent  Signed Prescriptions:                        Disp   Refills    blood glucose monitoring (NO BRAND SPECIFI*300 st*11       Si strip by In Vitro route 3 times daily  Authorizing Provider: KATY GUILLEN  Ordering User: MICKI THAKKARA    blood glucose monitoring (NO BRAND SPECIFI*1 kit  0        Sig: Use to test blood sugar 3 times daily or as directed.  Authorizing Provider: KATY GUILLEN  Ordering User: NEYMAR THAKKARHDA    blood glucose (NO BRAND SPECIFIED) lancets*300 ea*11       Sig: Use to test blood sugar 3 times daily or as directed.  Authorizing Provider: KATY GUILLEN  Ordering User: [email or web address]    blood glucose calibration (NO BRAND SPECIF*1 each 11       Sig: Use to calibrate blood glucose monitor as directed.  Authorizing Provider: KATY GUILLEN  Ordering User: HARRY THAKKAR RN

## 2018-12-05 NOTE — TELEPHONE ENCOUNTER
The Freestyle Lite products are not covered by her insurance. Please send down new rx's for One Touch Ultra Meter, one touch delica lancets, and one toouch control solution.

## 2018-12-10 DIAGNOSIS — K75.4 AUTOIMMUNE HEPATITIS (H): ICD-10-CM

## 2018-12-10 LAB
ALBUMIN SERPL-MCNC: 3.4 G/DL (ref 3.4–5)
ALP SERPL-CCNC: 110 U/L (ref 40–150)
ALT SERPL W P-5'-P-CCNC: 47 U/L (ref 0–50)
AST SERPL W P-5'-P-CCNC: 32 U/L (ref 0–45)
BILIRUB DIRECT SERPL-MCNC: 0.3 MG/DL (ref 0–0.2)
BILIRUB SERPL-MCNC: 0.6 MG/DL (ref 0.2–1.3)
ERYTHROCYTE [DISTWIDTH] IN BLOOD BY AUTOMATED COUNT: 14.8 % (ref 10–15)
HCT VFR BLD AUTO: 41.8 % (ref 35–47)
HGB BLD-MCNC: 14.2 G/DL (ref 11.7–15.7)
LIPASE SERPL-CCNC: 305 U/L (ref 73–393)
MCH RBC QN AUTO: 32.8 PG (ref 26.5–33)
MCHC RBC AUTO-ENTMCNC: 34 G/DL (ref 31.5–36.5)
MCV RBC AUTO: 97 FL (ref 78–100)
PLATELET # BLD AUTO: 206 10E9/L (ref 150–450)
PROT SERPL-MCNC: 6.9 G/DL (ref 6.8–8.8)
RBC # BLD AUTO: 4.33 10E12/L (ref 3.8–5.2)
WBC # BLD AUTO: 11 10E9/L (ref 4–11)

## 2018-12-10 PROCEDURE — 36415 COLL VENOUS BLD VENIPUNCTURE: CPT | Performed by: INTERNAL MEDICINE

## 2018-12-10 PROCEDURE — 83690 ASSAY OF LIPASE: CPT | Performed by: INTERNAL MEDICINE

## 2018-12-10 PROCEDURE — 80076 HEPATIC FUNCTION PANEL: CPT | Performed by: INTERNAL MEDICINE

## 2018-12-10 PROCEDURE — 85027 COMPLETE CBC AUTOMATED: CPT | Performed by: INTERNAL MEDICINE

## 2018-12-11 DIAGNOSIS — R74.8 ELEVATED LIVER ENZYMES: Primary | ICD-10-CM

## 2018-12-24 ENCOUNTER — OFFICE VISIT (OUTPATIENT)
Dept: FAMILY MEDICINE | Facility: CLINIC | Age: 53
End: 2018-12-24
Payer: COMMERCIAL

## 2018-12-24 VITALS
BODY MASS INDEX: 44.02 KG/M2 | WEIGHT: 233 LBS | HEART RATE: 97 BPM | DIASTOLIC BLOOD PRESSURE: 72 MMHG | TEMPERATURE: 97.7 F | SYSTOLIC BLOOD PRESSURE: 122 MMHG | OXYGEN SATURATION: 98 % | RESPIRATION RATE: 18 BRPM

## 2018-12-24 DIAGNOSIS — R74.8 ELEVATED LIVER ENZYMES: ICD-10-CM

## 2018-12-24 DIAGNOSIS — E55.9 VITAMIN D DEFICIENCY: ICD-10-CM

## 2018-12-24 DIAGNOSIS — E78.5 HYPERLIPIDEMIA WITH TARGET LDL LESS THAN 100: ICD-10-CM

## 2018-12-24 DIAGNOSIS — E11.9 CONTROLLED TYPE 2 DIABETES MELLITUS WITHOUT COMPLICATION, WITHOUT LONG-TERM CURRENT USE OF INSULIN (H): Primary | ICD-10-CM

## 2018-12-24 DIAGNOSIS — I10 ESSENTIAL HYPERTENSION WITH GOAL BLOOD PRESSURE LESS THAN 140/90: ICD-10-CM

## 2018-12-24 LAB
ALBUMIN SERPL-MCNC: 3.4 G/DL (ref 3.4–5)
ALP SERPL-CCNC: 109 U/L (ref 40–150)
ALT SERPL W P-5'-P-CCNC: 36 U/L (ref 0–50)
ANION GAP SERPL CALCULATED.3IONS-SCNC: 11 MMOL/L (ref 3–14)
AST SERPL W P-5'-P-CCNC: 33 U/L (ref 0–45)
BILIRUB DIRECT SERPL-MCNC: 0.1 MG/DL (ref 0–0.2)
BILIRUB SERPL-MCNC: 0.4 MG/DL (ref 0.2–1.3)
BUN SERPL-MCNC: 8 MG/DL (ref 7–30)
CALCIUM SERPL-MCNC: 9.4 MG/DL (ref 8.5–10.1)
CHLORIDE SERPL-SCNC: 106 MMOL/L (ref 94–109)
CO2 SERPL-SCNC: 23 MMOL/L (ref 20–32)
CREAT SERPL-MCNC: 0.46 MG/DL (ref 0.52–1.04)
GFR SERPL CREATININE-BSD FRML MDRD: >90 ML/MIN/{1.73_M2}
GLUCOSE SERPL-MCNC: 143 MG/DL (ref 70–99)
HBA1C MFR BLD: 6.8 % (ref 0–5.6)
LIPASE SERPL-CCNC: 231 U/L (ref 73–393)
POTASSIUM SERPL-SCNC: 3.3 MMOL/L (ref 3.4–5.3)
PROT SERPL-MCNC: 7.4 G/DL (ref 6.8–8.8)
SODIUM SERPL-SCNC: 140 MMOL/L (ref 133–144)

## 2018-12-24 PROCEDURE — 83036 HEMOGLOBIN GLYCOSYLATED A1C: CPT | Performed by: INTERNAL MEDICINE

## 2018-12-24 PROCEDURE — 82306 VITAMIN D 25 HYDROXY: CPT | Performed by: INTERNAL MEDICINE

## 2018-12-24 PROCEDURE — 80048 BASIC METABOLIC PNL TOTAL CA: CPT | Performed by: INTERNAL MEDICINE

## 2018-12-24 PROCEDURE — 99214 OFFICE O/P EST MOD 30 MIN: CPT | Performed by: INTERNAL MEDICINE

## 2018-12-24 PROCEDURE — 80076 HEPATIC FUNCTION PANEL: CPT | Performed by: INTERNAL MEDICINE

## 2018-12-24 PROCEDURE — 36415 COLL VENOUS BLD VENIPUNCTURE: CPT | Performed by: INTERNAL MEDICINE

## 2018-12-24 PROCEDURE — 83690 ASSAY OF LIPASE: CPT | Performed by: INTERNAL MEDICINE

## 2018-12-24 RX ORDER — METFORMIN HCL 500 MG
TABLET, EXTENDED RELEASE 24 HR ORAL
Qty: 360 TABLET | Refills: 1 | Status: SHIPPED | OUTPATIENT
Start: 2018-12-24 | End: 2019-09-23

## 2018-12-24 RX ORDER — FELODIPINE 5 MG/1
5 TABLET, EXTENDED RELEASE ORAL DAILY
Qty: 90 TABLET | Refills: 1 | Status: SHIPPED | OUTPATIENT
Start: 2018-12-24 | End: 2019-06-24

## 2018-12-24 RX ORDER — LOSARTAN POTASSIUM 100 MG/1
100 TABLET ORAL DAILY
Qty: 90 TABLET | Refills: 1 | Status: SHIPPED | OUTPATIENT
Start: 2018-12-24 | End: 2019-06-24

## 2018-12-24 RX ORDER — HYDROCHLOROTHIAZIDE 12.5 MG/1
12.5 TABLET ORAL EVERY MORNING
Qty: 90 TABLET | Refills: 1 | Status: SHIPPED | OUTPATIENT
Start: 2018-12-24 | End: 2019-06-24

## 2018-12-24 NOTE — PROGRESS NOTES
"  SUBJECTIVE:   Mary Grigsby is a 53 year old female who presents to clinic today for the following health issues:    Diabetes Follow-up    Patient is checking blood sugars: daily    Diabetic concerns: None     Symptoms of hypoglycemia (low blood sugar): none     Paresthesias (numbness or burning in feet) or sores: No    Due for diabetic eye exam.     Sugars have been high while on Prednisone, she was on this for 2.5 months. She stopped the Trulicity after the problem with her pancreas occurred, worry that it could cause problems with her pancreas again. She takes her Metformin 2 tablets in the morning and 2 at night, this seems to be keeping her sugars more \"even-keel\" throughout the day than taking all 4 at night. She is not taking the insulin, the [facility] pharmacist told her she didn't need to take it as long as her sugars are good throughout the day.     Diabetes Management Resources    Hyperlipidemia Follow-Up      Rate your low fat/cholesterol diet?: good    Taking statin?  No    Other lipid medications/supplements?:  none    Hypertension Follow-up      Outpatient blood pressures are not being checked.    Low Salt Diet: no added salt    BP Readings from Last 2 Encounters:   12/24/18 122/72   11/09/18 122/64     Hemoglobin A1C (%)   Date Value   12/24/2018 6.8 (H)   10/02/2018 7.9 (H)     LDL Cholesterol Calculated (mg/dL)   Date Value   10/05/2018 33   06/15/2018 105 (H)     Problem list and histories reviewed & adjusted, as indicated.  Additional history: as documented    Patient Active Problem List   Diagnosis     Hypertension goal BP (blood pressure) < 140/90     Ex-smoker     Family history of diabetes mellitus     Hyperlipidemia with target LDL less than 100     Seasonal allergic rhinitis     Rhinitis, allergic to other allergen     House dust mite allergy     obesity     Severe dysplasia of cervix (GUSTABO III)     Family history of thyroid disease     Cervical lymphadenopathy     Controlled type 2 diabetes " mellitus without complication, without long-term current use of insulin (H)     Bilateral lower extremity edema     Hypovitaminosis D     Pancreatitis     Autoimmune hepatitis (H)     Past Surgical History:   Procedure Laterality Date     CRYOTHERAPY  3/30/2011    cervical     ESOPHAGOSCOPY, GASTROSCOPY, DUODENOSCOPY (EGD), COMBINED N/A 10/4/2018    Procedure: COMBINED ENDOSCOPIC ULTRASOUND, ESOPHAGOSCOPY, GASTROSCOPY, DUODENOSCOPY (EGD);  EUS;  Surgeon: John Carrera MD;  Location: [facility]     HC COLP CERVIX/UPPER VAGINA W BX CERVIX  2010, 3/2011    neg dysplasia, GUSTABO 1-2     HC REPAIR OF NASAL SEPTUM  2/3/09-per Dr. Harley    antrostomy, ethmoidectomy, septoplasty and turbinate reduction     LAPAROSCOPIC ASSISTED HYSTERECTOMY VAGINAL  3/12/12    [facility]     TONSILLECTOMY & ADENOIDECTOMY  1973    age 8       Social History     Tobacco Use     Smoking status: Former Smoker     Packs/day: 0.50     Years: 20.00     Pack years: 10.00     Types: Cigarettes     Start date: 1980     Last attempt to quit: 2013     Years since quittin.8     Smokeless tobacco: Never Used     Tobacco comment: still has occasionally    Substance Use Topics     Alcohol use: No     Family History   Problem Relation Age of Onset     Cancer Father      Other Cancer Father         passed      Hypertension Father         age 72     Hypertension Sister         age 35     Thyroid Disease Sister      Cerebrovascular Disease Maternal Grandmother      Asthma Son      Thyroid Disease Sister      Thyroid Disease Sister      Thyroid Disease Sister      Diabetes Mother      Hypertension Mother         age 34     Hypertension Sister      Thyroid Disease Sister      Asthma Son      Glaucoma No family hx of      Macular Degeneration No family hx of          Current Outpatient Medications   Medication Sig Dispense Refill     aspirin 81 MG tablet Take 1 tablet (81 mg) by mouth daily Indication: primary prevention for heart  disease 1 tablet 3     azaTHIOprine (IMURAN) 50 MG tablet Take 1 tablet (50 mg) by mouth daily 90 tablet 1     AZATHIOPRINE PO Take 25 mg by mouth 2 times daily       blood glucose (NO BRAND SPECIFIED) lancets standard Use to test blood sugar 3 times daily or as directed. 300 each 11     blood glucose calibration (NO BRAND SPECIFIED) solution Use to calibrate blood glucose monitor as directed. 1 each 11     blood glucose monitoring (NO BRAND SPECIFIED) meter device kit Use to test blood sugar 3 times daily or as directed. 1 kit 0     blood glucose monitoring (NO BRAND SPECIFIED) test strip 1 strip by In Vitro route 3 times daily 300 strip 11     cholecalciferol (VITAMIN D3) 1000 UNIT tablet Take 3 tablets (3,000 Units) by mouth daily (Patient taking differently: Take 3,000 Units by mouth every other day )       felodipine ER (PLENDIL) 5 MG 24 hr tablet Take 1 tablet (5 mg) by mouth daily 90 tablet 1     Fexofenadine HCl (ALLEGRA PO) Take 90 mg by mouth daily as needed        FREESTYLE LANCETS MISC 1 Device 2 times daily. 100 each 11     hydrochlorothiazide 12.5 MG TABS tablet Take 1 tablet (12.5 mg) by mouth every morning 90 tablet 1     Hypertonic Nasal Wash (SINUS RINSE BOTTLE KIT NA) Spray 1 Bottle in nostril as needed.       losartan (COZAAR) 100 MG tablet Take 1 tablet (100 mg) by mouth daily 90 tablet 1     metFORMIN (GLUCOPHAGE-XR) 500 MG 24 hr tablet TAKE FOUR TABLETS BY MOUTH EVERY DAY WITH DINNER 360 tablet 1     STATIN NOT PRESCRIBED, INTENTIONAL, Statin not prescribed intentionally due to Active liver disease (liver failure, cirrhosis, hepatitis)       sulfamethoxazole-trimethoprim (BACTRIM DS/SEPTRA DS) 800-160 MG per tablet Take 1 tablet by mouth 2 times daily 28 tablet 3     insulin isophane human (HUMULIN N KWIKPEN) 100 UNIT/ML injection Inject 10 Units Subcutaneous every morning With breakfast (Patient not taking: Reported on 12/24/2018) 15 mL 0     insulin pen needle (BD JEANNE U/F) 32G X 4 MM Use  once daily as directed. (Patient not taking: Reported on 12/24/2018) 100 each 3     Labs reviewed in EPIC    Reviewed and updated as needed this visit by clinical staff  Tobacco  Allergies  Meds  Med Hx  Surg Hx  Fam Hx  Soc Hx      Reviewed and updated as needed this visit by Provider       ROS:  Constitutional, HEENT, cardiovascular, pulmonary, GI, , musculoskeletal, neuro, skin, endocrine and psych systems are negative, except as otherwise noted.    This document serves as a record of the services and decisions personally performed and made by Dipak Hooper MD. It was created on his behalf by Bisi Beverly, a trained medical scribe. The creation of this document is based on the provider's statements to the medical scribe.  Bisi Beverly December 24, 2018 8:46 AM    OBJECTIVE:     /72   Pulse 97   Temp 97.7  F (36.5  C) (Oral)   Resp 18   Wt 105.7 kg (233 lb)   LMP 06/24/2011   SpO2 98%   BMI 44.02 kg/m    Body mass index is 44.02 kg/m .  GENERAL: healthy, alert and no distress, obese  EYES: Eyes grossly normal to inspection, sclerae white and conjunctivae normal  MS: no gross musculoskeletal defects noted, no edema  SKIN: no suspicious lesions or rashes to visible skin  PSYCH: mentation appears normal, affect normal/bright    Diagnostic Test Results:  Results for orders placed or performed in visit on 12/24/18 (from the past 24 hour(s))   Hemoglobin A1c   Result Value Ref Range    Hemoglobin A1C 6.8 (H) 0 - 5.6 %       ASSESSMENT/PLAN:     1. Controlled type 2 diabetes mellitus without complication, without long-term current use of insulin (H)  Well controlled A1c results despite recent prednisone use.  Follow-up in 6 months for diabetes check. Have A1c drawn in 3 months.   - Hemoglobin A1c  - Basic metabolic panel  (Ca, Cl, CO2, Creat, Gluc, K, Na, BUN)  - metFORMIN (GLUCOPHAGE-XR) 500 MG 24 hr tablet; TAKE FOUR TABLETS BY MOUTH EVERY DAY WITH DINNER  Dispense: 360 tablet; Refill: 1  - **A1C  FUTURE anytime; Future    2. Vitamin D deficiency    - Vitamin D Deficiency    3. Elevated liver enzymes    - Hepatic panel  - Lipase    4. Essential hypertension with goal blood pressure less than 140/90  Well controlled on current medications and treatment plan. No change at this time.   - hydrochlorothiazide (HYDRODIURIL) 12.5 MG tablet; Take 1 tablet (12.5 mg) by mouth every morning  Dispense: 90 tablet; Refill: 1  - losartan (COZAAR) 100 MG tablet; Take 1 tablet (100 mg) by mouth daily  Dispense: 90 tablet; Refill: 1  - felodipine ER (PLENDIL) 5 MG 24 hr tablet; Take 1 tablet (5 mg) by mouth daily  Dispense: 90 tablet; Refill: 1    5. Hyperlipidemia with target LDL less than 100    - STATIN NOT PRESCRIBED (INTENTIONAL); Statin not prescribed intentionally due to Active liver disease (liver failure, cirrhosis, hepatitis)      The information in this document, created by the medical scribe for me, accurately reflects the services I personally performed and the decisions made by me. I have reviewed and approved this document for accuracy.     Dipak Hooper MD  [facility]

## 2018-12-24 NOTE — LETTER
December 31, 2018       TO: Mary Grigsby  [address]  [place] [address]       Dear Ms. Grigsby,    We are writing to inform you of your test results.  LIver tests look great. Continue Imuran 50 mg po daily. In 1 month: check LFTs and CBC.     Resulted Orders   Hepatic panel   Result Value Ref Range    Bilirubin Direct 0.1 0.0 - 0.2 mg/dL    Bilirubin Total 0.4 0.2 - 1.3 mg/dL    Albumin 3.4 3.4 - 5.0 g/dL    Protein Total 7.4 6.8 - 8.8 g/dL    Alkaline Phosphatase 109 40 - 150 U/L    ALT 36 0 - 50 U/L    AST 33 0 - 45 U/L         It was a pleasure to see you at your recent visit. Please let me know if you have any questions or concerns.     Clinic Staff - [phone]     Sincerely,     Melissa Gutierrez MD  [address] [place] [address] [address] [place] [id number]

## 2018-12-26 LAB — DEPRECATED CALCIDIOL+CALCIFEROL SERPL-MC: 29 UG/L (ref 20–75)

## 2018-12-26 NOTE — RESULT ENCOUNTER NOTE
Dear Mary,    Your recent test results are attached.      Normal Vitamin D.    If you have any questions please feel free to contact [phone] or myself via [email or web address].    Sincerely,  Trini Wray, CNP

## 2018-12-31 DIAGNOSIS — K75.4 AUTOIMMUNE HEPATITIS (H): Primary | ICD-10-CM

## 2019-01-21 DIAGNOSIS — K75.4 AUTOIMMUNE HEPATITIS (H): ICD-10-CM

## 2019-01-21 LAB
ALBUMIN SERPL-MCNC: 3.6 G/DL (ref 3.4–5)
ALP SERPL-CCNC: 95 U/L (ref 40–150)
ALT SERPL W P-5'-P-CCNC: 43 U/L (ref 0–50)
AST SERPL W P-5'-P-CCNC: 36 U/L (ref 0–45)
BILIRUB DIRECT SERPL-MCNC: 0.1 MG/DL (ref 0–0.2)
BILIRUB SERPL-MCNC: 0.8 MG/DL (ref 0.2–1.3)
ERYTHROCYTE [DISTWIDTH] IN BLOOD BY AUTOMATED COUNT: 13.1 % (ref 10–15)
HCT VFR BLD AUTO: 45.2 % (ref 35–47)
HGB BLD-MCNC: 15.5 G/DL (ref 11.7–15.7)
MCH RBC QN AUTO: 32.4 PG (ref 26.5–33)
MCHC RBC AUTO-ENTMCNC: 34.3 G/DL (ref 31.5–36.5)
MCV RBC AUTO: 94 FL (ref 78–100)
PLATELET # BLD AUTO: 253 10E9/L (ref 150–450)
PROT SERPL-MCNC: 7.8 G/DL (ref 6.8–8.8)
RBC # BLD AUTO: 4.79 10E12/L (ref 3.8–5.2)
WBC # BLD AUTO: 10.1 10E9/L (ref 4–11)

## 2019-01-21 PROCEDURE — 80076 HEPATIC FUNCTION PANEL: CPT | Performed by: INTERNAL MEDICINE

## 2019-01-21 PROCEDURE — 85027 COMPLETE CBC AUTOMATED: CPT | Performed by: INTERNAL MEDICINE

## 2019-01-21 PROCEDURE — 36415 COLL VENOUS BLD VENIPUNCTURE: CPT | Performed by: INTERNAL MEDICINE

## 2019-01-22 DIAGNOSIS — K75.4 AUTOIMMUNE HEPATITIS (H): Primary | ICD-10-CM

## 2019-03-13 ENCOUNTER — TELEPHONE (OUTPATIENT)
Dept: FAMILY MEDICINE | Facility: CLINIC | Age: 54
End: 2019-03-13

## 2019-03-13 DIAGNOSIS — D06.9 SEVERE DYSPLASIA OF CERVIX (CIN III): ICD-10-CM

## 2019-03-13 NOTE — TELEPHONE ENCOUNTER
Pt is past due for f/u pap smear if declining recommended colposcopy.  Spoke with pt, states she will call to schedule.  Conchita Summers,    Pap Tracking

## 2019-03-25 DIAGNOSIS — E11.9 CONTROLLED TYPE 2 DIABETES MELLITUS WITHOUT COMPLICATION, WITHOUT LONG-TERM CURRENT USE OF INSULIN (H): ICD-10-CM

## 2019-03-25 DIAGNOSIS — K75.4 AUTOIMMUNE HEPATITIS (H): ICD-10-CM

## 2019-03-25 LAB
ALBUMIN SERPL-MCNC: 3.6 G/DL (ref 3.4–5)
ALP SERPL-CCNC: 103 U/L (ref 40–150)
ALT SERPL W P-5'-P-CCNC: 47 U/L (ref 0–50)
AST SERPL W P-5'-P-CCNC: 32 U/L (ref 0–45)
BILIRUB DIRECT SERPL-MCNC: 0.2 MG/DL (ref 0–0.2)
BILIRUB SERPL-MCNC: 0.5 MG/DL (ref 0.2–1.3)
ERYTHROCYTE [DISTWIDTH] IN BLOOD BY AUTOMATED COUNT: 13.5 % (ref 10–15)
HBA1C MFR BLD: 6.7 % (ref 0–5.6)
HCT VFR BLD AUTO: 42.8 % (ref 35–47)
HGB BLD-MCNC: 14.8 G/DL (ref 11.7–15.7)
MCH RBC QN AUTO: 31.6 PG (ref 26.5–33)
MCHC RBC AUTO-ENTMCNC: 34.6 G/DL (ref 31.5–36.5)
MCV RBC AUTO: 91 FL (ref 78–100)
PLATELET # BLD AUTO: 233 10E9/L (ref 150–450)
PROT SERPL-MCNC: 7.5 G/DL (ref 6.8–8.8)
RBC # BLD AUTO: 4.69 10E12/L (ref 3.8–5.2)
WBC # BLD AUTO: 10.1 10E9/L (ref 4–11)

## 2019-03-25 PROCEDURE — 80076 HEPATIC FUNCTION PANEL: CPT | Performed by: INTERNAL MEDICINE

## 2019-03-25 PROCEDURE — 36415 COLL VENOUS BLD VENIPUNCTURE: CPT | Performed by: INTERNAL MEDICINE

## 2019-03-25 PROCEDURE — 85027 COMPLETE CBC AUTOMATED: CPT | Performed by: INTERNAL MEDICINE

## 2019-03-25 PROCEDURE — 83036 HEMOGLOBIN GLYCOSYLATED A1C: CPT | Performed by: INTERNAL MEDICINE

## 2019-04-19 ENCOUNTER — HEALTH MAINTENANCE LETTER (OUTPATIENT)
Age: 54
End: 2019-04-19

## 2019-05-14 ENCOUNTER — OFFICE VISIT (OUTPATIENT)
Dept: GASTROENTEROLOGY | Facility: CLINIC | Age: 54
End: 2019-05-14
Attending: INTERNAL MEDICINE
Payer: COMMERCIAL

## 2019-05-14 VITALS
RESPIRATION RATE: 18 BRPM | OXYGEN SATURATION: 97 % | DIASTOLIC BLOOD PRESSURE: 79 MMHG | BODY MASS INDEX: 41.8 KG/M2 | HEIGHT: 61 IN | HEART RATE: 83 BPM | TEMPERATURE: 98.3 F | SYSTOLIC BLOOD PRESSURE: 134 MMHG | WEIGHT: 221.4 LBS

## 2019-05-14 DIAGNOSIS — K75.4 AUTOIMMUNE HEPATITIS (H): ICD-10-CM

## 2019-05-14 PROCEDURE — G0463 HOSPITAL OUTPT CLINIC VISIT: HCPCS | Mod: ZF

## 2019-05-14 RX ORDER — AZATHIOPRINE 50 MG/1
50 TABLET ORAL DAILY
Qty: 90 TABLET | Refills: 3 | Status: SHIPPED | OUTPATIENT
Start: 2019-05-14 | End: 2019-07-11

## 2019-05-14 ASSESSMENT — PAIN SCALES - GENERAL: PAINLEVEL: NO PAIN (0)

## 2019-05-14 ASSESSMENT — MIFFLIN-ST. JEOR: SCORE: 1546.64

## 2019-05-14 NOTE — NURSING NOTE
"Chief Complaint   Patient presents with     RECHECK     Autoimmune hepatitis       Vital signs:  Temp: 98.3  F (36.8  C) Temp src: Oral BP: 134/79 Pulse: 83   Resp: 18 SpO2: 97 %     Height: 154.9 cm (5' 1\") Weight: 100.4 kg (221 lb 6.4 oz)  Estimated body mass index is 41.83 kg/m  as calculated from the following:    Height as of this encounter: 1.549 m (5' 1\").    Weight as of this encounter: 100.4 kg (221 lb 6.4 oz).          Trini Reed [facility]  5/14/2019 11:12 AM      "

## 2019-05-14 NOTE — LETTER
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  Doing well on azathioprine 50 mg daily. Renewed.  Discussed bx, dx, prognosis, follow up plan and medication including side effects.   Fibrosis scan correlates with biopsy which showed state 3-4. Expect reduction with AIH treatment.    Continue every 3 month labs.     RTC 6 months and US at that time.     Melissa Gutierrez MD  Hepatology/Liver Transplant  Medical Director, Liver Transplantation  [facility]  ========================================================================  S:  53 year old you female with AIH, diagnosed in October 2018. She was hospitalized 10/3-10/5/18 at [facility].  Diagnosis was made when she had elevated liver tests. Bili got up to 12 and ALT up to 700. She is on azathioprine 50 mg daily.  Last labs in March were normal    She asks if she can be on Metformin.     Lab Test 03/25/19  0713   PROTTOTAL 7.5   ALBUMIN 3.6   BILITOTAL 0.5   ALKPHOS 103   AST 32   ALT 47       Lab Test 03/25/19  0713   WBC 10.1   RBC 4.69   HGB 14.8   HCT 42.8   MCV 91   MCH 31.6   MCHC 34.6   RDW 13.5               She was also diagnosed with pancreatitis at the time, unclear etiology. EUS was negative for gallstones. Pancreatic parenchyma had likely fatty infiltration.  Pain resolved after 24 hours and lipase normalized within a few weeks.     She was started on prednisone 40 mg when her biopsy came back and we weaned it off over several weeks.      She does not drink alcohol. She is working on losing weight.     CT 10/3/18   1. Diffuse hepatic steatosis.  2. Edematous pancreatic head, consistent with pancreatitis given  elevated lipase.  3. Cholelithiasis not well appreciated but better defined on  ultrasound.  4. Ascites in the pelvis and about the liver.  5. Consider  "MRCP for further evaluation.     LIVER, NEEDLE BIOPSY 10/9/18:   - Active chronic hepatitis with features most suggestive of autoimmune   hepatitis   - Can not rule out stage 3-4 fibrosis   - See microscopic description      Doing well.  No new health problems. No new family history. No F/N/V/D/abd pain. Getting blood sugars under control with insulin.     SHx: Works full time at a [employer]. Here with her daughter.      O:  /79 (BP Location: Right arm, Patient Position: Sitting, Cuff Size: Adult Large)   Pulse 83   Temp 98.3  F (36.8  C) (Oral)   Resp 18   Ht 1.549 m (5' 1\")   Wt 100.4 kg (221 lb 6.4 oz)   LMP 06/24/2011   SpO2 97%   BMI 41.83 kg/m     Gen: No acute distress  Head: Normocephalic atraumatic  Eyes: Sclera anicteric  Neck: No cervical lymphadenopathy  Respiratory: Clear to auscultation bilaterally, no overt wheezing or rales  CV: RRR   Abdomen:  Soft, nontender, nondistended, normal bowel sounds  Extrem: No edema  Skin: No jaundice, spider angiomata or palmar erythema.  No rashes.   Neuro: Alert and oriented. No asterixis.    MSK: Grossly Intact  Psych: Normal speech, normal affect          Melissa Gutierrez MD      "

## 2019-05-14 NOTE — LETTER
5/14/2019       RE: Mary Grigsby  [address]  [place] [address]     Dear Colleague,    Thank you for referring your patient, Mary Grigsby, to the [facility] at [facility]. Please see a copy of my visit note below.    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  Doing well on azathioprine 50 mg daily. Renewed.  Discussed bx, dx, prognosis, follow up plan and medication including side effects.   Fibrosis scan correlates with biopsy which showed state 3-4. Expect reduction with AIH treatment.    Continue every 3 month labs.     RTC 6 months and US at that time.     Melissa Gutierrez MD  Hepatology/Liver Transplant  Medical Director, Liver Transplantation  [facility]  ========================================================================  S:  53 year old you female with AIH, diagnosed in October 2018. She was hospitalized 10/3-10/5/18 at [facility].  Diagnosis was made when she had elevated liver tests. Bili got up to 12 and ALT up to 700. She is on azathioprine 50 mg daily.  Last labs in March were normal    She asks if she can be on Metformin.     Lab Test 03/25/19  0713   PROTTOTAL 7.5   ALBUMIN 3.6   BILITOTAL 0.5   ALKPHOS 103   AST 32   ALT 47       Lab Test 03/25/19  0713   WBC 10.1   RBC 4.69   HGB 14.8   HCT 42.8   MCV 91   MCH 31.6   MCHC 34.6   RDW 13.5               She was also diagnosed with pancreatitis at the time, unclear etiology. EUS was negative for gallstones. Pancreatic parenchyma had likely fatty infiltration.  Pain resolved after 24 hours and lipase normalized within a few weeks.     She was started on prednisone 40 mg when her biopsy came back and we weaned it off over several weeks.      She does not drink alcohol. She is working on losing weight.     CT 10/3/18   1. Diffuse hepatic steatosis.  2. Edematous  "pancreatic head, consistent with pancreatitis given  elevated lipase.  3. Cholelithiasis not well appreciated but better defined on  ultrasound.  4. Ascites in the pelvis and about the liver.  5. Consider MRCP for further evaluation.     LIVER, NEEDLE BIOPSY 10/9/18:   - Active chronic hepatitis with features most suggestive of autoimmune   hepatitis   - Can not rule out stage 3-4 fibrosis   - See microscopic description      Doing well.  No new health problems. No new family history. No F/N/V/D/abd pain. Getting blood sugars under control with insulin.     SHx: Works full time at a [employer]. Here with her daughter.      O:  /79 (BP Location: Right arm, Patient Position: Sitting, Cuff Size: Adult Large)   Pulse 83   Temp 98.3  F (36.8  C) (Oral)   Resp 18   Ht 1.549 m (5' 1\")   Wt 100.4 kg (221 lb 6.4 oz)   LMP 06/24/2011   SpO2 97%   BMI 41.83 kg/m     Gen: No acute distress  Head: Normocephalic atraumatic  Eyes: Sclera anicteric  Neck: No cervical lymphadenopathy  Respiratory: Clear to auscultation bilaterally, no overt wheezing or rales  CV: RRR   Abdomen:  Soft, nontender, nondistended, normal bowel sounds  Extrem: No edema  Skin: No jaundice, spider angiomata or palmar erythema.  No rashes.   Neuro: Alert and oriented. No asterixis.    MSK: Grossly Intact  Psych: Normal speech, normal affect          Again, thank you for allowing me to participate in the care of your patient.      Sincerely,    Melissa Guteirrez MD      " Information could not be obtained

## 2019-05-14 NOTE — PROGRESS NOTES
[facility] follow up      A/P  53 year old yo female with AIH with stage 3-4 fibrosis on bx and fibrosis scan.  Doing well on azathioprine 50 mg daily. Renewed.  Discussed bx, dx, prognosis, follow up plan and medication including side effects.   Fibrosis scan correlates with biopsy which showed state 3-4. Expect reduction with AIH treatment.    Continue every 3 month labs.     RTC 6 months and US at that time.     Melissa Gutierrez MD  Hepatology/Liver Transplant  Medical Director, Liver Transplantation  [facility]  ========================================================================  S:  53 year old you female with AIH, diagnosed in October 2018. She was hospitalized 10/3-10/5/18 at [facility].  Diagnosis was made when she had elevated liver tests. Bili got up to 12 and ALT up to 700. She is on azathioprine 50 mg daily.  Last labs in March were normal    She asks if she can be on Metformin.     Lab Test 03/25/19  0713   PROTTOTAL 7.5   ALBUMIN 3.6   BILITOTAL 0.5   ALKPHOS 103   AST 32   ALT 47       Lab Test 03/25/19  0713   WBC 10.1   RBC 4.69   HGB 14.8   HCT 42.8   MCV 91   MCH 31.6   MCHC 34.6   RDW 13.5               She was also diagnosed with pancreatitis at the time, unclear etiology. EUS was negative for gallstones. Pancreatic parenchyma had likely fatty infiltration.  Pain resolved after 24 hours and lipase normalized within a few weeks.     She was started on prednisone 40 mg when her biopsy came back and we weaned it off over several weeks.      She does not drink alcohol. She is working on losing weight.     CT 10/3/18   1. Diffuse hepatic steatosis.  2. Edematous pancreatic head, consistent with pancreatitis given  elevated lipase.  3. Cholelithiasis not well appreciated but better defined on  ultrasound.  4. Ascites in the pelvis and about the liver.  5. Consider MRCP for further evaluation.     LIVER, NEEDLE BIOPSY 10/9/18:   - Active chronic hepatitis  "with features most suggestive of autoimmune   hepatitis   - Can not rule out stage 3-4 fibrosis   - See microscopic description      Doing well.  No new health problems. No new family history. No F/N/V/D/abd pain. Getting blood sugars under control with insulin.     SHx: Works full time at a [employer]. Here with her daughter.      O:  /79 (BP Location: Right arm, Patient Position: Sitting, Cuff Size: Adult Large)   Pulse 83   Temp 98.3  F (36.8  C) (Oral)   Resp 18   Ht 1.549 m (5' 1\")   Wt 100.4 kg (221 lb 6.4 oz)   LMP 06/24/2011   SpO2 97%   BMI 41.83 kg/m    Gen: No acute distress  Head: Normocephalic atraumatic  Eyes: Sclera anicteric  Neck: No cervical lymphadenopathy  Respiratory: Clear to auscultation bilaterally, no overt wheezing or rales  CV: RRR   Abdomen:  Soft, nontender, nondistended, normal bowel sounds  Extrem: No edema  Skin: No jaundice, spider angiomata or palmar erythema.  No rashes.   Neuro: Alert and oriented. No asterixis.    MSK: Grossly Intact  Psych: Normal speech, normal affect        "

## 2019-05-14 NOTE — PATIENT INSTRUCTIONS
Preventive Care:    Colorectal Cancer Screening: During our visit today, we discussed that it is recommended you receive colorectal cancer screening. Please call or make an appointment with your primary care provider to discuss this. You may also call the [facility] scheduling line ([phone]) to set up a colonoscopy appointment.    Diabetic Eye Exam Screening: During our visit today, we discussed that it is recommended you receive diabetic eye exam screening. Please call or make an appointment with your primary care provider to discuss this with them. You may also call the  [facility] scheduling line ([phone]) to set up an eye exam at one of the [facility].

## 2019-06-24 DIAGNOSIS — I10 ESSENTIAL HYPERTENSION WITH GOAL BLOOD PRESSURE LESS THAN 140/90: ICD-10-CM

## 2019-06-25 RX ORDER — FELODIPINE 5 MG/1
TABLET, EXTENDED RELEASE ORAL
Qty: 90 TABLET | Refills: 0 | Status: SHIPPED | OUTPATIENT
Start: 2019-06-25 | End: 2019-09-24

## 2019-06-25 RX ORDER — LOSARTAN POTASSIUM 100 MG/1
TABLET ORAL
Qty: 90 TABLET | Refills: 0 | Status: SHIPPED | OUTPATIENT
Start: 2019-06-25 | End: 2019-09-24

## 2019-06-25 RX ORDER — HYDROCHLOROTHIAZIDE 12.5 MG/1
TABLET ORAL
Qty: 90 TABLET | Refills: 0 | Status: SHIPPED | OUTPATIENT
Start: 2019-06-25 | End: 2019-09-24

## 2019-06-25 NOTE — TELEPHONE ENCOUNTER
"Prescription approved per [facility] Refill Protocol.    Requested Prescriptions   Signed Prescriptions Disp Refills    losartan (COZAAR) 100 MG tablet 90 tablet 0     Sig: TAKE ONE TABLET BY MOUTH ONCE DAILY       Angiotensin-II Receptors Failed - 6/24/2019  8:35 AM        Failed - Normal serum creatinine on file in past 12 months     Recent Labs   Lab Test 12/24/18  0823   CR 0.46*             Failed - Normal serum potassium on file in past 12 months     Recent Labs   Lab Test 12/24/18  0823   POTASSIUM 3.3*                    Passed - Blood pressure under 140/90 in past 12 months     BP Readings from Last 3 Encounters:   05/14/19 134/79   12/24/18 122/72   11/09/18 122/64                 Passed - Recent (12 mo) or future (30 days) visit within the authorizing provider's specialty     Patient had office visit in the last 12 months or has a visit in the next 30 days with authorizing provider or within the authorizing provider's specialty.  See \"Patient Info\" tab in inbasket, or \"Choose Columns\" in Meds & Orders section of the refill encounter.              Passed - Medication is active on med list        Passed - Patient is age 18 or older        Passed - No active pregnancy on record        Passed - No positive pregnancy test in past 12 months       hydrochlorothiazide (HYDRODIURIL) 12.5 MG tablet 90 tablet 0     Sig: TAKE ONE TABLET BY MOUTH EVERY MORNING       Diuretics (Including Combos) Protocol Failed - 6/24/2019  8:35 AM        Failed - Normal serum creatinine on file in past 12 months     Recent Labs   Lab Test 12/24/18  0823   CR 0.46*              Failed - Normal serum potassium on file in past 12 months     Recent Labs   Lab Test 12/24/18  0823   POTASSIUM 3.3*                    Passed - Blood pressure under 140/90 in past 12 months     BP Readings from Last 3 Encounters:   05/14/19 134/79   12/24/18 122/72   11/09/18 122/64                 Passed - Recent (12 mo) or future (30 days) visit within the " "authorizing provider's specialty     Patient had office visit in the last 12 months or has a visit in the next 30 days with authorizing provider or within the authorizing provider's specialty.  See \"Patient Info\" tab in inbasket, or \"Choose Columns\" in Meds & Orders section of the refill encounter.              Passed - Medication is active on med list        Passed - Patient is age 18 or older        Passed - No active pregancy on record        Passed - Normal serum sodium on file in past 12 months     Recent Labs   Lab Test 12/24/18  0823                 Passed - No positive pregnancy test in past 12 months       felodipine ER (PLENDIL) 5 MG 24 hr tablet 90 tablet 0     Sig: TAKE ONE TABLET BY MOUTH ONCE DAILY       Calcium Channel Blockers Protocol  Failed - 6/24/2019  8:35 AM        Failed - Normal serum creatinine on file in past 12 months     Recent Labs   Lab Test 12/24/18  0823   CR 0.46*             Passed - Blood pressure under 140/90 in past 12 months     BP Readings from Last 3 Encounters:   05/14/19 134/79   12/24/18 122/72   11/09/18 122/64                 Passed - Recent (12 mo) or future (30 days) visit within the authorizing provider's specialty     Patient had office visit in the last 12 months or has a visit in the next 30 days with authorizing provider or within the authorizing provider's specialty.  See \"Patient Info\" tab in inbasket, or \"Choose Columns\" in Meds & Orders section of the refill encounter.              Passed - Medication is active on med list        Passed - Patient is age 18 or older        Passed - No active pregnancy on record        Passed - No positive pregnancy test in past 12 months        Kala Rajan RN  [facility]    "

## 2019-07-01 DIAGNOSIS — K75.4 AUTOIMMUNE HEPATITIS (H): ICD-10-CM

## 2019-07-01 LAB
ALBUMIN SERPL-MCNC: 3.7 G/DL (ref 3.4–5)
ALP SERPL-CCNC: 128 U/L (ref 40–150)
ALT SERPL W P-5'-P-CCNC: 112 U/L (ref 0–50)
AST SERPL W P-5'-P-CCNC: 82 U/L (ref 0–45)
BILIRUB DIRECT SERPL-MCNC: 0.1 MG/DL (ref 0–0.2)
BILIRUB SERPL-MCNC: 0.5 MG/DL (ref 0.2–1.3)
ERYTHROCYTE [DISTWIDTH] IN BLOOD BY AUTOMATED COUNT: 13.7 % (ref 10–15)
HCT VFR BLD AUTO: 44.1 % (ref 35–47)
HGB BLD-MCNC: 15.1 G/DL (ref 11.7–15.7)
MCH RBC QN AUTO: 31.5 PG (ref 26.5–33)
MCHC RBC AUTO-ENTMCNC: 34.2 G/DL (ref 31.5–36.5)
MCV RBC AUTO: 92 FL (ref 78–100)
PLATELET # BLD AUTO: 206 10E9/L (ref 150–450)
PROT SERPL-MCNC: 7.6 G/DL (ref 6.8–8.8)
RBC # BLD AUTO: 4.79 10E12/L (ref 3.8–5.2)
WBC # BLD AUTO: 9.1 10E9/L (ref 4–11)

## 2019-07-01 PROCEDURE — 85027 COMPLETE CBC AUTOMATED: CPT | Performed by: INTERNAL MEDICINE

## 2019-07-01 PROCEDURE — 80076 HEPATIC FUNCTION PANEL: CPT | Performed by: INTERNAL MEDICINE

## 2019-07-01 PROCEDURE — 36415 COLL VENOUS BLD VENIPUNCTURE: CPT | Performed by: INTERNAL MEDICINE

## 2019-07-09 DIAGNOSIS — K75.4 AUTOIMMUNE HEPATITIS (H): ICD-10-CM

## 2019-07-09 LAB
ALBUMIN SERPL-MCNC: 3.9 G/DL (ref 3.4–5)
ALP SERPL-CCNC: 144 U/L (ref 40–150)
ALT SERPL W P-5'-P-CCNC: 113 U/L (ref 0–50)
AST SERPL W P-5'-P-CCNC: 69 U/L (ref 0–45)
BILIRUB DIRECT SERPL-MCNC: 0.1 MG/DL (ref 0–0.2)
BILIRUB SERPL-MCNC: 0.4 MG/DL (ref 0.2–1.3)
ERYTHROCYTE [DISTWIDTH] IN BLOOD BY AUTOMATED COUNT: 13.8 % (ref 10–15)
HCT VFR BLD AUTO: 44.8 % (ref 35–47)
HGB BLD-MCNC: 15.6 G/DL (ref 11.7–15.7)
MCH RBC QN AUTO: 32 PG (ref 26.5–33)
MCHC RBC AUTO-ENTMCNC: 34.8 G/DL (ref 31.5–36.5)
MCV RBC AUTO: 92 FL (ref 78–100)
PLATELET # BLD AUTO: 249 10E9/L (ref 150–450)
PROT SERPL-MCNC: 7.9 G/DL (ref 6.8–8.8)
RBC # BLD AUTO: 4.88 10E12/L (ref 3.8–5.2)
WBC # BLD AUTO: 11.3 10E9/L (ref 4–11)

## 2019-07-09 PROCEDURE — 80076 HEPATIC FUNCTION PANEL: CPT | Performed by: INTERNAL MEDICINE

## 2019-07-09 PROCEDURE — 36415 COLL VENOUS BLD VENIPUNCTURE: CPT | Performed by: INTERNAL MEDICINE

## 2019-07-09 PROCEDURE — 85027 COMPLETE CBC AUTOMATED: CPT | Performed by: INTERNAL MEDICINE

## 2019-07-11 DIAGNOSIS — R74.8 ELEVATED LIVER ENZYMES: Primary | ICD-10-CM

## 2019-07-11 DIAGNOSIS — K75.4 AUTOIMMUNE HEPATITIS (H): ICD-10-CM

## 2019-07-11 DIAGNOSIS — K75.4 AUTOIMMUNE HEPATITIS (H): Primary | ICD-10-CM

## 2019-07-11 RX ORDER — AZATHIOPRINE 75 MG/1
75 TABLET ORAL DAILY
Qty: 30 TABLET | Refills: 3 | Status: SHIPPED | OUTPATIENT
Start: 2019-07-11 | End: 2019-12-10

## 2019-07-12 ENCOUNTER — TELEPHONE (OUTPATIENT)
Dept: ONCOLOGY | Facility: CLINIC | Age: 54
End: 2019-07-12

## 2019-07-12 NOTE — TELEPHONE ENCOUNTER
Pt is not interested in quitting at this time.     Tobacco Treatment Team at the [facility] spoke with Ms. Grigsby on 7/12/2019 regarding the tobacco cessation program and she declined to participate with the program.No further calls needed.    LISETTE Regan  Tobacco Treatment Specialist  PH: [phone]

## 2019-07-22 DIAGNOSIS — K75.4 AUTOIMMUNE HEPATITIS (H): ICD-10-CM

## 2019-07-22 LAB
ALBUMIN SERPL-MCNC: 3.9 G/DL (ref 3.4–5)
ALP SERPL-CCNC: 130 U/L (ref 40–150)
ALT SERPL W P-5'-P-CCNC: 70 U/L (ref 0–50)
ANION GAP SERPL CALCULATED.3IONS-SCNC: 5 MMOL/L (ref 3–14)
AST SERPL W P-5'-P-CCNC: 44 U/L (ref 0–45)
BASOPHILS # BLD AUTO: 0.1 10E9/L (ref 0–0.2)
BASOPHILS NFR BLD AUTO: 1 %
BILIRUB DIRECT SERPL-MCNC: 0.1 MG/DL (ref 0–0.2)
BILIRUB SERPL-MCNC: 0.6 MG/DL (ref 0.2–1.3)
BUN SERPL-MCNC: 14 MG/DL (ref 7–30)
CALCIUM SERPL-MCNC: 9.6 MG/DL (ref 8.5–10.1)
CHLORIDE SERPL-SCNC: 102 MMOL/L (ref 94–109)
CO2 SERPL-SCNC: 31 MMOL/L (ref 20–32)
CREAT SERPL-MCNC: 0.62 MG/DL (ref 0.52–1.04)
DIFFERENTIAL METHOD BLD: NORMAL
EOSINOPHIL # BLD AUTO: 0.5 10E9/L (ref 0–0.7)
EOSINOPHIL NFR BLD AUTO: 4.4 %
ERYTHROCYTE [DISTWIDTH] IN BLOOD BY AUTOMATED COUNT: 13.8 % (ref 10–15)
GFR SERPL CREATININE-BSD FRML MDRD: >90 ML/MIN/{1.73_M2}
GLUCOSE SERPL-MCNC: 97 MG/DL (ref 70–99)
HCT VFR BLD AUTO: 45.5 % (ref 35–47)
HGB BLD-MCNC: 15.7 G/DL (ref 11.7–15.7)
INR PPP: 1.03 (ref 0.86–1.14)
LYMPHOCYTES # BLD AUTO: 4.1 10E9/L (ref 0.8–5.3)
LYMPHOCYTES NFR BLD AUTO: 39.7 %
MCH RBC QN AUTO: 31.5 PG (ref 26.5–33)
MCHC RBC AUTO-ENTMCNC: 34.5 G/DL (ref 31.5–36.5)
MCV RBC AUTO: 91 FL (ref 78–100)
MONOCYTES # BLD AUTO: 0.8 10E9/L (ref 0–1.3)
MONOCYTES NFR BLD AUTO: 7.6 %
NEUTROPHILS # BLD AUTO: 5 10E9/L (ref 1.6–8.3)
NEUTROPHILS NFR BLD AUTO: 47.3 %
PLATELET # BLD AUTO: 232 10E9/L (ref 150–450)
POTASSIUM SERPL-SCNC: 3.7 MMOL/L (ref 3.4–5.3)
PROT SERPL-MCNC: 8.1 G/DL (ref 6.8–8.8)
RBC # BLD AUTO: 4.98 10E12/L (ref 3.8–5.2)
SODIUM SERPL-SCNC: 138 MMOL/L (ref 133–144)
WBC # BLD AUTO: 10.4 10E9/L (ref 4–11)

## 2019-07-22 PROCEDURE — 80076 HEPATIC FUNCTION PANEL: CPT | Performed by: INTERNAL MEDICINE

## 2019-07-22 PROCEDURE — 80048 BASIC METABOLIC PNL TOTAL CA: CPT | Performed by: INTERNAL MEDICINE

## 2019-07-22 PROCEDURE — 85025 COMPLETE CBC W/AUTO DIFF WBC: CPT | Performed by: INTERNAL MEDICINE

## 2019-07-22 PROCEDURE — 36415 COLL VENOUS BLD VENIPUNCTURE: CPT | Performed by: INTERNAL MEDICINE

## 2019-07-22 PROCEDURE — 85610 PROTHROMBIN TIME: CPT | Performed by: INTERNAL MEDICINE

## 2019-07-24 DIAGNOSIS — K75.4 AUTOIMMUNE HEPATITIS (H): Primary | ICD-10-CM

## 2019-08-05 DIAGNOSIS — K75.4 AUTOIMMUNE HEPATITIS (H): ICD-10-CM

## 2019-08-05 LAB
ALBUMIN SERPL-MCNC: 3.6 G/DL (ref 3.4–5)
ALP SERPL-CCNC: 115 U/L (ref 40–150)
ALT SERPL W P-5'-P-CCNC: 42 U/L (ref 0–50)
AST SERPL W P-5'-P-CCNC: 29 U/L (ref 0–45)
BILIRUB DIRECT SERPL-MCNC: 0.1 MG/DL (ref 0–0.2)
BILIRUB SERPL-MCNC: 0.6 MG/DL (ref 0.2–1.3)
ERYTHROCYTE [DISTWIDTH] IN BLOOD BY AUTOMATED COUNT: 13.7 % (ref 10–15)
HCT VFR BLD AUTO: 43.6 % (ref 35–47)
HGB BLD-MCNC: 15 G/DL (ref 11.7–15.7)
MCH RBC QN AUTO: 31.6 PG (ref 26.5–33)
MCHC RBC AUTO-ENTMCNC: 34.4 G/DL (ref 31.5–36.5)
MCV RBC AUTO: 92 FL (ref 78–100)
PLATELET # BLD AUTO: 237 10E9/L (ref 150–450)
PROT SERPL-MCNC: 7.4 G/DL (ref 6.8–8.8)
RBC # BLD AUTO: 4.74 10E12/L (ref 3.8–5.2)
WBC # BLD AUTO: 9.8 10E9/L (ref 4–11)

## 2019-08-05 PROCEDURE — 85027 COMPLETE CBC AUTOMATED: CPT | Performed by: INTERNAL MEDICINE

## 2019-08-05 PROCEDURE — 36415 COLL VENOUS BLD VENIPUNCTURE: CPT | Performed by: INTERNAL MEDICINE

## 2019-08-05 PROCEDURE — 80076 HEPATIC FUNCTION PANEL: CPT | Performed by: INTERNAL MEDICINE

## 2019-09-09 DIAGNOSIS — K75.4 AUTOIMMUNE HEPATITIS (H): ICD-10-CM

## 2019-09-09 LAB
ALBUMIN SERPL-MCNC: 3.6 G/DL (ref 3.4–5)
ALP SERPL-CCNC: 125 U/L (ref 40–150)
ALT SERPL W P-5'-P-CCNC: 36 U/L (ref 0–50)
AST SERPL W P-5'-P-CCNC: 25 U/L (ref 0–45)
BILIRUB DIRECT SERPL-MCNC: 0.1 MG/DL (ref 0–0.2)
BILIRUB SERPL-MCNC: 0.4 MG/DL (ref 0.2–1.3)
ERYTHROCYTE [DISTWIDTH] IN BLOOD BY AUTOMATED COUNT: 13.9 % (ref 10–15)
HCT VFR BLD AUTO: 44.1 % (ref 35–47)
HGB BLD-MCNC: 15.2 G/DL (ref 11.7–15.7)
MCH RBC QN AUTO: 32.1 PG (ref 26.5–33)
MCHC RBC AUTO-ENTMCNC: 34.5 G/DL (ref 31.5–36.5)
MCV RBC AUTO: 93 FL (ref 78–100)
PLATELET # BLD AUTO: 235 10E9/L (ref 150–450)
PROT SERPL-MCNC: 7.6 G/DL (ref 6.8–8.8)
RBC # BLD AUTO: 4.74 10E12/L (ref 3.8–5.2)
WBC # BLD AUTO: 9.9 10E9/L (ref 4–11)

## 2019-09-09 PROCEDURE — 85027 COMPLETE CBC AUTOMATED: CPT | Performed by: INTERNAL MEDICINE

## 2019-09-09 PROCEDURE — 36415 COLL VENOUS BLD VENIPUNCTURE: CPT | Performed by: INTERNAL MEDICINE

## 2019-09-09 PROCEDURE — 80076 HEPATIC FUNCTION PANEL: CPT | Performed by: INTERNAL MEDICINE

## 2019-09-23 ENCOUNTER — OFFICE VISIT (OUTPATIENT)
Dept: FAMILY MEDICINE | Facility: CLINIC | Age: 54
End: 2019-09-23
Payer: COMMERCIAL

## 2019-09-23 VITALS
WEIGHT: 230.4 LBS | HEART RATE: 83 BPM | DIASTOLIC BLOOD PRESSURE: 64 MMHG | OXYGEN SATURATION: 96 % | RESPIRATION RATE: 18 BRPM | BODY MASS INDEX: 43.53 KG/M2 | SYSTOLIC BLOOD PRESSURE: 126 MMHG | TEMPERATURE: 97.8 F

## 2019-09-23 DIAGNOSIS — Z13.89 SCREENING FOR DIABETIC PERIPHERAL NEUROPATHY: ICD-10-CM

## 2019-09-23 DIAGNOSIS — E11.9 CONTROLLED TYPE 2 DIABETES MELLITUS WITHOUT COMPLICATION, WITHOUT LONG-TERM CURRENT USE OF INSULIN (H): Primary | ICD-10-CM

## 2019-09-23 DIAGNOSIS — Z90.710 HISTORY OF TOTAL ABDOMINAL HYSTERECTOMY: ICD-10-CM

## 2019-09-23 DIAGNOSIS — Z23 NEED FOR SHINGLES VACCINE: ICD-10-CM

## 2019-09-23 DIAGNOSIS — E66.01 MORBID OBESITY DUE TO EXCESS CALORIES (H): ICD-10-CM

## 2019-09-23 DIAGNOSIS — Z23 NEED FOR DIPHTHERIA-TETANUS-PERTUSSIS (TDAP) VACCINE: ICD-10-CM

## 2019-09-23 DIAGNOSIS — E11.9 CONTROLLED TYPE 2 DIABETES MELLITUS WITHOUT COMPLICATION, WITHOUT LONG-TERM CURRENT USE OF INSULIN (H): ICD-10-CM

## 2019-09-23 DIAGNOSIS — Z23 NEED FOR 23-POLYVALENT PNEUMOCOCCAL POLYSACCHARIDE VACCINE: ICD-10-CM

## 2019-09-23 LAB
CREAT UR-MCNC: 54 MG/DL
HBA1C MFR BLD: 6.6 % (ref 0–5.6)
MICROALBUMIN UR-MCNC: <5 MG/L
MICROALBUMIN/CREAT UR: NORMAL MG/G CR (ref 0–25)

## 2019-09-23 PROCEDURE — 36415 COLL VENOUS BLD VENIPUNCTURE: CPT | Performed by: INTERNAL MEDICINE

## 2019-09-23 PROCEDURE — 83036 HEMOGLOBIN GLYCOSYLATED A1C: CPT | Performed by: INTERNAL MEDICINE

## 2019-09-23 PROCEDURE — 99207 C FOOT EXAM  NO CHARGE: CPT | Performed by: INTERNAL MEDICINE

## 2019-09-23 PROCEDURE — 99213 OFFICE O/P EST LOW 20 MIN: CPT | Performed by: INTERNAL MEDICINE

## 2019-09-23 PROCEDURE — 82043 UR ALBUMIN QUANTITATIVE: CPT | Performed by: INTERNAL MEDICINE

## 2019-09-23 NOTE — LETTER
[facility]  [address] [place] [address]    September 24, 2019    Mary Grigsby  [address]  [facility] [address]          Dear Mary,    All of these tests are within acceptable limits , things look good !     Enclosed is a copy of your results.     Results for orders placed or performed in visit on 09/23/19   Hemoglobin A1c   Result Value Ref Range    Hemoglobin A1C 6.6 (H) 0 - 5.6 %   Albumin Random Urine Quantitative with Creat Ratio   Result Value Ref Range    Creatinine Urine 54 mg/dL    Albumin Urine mg/L <5 mg/L    Albumin Urine mg/g Cr Unable to calculate due to low value 0 - 25 mg/g Cr       If you have any questions or concerns, please call myself or my nurse at [phone].      Sincerely,        Dipak Hooper MD/ben

## 2019-09-23 NOTE — PROGRESS NOTES
Subjective     Mary Grigsby is a 54 year old female who presents to clinic today for the following health issues:    History of Present Illness        Diabetes:   She presents for follow up of diabetes.  She is checking home blood glucose one time daily. She checks blood glucose before meals.  Blood glucose is never over 200 and never under 70. When her blood glucose is low, the patient is asymptomatic for confusion, blurred vision, lethargy and reports not feeling dizzy, shaky, or weak.  She has no concerns regarding her diabetes at this time.  She is not experiencing numbness or burning in feet, excessive thirst, blurry vision, weight changes or redness, sores or blisters on feet. The patient has not had a diabetic eye exam in the last 12 months. Eye exam performed on none .    Diabetes Management Resources    She eats 4 or more servings of fruits and vegetables daily.She consumes 1 sweetened beverage(s) daily.  She is taking medications regularly.    Last fall when she was on high dosage of prednisone she required some prednisone but as soon as prednisone stopped diabetes mellitus slipped back to well controlled and insulin was rapidly taken off of insulin     Wt Readings from Last 5 Encounters:   09/23/19 104.5 kg (230 lb 6.4 oz)   05/14/19 100.4 kg (221 lb 6.4 oz)   12/24/18 105.7 kg (233 lb)   11/09/18 107 kg (236 lb)   11/06/18 104.8 kg (231 lb)     Lab Results   Component Value Date    A1C 6.6 09/23/2019    A1C 6.7 03/25/2019    A1C 6.8 12/24/2018    A1C 7.9 10/02/2018    A1C 9.6 06/15/2018     No use of metformin ( Glucophage) since march 2019. Patient feels conflicted between different advices from primary health care provider versus her liver disease specialist [Dr. Melissa Gutierrez with [facility]]. She needs to received the pneumococcal vaccine 23-valent.     So she's on nothing for her diabetes mellitus at this point . Her hemoglobin a1c  [ diabetes test ] shows mild and well  controlled diabetes mellitus so no medication will be recommended today     Concerned with prophylactic vaccination and inoculation against influenza and ShingRx vaccination against herpes zoster because of her autoimmune hepatitis   Declines tetanus booster secondary the perceived pain from injection     Patient Active Problem List   Diagnosis     Hypertension goal BP (blood pressure) < 140/90     Ex-smoker     Family history of diabetes mellitus     Hyperlipidemia with target LDL less than 100     Seasonal allergic rhinitis     Rhinitis, allergic to other allergen     House dust mite allergy     obesity     Severe dysplasia of cervix (GUSATBO III)     Family history of thyroid disease     Cervical lymphadenopathy     Controlled type 2 diabetes mellitus without complication, without long-term current use of insulin (H)     Bilateral lower extremity edema     Hypovitaminosis D     Pancreatitis     Autoimmune hepatitis (H)     Past Surgical History:   Procedure Laterality Date     CRYOTHERAPY  3/30/2011    cervical     ESOPHAGOSCOPY, GASTROSCOPY, DUODENOSCOPY (EGD), COMBINED N/A 10/4/2018    Procedure: COMBINED ENDOSCOPIC ULTRASOUND, ESOPHAGOSCOPY, GASTROSCOPY, DUODENOSCOPY (EGD);  EUS;  Surgeon: John Carrera MD;  Location: [facility]     HC COLP CERVIX/UPPER VAGINA W BX CERVIX  2010, 3/2011    neg dysplasia, GUSTABO 1-2     HC REPAIR OF NASAL SEPTUM  2/3/09-per Dr. Harley    antrostomy, ethmoidectomy, septoplasty and turbinate reduction     LAPAROSCOPIC ASSISTED HYSTERECTOMY VAGINAL  3/12/12    [facility]     TONSILLECTOMY & ADENOIDECTOMY  1973    age 8       Social History     Tobacco Use     Smoking status: Current Every Day Smoker     Packs/day: 0.50     Years: 20.00     Pack years: 10.00     Types: Cigarettes     Start date: 1980     Last attempt to quit: 2013     Years since quittin.5     Smokeless tobacco: Never Used     Tobacco comment: still has occasionally    Substance Use Topics      Alcohol use: No     Family History   Problem Relation Age of Onset     Cancer Father      Other Cancer Father         passed Nov.2016     Hypertension Father         age 72     Hypertension Sister         age 35     Thyroid Disease Sister      Cerebrovascular Disease Maternal Grandmother      Asthma Son      Thyroid Disease Sister      Thyroid Disease Sister      Thyroid Disease Sister      Diabetes Mother      Hypertension Mother         age 34     Hypertension Sister      Thyroid Disease Sister      Asthma Son      Glaucoma No family hx of      Macular Degeneration No family hx of          Current Outpatient Medications   Medication Sig Dispense Refill     aspirin 81 MG tablet Take 1 tablet (81 mg) by mouth daily Indication: primary prevention for heart disease 1 tablet 3     azaTHIOprine 75 MG TABS Take 75 mg by mouth daily 30 tablet 3     blood glucose (NO BRAND SPECIFIED) lancets standard Use to test blood sugar 3 times daily or as directed. 300 each 11     blood glucose calibration (NO BRAND SPECIFIED) solution Use to calibrate blood glucose monitor as directed. 1 each 11     blood glucose monitoring (NO BRAND SPECIFIED) meter device kit Use to test blood sugar 3 times daily or as directed. 1 kit 0     blood glucose monitoring (NO BRAND SPECIFIED) test strip 1 strip by In Vitro route 3 times daily 300 strip 11     cholecalciferol (VITAMIN D3) 1000 UNIT tablet Take 3 tablets (3,000 Units) by mouth daily       felodipine ER (PLENDIL) 5 MG 24 hr tablet TAKE ONE TABLET BY MOUTH ONCE DAILY 90 tablet 0     Fexofenadine HCl (ALLEGRA PO) Take 90 mg by mouth daily as needed        FREESTYLE LANCETS MISC 1 Device 2 times daily. 100 each 11     hydrochlorothiazide (HYDRODIURIL) 12.5 MG tablet TAKE ONE TABLET BY MOUTH EVERY MORNING 90 tablet 0     Hypertonic Nasal Wash (SINUS RINSE BOTTLE KIT NA) Spray 1 Bottle in nostril as needed.       losartan (COZAAR) 100 MG tablet TAKE ONE TABLET BY MOUTH ONCE DAILY 90 tablet 0      STATIN NOT PRESCRIBED (INTENTIONAL) Statin not prescribed intentionally due to Active liver disease (liver failure, cirrhosis, hepatitis)       sulfamethoxazole-trimethoprim (BACTRIM DS/SEPTRA DS) 800-160 MG per tablet Take 1 tablet by mouth 2 times daily 28 tablet 3     Allergies   Allergen Reactions     Amoxicillin Hives     Augmentin      Erythromycin      No Clinical Screening - See Comments      Trulicity - pancreatitis.  Should not use any other GLP-1 agonist or DPP4 inhibitor     Recent Labs   Lab Test 09/23/19  1350 09/09/19  0701 08/05/19  0705 07/22/19  0814  03/25/19  0713  12/24/18  0823  10/05/18  0538  06/15/18  1017  05/27/16  0714 09/24/15  1605   A1C 6.6*  --   --   --   --  6.7*  --  6.8*  --   --    < > 9.6*   < > 6.8* 6.4*   LDL  --   --   --   --   --   --   --   --   --  33  --  105*  --  103*  --    HDL  --   --   --   --   --   --   --   --   --  9*  --  36*  --  40*  --    TRIG  --   --   --   --   --   --   --   --   --  196*  --  179*  --  149  --    ALT  --  36 42 70*   < > 47   < >  --    < > 486*   < >  --   --   --   --    CR  --   --   --  0.62  --   --   --  0.46*   < > 1.01   < > 0.53   < > 0.50*  --    GFRESTIMATED  --   --   --  >90  --   --   --  >90   < > 57*   < > >90   < > >90  Non  GFR Calc    --    GFRESTBLACK  --   --   --  >90  --   --   --  >90   < > 69   < > >90   < > >90  African American GFR Calc    --    POTASSIUM  --   --   --  3.7  --   --   --  3.3*   < > 3.5   < > 3.7   < > 3.7  --    TSH  --   --   --   --   --   --   --   --   --   --   --  4.51*  --   --  3.62    < > = values in this interval not displayed.      BP Readings from Last 3 Encounters:   09/23/19 126/64   05/14/19 134/79   12/24/18 122/72    Wt Readings from Last 3 Encounters:   09/23/19 104.5 kg (230 lb 6.4 oz)   05/14/19 100.4 kg (221 lb 6.4 oz)   12/24/18 105.7 kg (233 lb)            Reviewed and updated as needed this visit by Provider         Review of Systems   ROS COMP:  Constitutional, HEENT, cardiovascular, pulmonary, gi and gu systems are negative, except as otherwise noted.      Objective    LMP 06/24/2011   There is no height or weight on file to calculate BMI.  Physical Exam   GENERAL: healthy, alert and no distress  RESP: lungs clear to auscultation - no rales, rhonchi or wheezes  CV: regular rate and rhythm, normal S1 S2, no S3 or S4, no murmur, click or rub, no peripheral edema and peripheral pulses strong  MS: no gross musculoskeletal defects noted, no edema  Diabetic foot exam: normal DP and PT pulses, no trophic changes or ulcerative lesions and normal sensory exam    Diagnostic Test Results:  Orders Placed This Encounter   Procedures     RetinaVue Eye Scan     FOOT EXAM     Hemoglobin A1c     **A1C FUTURE anytime     Albumin Random Urine Quantitative with Creat Ratio             Assessment & Plan     (E11.9) Controlled type 2 diabetes mellitus without complication, without long-term current use of insulin (H)  (primary encounter diagnosis)  Comment: the main thing we see patient for, her diabetes mellitus is absolutely controlled within acceptable limits. So there would be no clear cut indication for starting anything. I take it, according to the patient, that Dr. Gutierrez would give me a green light to start metformin ( Glucophage) if it were necessary.  Plan: Hemoglobin A1c, RetinaVue Eye Scan, **A1C         FUTURE anytime, Albumin Random Urine         Quantitative with Creat Ratio            (Z13.89) Screening for diabetic peripheral neuropathy  Comment: no diabetes neuropathy   Plan: FOOT EXAM            (Z23) Need for diphtheria-tetanus-pertussis (Tdap) vaccine  Comment: declines   Plan:     (Z23) Need for shingles vaccine  Comment: declines   Plan:     (Z23) Need for 23-polyvalent pneumococcal polysaccharide vaccine  Comment: declines   Plan:     (E66.01) Morbid obesity due to excess calories (H)  Comment: weight loss measures reviewed   Plan:     (Z90.710)  "History of total abdominal hysterectomy  Comment: noted as a point of historical importance   Plan: she had severe Severe dysplasia of cervix (GUSTABO III) and now is still supposed to have follow up with gynecologic consultation . Patient says she is having further follow up with Dr. Petey Peraza, gynecologist next door at [facility]       Tobacco Cessation:   reports that she has been smoking cigarettes. She started smoking about 39 years ago. She has a 10.00 pack-year smoking history. She has never used smokeless tobacco.  Tobacco Cessation Action Plan: Phone counseling: Place order for QuitPlan (Tobacco Cessation [facility] Referral [id number])      BMI:   Estimated body mass index is 43.53 kg/m  as calculated from the following:    Height as of 5/14/19: 1.549 m (5' 1\").    Weight as of this encounter: 104.5 kg (230 lb 6.4 oz).   Weight management plan: Discussed healthy diet and exercise guidelines        Recheck hemoglobin a1c  [ diabetes test ] in 3 months and face to face encounter in 6 months     No follow-ups on file.    Dipak Hooper MD  [facility]"

## 2019-09-24 DIAGNOSIS — I10 ESSENTIAL HYPERTENSION WITH GOAL BLOOD PRESSURE LESS THAN 140/90: ICD-10-CM

## 2019-09-24 RX ORDER — HYDROCHLOROTHIAZIDE 12.5 MG/1
12.5 TABLET ORAL EVERY MORNING
Qty: 90 TABLET | Refills: 1 | Status: SHIPPED | OUTPATIENT
Start: 2019-09-24 | End: 2020-03-06

## 2019-09-24 RX ORDER — LOSARTAN POTASSIUM 100 MG/1
100 TABLET ORAL DAILY
Qty: 90 TABLET | Refills: 1 | Status: SHIPPED | OUTPATIENT
Start: 2019-09-24 | End: 2020-03-06

## 2019-09-24 RX ORDER — FELODIPINE 5 MG/1
5 TABLET, EXTENDED RELEASE ORAL DAILY
Qty: 90 TABLET | Refills: 1 | Status: SHIPPED | OUTPATIENT
Start: 2019-09-24 | End: 2020-03-06

## 2019-09-24 NOTE — PROGRESS NOTES
Prescription approved per [facility] [place].    Kala Rajan RN  [facility]

## 2019-10-04 ENCOUNTER — TELEPHONE (OUTPATIENT)
Dept: FAMILY MEDICINE | Facility: CLINIC | Age: 54
End: 2019-10-04

## 2019-10-04 NOTE — TELEPHONE ENCOUNTER
Pt just wondering status of speaking with Melissa Gutierrez about pt getting flu shot, shingles shot, and tetanus shot, if this would be ok with her liver. Please advise.       Nina RUBALCAVA CMA ([facility])

## 2019-10-04 NOTE — TELEPHONE ENCOUNTER
Called patient and informed of message from liver provider. Pt understood.       Nina RUBALCAVA CMA ([facility])

## 2019-12-10 DIAGNOSIS — R74.8 ELEVATED LIVER ENZYMES: ICD-10-CM

## 2019-12-10 DIAGNOSIS — K75.4 AUTOIMMUNE HEPATITIS (H): ICD-10-CM

## 2019-12-10 RX ORDER — AZATHIOPRINE 75 MG/1
75 TABLET ORAL DAILY
Qty: 30 TABLET | Refills: 3 | Status: SHIPPED | OUTPATIENT
Start: 2019-12-10 | End: 2019-12-31

## 2019-12-20 ENCOUNTER — OFFICE VISIT (OUTPATIENT)
Dept: OBGYN | Facility: CLINIC | Age: 54
End: 2019-12-20
Payer: COMMERCIAL

## 2019-12-20 ENCOUNTER — RESULT FOLLOW UP (OUTPATIENT)
Dept: OBGYN | Facility: CLINIC | Age: 54
End: 2019-12-20

## 2019-12-20 VITALS
DIASTOLIC BLOOD PRESSURE: 79 MMHG | SYSTOLIC BLOOD PRESSURE: 135 MMHG | WEIGHT: 231.8 LBS | BODY MASS INDEX: 43.76 KG/M2 | OXYGEN SATURATION: 96 % | HEART RATE: 93 BPM | HEIGHT: 61 IN

## 2019-12-20 DIAGNOSIS — Z01.419 ENCOUNTER FOR GYNECOLOGICAL EXAMINATION WITHOUT ABNORMAL FINDING: Primary | ICD-10-CM

## 2019-12-20 DIAGNOSIS — E11.9 CONTROLLED TYPE 2 DIABETES MELLITUS WITHOUT COMPLICATION, WITHOUT LONG-TERM CURRENT USE OF INSULIN (H): ICD-10-CM

## 2019-12-20 DIAGNOSIS — K75.4 AUTOIMMUNE HEPATITIS (H): ICD-10-CM

## 2019-12-20 DIAGNOSIS — Z12.31 VISIT FOR SCREENING MAMMOGRAM: ICD-10-CM

## 2019-12-20 DIAGNOSIS — D06.9 SEVERE DYSPLASIA OF CERVIX (CIN III): ICD-10-CM

## 2019-12-20 LAB
ALBUMIN SERPL-MCNC: 3.8 G/DL (ref 3.4–5)
ALP SERPL-CCNC: 144 U/L (ref 40–150)
ALT SERPL W P-5'-P-CCNC: 44 U/L (ref 0–50)
AST SERPL W P-5'-P-CCNC: 28 U/L (ref 0–45)
BILIRUB DIRECT SERPL-MCNC: 0.1 MG/DL (ref 0–0.2)
BILIRUB SERPL-MCNC: 0.5 MG/DL (ref 0.2–1.3)
ERYTHROCYTE [DISTWIDTH] IN BLOOD BY AUTOMATED COUNT: 13.6 % (ref 10–15)
HBA1C MFR BLD: 7 % (ref 0–5.6)
HCT VFR BLD AUTO: 44.4 % (ref 35–47)
HGB BLD-MCNC: 15.4 G/DL (ref 11.7–15.7)
MCH RBC QN AUTO: 31.6 PG (ref 26.5–33)
MCHC RBC AUTO-ENTMCNC: 34.7 G/DL (ref 31.5–36.5)
MCV RBC AUTO: 91 FL (ref 78–100)
PLATELET # BLD AUTO: 262 10E9/L (ref 150–450)
PROT SERPL-MCNC: 7.8 G/DL (ref 6.8–8.8)
RBC # BLD AUTO: 4.88 10E12/L (ref 3.8–5.2)
WBC # BLD AUTO: 9.1 10E9/L (ref 4–11)

## 2019-12-20 PROCEDURE — 85027 COMPLETE CBC AUTOMATED: CPT | Performed by: INTERNAL MEDICINE

## 2019-12-20 PROCEDURE — 83036 HEMOGLOBIN GLYCOSYLATED A1C: CPT | Performed by: INTERNAL MEDICINE

## 2019-12-20 PROCEDURE — 87624 HPV HI-RISK TYP POOLED RSLT: CPT | Performed by: OBSTETRICS & GYNECOLOGY

## 2019-12-20 PROCEDURE — 36415 COLL VENOUS BLD VENIPUNCTURE: CPT | Performed by: INTERNAL MEDICINE

## 2019-12-20 PROCEDURE — 80076 HEPATIC FUNCTION PANEL: CPT | Performed by: INTERNAL MEDICINE

## 2019-12-20 PROCEDURE — 99396 PREV VISIT EST AGE 40-64: CPT | Performed by: OBSTETRICS & GYNECOLOGY

## 2019-12-20 PROCEDURE — 88175 CYTOPATH C/V AUTO FLUID REDO: CPT | Performed by: OBSTETRICS & GYNECOLOGY

## 2019-12-20 ASSESSMENT — PATIENT HEALTH QUESTIONNAIRE - PHQ9: SUM OF ALL RESPONSES TO PHQ QUESTIONS 1-9: 1

## 2019-12-20 ASSESSMENT — MIFFLIN-ST. JEOR: SCORE: 1581.69

## 2019-12-20 NOTE — PATIENT INSTRUCTIONS
If you have any questions regarding your visit, Please contact your care team.    Women s Health CLINIC HOURS TELEPHONE NUMBER   MD Noemi Marinelli MA Lisa -      [facility] [place]       Tuesday:       7:30-3:30 [place]  Wednesday:       8:00-4:30 [place]  Thursday:       8:00-4:00 [place]  Friday:       7:30-12:30 [facility]  [address] [place] [address]  [phone] ask for Women's Clinic    [facility]  [address] [address]  [phone] ask for [facility]    Imaging Scheduling for [place]:  [phone]    Imaging Scheduling for [place]: [phone]       Urgent Care locations:    [facility] Saturday and Sunday   9 am - 5 pm    Monday-Friday   12 pm - 8 pm  Saturday and Sunday   9 am - 5 pm   [phone]     [facility]:  [phone]    If you need a medication refill, please contact your pharmacy. Please allow 3 business days for your refill to be completed.  As always, Thank you for trusting us with your healthcare needs!       none

## 2019-12-20 NOTE — PROGRESS NOTES
Mary Grigsby is a 54 year old female , who presents for annual exam.   No unusual bleeding, no incontinence, or unusual discharge.     Last cholesterol:   Recent Labs   Lab Test 10/05/18  0538 06/15/18  1017  05/22/15  0753  13  0709   CHOL 82 177   < > 161   < > 178   HDL 9* 36*   < > 44*   < > 43*   LDL 33 105*   < > 90   < > 113   TRIG 196* 179*   < > 133  --  109   CHOLHDLRATIO  --   --   --  3.7  --  4.1    < > = values in this interval not displayed.     Past Medical History:   Diagnosis Date     Autoimmune hepatitis (H) 10/2018     Cervical high risk HPV (human papillomavirus) test positive 2015, 16    type 16     Cervical lymphadenopathy 2012     Cervical lymphadenopathy 2012     Cervical lymphadenopathy 2012     Cervical lymphadenopathy      Deviated septum     had septoplasty and endo. sinus surg.  per [facility]     Family history of diabetes mellitus     mother     Family history of stroke     multiple tiny cva's     House dust mite allergy      Hyperlipidemia LDL goal < 100      Hypertension goal BP (blood pressure) < 140/90      Obesity      Pap smear of cervix with ASCUS, cannot exclude HGSIL 3/2013     Papanicolaou smear of cervix with low grade squamous intraepithelial lesion (LGSIL) 2011, 10/2011    colp 3/22/11 - GUSTABO 1-2     Rhinitis, allergic to other allergen      Seasonal allergic rhinitis 08 skin tests    CR/DM/M/T/G/W     Smoking      Type 2 diabetes, HbA1c goal < 7% (H)        Past Surgical History:   Procedure Laterality Date     CRYOTHERAPY  3/30/2011    cervical     ESOPHAGOSCOPY, GASTROSCOPY, DUODENOSCOPY (EGD), COMBINED N/A 10/4/2018    Procedure: COMBINED ENDOSCOPIC ULTRASOUND, ESOPHAGOSCOPY, GASTROSCOPY, DUODENOSCOPY (EGD);  EUS;  Surgeon: John Carrera MD;  Location: [facility]     HC COLP CERVIX/UPPER VAGINA W BX CERVIX  2010, 3/2011    neg dysplasia, GUSTABO 1-2     HC REPAIR OF NASAL SEPTUM  2/3/09-per Dr. Cherri briseno,  ethmoidectomy, septoplasty and turbinate reduction     LAPAROSCOPIC ASSISTED HYSTERECTOMY VAGINAL  3/12/12    [facility]     TONSILLECTOMY & ADENOIDECTOMY  1973    age 8       OB History    Para Term  AB Living   2 2 2 0 0 2   SAB TAB Ectopic Multiple Live Births   0 0 0 0 2      # Outcome Date GA Lbr Raffi/2nd Weight Sex Delivery Anes PTL Lv   2 Term 09/15/93 40w0d  3.062 kg (6 lb 12 oz) M    DAYNE   1 Term 10/04/90 40w0d  3.345 kg (7 lb 6 oz) F    DAYNE       Gyn History:  Gynecological History         Patient's last menstrual period was 2011.     STD HPV/no PID/she has had the IUD      history of abnormal pap smear:  Yes  Last pap:   Lab Results   Component Value Date    PAP NIL 2018           Current Outpatient Medications   Medication Sig Dispense Refill     aspirin 81 MG tablet Take 1 tablet (81 mg) by mouth daily Indication: primary prevention for heart disease 1 tablet 3     azaTHIOprine 75 MG TABS Take 75 mg by mouth daily 30 tablet 3     blood glucose (NO BRAND SPECIFIED) lancets standard Use to test blood sugar 3 times daily or as directed. 300 each 11     blood glucose calibration (NO BRAND SPECIFIED) solution Use to calibrate blood glucose monitor as directed. 1 each 11     blood glucose monitoring (NO BRAND SPECIFIED) meter device kit Use to test blood sugar 3 times daily or as directed. 1 kit 0     blood glucose monitoring (NO BRAND SPECIFIED) test strip 1 strip by In Vitro route 3 times daily 300 strip 11     cholecalciferol (VITAMIN D3) 1000 UNIT tablet Take 3 tablets (3,000 Units) by mouth daily       felodipine ER (PLENDIL) 5 MG 24 hr tablet Take 1 tablet (5 mg) by mouth daily 90 tablet 1     Fexofenadine HCl (ALLEGRA PO) Take 90 mg by mouth daily as needed        FREESTYLE LANCETS MISC 1 Device 2 times daily. 100 each 11     hydrochlorothiazide (HYDRODIURIL) 12.5 MG tablet Take 1 tablet (12.5 mg) by mouth every morning 90 tablet 1     losartan (COZAAR) 100 MG  tablet Take 1 tablet (100 mg) by mouth daily 90 tablet 1     STATIN NOT PRESCRIBED (INTENTIONAL) Statin not prescribed intentionally due to Active liver disease (liver failure, cirrhosis, hepatitis)       Hypertonic Nasal Wash (SINUS RINSE BOTTLE KIT NA) Spray 1 Bottle in nostril as needed.       sulfamethoxazole-trimethoprim (BACTRIM DS/SEPTRA DS) 800-160 MG per tablet Take 1 tablet by mouth 2 times daily (Patient not taking: Reported on 2019) 28 tablet 3       Allergies   Allergen Reactions     Amoxicillin Hives     Augmentin      Erythromycin      No Clinical Screening - See Comments      Trulicity - pancreatitis.  Should not use any other GLP-1 agonist or DPP4 inhibitor       Social History     Socioeconomic History     Marital status:      Spouse name: Demetris     Number of children: 2     Years of education: Not on file     Highest education level: Not on file   Occupational History     Occupation: financial     Employer: [employer]    Social Needs     Financial resource strain: Not on file     Food insecurity:     Worry: Not on file     Inability: Not on file     Transportation needs:     Medical: Not on file     Non-medical: Not on file   Tobacco Use     Smoking status: Current Every Day Smoker     Packs/day: 0.50     Years: 20.00     Pack years: 10.00     Types: Cigarettes     Start date: 1980     Last attempt to quit: 2013     Years since quittin.8     Smokeless tobacco: Current User     Tobacco comment: still has occasionally    Substance and Sexual Activity     Alcohol use: No     Drug use: No     Sexual activity: Yes     Partners: Male     Birth control/protection: None   Lifestyle     Physical activity:     Days per week: Not on file     Minutes per session: Not on file     Stress: Not on file   Relationships     Social connections:     Talks on phone: Not on file     Gets together: Not on file     Attends Protestant service: Not on file     Active member of club or  "organization: Not on file     Attends meetings of clubs or organizations: Not on file     Relationship status: Not on file     Intimate partner violence:     Fear of current or ex partner: Not on file     Emotionally abused: Not on file     Physically abused: Not on file     Forced sexual activity: Not on file   Other Topics Concern      Service No     Blood Transfusions No     Caffeine Concern No     Occupational Exposure No     Hobby Hazards No     Sleep Concern No     Stress Concern No     Weight Concern Not Asked     Special Diet No     Back Care Not Asked     Exercise Yes     Comment: 1-3 times/week     Bike Helmet Not Asked     Seat Belt Yes     Self-Exams Yes     Parent/sibling w/ CABG, MI or angioplasty before 65F 55M? No   Social History Narrative     Not on file       Family History   Problem Relation Age of Onset     Cancer Father      Other Cancer Father         passed Nov.2016     Hypertension Father         age 72     Hypertension Sister         age 35     Thyroid Disease Sister      Cerebrovascular Disease Maternal Grandmother      Asthma Son      Thyroid Disease Sister      Thyroid Disease Sister      Thyroid Disease Sister      Diabetes Mother      Hypertension Mother         age 34     Hypertension Sister      Thyroid Disease Sister      Asthma Son      Glaucoma No family hx of      Macular Degeneration No family hx of          ROS:  All negative except as above.      EXAM:  /79   Pulse 93   Ht 1.538 m (5' 0.55\")   Wt 105.1 kg (231 lb 12.8 oz)   LMP 06/24/2011   SpO2 96%   BMI 44.45 kg/m    General:  WNWD female, NAD  Alert  Oriented x 3  Gait:  Normal  Skin:  Normal skin turgor  Neurologic:  CN grossly intact, good sensation.    HEENT:  NC/AT, EOMI  Neck:  No masses palpated, symmetrical, carotids +2/4, no bruits heard  Heart:  RRR  Lungs:  Clear   Breasts:  Symmetrical, no dimpling noted, no masses palpated, no discharge expressed  Abdomen:  Non-tender, non-distended.  Vulva: " No external lesions, normal hair distribution, no adenopathy  BUS:  Normal, no masses noted  Urethra:  No hypermobility noted  Urethral meatus:  No masses noted  Vagina and cuff: Moist, pink, no abnormal discharge, well rugated, no lesions  Cervix: absent   Uterus: absent   Ovaries/Bimanual:  Absent   Perianal:  No masses noted.    Rectal exam: declined   Extremities:  No clubbing, cyanosis, or edema      ASSESSMENT/PLAN   Annual examination   History of GUSTABO III:  Pap smear and HPV today.   Schedule the mammogram   Low fat diet, weight bearing exercises and self breast exams on a monthly basis have been recommended.  I have discussed with patient the risks, benefits, medications, treatment options and modalities.   I have instructed the patient to call or schedule a follow-up appointment if any problems.

## 2019-12-20 NOTE — LETTER
June 29, 2020      Mary Grigsby  [address]  [place] [address]      Dear MsJackeline Grigsby,    At [facility], your health and wellness is our primary concern. That is why we are following up on a positive high risk HPV test  from 12/20/19, which was reported as HPV 16.  Your Provider had recommended that you have a Colposcopy completed by 03/20/20    COVID-19 scheduling restrictions have been revised and we are now able to make this appointment at limited clinic sites:    [place]  [phone]    [place] [phone]   [place] [facility] ([place]) [phone]   [place] [phone] ([facility] for women[facility] clinic)   [place] [phone]   [place] [phone]   [place] [phone]     It is important to complete the follow up that your provider has suggested for you to ensure that there are no worsening changes which may, over time, develop into cancer.      Please call your preferred clinic from the list above to schedule an appointment for a Colposcopy (this cannot be scheduled through [facility]) at this time. If you have questions or concerns, please call the clinic and we will be happy to assist you.    If you have completed the tests outside of [facility], please have the results forwarded to our office. We will update the chart for your primary Physician to review before your next annual physical.     Thank you for choosing [facility]!    Sincerely,      Your [facility] Care Team//[facility]

## 2019-12-20 NOTE — LETTER
May 20, 2020      Mary Grigsby  [address]  [place] [address]      Dear MsJackeline Grigsby,    At [facility], your health and wellness is our primary concern. That is why we are following up on a positive high risk HPV test  from 12/20/19, which was reported as HPV 16.  Your Provider had recommended that you have a Colposcopy completed by 03/20/20    COVID-19 scheduling restrictions have been revised and we are now able to make this appointment at limited clinic sites:    [place]  [phone]    [place] [phone]   [place] [facility] ([place]) [phone]   [place] [phone] ([facility] for women's clinic)   [place] [phone]   [place] [phone]   [place] [phone]     It is important to complete the follow up that your provider has suggested for you to ensure that there are no worsening changes which may, over time, develop into cancer.      Please call your preferred clinic from the list above to schedule an appointment for a Colposcopy (this cannot be scheduled through [facility]) at this time. If you have questions or concerns, please call the clinic and we will be happy to assist you.    If you have completed the tests outside of [facility], please have the results forwarded to our office. We will update the chart for your primary Physician to review before your next annual physical.     Thank you for choosing [facility]!    Sincerely,      Your [facility] Care Team//[facility]

## 2019-12-23 DIAGNOSIS — K75.4 AUTOIMMUNE HEPATITIS (H): Primary | ICD-10-CM

## 2019-12-24 DIAGNOSIS — K75.4 AUTOIMMUNE HEPATITIS (H): Primary | ICD-10-CM

## 2019-12-24 LAB
COPATH REPORT: NORMAL
PAP: NORMAL

## 2019-12-27 NOTE — PROGRESS NOTES
11/13/09:Pap--NIL. + high risk HPV 16.   1/8/10:[place]--Negative for dysplasia. Repeat pap and HPV in 1 year.   1/28/11:Pap--LSIL. Plan colposcopy.   3/22/11:[place]--GUSTABO 1-2. Plan cryotherapy.  3/30/11:Cryotherapy done. Repeat pap 3 months.   7/15/11:Pap--ASCUS. Repeat pap 3 months. Reminder placed in epic.   10/14/11:Pap--LSIL with endometrial cells present. Repeat pap 3 months along with annual exam. Reminder placed in [facility].   2/3/12:Pap--HSIL. Plan colp.  2/15/12: [place]--Bx - GUSTABO 2, can't exclude GUSTABO 3. ECC - GUSTABO 1. Suspicious for HSIL. Pre-surgery consult 3/6/12. Reminder in epic.   3/12/12:Hysterectomy done.   4/24/12:6 wk post op. Repeat pap 2 months. Reminder in [facility].   6/19/12:Pap--ASCUS. Repeat pap 3 months. Reminder in epic.   9/18/12:Pap--ASCUS. Repeat pap 3 months. Reminder in epic.   12/28/12:Pap--NIL. Repeat pap 3 months. Reminder in [facility].   3/22/13 pap ASC-H. Plan--colp.  4/26/13: [place]--Negative. Repeat pap and HPV in 3 months. Reminder in [facility].   6/28/13:Pap - ASCUS. Repeat pap 3-4 months. Reminder in epic.   10/11/13:Pap - NIL. Repeat pap 3 months and annual exam in 6 months. Reminder in epic.   1/17/14: Pap - NIL, + HR HPV 16. AFE 3/28/14. Reminder in epic.   3/28/14: Pap - NIL. Repeat pap 6 months. Reminder in epic.   9/26/14: Pap - NIL. Repeat pap 6 months with annual exam. Tracking updated.   4/10/15: Pap - NIL. + HR HPV 16. Plan colp  5/22/15: [place] vaginal cuff biopsy - negative. Plan cotest in 1 year.   5/13/16  NIL/+ HR HPV 16. Plan: Repeat Colposcopy.    10/26/16: [place] not done. Tracking updated for 6 mo pap.   3/23/18 NIL Vaginal Pap, + HR HPV 16 (not 18 or other). Plan: colp of vaginal cuff bef 6/23/18 ([facility])  6/25/18 3mo [place] not done, updated to 6mo [place]/Pap due by 9/23/18 4/12/19 Lost to follow-up for pap tracking  12/20/19 NIL vaginal pap with + HR HPV 16. Plan colp. ([facility])  12/30/19 Message left to return call. (rocael). Pt notified by phone. (rocael)  04/24/20 [place] appt--Cx. (gabe)  05/20/20  MyChart colp reminder message sent. ([facility])  06/17/20 3mo colp not done, chart and tracking updated for 6mo colp/pap, due 6/20/20. 6mo colp reminder MyChart sent.

## 2019-12-30 ENCOUNTER — TELEPHONE (OUTPATIENT)
Dept: GASTROENTEROLOGY | Facility: CLINIC | Age: 54
End: 2019-12-30

## 2019-12-30 NOTE — PROGRESS NOTES
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  Doing well on azathioprine 75 mg daily. Renewed.  Discussed bx, dx, prognosis, follow up plan and medication including side effects.   Fibrosis scan correlates with biopsy which showed state 3-4. Expect reduction with AIH treatment.  US for HCC screening. Ordered.   Continue every 3 month labs.     RTC 12 months     Melissa Gutierrez MD  Hepatology/Liver Transplant  Medical Director, Liver Transplantation  [facility]  ========================================================================  S:  54 year old you female with AIH, diagnosed in October 2018. She was hospitalized 10/3-10/5/18 at [facility].  Diagnosis was made when she had elevated liver tests. Bili got up to 12 and ALT up to 700. She is on azathioprine 75 mg daily.  She had a flair in July, Imuran was increased to 75 mg from 50 and liver tests have been normal since. Tolerating imuran without difficulty.      She was also diagnosed with pancreatitis at the time or her initial diagnosis in 2018, unclear etiology. EUS was negative for gallstones. Pancreatic parenchyma had likely fatty infiltration.  Pain resolved after 24 hours and lipase normalized within a few weeks.     She does not drink alcohol. She is working on losing weight.     CT 10/3/18   1. Diffuse hepatic steatosis.  2. Edematous pancreatic head, consistent with pancreatitis given  elevated lipase.  3. Cholelithiasis not well appreciated but better defined on  ultrasound.  4. Ascites in the pelvis and about the liver.  5. Consider MRCP for further evaluation.     LIVER, NEEDLE BIOPSY 10/9/18:   - Active chronic hepatitis with features most suggestive of autoimmune   hepatitis   - Can not rule out stage 3-4 fibrosis   - See microscopic description      Doing well.  No new health problems. No new family history. No F/N/V/D/abd pain. Getting blood  sugars under control with insulin.    Lab Test 12/20/19  0904   PROTTOTAL 7.8   ALBUMIN 3.8   BILITOTAL 0.5   ALKPHOS 144   AST 28   ALT 44     Lab Test 12/20/19  0904   WBC 9.1   RBC 4.88   HGB 15.4   HCT 44.4   MCV 91   MCH 31.6   MCHC 34.7   RDW 13.6        SHx: Works full time at a [employer]. Here with her daughter. Dtr is getting  in April     O:  /66 (BP Location: Right arm)   Pulse 92   Temp 98.3  F (36.8  C) (Oral)   Wt 104.9 kg (231 lb 4.8 oz)   LMP 06/24/2011   SpO2 97%   BMI 44.35 kg/m      Gen: No acute distress  Head: Normocephalic atraumatic  Eyes: Sclera anicteric  Neck: No cervical lymphadenopathy  Respiratory: Clear to auscultation bilaterally, no overt wheezing or rales  CV: RRR   Abdomen:  Soft, nontender, nondistended, normal bowel sounds  Extrem: No edema  Skin: No jaundice, spider angiomata or palmar erythema.  No rashes.   Neuro: Alert and oriented. No asterixis.    MSK: Grossly Intact  Psych: Normal speech, normal affect

## 2019-12-31 ENCOUNTER — OFFICE VISIT (OUTPATIENT)
Dept: GASTROENTEROLOGY | Facility: CLINIC | Age: 54
End: 2019-12-31
Attending: INTERNAL MEDICINE
Payer: COMMERCIAL

## 2019-12-31 VITALS
DIASTOLIC BLOOD PRESSURE: 66 MMHG | OXYGEN SATURATION: 97 % | HEART RATE: 92 BPM | SYSTOLIC BLOOD PRESSURE: 122 MMHG | BODY MASS INDEX: 44.35 KG/M2 | TEMPERATURE: 98.3 F | WEIGHT: 231.3 LBS

## 2019-12-31 DIAGNOSIS — R74.8 ELEVATED LIVER ENZYMES: ICD-10-CM

## 2019-12-31 DIAGNOSIS — K75.4 AUTOIMMUNE HEPATITIS (H): ICD-10-CM

## 2019-12-31 PROCEDURE — G0463 HOSPITAL OUTPT CLINIC VISIT: HCPCS | Mod: ZF

## 2019-12-31 RX ORDER — AZATHIOPRINE 75 MG/1
75 TABLET ORAL DAILY
Qty: 135 TABLET | Refills: 3 | Status: SHIPPED | OUTPATIENT
Start: 2019-12-31 | End: 2020-12-21

## 2019-12-31 ASSESSMENT — PAIN SCALES - GENERAL: PAINLEVEL: NO PAIN (0)

## 2019-12-31 NOTE — NURSING NOTE
"Chief Complaint   Patient presents with     RECHECK     Follow Up Hep     Vital signs:  Temp: 98.3  F (36.8  C) Temp src: Oral BP: 122/66 Pulse: 92     SpO2: 97 %       Weight: 104.9 kg (231 lb 4.8 oz)  Estimated body mass index is 44.35 kg/m  as calculated from the following:    Height as of 12/20/19: 1.538 m (5' 0.55\").    Weight as of this encounter: 104.9 kg (231 lb 4.8 oz).        Orquidea Meneses, CMA    "

## 2019-12-31 NOTE — LETTER
12/31/2019      RE: Mary Grigsby  [address]  [place] [address]       [facility] follow up      A/P  54 year old yo female with AIH with stage 3-4 fibrosis on bx and fibrosis scan.  Doing well on azathioprine 75 mg daily. Renewed.  Discussed bx, dx, prognosis, follow up plan and medication including side effects.   Fibrosis scan correlates with biopsy which showed state 3-4. Expect reduction with AIH treatment.  US for HCC screening. Ordered.   Continue every 3 month labs.     RTC  12 months     Melissa Gutierrez MD  Hepatology/Liver Transplant  Medical Director, Liver Transplantation  [facility]  ========================================================================  S:  54 year old you female with AIH, diagnosed in October 2018. She was hospitalized 10/3-10/5/18 at [facility].  Diagnosis was made when she had elevated liver tests. Bili got up to 12 and ALT up to 700. She is on azathioprine 75 mg daily.  She had a flair in July, Imuran was increased to 75 mg from 50 and liver tests have been normal since. Tolerating imuran without difficulty.      She was also diagnosed with pancreatitis at the time or her initial diagnosis in 2018, unclear etiology. EUS was negative for gallstones. Pancreatic parenchyma had likely fatty infiltration.  Pain resolved after 24 hours and lipase normalized within a few weeks.     She does not drink alcohol. She is working on losing weight.     CT 10/3/18   1. Diffuse hepatic steatosis.  2. Edematous pancreatic head, consistent with pancreatitis given  elevated lipase.  3. Cholelithiasis not well appreciated but better defined on  ultrasound.  4. Ascites in the pelvis and about the liver.  5. Consider MRCP for further evaluation.     LIVER, NEEDLE BIOPSY 10/9/18:   - Active chronic hepatitis with features most suggestive of autoimmune   hepatitis   - Can not rule out stage 3-4 fibrosis   - See microscopic description      Doing  well.  No new health problems. No new family history. No F/N/V/D/abd pain. Getting blood sugars under control with insulin.    Lab Test 12/20/19  0904   PROTTOTAL 7.8   ALBUMIN 3.8   BILITOTAL 0.5   ALKPHOS 144   AST 28   ALT 44     Lab Test 12/20/19  0904   WBC 9.1   RBC 4.88   HGB 15.4   HCT 44.4   MCV 91   MCH 31.6   MCHC 34.7   RDW 13.6        SHx: Works full time at a [employer]. Here with her daughter. Dtr is getting  in April     O:  /66 (BP Location: Right arm)   Pulse 92   Temp 98.3  F (36.8  C) (Oral)   Wt 104.9 kg (231 lb 4.8 oz)   LMP 06/24/2011   SpO2 97%   BMI 44.35 kg/m       Gen: No acute distress  Head: Normocephalic atraumatic  Eyes: Sclera anicteric  Neck: No cervical lymphadenopathy  Respiratory: Clear to auscultation bilaterally, no overt wheezing or rales  CV: RRR   Abdomen:  Soft, nontender, nondistended, normal bowel sounds  Extrem: No edema  Skin: No jaundice, spider angiomata or palmar erythema.  No rashes.   Neuro: Alert and oriented. No asterixis.    MSK: Grossly Intact  Psych: Normal speech, normal affect         Melissa Gutierrez MD

## 2020-01-17 ENCOUNTER — TELEPHONE (OUTPATIENT)
Dept: INTERNAL MEDICINE | Facility: CLINIC | Age: 55
End: 2020-01-17

## 2020-01-17 NOTE — LETTER
January 21, 2020          Mary Grigsby,  [address]  [place] [address]          Dear Mary Grigsby      Monitoring and managing your preventative and chronic health conditions are very important to us. Our records indicate that you have not scheduled for Mammogram  which was recommended by Dr. Manzano.      If you have received your health care elsewhere, please call the clinic so the information can be documented in your chart.    Please call [phone] or message us through your [employer] account to schedule an appointment or provide information for your chart.     Feel free to contact us if you have any questions or concerns!    I look forward to seeing you and working with you on your health care needs.     Sincerely,       Your [facility] Team/[facility]

## 2020-02-24 ENCOUNTER — HEALTH MAINTENANCE LETTER (OUTPATIENT)
Age: 55
End: 2020-02-24

## 2020-03-05 DIAGNOSIS — I10 ESSENTIAL HYPERTENSION WITH GOAL BLOOD PRESSURE LESS THAN 140/90: ICD-10-CM

## 2020-03-06 RX ORDER — HYDROCHLOROTHIAZIDE 12.5 MG/1
TABLET ORAL
Qty: 90 TABLET | Refills: 1 | Status: SHIPPED | OUTPATIENT
Start: 2020-03-06 | End: 2020-08-25

## 2020-03-06 RX ORDER — FELODIPINE 5 MG/1
TABLET, EXTENDED RELEASE ORAL
Qty: 90 TABLET | Refills: 1 | Status: SHIPPED | OUTPATIENT
Start: 2020-03-06 | End: 2020-08-25

## 2020-03-06 RX ORDER — LOSARTAN POTASSIUM 100 MG/1
TABLET ORAL
Qty: 90 TABLET | Refills: 1 | Status: SHIPPED | OUTPATIENT
Start: 2020-03-06 | End: 2020-08-25

## 2020-03-26 ENCOUNTER — OFFICE VISIT (OUTPATIENT)
Dept: INTERNAL MEDICINE | Facility: CLINIC | Age: 55
End: 2020-03-26
Payer: COMMERCIAL

## 2020-03-26 VITALS
SYSTOLIC BLOOD PRESSURE: 138 MMHG | WEIGHT: 241 LBS | OXYGEN SATURATION: 98 % | BODY MASS INDEX: 46.21 KG/M2 | RESPIRATION RATE: 18 BRPM | TEMPERATURE: 98 F | HEART RATE: 94 BPM | DIASTOLIC BLOOD PRESSURE: 72 MMHG

## 2020-03-26 DIAGNOSIS — K11.20 SIALADENITIS: Primary | ICD-10-CM

## 2020-03-26 PROCEDURE — 99213 OFFICE O/P EST LOW 20 MIN: CPT | Performed by: INTERNAL MEDICINE

## 2020-03-26 RX ORDER — PREDNISONE 20 MG/1
20 TABLET ORAL DAILY
Qty: 5 TABLET | Refills: 0 | Status: SHIPPED | OUTPATIENT
Start: 2020-03-26 | End: 2020-08-28

## 2020-03-26 RX ORDER — LEVOFLOXACIN 500 MG/1
500 TABLET, FILM COATED ORAL DAILY
Qty: 10 TABLET | Refills: 0 | Status: SHIPPED | OUTPATIENT
Start: 2020-03-26 | End: 2020-08-28

## 2020-03-26 NOTE — PROGRESS NOTES
Subjective     Mary Grigsby is a 54 year old female who presents to clinic today for the following health issues:    Sialadenitis     Patient has a past medical history of sialolithiasis     This is the currently the fifth time, has seen Dr. Geo Harley, Ear, Nose, and Throat specialist with [facility]  Before about this. Last flare-up before 2012. We couldn't find the actual notes.    It's been generally one sided or the other and could be either. The current flare-up took off with symptoms about this last Sunday     Trying   Heat packs   switching with lemon juice  Nothing has helped and she is in pain now.    Has had some limited success with her treatments at home but it's worsened now to the point that she has this well defined enlarged salivary gland and is suffering.        Patient Active Problem List   Diagnosis     Hypertension goal BP (blood pressure) < 140/90     Ex-smoker     Family history of diabetes mellitus     Hyperlipidemia with target LDL less than 100     Seasonal allergic rhinitis     Rhinitis, allergic to other allergen     House dust mite allergy     obesity     Severe dysplasia of cervix (GUSTABO III)     Family history of thyroid disease     Cervical lymphadenopathy     Controlled type 2 diabetes mellitus without complication, without long-term current use of insulin (H)     Bilateral lower extremity edema     Hypovitaminosis D     Pancreatitis     Autoimmune hepatitis (H)     Past Surgical History:   Procedure Laterality Date     CRYOTHERAPY  3/30/2011    cervical     ESOPHAGOSCOPY, GASTROSCOPY, DUODENOSCOPY (EGD), COMBINED N/A 10/4/2018    Procedure: COMBINED ENDOSCOPIC ULTRASOUND, ESOPHAGOSCOPY, GASTROSCOPY, DUODENOSCOPY (EGD);  EUS;  Surgeon: John Carrera MD;  Location: [facility]     HC COLP CERVIX/UPPER VAGINA W BX CERVIX  1/2010, 3/2011    neg dysplasia, GUSTABO 1-2     HC REPAIR OF NASAL SEPTUM  2/3/09-per Dr. Harley    antrostomy, ethmoidectomy, septoplasty and  turbinate reduction     LAPAROSCOPIC ASSISTED HYSTERECTOMY VAGINAL  3/12/12    [facility]     TONSILLECTOMY & ADENOIDECTOMY  1973    age 8       Social History     Tobacco Use     Smoking status: Current Every Day Smoker     Packs/day: 0.50     Years: 20.00     Pack years: 10.00     Types: Cigarettes     Start date: 1980     Last attempt to quit: 2013     Years since quittin.0     Smokeless tobacco: Current User     Tobacco comment: still has occasionally    Substance Use Topics     Alcohol use: No     Family History   Problem Relation Age of Onset     Cancer Father      Other Cancer Father         passed      Hypertension Father         age 72     Hypertension Sister         age 35     Thyroid Disease Sister      Cerebrovascular Disease Maternal Grandmother      Asthma Son      Thyroid Disease Sister      Thyroid Disease Sister      Thyroid Disease Sister      Diabetes Mother      Hypertension Mother         age 34     Hypertension Sister      Thyroid Disease Sister      Asthma Son      Glaucoma No family hx of      Macular Degeneration No family hx of          Current Outpatient Medications   Medication Sig Dispense Refill     aspirin 81 MG tablet Take 1 tablet (81 mg) by mouth daily Indication: primary prevention for heart disease 1 tablet 3     azaTHIOprine 75 MG TABS Take 75 mg by mouth daily 135 tablet 3     blood glucose (NO BRAND SPECIFIED) lancets standard Use to test blood sugar 3 times daily or as directed. 300 each 11     blood glucose calibration (NO BRAND SPECIFIED) solution Use to calibrate blood glucose monitor as directed. 1 each 11     blood glucose monitoring (NO BRAND SPECIFIED) meter device kit Use to test blood sugar 3 times daily or as directed. 1 kit 0     blood glucose monitoring (NO BRAND SPECIFIED) test strip 1 strip by In Vitro route 3 times daily 300 strip 11     cholecalciferol (VITAMIN D3) 1000 UNIT tablet Take 3 tablets (3,000 Units) by mouth daily        felodipine ER (PLENDIL) 5 MG 24 hr tablet TAKE ONE TABLET BY MOUTH ONCE DAILY 90 tablet 1     Fexofenadine HCl (ALLEGRA PO) Take 90 mg by mouth daily as needed        FREESTYLE LANCETS MISC 1 Device 2 times daily. 100 each 11     hydrochlorothiazide (HYDRODIURIL) 12.5 MG tablet TAKE ONE TABLET BY MOUTH EVERY MORNING 90 tablet 1     Hypertonic Nasal Wash (SINUS RINSE BOTTLE KIT NA) Spray 1 Bottle in nostril as needed.       levofloxacin (LEVAQUIN) 500 MG tablet Take 1 tablet (500 mg) by mouth daily 10 tablet 0     losartan (COZAAR) 100 MG tablet TAKE ONE TABLET BY MOUTH ONCE DAILY 90 tablet 1     predniSONE (DELTASONE) 20 MG tablet Take 1 tablet (20 mg) by mouth daily 5 tablet 0     STATIN NOT PRESCRIBED (INTENTIONAL) Statin not prescribed intentionally due to Active liver disease (liver failure, cirrhosis, hepatitis)       sulfamethoxazole-trimethoprim (BACTRIM DS/SEPTRA DS) 800-160 MG per tablet Take 1 tablet by mouth 2 times daily 28 tablet 3     Allergies   Allergen Reactions     Amoxicillin Hives     Augmentin      Erythromycin      No Clinical Screening - See Comments      Trulicity - pancreatitis.  Should not use any other GLP-1 agonist or DPP4 inhibitor     Recent Labs   Lab Test 12/20/19  0904 09/23/19  1350 09/09/19  0701 08/05/19  0705 07/22/19  0814  03/25/19  0713  12/24/18  0823  10/05/18  0538  06/15/18  1017  05/27/16  0714 09/24/15  1605   A1C 7.0* 6.6*  --   --   --   --  6.7*  --  6.8*  --   --    < > 9.6*   < > 6.8* 6.4*   LDL  --   --   --   --   --   --   --   --   --   --  33  --  105*  --  103*  --    HDL  --   --   --   --   --   --   --   --   --   --  9*  --  36*  --  40*  --    TRIG  --   --   --   --   --   --   --   --   --   --  196*  --  179*  --  149  --    ALT 44  --  36 42 70*   < > 47   < >  --    < > 486*   < >  --   --   --   --    CR  --   --   --   --  0.62  --   --   --  0.46*   < > 1.01   < > 0.53   < > 0.50*  --    GFRESTIMATED  --   --   --   --  >90  --   --   --  >90   <  > 57*   < > >90   < > >90  Non  GFR Calc    --    GFRESTBLACK  --   --   --   --  >90  --   --   --  >90   < > 69   < > >90   < > >90  African American GFR Calc    --    POTASSIUM  --   --   --   --  3.7  --   --   --  3.3*   < > 3.5   < > 3.7   < > 3.7  --    TSH  --   --   --   --   --   --   --   --   --   --   --   --  4.51*  --   --  3.62    < > = values in this interval not displayed.      BP Readings from Last 3 Encounters:   03/26/20 138/72   12/31/19 122/66   12/20/19 135/79    Wt Readings from Last 3 Encounters:   03/26/20 109.3 kg (241 lb)   12/31/19 104.9 kg (231 lb 4.8 oz)   12/20/19 105.1 kg (231 lb 12.8 oz)               Reviewed and updated as needed this visit by Provider         Review of Systems   ROS COMP: Constitutional, HEENT, cardiovascular, pulmonary, gi and gu systems are negative, except as otherwise noted.      Objective    /72   Pulse 94   Temp 98  F (36.7  C) (Oral)   Resp 18   Wt 109.3 kg (241 lb)   LMP 06/24/2011   SpO2 98%   BMI 46.21 kg/m    Body mass index is 46.21 kg/m .  Physical Exam   GENERAL: healthy, alert and no distress  HENT: ear canals and TM's normal, nose and mouth without ulcers or lesions  NECK: no adenopathy, there is an obvious asymmetry with a swollen left sided parotid gland problem which has tenderness to palpation, otherwise no masses, or scars and thyroid normal to palpation  MS: no gross musculoskeletal defects noted, no edema    Diagnostic Test Results:  Labs reviewed in Epic        Assessment & Plan     (K11.20) Sialadenitis  (primary encounter diagnosis)  Comment: we reviewed the pathophysiology of this diagnosis. There's currently no clear cut reason for this flare-up beyond bad luck. We are following the treatment plan as laid out by Dr. Harley at the office visit in 2012. Initially will take antibiotics and then depending on how things go we may need to add the prednisone   Plan: levofloxacin (LEVAQUIN) 500 MG tablet,          predniSONE (DELTASONE) 20 MG tablet            No follow-ups on file.    Dipak Hooper MD  [facility]      It was noted patient was due for some other follow up of chronic medical problems but she has declined to address things which is understandable given the fact that Novel Coronavirus (COVID-19) is at the peak right now

## 2020-05-12 NOTE — TELEPHONE ENCOUNTER
Tobacco Treatment Program at the [facility] attempted to reach Ms. Grigsby on 5/12/2020 regarding the tobacco cessation program to help Ms. Grigsby to quit smoking. We will attempt to reach Ms. Grigsby another time.     Shani Celeste [facility]  Tobacco Treatment Specialist  PH: [phone]

## 2020-05-15 DIAGNOSIS — K75.4 AUTOIMMUNE HEPATITIS (H): ICD-10-CM

## 2020-05-15 DIAGNOSIS — R74.8 ELEVATED LIVER ENZYMES: ICD-10-CM

## 2020-05-15 LAB
ALBUMIN SERPL-MCNC: 3.6 G/DL (ref 3.4–5)
ALP SERPL-CCNC: 181 U/L (ref 40–150)
ALT SERPL W P-5'-P-CCNC: 38 U/L (ref 0–50)
AST SERPL W P-5'-P-CCNC: 24 U/L (ref 0–45)
BILIRUB DIRECT SERPL-MCNC: <0.1 MG/DL (ref 0–0.2)
BILIRUB SERPL-MCNC: 0.3 MG/DL (ref 0.2–1.3)
ERYTHROCYTE [DISTWIDTH] IN BLOOD BY AUTOMATED COUNT: 13.7 % (ref 10–15)
HCT VFR BLD AUTO: 44.5 % (ref 35–47)
HGB BLD-MCNC: 15.2 G/DL (ref 11.7–15.7)
MCH RBC QN AUTO: 31.2 PG (ref 26.5–33)
MCHC RBC AUTO-ENTMCNC: 34.2 G/DL (ref 31.5–36.5)
MCV RBC AUTO: 91 FL (ref 78–100)
PLATELET # BLD AUTO: 245 10E9/L (ref 150–450)
PROT SERPL-MCNC: 7.3 G/DL (ref 6.8–8.8)
RBC # BLD AUTO: 4.87 10E12/L (ref 3.8–5.2)
WBC # BLD AUTO: 10.5 10E9/L (ref 4–11)

## 2020-05-15 PROCEDURE — 36415 COLL VENOUS BLD VENIPUNCTURE: CPT | Performed by: INTERNAL MEDICINE

## 2020-05-15 PROCEDURE — 85027 COMPLETE CBC AUTOMATED: CPT | Performed by: INTERNAL MEDICINE

## 2020-05-15 PROCEDURE — 80076 HEPATIC FUNCTION PANEL: CPT | Performed by: INTERNAL MEDICINE

## 2020-05-19 ENCOUNTER — PATIENT OUTREACH (OUTPATIENT)
Dept: OBGYN | Facility: CLINIC | Age: 55
End: 2020-05-19

## 2020-05-19 DIAGNOSIS — D06.9 SEVERE DYSPLASIA OF CERVIX (CIN III): ICD-10-CM

## 2020-05-19 NOTE — TELEPHONE ENCOUNTER
Chely Arango,    Please advise patient that COVID 19 scheduling restrictions have been revised for selected sites.  She can now call to schedule her colposcopy at: [facility].    Thanks!     Kala Hutchinson RN  Pap Tracking

## 2020-05-20 NOTE — TELEPHONE ENCOUNTER
Neema wagner reminder message sent.     Conchita Summers -   [facility] Pap Tracking

## 2020-06-17 NOTE — TELEPHONE ENCOUNTER
3mo colp not done, chart and tracking updated for 6mo colp/pap, due 06/20/20. 6mo colp reminder MyChart sent.    Conchita Summers -   [facility] Pap Tracking

## 2020-07-15 ENCOUNTER — PATIENT OUTREACH (OUTPATIENT)
Dept: PEDIATRICS | Facility: CLINIC | Age: 55
End: 2020-07-15

## 2020-07-15 DIAGNOSIS — D06.9 SEVERE DYSPLASIA OF CERVIX (CIN III): ICD-10-CM

## 2020-08-23 ENCOUNTER — TELEPHONE (OUTPATIENT)
Dept: FAMILY MEDICINE | Facility: CLINIC | Age: 55
End: 2020-08-23

## 2020-08-23 DIAGNOSIS — I10 ESSENTIAL HYPERTENSION WITH GOAL BLOOD PRESSURE LESS THAN 140/90: ICD-10-CM

## 2020-08-23 DIAGNOSIS — E11.9 CONTROLLED TYPE 2 DIABETES MELLITUS WITHOUT COMPLICATION, WITHOUT LONG-TERM CURRENT USE OF INSULIN (H): Primary | ICD-10-CM

## 2020-08-23 DIAGNOSIS — E55.9 HYPOVITAMINOSIS D: ICD-10-CM

## 2020-08-23 DIAGNOSIS — E78.5 HYPERLIPIDEMIA WITH TARGET LDL LESS THAN 100: ICD-10-CM

## 2020-08-25 RX ORDER — HYDROCHLOROTHIAZIDE 12.5 MG/1
TABLET ORAL
Qty: 30 TABLET | Refills: 0 | Status: SHIPPED | OUTPATIENT
Start: 2020-08-25 | End: 2020-09-24

## 2020-08-25 RX ORDER — FELODIPINE 5 MG/1
TABLET, EXTENDED RELEASE ORAL
Qty: 30 TABLET | Refills: 0 | Status: SHIPPED | OUTPATIENT
Start: 2020-08-25 | End: 2020-09-24

## 2020-08-25 RX ORDER — LOSARTAN POTASSIUM 100 MG/1
TABLET ORAL
Qty: 30 TABLET | Refills: 0 | Status: SHIPPED | OUTPATIENT
Start: 2020-08-25 | End: 2020-09-24

## 2020-08-25 NOTE — TELEPHONE ENCOUNTER
"Routing refill request to provider for review/approval because:  Failed protocol - see below    Requested Prescriptions   Pending Prescriptions Disp Refills    losartan (COZAAR) 100 MG tablet [Pharmacy Med Name: LOSARTAN POTASSIUM 100MG TABS] 90 tablet 1     Sig: TAKE ONE TABLET BY MOUTH ONCE DAILY       Angiotensin-II Receptors Failed - 8/24/2020  9:30 AM        Failed - Normal serum creatinine on file in past 12 months     Recent Labs   Lab Test 07/22/19  0814   CR 0.62       Ok to refill medication if creatinine is low          Failed - Normal serum potassium on file in past 12 months     Recent Labs   Lab Test 07/22/19  0814   POTASSIUM 3.7                    Passed - Last blood pressure under 140/90 in past 12 months     BP Readings from Last 3 Encounters:   03/26/20 138/72   12/31/19 122/66   12/20/19 135/79                 Passed - Recent (12 mo) or future (30 days) visit within the authorizing provider's specialty     Patient has had an office visit with the authorizing provider or a provider within the authorizing providers department within the previous 12 mos or has a future within next 30 days. See \"Patient Info\" tab in inbasket, or \"Choose Columns\" in Meds & Orders section of the refill encounter.              Passed - Medication is active on med list        Passed - Patient is age 18 or older        Passed - No active pregnancy on record        Passed - No positive pregnancy test in past 12 months          hydrochlorothiazide (HYDRODIURIL) 12.5 MG tablet [Pharmacy Med Name: HYDROCHLOROTHIAZIDE 12.5MG TABS] 90 tablet 1     Sig: TAKE ONE TABLET BY MOUTH EVERY MORNING       Diuretics (Including Combos) Protocol Failed - 8/24/2020  9:30 AM        Failed - Normal serum creatinine on file in past 12 months     Recent Labs   Lab Test 07/22/19  0814   CR 0.62              Failed - Normal serum potassium on file in past 12 months     Recent Labs   Lab Test 07/22/19  0814   POTASSIUM 3.7                    " "Failed - Normal serum sodium on file in past 12 months     Recent Labs   Lab Test 07/22/19  0814                 Passed - Blood pressure under 140/90 in past 12 months     BP Readings from Last 3 Encounters:   03/26/20 138/72   12/31/19 122/66   12/20/19 135/79                 Passed - Recent (12 mo) or future (30 days) visit within the authorizing provider's specialty     Patient has had an office visit with the authorizing provider or a provider within the authorizing providers department within the previous 12 mos or has a future within next 30 days. See \"Patient Info\" tab in inbasket, or \"Choose Columns\" in Meds & Orders section of the refill encounter.              Passed - Medication is active on med list        Passed - Patient is age 18 or older        Passed - No active pregancy on record        Passed - No positive pregnancy test in past 12 months          felodipine ER (PLENDIL) 5 MG 24 hr tablet [Pharmacy Med Name: FELODIPINE ER 5MG TB24] 90 tablet 1     Sig: TAKE ONE TABLET BY MOUTH ONCE DAILY       Calcium Channel Blockers Protocol  Failed - 8/24/2020  9:30 AM        Failed - Normal serum creatinine on file in past 12 months     Recent Labs   Lab Test 07/22/19  0814   CR 0.62       Ok to refill medication if creatinine is low          Passed - Blood pressure under 140/90 in past 12 months     BP Readings from Last 3 Encounters:   03/26/20 138/72   12/31/19 122/66   12/20/19 135/79                 Passed - Recent (12 mo) or future (30 days) visit within the authorizing provider's specialty     Patient has had an office visit with the authorizing provider or a provider within the authorizing providers department within the previous 12 mos or has a future within next 30 days. See \"Patient Info\" tab in inbasket, or \"Choose Columns\" in Meds & Orders section of the refill encounter.              Passed - Medication is active on med list        Passed - Patient is age 18 or older        Passed - No " active pregnancy on record        Passed - No positive pregnancy test in past 12 months           Moni Lizarraga, RN

## 2020-08-25 NOTE — TELEPHONE ENCOUNTER
Needs appointment as soon as possible. Lets see if she can come for hemoglobin a1c  [ diabetes test ] and schedule for telephone MD visit this Friday     Dipak Hooper MD

## 2020-08-26 NOTE — TELEPHONE ENCOUNTER
Patient called and informed.  Lab appointment scheduled for tomorrow, and telephone visit scheduled with Dr. Hooper for Friday.    Patient would also like to have her potassium and Vitamin D checked.  Routing to Dr. Hooper to place these orders. Siri Mar,

## 2020-08-27 DIAGNOSIS — E78.5 HYPERLIPIDEMIA WITH TARGET LDL LESS THAN 100: ICD-10-CM

## 2020-08-27 DIAGNOSIS — E11.9 CONTROLLED TYPE 2 DIABETES MELLITUS WITHOUT COMPLICATION, WITHOUT LONG-TERM CURRENT USE OF INSULIN (H): ICD-10-CM

## 2020-08-27 DIAGNOSIS — I10 ESSENTIAL HYPERTENSION WITH GOAL BLOOD PRESSURE LESS THAN 140/90: ICD-10-CM

## 2020-08-27 DIAGNOSIS — E55.9 HYPOVITAMINOSIS D: ICD-10-CM

## 2020-08-27 LAB
ANION GAP SERPL CALCULATED.3IONS-SCNC: 5 MMOL/L (ref 3–14)
BUN SERPL-MCNC: 14 MG/DL (ref 7–30)
CALCIUM SERPL-MCNC: 9.4 MG/DL (ref 8.5–10.1)
CHLORIDE SERPL-SCNC: 104 MMOL/L (ref 94–109)
CHOLEST SERPL-MCNC: 212 MG/DL
CO2 SERPL-SCNC: 26 MMOL/L (ref 20–32)
CREAT SERPL-MCNC: 0.57 MG/DL (ref 0.52–1.04)
CREAT UR-MCNC: 24 MG/DL
DEPRECATED CALCIDIOL+CALCIFEROL SERPL-MC: 29 UG/L (ref 20–75)
GFR SERPL CREATININE-BSD FRML MDRD: >90 ML/MIN/{1.73_M2}
GLUCOSE SERPL-MCNC: 167 MG/DL (ref 70–99)
HBA1C MFR BLD: 8.2 % (ref 0–5.6)
HDLC SERPL-MCNC: 43 MG/DL
LDLC SERPL CALC-MCNC: 133 MG/DL
MICROALBUMIN UR-MCNC: <5 MG/L
MICROALBUMIN/CREAT UR: NORMAL MG/G CR (ref 0–25)
NONHDLC SERPL-MCNC: 169 MG/DL
POTASSIUM SERPL-SCNC: 4 MMOL/L (ref 3.4–5.3)
SODIUM SERPL-SCNC: 135 MMOL/L (ref 133–144)
TRIGL SERPL-MCNC: 182 MG/DL

## 2020-08-27 PROCEDURE — 83036 HEMOGLOBIN GLYCOSYLATED A1C: CPT | Performed by: INTERNAL MEDICINE

## 2020-08-27 PROCEDURE — 82306 VITAMIN D 25 HYDROXY: CPT | Performed by: INTERNAL MEDICINE

## 2020-08-27 PROCEDURE — 36415 COLL VENOUS BLD VENIPUNCTURE: CPT | Performed by: INTERNAL MEDICINE

## 2020-08-27 PROCEDURE — 80048 BASIC METABOLIC PNL TOTAL CA: CPT | Performed by: INTERNAL MEDICINE

## 2020-08-27 PROCEDURE — 80061 LIPID PANEL: CPT | Performed by: INTERNAL MEDICINE

## 2020-08-27 PROCEDURE — 82043 UR ALBUMIN QUANTITATIVE: CPT | Performed by: INTERNAL MEDICINE

## 2020-08-27 NOTE — PROGRESS NOTES
"Mary Grigsby is a 55 year old female who is being evaluated via a billable telephone visit.      The patient has been notified of following:     \"This telephone visit will be conducted via a call between you and your physician/provider. We have found that certain health care needs can be provided without the need for a physical exam.  This service lets us provide the care you need with a short phone conversation.  If a prescription is necessary we can send it directly to your pharmacy.  If lab work is needed we can place an order for that and you can then stop by our lab to have the test done at a later time.    Telephone visits are billed at different rates depending on your insurance coverage. During this emergency period, for some insurers they may be billed the same as an in-person visit.  Please reach out to your insurance provider with any questions.    If during the course of the call the physician/provider feels a telephone visit is not appropriate, you will not be charged for this service.\"    Patient has given verbal consent for Telephone visit?  Yes    What phone number would you like to be contacted at? [phone]    How would you like to obtain your AVS? MyChart    Subjective     Mary Grigsby is a 55 year old female who presents via phone visit today for the following health issues:    HPI  Chief Complaint   Patient presents with     Recheck Medication     Results     lab results         Controlled type 2 diabetes mellitus without complication, without long-term current use of insulin (H)  Hypovitaminosis D  Hyperlipidemia with target LDL less than 100  Autoimmune hepatitis (H)     Mary Grigsby is a very pleasant woman with diabetes mellitus type 2  And a separate autoimmune hepatitis disease. Because of her liver disease she has had a lot of problems with medications and concerns with liver disease issues. She is currently treated only with diet and exercise for her diabetes mellitus " and most likely secondary to the Coronavirus (COVID-19) situation, patient acknowledges she has fallen out of proper behaviors for her diabetes mellitus control. Her hemoglobin a1c  [ diabetes test ] has taken a turn for the worse as the chart shows below    Lab Results   Component Value Date    A1C 8.2 08/27/2020    A1C 7.0 12/20/2019    A1C 6.6 09/23/2019    A1C 6.7 03/25/2019    A1C 6.8 12/24/2018       She is seeing Dr. Meilssa Gutierrez with [facility] for follow up of her liver disease which is according to my understanding felt to be under good control with azaTHIOprine (IMURAN) and she's not taking prednisone at this point. I cleaned up her medication list to reflect what she is actually taking now.    As we reviewed the possible choices for anti-diabetic medications we are rather limited     1. She feels metformin ( Glucophage) made her feel sick with gastrointestinal symptoms although this is not listed as an allergy and so technically we could probably use this  2. (glucagon-like peptide-1) GLP-1 agonists prohibitive   3. Dipeptidyl Peptidase-4 Inhibitors (DPP-4 I) prohibitive     We would therefore be limited in possible choices to probably   A. Sulfonylureas such as glimepiride [ Amaryl ]   B. Sodium-glucose Cotransporter-2 (SGLT2) Inhibitors such as FARXIGA (dapagliflozin)   C. Insulin   D. Pioglitazone (ACTOS)  ?     Patient was most enthusiastic about taking the next 3-6 months to really try to get back on track with things and it's ok to wait this time period with a planned recheck hemoglobin a1c  [ diabetes test ] in 3-6 months     We also reviewed her situation with hypovitaminosis D and it is ok for her to go back to 3000 international units per day. We can recheck this also in 6 months.    Finally with her lipids according to my understanding she is also prohibited from statin medication and she is listed as statin intentionally not prescribed but I will confirm all this  "with Dr. Gutierrez.      Review of Systems   Constitutional, HEENT, cardiovascular, pulmonary, gi and gu systems are negative, except as otherwise noted.       Objective        Gained about 10 pounds over last few months secondary to Coronavirus (COVID-19) , schedule is all mixed up, still partially furloughed. She wasn't surprised by these pre-visit laboratory studies. See laboratory section of Epic electronic medical records      Questions about statin medication and her autoimmune hepatitis . Questions about     Vitals:  No vitals were obtained today due to virtual visit.    healthy, alert and no distress  PSYCH: Alert and oriented times 3; coherent speech, normal   rate and volume, able to articulate logical thoughts, able   to abstract reason, no tangential thoughts, no hallucinations   or delusions  Her affect is normal  RESP: No cough, no audible wheezing, able to talk in full sentences  Remainder of exam unable to be completed due to telephone visits    Pre-clinic laboratory studies done         Assessment/Plan:    Assessment & Plan     Controlled type 2 diabetes mellitus without complication, without long-term current use of insulin (H)  As detailed above     Hypovitaminosis D  As detailed above     Hyperlipidemia with target LDL less than 100  As detailed above     Autoimmune hepatitis (H)  As detailed above        Tobacco Cessation:   reports that she has been smoking cigarettes. She started smoking about 40 years ago. She has a 10.00 pack-year smoking history. She uses smokeless tobacco.  Tobacco Cessation Action Plan: Information offered: Patient not interested at this time      BMI:   Estimated body mass index is 46.21 kg/m  as calculated from the following:    Height as of 12/20/19: 1.538 m (5' 0.55\").    Weight as of 3/26/20: 109.3 kg (241 lb).   Weight management plan: Discussed healthy diet and exercise guidelines      Return in about 6 months (around 2/28/2021) for Lab Work, telephone " visit.    Dipak Hooper MD  [facility]    Phone call duration:  14 minutes    Telephone MD visit     Call began at 8:36 AM   Call ended at 8:50 AM

## 2020-08-28 ENCOUNTER — VIRTUAL VISIT (OUTPATIENT)
Dept: INTERNAL MEDICINE | Facility: CLINIC | Age: 55
End: 2020-08-28
Payer: COMMERCIAL

## 2020-08-28 DIAGNOSIS — K75.4 AUTOIMMUNE HEPATITIS (H): ICD-10-CM

## 2020-08-28 DIAGNOSIS — E11.9 CONTROLLED TYPE 2 DIABETES MELLITUS WITHOUT COMPLICATION, WITHOUT LONG-TERM CURRENT USE OF INSULIN (H): Primary | ICD-10-CM

## 2020-08-28 DIAGNOSIS — E78.5 HYPERLIPIDEMIA WITH TARGET LDL LESS THAN 100: ICD-10-CM

## 2020-08-28 DIAGNOSIS — E55.9 HYPOVITAMINOSIS D: ICD-10-CM

## 2020-08-28 PROCEDURE — 99214 OFFICE O/P EST MOD 30 MIN: CPT | Mod: TEL | Performed by: INTERNAL MEDICINE

## 2020-08-28 NOTE — Clinical Note
Hi, I am wondering what anti-diabetic medications are safe for this patient . See my note. What is ok with you given the fact of her autoimmune hepatitis ? And is her edict against statin medication permanent ? Thank you so very much for your feedback !    Dipak Hooper MD

## 2020-08-28 NOTE — LETTER
"[facility]  [address]  [place] [address]  [phone]          September 1, 2020    Mary Grigsby                                                                                                                 [address]  [place] [address]          Dear Mary,    I reviewed your case with Dr. Gutierrez and here's her response:    \"I want you to continue with your efforts with diet and exercise, but in 3 months we will recheck hemoglobin A1c (diabetes test), and then add medications without too much worry for your liver\".    So specifically, right away you are okay to restart with your lipid lowering therapy such as atorvastatin (lipitor) 10 milligrams a day or other choices. Do you have a preference there ?     Sincerely,         Dipak Hooper MD/hernandez    "

## 2020-09-23 DIAGNOSIS — I10 ESSENTIAL HYPERTENSION WITH GOAL BLOOD PRESSURE LESS THAN 140/90: ICD-10-CM

## 2020-09-24 RX ORDER — LOSARTAN POTASSIUM 100 MG/1
TABLET ORAL
Qty: 90 TABLET | Refills: 1 | Status: SHIPPED | OUTPATIENT
Start: 2020-09-24 | End: 2021-03-22

## 2020-09-24 RX ORDER — FELODIPINE 5 MG/1
TABLET, EXTENDED RELEASE ORAL
Qty: 90 TABLET | Refills: 1 | Status: SHIPPED | OUTPATIENT
Start: 2020-09-24 | End: 2021-03-22

## 2020-09-24 RX ORDER — HYDROCHLOROTHIAZIDE 12.5 MG/1
TABLET ORAL
Qty: 90 TABLET | Refills: 1 | Status: SHIPPED | OUTPATIENT
Start: 2020-09-24 | End: 2021-03-22

## 2020-10-21 DIAGNOSIS — K75.4 AUTOIMMUNE HEPATITIS (H): Primary | ICD-10-CM

## 2020-10-21 DIAGNOSIS — R74.8 ELEVATED LIVER ENZYMES: ICD-10-CM

## 2020-10-21 DIAGNOSIS — K75.4 AUTOIMMUNE HEPATITIS (H): ICD-10-CM

## 2020-10-21 LAB
ALBUMIN SERPL-MCNC: 3.6 G/DL (ref 3.4–5)
ALP SERPL-CCNC: 155 U/L (ref 40–150)
ALT SERPL W P-5'-P-CCNC: 72 U/L (ref 0–50)
ANION GAP SERPL CALCULATED.3IONS-SCNC: 4 MMOL/L (ref 3–14)
AST SERPL W P-5'-P-CCNC: 41 U/L (ref 0–45)
BILIRUB DIRECT SERPL-MCNC: 0.1 MG/DL (ref 0–0.2)
BILIRUB SERPL-MCNC: 0.4 MG/DL (ref 0.2–1.3)
BUN SERPL-MCNC: 16 MG/DL (ref 7–30)
CALCIUM SERPL-MCNC: 9.1 MG/DL (ref 8.5–10.1)
CHLORIDE SERPL-SCNC: 104 MMOL/L (ref 94–109)
CO2 SERPL-SCNC: 29 MMOL/L (ref 20–32)
CREAT SERPL-MCNC: 0.57 MG/DL (ref 0.52–1.04)
ERYTHROCYTE [DISTWIDTH] IN BLOOD BY AUTOMATED COUNT: 13.8 % (ref 10–15)
GFR SERPL CREATININE-BSD FRML MDRD: >90 ML/MIN/{1.73_M2}
GLUCOSE SERPL-MCNC: 171 MG/DL (ref 70–99)
HCT VFR BLD AUTO: 44.5 % (ref 35–47)
HGB BLD-MCNC: 15.2 G/DL (ref 11.7–15.7)
MCH RBC QN AUTO: 31 PG (ref 26.5–33)
MCHC RBC AUTO-ENTMCNC: 34.2 G/DL (ref 31.5–36.5)
MCV RBC AUTO: 91 FL (ref 78–100)
PLATELET # BLD AUTO: 222 10E9/L (ref 150–450)
POTASSIUM SERPL-SCNC: 4 MMOL/L (ref 3.4–5.3)
PROT SERPL-MCNC: 7.4 G/DL (ref 6.8–8.8)
RBC # BLD AUTO: 4.91 10E12/L (ref 3.8–5.2)
SODIUM SERPL-SCNC: 137 MMOL/L (ref 133–144)
WBC # BLD AUTO: 9.9 10E9/L (ref 4–11)

## 2020-10-21 PROCEDURE — 80076 HEPATIC FUNCTION PANEL: CPT | Performed by: INTERNAL MEDICINE

## 2020-10-21 PROCEDURE — 85027 COMPLETE CBC AUTOMATED: CPT | Performed by: INTERNAL MEDICINE

## 2020-10-21 PROCEDURE — 80048 BASIC METABOLIC PNL TOTAL CA: CPT | Performed by: INTERNAL MEDICINE

## 2020-10-22 ENCOUNTER — ANCILLARY PROCEDURE (OUTPATIENT)
Dept: ULTRASOUND IMAGING | Facility: CLINIC | Age: 55
End: 2020-10-22
Attending: INTERNAL MEDICINE
Payer: COMMERCIAL

## 2020-10-22 DIAGNOSIS — K75.4 AUTOIMMUNE HEPATITIS (H): Primary | ICD-10-CM

## 2020-10-22 DIAGNOSIS — K75.4 AUTOIMMUNE HEPATITIS (H): ICD-10-CM

## 2020-10-22 NOTE — RESULT ENCOUNTER NOTE
Ultrasound shows changes consistent with known liver disease. No evidence for any early liver cancer. Repeat US in 6 months (ordered).

## 2020-10-27 ENCOUNTER — VIRTUAL VISIT (OUTPATIENT)
Dept: GASTROENTEROLOGY | Facility: CLINIC | Age: 55
End: 2020-10-27
Attending: INTERNAL MEDICINE
Payer: COMMERCIAL

## 2020-10-27 DIAGNOSIS — Z53.9 NO SHOW: Primary | ICD-10-CM

## 2020-10-27 ASSESSMENT — PAIN SCALES - GENERAL: PAINLEVEL: NO PAIN (0)

## 2020-10-27 NOTE — PROGRESS NOTES
"Called patient 4 times. Went straight to [facility]. Left 2 messages. Below is for documentation only.    Mary Grigsby is a 55 year old female who is being evaluated via a billable telephone visit during the COVID-19 pandemic and clinic response to limit in-person visits.  The patient has been notified of following:   \"This telephone visit will be conducted via a call between you and your physician/provider. We have found that certain health care needs can be provided without the need for a physical exam.  This service lets us provide the care you need with a short phone conversation.  If a prescription is necessary we can send it directly to your pharmacy.  If lab work is needed we can place an order for that and you can then stop by our lab to have the test done at a later time.  If during the course of the call the physician/provider feels a telephone visit is not appropriate, you will not be charged for this service.\" Telephone visits are billed at different rates depending on your insurance coverage. During this emergency period, for some insurers they may be billed the same as an in-person visit.  Please reach out to your insurance provider with any questions.  Consent has been obtained for this service by 1 care team member: yes  I have reviewed and updated the patient's Past Medical History, Social History, Family History and Medication List.    What phone number would you like to be contacted at?     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  On azathioprine 75 mg daily. Renewed.  Labs more elevated on this last set and will recheck in 1 month.  Discussed bx, dx, prognosis, follow up plan and medication including side effects.   Fibrosis scan correlates with biopsy which showed state 3-4. Expect reduction with AIH treatment.  US for HCC screening. " Ordered.   Continue every 3 month labs.     RTC      Melissa Gutierrez MD  Hepatology/Liver Transplant  Medical Director, Liver Transplantation  [facility]  ========================================================================  S:  55 Y F with AIH, diagnosed in October 2018. Diagnosis was made when she had elevated liver tests. Hospitalized 10/3-10/5/18 at [facility].  Bili got up to 12 and ALT up to 700. She is on azathioprine 75 mg daily.    She had a flair in July 2019. Imuran was increased to 75 mg from 50 and liver tests were normal until 10/21/20 labs: ALT 73 (from 38) and AST 41 (from 24). Tolerating Imuran without difficulty.      She was also diagnosed with pancreatitis at the time or her initial diagnosis in 2018, unclear etiology. EUS was negative for gallstones. Pancreatic parenchyma had likely fatty infiltration. Pain resolved after 24 hours and lipase normalized within a few weeks.     She does not drink alcohol. She is working on losing weight.     CT 10/3/18   1. Diffuse hepatic steatosis.  2. Edematous pancreatic head, consistent with pancreatitis given  elevated lipase.  3. Cholelithiasis not well appreciated but better defined on  ultrasound.  4. Ascites in the pelvis and about the liver.  5. Consider MRCP for further evaluation.     LIVER, NEEDLE BIOPSY 10/9/18:   - Active chronic hepatitis with features most suggestive of autoimmune   hepatitis   - Can not rule out stage 3-4 fibrosis   - See microscopic description     US 10/22/20  1.  There is diffuse fatty infiltration of the liver and probable  hepatomegaly. Diffuse coarsening of the hepatic parenchyma suggests chronic liver disease.  2.  Otherwise unremarkable right upper quadrant ultrasound. No focal hepatic masses are identified.     Doing well.  No new health problems. No new family history. No F/N/V/D/abd pain. Getting blood sugars under control with insulin.      Lab Test 10/21/20  0839   PROTTOTAL 7.4   ALBUMIN 3.6    BILITOTAL 0.4   ALKPHOS 155*   AST 41   ALT 72*     Lab Test 10/21/20  0839   WBC 9.9   RBC 4.91   HGB 15.2   HCT 44.5   MCV 91   MCH 31.0   MCHC 34.2   RDW 13.8        SHx: Works full time at a [employer].        This patient was a no show for this scheduled appointment.

## 2020-10-27 NOTE — LETTER
"    10/27/2020         RE: Mary Grigsby  [address]  [place] [address]        Dear Colleague,    Thank you for referring your patient, Mary Grigsby, to the [facility] [place]. Please see a copy of my visit note below.    Mary Grigsby is a 55 year old female who is being evaluated via a billable telephone visit.      The patient has been notified of following:     \"This telephone visit will be conducted via a call between you and your physician/provider. We have found that certain health care needs can be provided without the need for a physical exam.  This service lets us provide the care you need with a short phone conversation.  If a prescription is necessary we can send it directly to your pharmacy.  If lab work is needed we can place an order for that and you can then stop by our lab to have the test done at a later time.    Telephone visits are billed at different rates depending on your insurance coverage. During this emergency period, for some insurers they may be billed the same as an in-person visit.  Please reach out to your insurance provider with any questions.    If during the course of the call the physician/provider feels a telephone visit is not appropriate, you will not be charged for this service.\"    Patient has given verbal consent for Telephone visit?  Yes    What phone number would you like to be contacted at? [phone]    How would you like to obtain your AVS? [employer]    Phone call duration:        Called patient 4 times. Went straight to [employer]. Left 2 messages. Below is for documentation only.    Mary Grigsby is a 55 year old female who is being evaluated via a billable telephone visit during the COVID-19 pandemic and clinic response to limit in-person visits.  The patient has been notified of following:   \"This telephone visit will be conducted via a call between you and your physician/provider. We have found that certain " "health care needs can be provided without the need for a physical exam.  This service lets us provide the care you need with a short phone conversation.  If a prescription is necessary we can send it directly to your pharmacy.  If lab work is needed we can place an order for that and you can then stop by our lab to have the test done at a later time.  If during the course of the call the physician/provider feels a telephone visit is not appropriate, you will not be charged for this service.\" Telephone visits are billed at different rates depending on your insurance coverage. During this emergency period, for some insurers they may be billed the same as an in-person visit.  Please reach out to your insurance provider with any questions.  Consent has been obtained for this service by 1 care team member: yes  I have reviewed and updated the patient's Past Medical History, Social History, Family History and Medication List.    What phone number would you like to be contacted at?     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  On azathioprine 75 mg daily. Renewed.  Labs more elevated on this last set and will recheck in 1 month.  Discussed bx, dx, prognosis, follow up plan and medication including side effects.   Fibrosis scan correlates with biopsy which showed state 3-4. Expect reduction with AIH treatment.  US for HCC screening. Ordered.   Continue every 3 month labs.     RTC      Melissa Gutierrez MD  Hepatology/Liver Transplant  Medical Director, Liver Transplantation  [facility]  ========================================================================  S:  55 Y F with AIH, diagnosed in October 2018. Diagnosis was made when she had elevated liver tests. Hospitalized 10/3-10/5/18 at [facility].  Bili got up to 12 and ALT up to 700. She is on azathioprine " 75 mg daily.    She had a flair in July 2019. Imuran was increased to 75 mg from 50 and liver tests were normal until 10/21/20 labs: ALT 73 (from 38) and AST 41 (from 24). Tolerating Imuran without difficulty.      She was also diagnosed with pancreatitis at the time or her initial diagnosis in 2018, unclear etiology. EUS was negative for gallstones. Pancreatic parenchyma had likely fatty infiltration. Pain resolved after 24 hours and lipase normalized within a few weeks.     She does not drink alcohol. She is working on losing weight.     CT 10/3/18   1. Diffuse hepatic steatosis.  2. Edematous pancreatic head, consistent with pancreatitis given  elevated lipase.  3. Cholelithiasis not well appreciated but better defined on  ultrasound.  4. Ascites in the pelvis and about the liver.  5. Consider MRCP for further evaluation.     LIVER, NEEDLE BIOPSY 10/9/18:   - Active chronic hepatitis with features most suggestive of autoimmune   hepatitis   - Can not rule out stage 3-4 fibrosis   - See microscopic description     US 10/22/20  1.  There is diffuse fatty infiltration of the liver and probable  hepatomegaly. Diffuse coarsening of the hepatic parenchyma suggests chronic liver disease.  2.  Otherwise unremarkable right upper quadrant ultrasound. No focal hepatic masses are identified.     Doing well.  No new health problems. No new family history. No F/N/V/D/abd pain. Getting blood sugars under control with insulin.      Lab Test 10/21/20  0839   PROTTOTAL 7.4   ALBUMIN 3.6   BILITOTAL 0.4   ALKPHOS 155*   AST 41   ALT 72*     Lab Test 10/21/20  0839   WBC 9.9   RBC 4.91   HGB 15.2   HCT 44.5   MCV 91   MCH 31.0   MCHC 34.2   RDW 13.8        SHx: Works full time at a [employer].        This patient was a no show for this scheduled appointment.      Again, thank you for allowing me to participate in the care of your patient.        Sincerely,        Melissa Gutierrez MD

## 2020-10-27 NOTE — PROGRESS NOTES
"Mary Grigsby is a 55 year old female who is being evaluated via a billable telephone visit.      The patient has been notified of following:     \"This telephone visit will be conducted via a call between you and your physician/provider. We have found that certain health care needs can be provided without the need for a physical exam.  This service lets us provide the care you need with a short phone conversation.  If a prescription is necessary we can send it directly to your pharmacy.  If lab work is needed we can place an order for that and you can then stop by our lab to have the test done at a later time.    Telephone visits are billed at different rates depending on your insurance coverage. During this emergency period, for some insurers they may be billed the same as an in-person visit.  Please reach out to your insurance provider with any questions.    If during the course of the call the physician/provider feels a telephone visit is not appropriate, you will not be charged for this service.\"    Patient has given verbal consent for Telephone visit?  Yes    What phone number would you like to be contacted at? [phone]    How would you like to obtain your AVS? [facility]    Phone call duration:      "

## 2020-10-28 ENCOUNTER — TELEPHONE (OUTPATIENT)
Dept: GASTROENTEROLOGY | Facility: CLINIC | Age: 55
End: 2020-10-28

## 2020-10-28 NOTE — TELEPHONE ENCOUNTER
Lab order is in.    Yareli YOUNG LPN  Hepatology Clinic     Health Call Center    Phone Message    May a detailed message be left on voicemail: yes     Reason for Call: Order(s): Other:   Reason for requested: Pt is requesting for her lab orders to be sent to the  in [place]  Date needed: asap  Provider name: Dr. Gutierrez      Action Taken: Message routed to:  [facility] hep    Travel Screening: Not Applicable

## 2020-11-09 ENCOUNTER — TELEPHONE (OUTPATIENT)
Dept: GASTROENTEROLOGY | Facility: CLINIC | Age: 55
End: 2020-11-09

## 2020-12-13 ENCOUNTER — HEALTH MAINTENANCE LETTER (OUTPATIENT)
Age: 55
End: 2020-12-13

## 2020-12-21 DIAGNOSIS — K75.4 AUTOIMMUNE HEPATITIS (H): ICD-10-CM

## 2020-12-21 DIAGNOSIS — R74.8 ELEVATED LIVER ENZYMES: ICD-10-CM

## 2020-12-21 RX ORDER — AZATHIOPRINE 50 MG/1
TABLET ORAL
Qty: 135 TABLET | Refills: 3 | Status: SHIPPED | OUTPATIENT
Start: 2020-12-21 | End: 2021-12-03

## 2021-01-05 DIAGNOSIS — K75.4 AUTOIMMUNE HEPATITIS (H): Primary | ICD-10-CM

## 2021-01-05 LAB
ALBUMIN SERPL-MCNC: 3.8 G/DL (ref 3.4–5)
ALP SERPL-CCNC: 180 U/L (ref 40–150)
ALT SERPL W P-5'-P-CCNC: 41 U/L (ref 0–50)
AST SERPL W P-5'-P-CCNC: 21 U/L (ref 0–45)
BILIRUB DIRECT SERPL-MCNC: 0.1 MG/DL (ref 0–0.2)
BILIRUB SERPL-MCNC: 0.3 MG/DL (ref 0.2–1.3)
PROT SERPL-MCNC: 7.4 G/DL (ref 6.8–8.8)

## 2021-01-05 PROCEDURE — 36415 COLL VENOUS BLD VENIPUNCTURE: CPT | Performed by: INTERNAL MEDICINE

## 2021-01-05 PROCEDURE — 80076 HEPATIC FUNCTION PANEL: CPT | Performed by: INTERNAL MEDICINE

## 2021-01-15 ENCOUNTER — VIRTUAL VISIT (OUTPATIENT)
Dept: GASTROENTEROLOGY | Facility: CLINIC | Age: 56
End: 2021-01-15
Attending: INTERNAL MEDICINE
Payer: COMMERCIAL

## 2021-01-15 VITALS — WEIGHT: 241 LBS | BODY MASS INDEX: 46.21 KG/M2

## 2021-01-15 DIAGNOSIS — K75.4 AUTOIMMUNE HEPATITIS (H): Primary | ICD-10-CM

## 2021-01-15 DIAGNOSIS — K76.0 FATTY LIVER DISEASE, NONALCOHOLIC: ICD-10-CM

## 2021-01-15 DIAGNOSIS — Z79.624 ENCOUNTER FOR LONG TERM CURRENT USE OF AZATHIOPRINE: ICD-10-CM

## 2021-01-15 PROCEDURE — 99214 OFFICE O/P EST MOD 30 MIN: CPT | Mod: TEL | Performed by: INTERNAL MEDICINE

## 2021-01-15 NOTE — PROGRESS NOTES
[facility]  LIVER CLINIC FOLLOW UP  TELEPHONE VISIT     A/P  Ms. 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  Doing well on azathioprine 75 mg daily. Renewed.  Discussed bx, dx, prognosis, follow up plan and medication including side effects.   Fibrosis scan correlates with biopsy which showed stage 3-4. Expect reduction with AIH treatment.  HCC screening. US 10/22/20. Due March. Ordered.   Continue every 3 month labs with LFTs CBC for azathioprine side-effect monitoring as well as assessment of AIH activity.    Discussed fatty liver/weight gain and recommendations for 5-10% weight loss. Her PCP is considering putting her on a statin and metformin. There are ok for her to be on. I discussed potential mild increase in AST/ALT on statin and that this is acceptable. I discussed acidosis with metformin, uncommon and will monitor her BMP if she goes on metformin.     RTC 12 months     Melissa Gutierrez MD  Hepatology/Liver Transplant  Medical Director, Liver Transplantation  [facility]  ========================================================================  S:  Ms. Grigsby is a 55 Y F with AIH, diagnosed in October 2018. She was hospitalized 10/3-10/5/18 at [facility] for elevated liver tests. Bili got up to 12 and ALT up to 700. She is on azathioprine 75 mg daily since then and doing well.  She had a flair in July, Imuran was increased to 75 mg from 50 and liver tests have been normal since, with mild intermittent increase in AP, currently 180. Tolerating imuran without difficulty.      She was also diagnosed with pancreatitis (lipase 1747)  at the time of her initial diagnosis in 2018, unclear etiology. EUS was negative for gallstones. Pancreatic parenchyma had likely fatty infiltration.  Pain resolved after 24 hours and lipase normalized within a few weeks.     She does not drink alcohol. She is working on losing weight.    Her PCP is considering putting her  "on statin and metformin and she wants to know if this is ok.      CT 10/3/18   1. Diffuse hepatic steatosis.  2. Edematous pancreatic head, consistent with pancreatitis given elevated lipase.  3. Cholelithiasis not well appreciated but better defined on  ultrasound.  4. Ascites in the pelvis and about the liver.  5. Consider MRCP for further evaluation.     LIVER, NEEDLE BIOPSY 10/9/18:   - Active chronic hepatitis with features most suggestive of autoimmune hepatitis   - Can not rule out stage 3-4 fibrosis     Doing well.  No new health problems. No new family history. No F/N/V/D/abd pain.     Liver Function Studies -   Recent Labs   Lab Test 01/05/21  0734   PROTTOTAL 7.4   ALBUMIN 3.8   BILITOTAL 0.3   ALKPHOS 180*   AST 21   ALT 41     CBC RESULTS:   Recent Labs   Lab Test 10/21/20  0839   WBC 9.9   RBC 4.91   HGB 15.2   HCT 44.5   MCV 91   MCH 31.0   MCHC 34.2   RDW 13.8          SHx: Works full time at a [employer]. Here with her daughter. Dtr got  in April in a legal ceremony because of Covid and family looks forward to a celebrating this spring.  Mary Grigsby is a 55 year old female who is being evaluated via a billable telephone visit during the COVID-19 pandemic and clinic response to limit in-person visits.  The patient has been notified of following:   \"This telephone visit will be conducted via a call between you and your physician/provider. We have found that certain health care needs can be provided without the need for a physical exam.  This service lets us provide the care you need with a short phone conversation.  If a prescription is necessary we can send it directly to your pharmacy.  If lab work is needed we can place an order for that and you can then stop by our lab to have the test done at a later time.  If during the course of the call the physician/provider feels a telephone visit is not appropriate, you will not be charged for this service.\" Telephone visits are " billed at different rates depending on your insurance coverage. During this emergency period, for some insurers they may be billed the same as an in-person visit.  Please reach out to your insurance provider with any questions.  Consent has been obtained for this service by 1 care team member: yes  I have reviewed and updated the patient's Past Medical History, Social History, Family History and Medication List.    What phone number would you like to be contacted at?     Phone call contact time  Call Started at 8:37  Call Ended at 8:50  On phone patient

## 2021-01-15 NOTE — LETTER
1/15/2021         RE: Mary Grigsby  [address]  [place] [address]        Dear Colleague,    Thank you for referring your patient, Mary Grigsby, to the [facility] HEPATOLOGY [facility] [place]. Please see a copy of my visit note below.    [facility]  LIVER CLINIC FOLLOW UP  TELEPHONE VISIT     A/P  Ms. 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  Doing well on azathioprine 75 mg daily. Renewed.  Discussed bx, dx, prognosis, follow up plan and medication including side effects.   Fibrosis scan correlates with biopsy which showed stage 3-4. Expect reduction with AIH treatment.  HCC screening. US 10/22/20. Due March. Ordered.   Continue every 3 month labs with LFTs CBC for azathioprine side-effect monitoring as well as assessment of AIH activity.    Discussed fatty liver/weight gain and recommendations for 5-10% weight loss. Her PCP is considering putting her on a statin and metformin. There are ok for her to be on. I discussed potential mild increase in AST/ALT on statin and that this is acceptable. I discussed acidosis with metformin, uncommon and will monitor her BMP if she goes on metformin.     RTC 12 months     Melissa Gutierrez MD  Hepatology/Liver Transplant  Medical Director, Liver Transplantation  [facility]  ========================================================================  S:  Ms. Grigsby is a 55 Y F with AIH, diagnosed in October 2018. She was hospitalized 10/3-10/5/18 at [facility] for elevated liver tests. Bili got up to 12 and ALT up to 700. She is on azathioprine 75 mg daily since then and doing well.  She had a flair in July, Imuran was increased to 75 mg from 50 and liver tests have been normal since, with mild intermittent increase in AP, currently 180. Tolerating imuran without difficulty.      She was also diagnosed with pancreatitis (lipase 1747)  at the time of her initial diagnosis in 2018,  "unclear etiology. EUS was negative for gallstones. Pancreatic parenchyma had likely fatty infiltration.  Pain resolved after 24 hours and lipase normalized within a few weeks.     She does not drink alcohol. She is working on losing weight.    Her PCP is considering putting her on statin and metformin and she wants to know if this is ok.      CT 10/3/18   1. Diffuse hepatic steatosis.  2. Edematous pancreatic head, consistent with pancreatitis given elevated lipase.  3. Cholelithiasis not well appreciated but better defined on  ultrasound.  4. Ascites in the pelvis and about the liver.  5. Consider MRCP for further evaluation.     LIVER, NEEDLE BIOPSY 10/9/18:   - Active chronic hepatitis with features most suggestive of autoimmune hepatitis   - Can not rule out stage 3-4 fibrosis     Doing well.  No new health problems. No new family history. No F/N/V/D/abd pain.     Liver Function Studies -   Recent Labs   Lab Test 01/05/21  0734   PROTTOTAL 7.4   ALBUMIN 3.8   BILITOTAL 0.3   ALKPHOS 180*   AST 21   ALT 41     CBC RESULTS:   Recent Labs   Lab Test 10/21/20  0839   WBC 9.9   RBC 4.91   HGB 15.2   HCT 44.5   MCV 91   MCH 31.0   MCHC 34.2   RDW 13.8          SHx: Works full time at a [employer] Here with her daughter. Dtr got  in April in a legal ceremony because of Covid and family looks forward to a celebrating this spring.  Mary Grigsby is a 55 year old female who is being evaluated via a billable telephone visit during the COVID-19 pandemic and clinic response to limit in-person visits.  The patient has been notified of following:   \"This telephone visit will be conducted via a call between you and your physician/provider. We have found that certain health care needs can be provided without the need for a physical exam.  This service lets us provide the care you need with a short phone conversation.  If a prescription is necessary we can send it directly to your pharmacy.  If lab work is " "needed we can place an order for that and you can then stop by our lab to have the test done at a later time.  If during the course of the call the physician/provider feels a telephone visit is not appropriate, you will not be charged for this service.\" Telephone visits are billed at different rates depending on your insurance coverage. During this emergency period, for some insurers they may be billed the same as an in-person visit.  Please reach out to your insurance provider with any questions.  Consent has been obtained for this service by 1 care team member: yes  I have reviewed and updated the patient's Past Medical History, Social History, Family History and Medication List.    What phone number would you like to be contacted at?     Phone call contact time  Call Started at 8:37  Call Ended at 8:50  On phone patient              Again, thank you for allowing me to participate in the care of your patient.        Sincerely,        Melissa Gutierrez MD    "

## 2021-02-21 ENCOUNTER — HEALTH MAINTENANCE LETTER (OUTPATIENT)
Age: 56
End: 2021-02-21

## 2021-03-21 DIAGNOSIS — I10 ESSENTIAL HYPERTENSION WITH GOAL BLOOD PRESSURE LESS THAN 140/90: ICD-10-CM

## 2021-03-22 RX ORDER — FELODIPINE 5 MG/1
5 TABLET, EXTENDED RELEASE ORAL DAILY
Qty: 90 TABLET | Refills: 0 | Status: SHIPPED | OUTPATIENT
Start: 2021-03-22 | End: 2021-06-15

## 2021-03-22 RX ORDER — HYDROCHLOROTHIAZIDE 12.5 MG/1
TABLET ORAL
Qty: 90 TABLET | Refills: 0 | Status: SHIPPED | OUTPATIENT
Start: 2021-03-22 | End: 2021-06-15

## 2021-03-22 RX ORDER — LOSARTAN POTASSIUM 100 MG/1
100 TABLET ORAL DAILY
Qty: 90 TABLET | Refills: 0 | Status: SHIPPED | OUTPATIENT
Start: 2021-03-22 | End: 2021-06-15

## 2021-03-22 NOTE — TELEPHONE ENCOUNTER
Medication is being filled for 1 time refill only due to:  Patient needs to be seen because overdue for DM recheck.   Please schedule.  Sachi Real RN

## 2021-03-23 ENCOUNTER — MYC MEDICAL ADVICE (OUTPATIENT)
Dept: INTERNAL MEDICINE | Facility: CLINIC | Age: 56
End: 2021-03-23

## 2021-03-23 DIAGNOSIS — K11.20 SIALADENITIS: ICD-10-CM

## 2021-03-23 RX ORDER — LEVOFLOXACIN 500 MG/1
500 TABLET, FILM COATED ORAL DAILY
Qty: 10 TABLET | Refills: 0 | Status: SHIPPED | OUTPATIENT
Start: 2021-03-23 | End: 2021-06-17

## 2021-03-23 NOTE — TELEPHONE ENCOUNTER
Patient notified of provider message as written. Patient verbalized good understanding. She stated that she is having intolerable symptoms with the salivary gland issues and again requested antibiotics. Writer re-sent levofloxacin (LEVAQUIN) 500 MG tablet #10, 0 refills to [facility] with Dr. Hooper's okay. This is what she was previously taking for the same issue (last prescribed 03/26/2020).  Scheduled her for appointment this Friday, 03/26/2021 for salivary gland concerns. She also has an appointment scheduled for 04/09/2021 for diabetic/blood pressure medication     Marium HERNANDEZ, RN  [facility] [place]

## 2021-03-23 NOTE — TELEPHONE ENCOUNTER
Patient seen for this issue 3/26/20. Please advise if you would like her to schedule an appointment.     Milagros Baker RN

## 2021-03-23 NOTE — TELEPHONE ENCOUNTER
I am very concerned about this patients situation with multiple issues and concerns and given the fact that her diabetes mellitus was not well controlled     Lab Results   Component Value Date    A1C 8.2 08/27/2020    A1C 7.0 12/20/2019    A1C 6.6 09/23/2019    A1C 6.7 03/25/2019    A1C 6.8 12/24/2018     Fully 7 months ago, she needs the appointment with me. I agree to see her Thursday or Friday of this week.    If she is in agony with her salivary gland symptoms you have my permission to send a refill prescription with what we sent for her in march 2020     Dipak Hooper MD

## 2021-03-26 ENCOUNTER — OFFICE VISIT (OUTPATIENT)
Dept: INTERNAL MEDICINE | Facility: CLINIC | Age: 56
End: 2021-03-26
Payer: COMMERCIAL

## 2021-03-26 VITALS
HEART RATE: 89 BPM | SYSTOLIC BLOOD PRESSURE: 124 MMHG | OXYGEN SATURATION: 98 % | TEMPERATURE: 98.8 F | WEIGHT: 247.6 LBS | BODY MASS INDEX: 47.48 KG/M2 | DIASTOLIC BLOOD PRESSURE: 72 MMHG | RESPIRATION RATE: 16 BRPM

## 2021-03-26 DIAGNOSIS — E55.9 HYPOVITAMINOSIS D: ICD-10-CM

## 2021-03-26 DIAGNOSIS — K11.20 SIALOADENITIS: ICD-10-CM

## 2021-03-26 DIAGNOSIS — Z13.29 SCREENING FOR HYPOTHYROIDISM: ICD-10-CM

## 2021-03-26 DIAGNOSIS — I10 HYPERTENSION GOAL BP (BLOOD PRESSURE) < 140/90: ICD-10-CM

## 2021-03-26 DIAGNOSIS — E78.5 HYPERLIPIDEMIA WITH TARGET LDL LESS THAN 100: Primary | ICD-10-CM

## 2021-03-26 DIAGNOSIS — E11.9 CONTROLLED TYPE 2 DIABETES MELLITUS WITHOUT COMPLICATION, WITHOUT LONG-TERM CURRENT USE OF INSULIN (H): ICD-10-CM

## 2021-03-26 LAB
ALBUMIN SERPL-MCNC: 3.7 G/DL (ref 3.4–5)
ALP SERPL-CCNC: 155 U/L (ref 40–150)
ALT SERPL W P-5'-P-CCNC: 56 U/L (ref 0–50)
ANION GAP SERPL CALCULATED.3IONS-SCNC: 4 MMOL/L (ref 3–14)
AST SERPL W P-5'-P-CCNC: 33 U/L (ref 0–45)
BILIRUB SERPL-MCNC: 0.5 MG/DL (ref 0.2–1.3)
BUN SERPL-MCNC: 17 MG/DL (ref 7–30)
CALCIUM SERPL-MCNC: 9.6 MG/DL (ref 8.5–10.1)
CHLORIDE SERPL-SCNC: 105 MMOL/L (ref 94–109)
CHOLEST SERPL-MCNC: 218 MG/DL
CO2 SERPL-SCNC: 29 MMOL/L (ref 20–32)
CREAT SERPL-MCNC: 0.81 MG/DL (ref 0.52–1.04)
GFR SERPL CREATININE-BSD FRML MDRD: 82 ML/MIN/{1.73_M2}
GLUCOSE SERPL-MCNC: 154 MG/DL (ref 70–99)
HBA1C MFR BLD: 8.3 % (ref 0–5.6)
HDLC SERPL-MCNC: 47 MG/DL
LDLC SERPL CALC-MCNC: 137 MG/DL
NONHDLC SERPL-MCNC: 171 MG/DL
POTASSIUM SERPL-SCNC: 4.1 MMOL/L (ref 3.4–5.3)
PROT SERPL-MCNC: 7.6 G/DL (ref 6.8–8.8)
SODIUM SERPL-SCNC: 138 MMOL/L (ref 133–144)
T4 FREE SERPL-MCNC: 0.94 NG/DL (ref 0.76–1.46)
TRIGL SERPL-MCNC: 172 MG/DL
TSH SERPL DL<=0.005 MIU/L-ACNC: 4.93 MU/L (ref 0.4–4)

## 2021-03-26 PROCEDURE — 84439 ASSAY OF FREE THYROXINE: CPT | Performed by: INTERNAL MEDICINE

## 2021-03-26 PROCEDURE — 99207 PR FOOT EXAM NO CHARGE: CPT | Performed by: INTERNAL MEDICINE

## 2021-03-26 PROCEDURE — 80053 COMPREHEN METABOLIC PANEL: CPT | Performed by: INTERNAL MEDICINE

## 2021-03-26 PROCEDURE — 80061 LIPID PANEL: CPT | Performed by: INTERNAL MEDICINE

## 2021-03-26 PROCEDURE — 99214 OFFICE O/P EST MOD 30 MIN: CPT | Performed by: INTERNAL MEDICINE

## 2021-03-26 PROCEDURE — 83036 HEMOGLOBIN GLYCOSYLATED A1C: CPT | Performed by: INTERNAL MEDICINE

## 2021-03-26 PROCEDURE — 36415 COLL VENOUS BLD VENIPUNCTURE: CPT | Performed by: INTERNAL MEDICINE

## 2021-03-26 PROCEDURE — 84443 ASSAY THYROID STIM HORMONE: CPT | Performed by: INTERNAL MEDICINE

## 2021-03-26 NOTE — PROGRESS NOTES
"    Assessment & Plan     Hyperlipidemia with target LDL less than 100  Today we reviewed the basic healthcare issues that this patient has had as chronic medical problems. For her hypercholesterolemia she is currently listed as statin intentionally not prescribed , as per her discussion with Dr. Melissa Gutierrez with [facility] , a gastroenterologist who sees patient for her history of autoimmune disease hepatitis. According to patient today, if her laboratory studies are \" bad enough\" we will reconsider going back onto a statin medication. From my perspective having diabetes mellitus is bad enough and so I will as Dr. Gutierrez about preferred agent and level of dosing / potency.  - Lipid panel reflex to direct LDL Fasting    Hypertension goal BP (blood pressure) < 140/90  Controlled within acceptable limits , continue current plan of care    - Comprehensive metabolic panel    Controlled type 2 diabetes mellitus without complication, without long-term current use of insulin (H)  I am concerned . Patient has morbid obesity with Body mass index is 47.48 kg/m . she's actually gained weight during the pandemic. She's overdue for the hemoglobin a1c  [ diabetes test ] and had an 8.2% hemoglobin a1c  [ diabetes test ] last August . We have no reason for optimism here and will almost certainly need to restart Metformin ( Glucophage)  And possibly that's not going to be enough  - Hemoglobin A1c  - Comprehensive metabolic panel  - FOOT EXAM    Hypovitaminosis D  Last Vitamin D levels  Are fine.    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 For this she was eventually seen by Ear, Nose, and Throat specialist MD and was treated with antibiotic. Her current " flare-up of swelling and pain acutely was nearly identical to the way it had flared up last year. We already had sent in antibiotics earlier this week and so already her symptoms have resolved 9% % since the beginning on about Monday 4 days ago when it all restarted       Review of the result(s) of each unique test - labs from 2020 and from today  25 minutes spent on the date of the encounter doing chart review, history and exam, documentation and further activities as noted above  [id number]}      Return in about 6 months (around 9/26/2021), or or 3 months if A1c is too high, for Lab Work, Routine Visit.    Dipak Hooper MD  [facility] [place] is a 55 year old who presents for the following health issues   Encounter Diagnoses   Name Primary?     Hyperlipidemia with target LDL less than 100 Yes     Hypertension goal BP (blood pressure) < 140/90      Controlled type 2 diabetes mellitus without complication, without long-term current use of insulin (H)      Hypovitaminosis D      Screening for hypothyroidism      Sialoadenitis        HPI     Chief Complaint   Patient presents with     Gland     swollen, concerns      Wt Readings from Last 5 Encounters:   03/26/21 112.3 kg (247 lb 9.6 oz)   01/15/21 109.3 kg (241 lb)   03/26/20 109.3 kg (241 lb)   12/31/19 104.9 kg (231 lb 4.8 oz)   12/20/19 105.1 kg (231 lb 12.8 oz)     No medication for diabetes mellitus for 2 solid years now. Is doing some home blood glucose monitoring and says the numbers are all pretty good    Lab Results   Component Value Date    A1C 8.2 08/27/2020    A1C 7.0 12/20/2019    A1C 6.6 09/23/2019    A1C 6.7 03/25/2019    A1C 6.8 12/24/2018         Review of Systems   Constitutional, HEENT, cardiovascular, pulmonary, gi and gu systems are negative, except as otherwise noted.      Objective    /72   Pulse 89   Temp 98.8  F (37.1  C) (Oral)   Resp 16   Wt 112.3 kg (247 lb 9.6 oz)   LMP 06/24/2011   SpO2 98%    BMI 47.48 kg/m    Body mass index is 47.48 kg/m .  Physical Exam   GENERAL: healthy, alert and no distress  NECK: no adenopathy, no asymmetry, masses, or scars and thyroid normal to palpation  RESP: lungs clear to auscultation - no rales, rhonchi or wheezes  CV: regular rate and rhythm, normal S1 S2, no S3 or S4, no murmur, click or rub, no peripheral edema and peripheral pulses strong  MS: no gross musculoskeletal defects noted, no edema  NEURO: Normal strength and tone, mentation intact and speech normal  PSYCH: mentation appears normal, affect normal/bright    Orders Placed This Encounter   Procedures     FOOT EXAM     Lipid panel reflex to direct LDL Fasting     Hemoglobin A1c     Comprehensive metabolic panel     TSH with free T4 reflex

## 2021-03-30 DIAGNOSIS — E11.9 CONTROLLED TYPE 2 DIABETES MELLITUS WITHOUT COMPLICATION, WITHOUT LONG-TERM CURRENT USE OF INSULIN (H): Primary | ICD-10-CM

## 2021-03-30 DIAGNOSIS — E78.5 HYPERLIPIDEMIA WITH TARGET LDL LESS THAN 100: ICD-10-CM

## 2021-03-30 RX ORDER — METFORMIN HCL 500 MG
TABLET, EXTENDED RELEASE 24 HR ORAL
Qty: 360 TABLET | Refills: 0 | Status: SHIPPED | OUTPATIENT
Start: 2021-03-30 | End: 2021-07-03

## 2021-03-30 RX ORDER — METFORMIN HCL 500 MG
TABLET, EXTENDED RELEASE 24 HR ORAL
Qty: 360 TABLET | Refills: 0 | Status: SHIPPED | OUTPATIENT
Start: 2021-03-30 | End: 2021-03-30

## 2021-03-30 RX ORDER — ROSUVASTATIN CALCIUM 10 MG/1
10 TABLET, COATED ORAL EVERY EVENING
Qty: 90 TABLET | Refills: 2 | Status: SHIPPED | OUTPATIENT
Start: 2021-03-30 | End: 2021-10-07

## 2021-03-31 ENCOUNTER — ANCILLARY PROCEDURE (OUTPATIENT)
Dept: ULTRASOUND IMAGING | Facility: CLINIC | Age: 56
End: 2021-03-31
Attending: INTERNAL MEDICINE
Payer: COMMERCIAL

## 2021-03-31 DIAGNOSIS — Z79.624 ENCOUNTER FOR LONG TERM CURRENT USE OF AZATHIOPRINE: ICD-10-CM

## 2021-03-31 DIAGNOSIS — K75.4 AUTOIMMUNE HEPATITIS (H): Primary | ICD-10-CM

## 2021-03-31 DIAGNOSIS — K75.4 AUTOIMMUNE HEPATITIS (H): ICD-10-CM

## 2021-03-31 DIAGNOSIS — K76.0 FATTY LIVER DISEASE, NONALCOHOLIC: ICD-10-CM

## 2021-03-31 NOTE — RESULT ENCOUNTER NOTE
Ultrasound shows changes consistent with known liver disease. No evidence for any early liver cancer.   Repeat per routine screening in 6 months (ordered).

## 2021-04-02 NOTE — PROGRESS NOTES
New prescription for metFORMIN (GLUCOPHAGE-XR) 500 MG 24 hr tablet and rosuvastatin (CRESTOR) 10 MG tablet placed.    A1C future labs placed.

## 2021-04-17 ENCOUNTER — HEALTH MAINTENANCE LETTER (OUTPATIENT)
Age: 56
End: 2021-04-17

## 2021-06-14 DIAGNOSIS — I10 ESSENTIAL HYPERTENSION WITH GOAL BLOOD PRESSURE LESS THAN 140/90: ICD-10-CM

## 2021-06-15 RX ORDER — FELODIPINE 5 MG/1
5 TABLET, EXTENDED RELEASE ORAL DAILY
Qty: 90 TABLET | Refills: 0 | Status: SHIPPED | OUTPATIENT
Start: 2021-06-15 | End: 2021-09-07

## 2021-06-15 RX ORDER — LOSARTAN POTASSIUM 100 MG/1
100 TABLET ORAL DAILY
Qty: 90 TABLET | Refills: 0 | Status: SHIPPED | OUTPATIENT
Start: 2021-06-15 | End: 2021-09-07

## 2021-06-15 RX ORDER — HYDROCHLOROTHIAZIDE 12.5 MG/1
TABLET ORAL
Qty: 90 TABLET | Refills: 0 | Status: SHIPPED | OUTPATIENT
Start: 2021-06-15 | End: 2021-09-07

## 2021-07-02 DIAGNOSIS — Z79.624 ENCOUNTER FOR LONG TERM CURRENT USE OF AZATHIOPRINE: ICD-10-CM

## 2021-07-02 DIAGNOSIS — E11.9 CONTROLLED TYPE 2 DIABETES MELLITUS WITHOUT COMPLICATION, WITHOUT LONG-TERM CURRENT USE OF INSULIN (H): ICD-10-CM

## 2021-07-02 DIAGNOSIS — K76.0 FATTY LIVER DISEASE, NONALCOHOLIC: ICD-10-CM

## 2021-07-02 DIAGNOSIS — K75.4 AUTOIMMUNE HEPATITIS (H): ICD-10-CM

## 2021-07-02 LAB
ALBUMIN SERPL-MCNC: 3.5 G/DL (ref 3.4–5)
ALP SERPL-CCNC: 113 U/L (ref 40–150)
ALT SERPL W P-5'-P-CCNC: 52 U/L (ref 0–50)
AST SERPL W P-5'-P-CCNC: 38 U/L (ref 0–45)
BILIRUB DIRECT SERPL-MCNC: 0.1 MG/DL (ref 0–0.2)
BILIRUB SERPL-MCNC: 0.4 MG/DL (ref 0.2–1.3)
ERYTHROCYTE [DISTWIDTH] IN BLOOD BY AUTOMATED COUNT: 13.5 % (ref 10–15)
HBA1C MFR BLD: 8 % (ref 0–5.6)
HCT VFR BLD AUTO: 42.9 % (ref 35–47)
HGB BLD-MCNC: 14.8 G/DL (ref 11.7–15.7)
MCH RBC QN AUTO: 30.8 PG (ref 26.5–33)
MCHC RBC AUTO-ENTMCNC: 34.5 G/DL (ref 31.5–36.5)
MCV RBC AUTO: 89 FL (ref 78–100)
PLATELET # BLD AUTO: 215 10E9/L (ref 150–450)
PROT SERPL-MCNC: 7.2 G/DL (ref 6.8–8.8)
RBC # BLD AUTO: 4.81 10E12/L (ref 3.8–5.2)
WBC # BLD AUTO: 8.8 10E9/L (ref 4–11)

## 2021-07-02 PROCEDURE — 80076 HEPATIC FUNCTION PANEL: CPT | Performed by: INTERNAL MEDICINE

## 2021-07-02 PROCEDURE — 83036 HEMOGLOBIN GLYCOSYLATED A1C: CPT | Performed by: INTERNAL MEDICINE

## 2021-07-02 PROCEDURE — 85027 COMPLETE CBC AUTOMATED: CPT | Performed by: INTERNAL MEDICINE

## 2021-07-02 PROCEDURE — 36415 COLL VENOUS BLD VENIPUNCTURE: CPT | Performed by: INTERNAL MEDICINE

## 2021-07-03 RX ORDER — METFORMIN HCL 500 MG
1000 TABLET, EXTENDED RELEASE 24 HR ORAL 2 TIMES DAILY WITH MEALS
Qty: 360 TABLET | Refills: 0 | Status: SHIPPED | OUTPATIENT
Start: 2021-07-03 | End: 2021-10-03

## 2021-09-07 DIAGNOSIS — I10 ESSENTIAL HYPERTENSION WITH GOAL BLOOD PRESSURE LESS THAN 140/90: ICD-10-CM

## 2021-09-07 RX ORDER — FELODIPINE 5 MG/1
5 TABLET, EXTENDED RELEASE ORAL DAILY
Qty: 90 TABLET | Refills: 2 | Status: SHIPPED | OUTPATIENT
Start: 2021-09-07 | End: 2021-09-08

## 2021-09-07 RX ORDER — LOSARTAN POTASSIUM 100 MG/1
TABLET ORAL
Qty: 90 TABLET | Refills: 2 | Status: SHIPPED | OUTPATIENT
Start: 2021-09-07 | End: 2021-09-08

## 2021-09-07 RX ORDER — HYDROCHLOROTHIAZIDE 12.5 MG/1
TABLET ORAL
Qty: 90 TABLET | Refills: 2 | Status: SHIPPED | OUTPATIENT
Start: 2021-09-07 | End: 2021-09-08

## 2021-09-08 DIAGNOSIS — I10 ESSENTIAL HYPERTENSION WITH GOAL BLOOD PRESSURE LESS THAN 140/90: ICD-10-CM

## 2021-09-08 RX ORDER — FELODIPINE 5 MG/1
5 TABLET, EXTENDED RELEASE ORAL DAILY
Qty: 90 TABLET | Refills: 2 | Status: SHIPPED | OUTPATIENT
Start: 2021-09-08 | End: 2021-10-07

## 2021-09-08 RX ORDER — HYDROCHLOROTHIAZIDE 12.5 MG/1
TABLET ORAL
Qty: 90 TABLET | Refills: 2 | Status: SHIPPED | OUTPATIENT
Start: 2021-09-08 | End: 2021-10-07

## 2021-09-08 RX ORDER — LOSARTAN POTASSIUM 100 MG/1
TABLET ORAL
Qty: 90 TABLET | Refills: 2 | Status: SHIPPED | OUTPATIENT
Start: 2021-09-08 | End: 2021-10-07

## 2021-09-26 ENCOUNTER — HEALTH MAINTENANCE LETTER (OUTPATIENT)
Age: 56
End: 2021-09-26

## 2021-09-30 DIAGNOSIS — E11.9 CONTROLLED TYPE 2 DIABETES MELLITUS WITHOUT COMPLICATION, WITHOUT LONG-TERM CURRENT USE OF INSULIN (H): ICD-10-CM

## 2021-10-03 RX ORDER — METFORMIN HCL 500 MG
TABLET, EXTENDED RELEASE 24 HR ORAL
Qty: 120 TABLET | Refills: 0 | Status: SHIPPED | OUTPATIENT
Start: 2021-10-03 | End: 2021-10-07

## 2021-10-03 NOTE — TELEPHONE ENCOUNTER
Medication is being filled for 1 time refill only due to:  Patient needs to be seen because need recheck.   Sachi Real RN

## 2021-10-07 ENCOUNTER — OFFICE VISIT (OUTPATIENT)
Dept: INTERNAL MEDICINE | Facility: CLINIC | Age: 56
End: 2021-10-07
Payer: COMMERCIAL

## 2021-10-07 VITALS
TEMPERATURE: 98.6 F | RESPIRATION RATE: 14 BRPM | DIASTOLIC BLOOD PRESSURE: 80 MMHG | BODY MASS INDEX: 47.56 KG/M2 | OXYGEN SATURATION: 99 % | HEART RATE: 89 BPM | SYSTOLIC BLOOD PRESSURE: 133 MMHG | WEIGHT: 248 LBS

## 2021-10-07 DIAGNOSIS — E11.9 CONTROLLED TYPE 2 DIABETES MELLITUS WITHOUT COMPLICATION, WITHOUT LONG-TERM CURRENT USE OF INSULIN (H): Primary | ICD-10-CM

## 2021-10-07 DIAGNOSIS — E66.01 MORBID OBESITY DUE TO EXCESS CALORIES (H): ICD-10-CM

## 2021-10-07 DIAGNOSIS — E78.5 HYPERLIPIDEMIA WITH TARGET LDL LESS THAN 100: ICD-10-CM

## 2021-10-07 DIAGNOSIS — I10 ESSENTIAL HYPERTENSION WITH GOAL BLOOD PRESSURE LESS THAN 140/90: ICD-10-CM

## 2021-10-07 DIAGNOSIS — Z12.31 ENCOUNTER FOR SCREENING MAMMOGRAM FOR BREAST CANCER: ICD-10-CM

## 2021-10-07 DIAGNOSIS — J30.2 SEASONAL ALLERGIC RHINITIS, UNSPECIFIED TRIGGER: ICD-10-CM

## 2021-10-07 LAB
HBA1C MFR BLD: 7.9 % (ref 0–5.6)
HOLD SPECIMEN: NORMAL
HOLD SPECIMEN: NORMAL

## 2021-10-07 PROCEDURE — 99214 OFFICE O/P EST MOD 30 MIN: CPT | Performed by: INTERNAL MEDICINE

## 2021-10-07 PROCEDURE — 83036 HEMOGLOBIN GLYCOSYLATED A1C: CPT | Performed by: INTERNAL MEDICINE

## 2021-10-07 PROCEDURE — 36415 COLL VENOUS BLD VENIPUNCTURE: CPT | Performed by: INTERNAL MEDICINE

## 2021-10-07 PROCEDURE — 82043 UR ALBUMIN QUANTITATIVE: CPT | Performed by: INTERNAL MEDICINE

## 2021-10-07 RX ORDER — METFORMIN HCL 500 MG
TABLET, EXTENDED RELEASE 24 HR ORAL
Qty: 360 TABLET | Refills: 1 | Status: SHIPPED | OUTPATIENT
Start: 2021-10-07 | End: 2022-04-22

## 2021-10-07 RX ORDER — LOSARTAN POTASSIUM 100 MG/1
TABLET ORAL
Qty: 90 TABLET | Refills: 1 | Status: SHIPPED | OUTPATIENT
Start: 2021-10-07 | End: 2022-06-23

## 2021-10-07 RX ORDER — ALBUTEROL SULFATE 90 UG/1
2 AEROSOL, METERED RESPIRATORY (INHALATION) EVERY 6 HOURS
Qty: 18 G | Refills: 3 | Status: SHIPPED | OUTPATIENT
Start: 2021-10-07 | End: 2022-06-23

## 2021-10-07 RX ORDER — ROSUVASTATIN CALCIUM 10 MG/1
10 TABLET, COATED ORAL EVERY EVENING
Qty: 90 TABLET | Refills: 1 | Status: SHIPPED | OUTPATIENT
Start: 2021-10-07 | End: 2022-06-07

## 2021-10-07 RX ORDER — FELODIPINE 5 MG/1
5 TABLET, EXTENDED RELEASE ORAL DAILY
Qty: 90 TABLET | Refills: 1 | Status: SHIPPED | OUTPATIENT
Start: 2021-10-07 | End: 2022-06-23

## 2021-10-07 RX ORDER — HYDROCHLOROTHIAZIDE 12.5 MG/1
12.5 TABLET ORAL EVERY MORNING
Qty: 90 TABLET | Refills: 1 | Status: SHIPPED | OUTPATIENT
Start: 2021-10-07 | End: 2022-06-23

## 2021-10-07 NOTE — PROGRESS NOTES
Assessment & Plan     Controlled type 2 diabetes mellitus without complication, without long-term current use of insulin (H)  Lab Results   Component Value Date    A1C 7.9 10/07/2021    A1C 8.0 07/02/2021    A1C 8.3 03/26/2021    A1C 8.2 08/27/2020    A1C 7.0 12/20/2019    A1C 6.6 09/23/2019      we discussed her situation. Her diabetes mellitus is not controlled within acceptable limits. She's currently treating her diabetes mellitus only with full dose Metformin ( Glucophage) . She isn't interested in (glucagon-like peptide-1) GLP-1 agonists in that they were possibly involved with some problems she had around the time of her diagnosis of autoimmune hepatitis . We talked about goals and that I . Ideally, want her hemoglobin a1c  [ diabetes test ] below 7%, we aren't likely to get there without adding a second drug. She wants to wait for 3 more months and see where she can push the hemoglobin a1c  [ diabetes test ] with her own efforts, with lifestyle changes , exercise and diet. This is a reasonable plan just as long as we do take action within 3 months. If we aren't seeing downwards hemoglobin a1c  [ diabetes test ] movements we need to add second drug, most likely glimepiride [ Amaryl ]   - Hemoglobin A1c; Future  - Hemoglobin A1c  - REVIEW OF HEALTH MAINTENANCE PROTOCOL ORDERS  - OPTOMETRY REFERRAL; Future  - Albumin Random Urine Quantitative with Creat Ratio; Future  - metFORMIN (GLUCOPHAGE-XR) 500 MG 24 hr tablet; TAKE TWO TABLETS (1000MG) BY MOUTH TWICE A DAY WITH MEALS  - **A1C FUTURE 3mo; Future  - Albumin Random Urine Quantitative with Creat Ratio    Essential hypertension with goal blood pressure less than 140/90  Controlled within acceptable limits , continue current plan of care    - REVIEW OF HEALTH MAINTENANCE PROTOCOL ORDERS  - Albumin Random Urine Quantitative with Creat Ratio; Future  - hydrochlorothiazide (HYDRODIURIL) 12.5 MG tablet; Take 1 tablet (12.5 mg) by mouth every morning  -  felodipine ER (PLENDIL) 5 MG 24 hr tablet; Take 1 tablet (5 mg) by mouth daily  - losartan (COZAAR) 100 MG tablet; TAKE 1 TABLET (100 MG) BY MOUTH DAILY.    Hyperlipidemia with target LDL less than 100  Lab Results   Component Value Date    CHOL 218 03/26/2021     Lab Results   Component Value Date    HDL 47 03/26/2021     Lab Results   Component Value Date     03/26/2021     Lab Results   Component Value Date    TRIG 172 03/26/2021     Lab Results   Component Value Date    CHOLHDLRATIO 3.7 05/22/2015     Not due for fasting lipid panel today   - rosuvastatin (CRESTOR) 10 MG tablet; Take 1 tablet (10 mg) by mouth every evening    Morbid obesity due to excess calories (H)  Weight loss measures reviewed     Encounter for screening mammogram for breast cancer  Due for this test / procedure / healthcare maintenance   - MA SCREENING DIGITAL BILAT - Future  (s+30); Future    Seasonal allergic rhinitis, unspecified trigger  She is asking for a trial of the albuterol inhaler due to her significant fall allergies and chronic sinus symptoms , it's worth a try.  - albuterol (PROAIR HFA/PROVENTIL HFA/VENTOLIN HFA) 108 (90 Base) MCG/ACT inhaler; Inhale 2 puffs into the lungs every 6 hours    Review of the result(s) of each unique test - todays labs  Prescription drug management  22 minutes spent on the date of the encounter doing chart review, history and exam, documentation and further activities per the note       Tobacco Cessation:   reports that she has been smoking cigarettes. She started smoking about 41 years ago. She has a 10.00 pack-year smoking history. She uses smokeless tobacco.      Return in about 3 months (around 1/7/2022) for Lab Work.    Dipak Hooper MD  [facility] [place] is a 56 year old who presents for the following health issues     HPI     Diabetes Follow-up    How often are you checking your blood sugar? A few times a week  What time of day are you checking  your blood sugars (select all that apply)?  Before and after meals  Have you had any blood sugars above 200?  No  Have you had any blood sugars below 70?  Yes     What symptoms do you notice when your blood sugar is low?  None    What concerns do you have today about your diabetes? None     Do you have any of these symptoms? (Select all that apply)  No numbness or tingling in feet.  No redness, sores or blisters on feet.  No complaints of excessive thirst.  No reports of blurry vision.  No significant changes to weight.    Have you had a diabetic eye exam in the last 12 months? No    Wt Readings from Last 5 Encounters:   10/07/21 112.5 kg (248 lb)   03/26/21 112.3 kg (247 lb 9.6 oz)   01/15/21 109.3 kg (241 lb)   03/26/20 109.3 kg (241 lb)   12/31/19 104.9 kg (231 lb 4.8 oz)     Body mass index is 47.56 kg/m .  We reviewed the concepts around the metabolic syndrome , she has a negative family history of ischemic vascular disease but remains at significant risk for ischemic vascular disease  And goals of therapy for diabetes mellitus and other diagnoses are reviewed     BP Readings from Last 2 Encounters:   03/26/21 124/72   03/26/20 138/72     Hemoglobin A1C (%)   Date Value   10/07/2021 7.9 (H)   07/02/2021 8.0 (H)   03/26/2021 8.3 (H)     LDL Cholesterol Calculated (mg/dL)   Date Value   03/26/2021 137 (H)   08/27/2020 133 (H)     History of 2 different sinus surgeries, sees Dr. Geo Harley, Ear, Nose, and Throat specialist with [facility]   Can't take antihistamines even the ns cause her to have exsl   Wants to try an albuterol metered dose inhaler. Already uses fluticasone (FLONASE) 50 MCG/ACT nasal spray         Review of Systems   Constitutional, HEENT, cardiovascular, pulmonary, gi and gu systems are negative, except as otherwise noted.      Objective    /80   Pulse 89   Temp 98.6  F (37  C) (Oral)   Resp 14   Wt 112.5 kg (248 lb)   LMP 06/24/2011   SpO2 99%   BMI 47.56  kg/m    Body mass index is 47.56 kg/m .  Physical Exam   GENERAL: healthy, alert and no distress  EYES: Eyes grossly normal to inspection, PERRL and conjunctivae and sclerae normal  MS: no gross musculoskeletal defects noted, no edema  NEURO: Normal strength and tone, mentation intact and speech normal  PSYCH: mentation appears normal, affect normal/bright

## 2021-10-07 NOTE — LETTER
October 11, 2021      Mary KATHY Grigsby  [address]  [place] [address]        Dear ,    We are writing to inform you of your test results.    We already discussed this result at your appointment . Please let me know if you have any questions for me about this test. You are are advised to recheck this in 3 months.     Resulted Orders   Hemoglobin A1c   Result Value Ref Range    Hemoglobin A1C 7.9 (H) 0.0 - 5.6 %      Comment:      Normal <5.7%   Prediabetes 5.7-6.4%    Diabetes 6.5% or higher     Note: Adopted from ADA consensus guidelines.   Extra Serum Separator Tube (SST)   Result Value Ref Range    Hold Specimen JIC    Extra Green Top (Lithium Heparin) Tube   Result Value Ref Range    Hold Specimen JIC    Albumin Random Urine Quantitative with Creat Ratio   Result Value Ref Range    Creatinine Urine mg/dL 73 mg/dL    Albumin Urine mg/L 54 mg/L    Albumin Urine mg/g Cr 73.97 (H) 0.00 - 25.00 mg/g Cr       If you have any questions or concerns, please call the clinic at the number listed above.       Sincerely,      Dipak Hooper MD/ben

## 2021-10-08 LAB
CREAT UR-MCNC: 73 MG/DL
MICROALBUMIN UR-MCNC: 54 MG/L
MICROALBUMIN/CREAT UR: 73.97 MG/G CR (ref 0–25)

## 2021-12-03 DIAGNOSIS — K75.4 AUTOIMMUNE HEPATITIS (H): ICD-10-CM

## 2021-12-03 DIAGNOSIS — R74.8 ELEVATED LIVER ENZYMES: ICD-10-CM

## 2021-12-03 RX ORDER — AZATHIOPRINE 50 MG/1
TABLET ORAL
Qty: 135 TABLET | Refills: 3 | Status: SHIPPED | OUTPATIENT
Start: 2021-12-03 | End: 2022-11-15

## 2022-03-13 ENCOUNTER — HEALTH MAINTENANCE LETTER (OUTPATIENT)
Age: 57
End: 2022-03-13

## 2022-03-15 ENCOUNTER — TELEPHONE (OUTPATIENT)
Dept: INTERNAL MEDICINE | Facility: CLINIC | Age: 57
End: 2022-03-15
Payer: COMMERCIAL

## 2022-03-15 NOTE — TELEPHONE ENCOUNTER
Patient Quality Outreach    Patient is due for the following:   Diabetes -  A1C, Eye Exam, Microalbumin, Statin and Diabetic Follow-Up Visit  Colon Cancer Screening -  FIT, Colonoscopy and Cologuard  Breast Cancer Screening - Mammogram  Cervical Cancer Screening - PAP Needed  Depression  -  PHQ-9 Needed  Physical  - Due Now  Immunizations  -  Hepatitis B, Pneumococcal, Tdap and Zoster    NEXT STEPS:   Schedule a yearly physical    Type of outreach:    Sent letter.      Questions for provider review:    None     Dot Mack CMA  Chart routed to Care Team.

## 2022-03-15 NOTE — LETTER
[facility] [place]  [address]  [place] [address]  [phone]          March 15, 2022      Mary Grigsby  [address]  [place] [facility] [place] [facility] [address]      Your healthcare team cares about your health. To provide you with the best care,   we have reviewed your chart and based on our findings, we see that you are due to:     - DIABETES FOLLOW UP: Schedule a diabetic follow up appointment as a Office Visit. Patients with diabetes should generally see their provider every 3-6 months.    Schedule a DIABETIC EYE EXAM.  This exam is done with an optometrist. You can schedule this appointment with your eye doctor.  If you need a referral, please let us know.  - BREAST CANCER SCREENING:  Schedule Annual Mammogram. [facility] scheduling number - [phone] or schedule in MyChart (self referall)  - CERVICAL CANCER SCREENING: Schedule a Cervical Cancer Screening, with Pap and wellness exam.   - COLON CANCER SCREENING:  Call or mychart the clinic to schedule your colonoscopy or schedule/ your FIT Test, or Cologuard test  - ANNUAL WELLNESS FOLLOW UP:   Schedule an Annual Medicare Wellness Exam. This can be done by in person visit or virtual video visit.   - OTHER FOLLOW UP:  Immunizations-Zosters, Hep B, TDap, Peumococcal PCV13 and PPSV23. Lung Cancer Screen.    If you have already completed these items, please contact the clinic via phone or   One[employer]hart so your care team can review and update your records. Thank you for   choosing [facility] for your healthcare needs. For any questions,   concerns, or to schedule an appointment please contact the clinic.       Healthy Regards,      Your [facility] Care Team

## 2022-03-15 NOTE — LETTER
[facility] [place]  [address]  [place] [address]  [phone]          April 15, 2022      Mary Grigsby  [address]  [place] [address]      Your healthcare team cares about your health. To provide you with the best care,   we have reviewed your chart and based on our findings, we see that you are due to:     - DIABETES FOLLOW UP: Schedule a diabetic follow up appointment as a Office Visit. Patients with diabetes should generally see their provider every 3-6 months.    Schedule a DIABETIC EYE EXAM.  This exam is done with an optometrist. You can schedule this appointment with your eye doctor.  If you need a referral, please let us know.  - BREAST CANCER SCREENING:  Schedule Annual Mammogram. [facility] scheduling number - [phone] or schedule in Letsgofordinnerhart (self referall)  - CERVICAL CANCER SCREENING: Schedule a Cervical Cancer Screening, with Pap and wellness exam.   - ANNUAL WELLNESS FOLLOW UP:   Schedule an Annual Medicare Wellness Exam. This can be done by in person visit or virtual video visit.   - OTHER FOLLOW UP:  Immunizations:Hep B, Pneumococcal, TDaP, and Zosters(Shingles Shot)    If you have already completed these items, please contact the clinic via phone or   "[employer]hart so your care team can review and update your records. Thank you for   choosing [facility] for your healthcare needs. For any questions,   concerns, or to schedule an appointment please contact the clinic.       Healthy Regards,      Your [facility] Care Team

## 2022-04-15 NOTE — TELEPHONE ENCOUNTER
Patient Quality Outreach    Patient is due for the following:   Diabetes -  A1C, LDL (Fasting), Eye Exam and Diabetic Follow-Up Visit  Breast Cancer Screening - Mammogram  Cervical Cancer Screening - PAP Needed  Physical  - Due Now  Immunizations  -  Hepatitis B, Pneumococcal, Tdap and Zoster    NEXT STEPS:   Schedule a yearly physical    Type of outreach:    Sent letter.    Next Steps:  Reach out within 90 days via Letter.    Max number of attempts reached: Yes. Will try again in 90 days if patient still on fail list.    Questions for provider review:    None     Dot Mack CMA  Chart routed to Care Team.

## 2022-04-21 DIAGNOSIS — E11.9 CONTROLLED TYPE 2 DIABETES MELLITUS WITHOUT COMPLICATION, WITHOUT LONG-TERM CURRENT USE OF INSULIN (H): ICD-10-CM

## 2022-04-22 RX ORDER — METFORMIN HCL 500 MG
TABLET, EXTENDED RELEASE 24 HR ORAL
Qty: 360 TABLET | Refills: 0 | Status: SHIPPED | OUTPATIENT
Start: 2022-04-22 | End: 2022-06-22

## 2022-04-22 NOTE — TELEPHONE ENCOUNTER
Routing refill request to provider for review/approval because:  Patient needs to be seen because:  Due for diabetic f/u      Genia Reyes RN  [facility]

## 2022-05-08 ENCOUNTER — HEALTH MAINTENANCE LETTER (OUTPATIENT)
Age: 57
End: 2022-05-08

## 2022-06-07 DIAGNOSIS — E78.5 HYPERLIPIDEMIA WITH TARGET LDL LESS THAN 100: ICD-10-CM

## 2022-06-07 RX ORDER — ROSUVASTATIN CALCIUM 10 MG/1
10 TABLET, COATED ORAL EVERY EVENING
Qty: 30 TABLET | Refills: 0 | Status: SHIPPED | OUTPATIENT
Start: 2022-06-07 | End: 2022-06-22

## 2022-06-07 NOTE — TELEPHONE ENCOUNTER
"Requested Prescriptions   Pending Prescriptions Disp Refills     rosuvastatin (CRESTOR) 10 MG tablet [Pharmacy Med Name: ROSUVASTATIN CALCIUM 10MG TABS] 90 tablet 1     Sig: TAKE 1 TABLET (10 MG) BY MOUTH EVERY EVENING       Statins Protocol Failed - 6/7/2022  5:01 AM        Failed - LDL on file in past 12 months     Recent Labs   Lab Test 03/26/21  1249   *             Passed - No abnormal creatine kinase in past 12 months     Recent Labs   Lab Test 10/03/18  1525   CKT 53              Passed - Recent (12 mo) or future (30 days) visit within the authorizing provider's specialty     Patient has had an office visit with the authorizing provider or a provider within the authorizing providers department within the previous 12 mos or has a future within next 30 days. See \"Patient Info\" tab in inbasket, or \"Choose Columns\" in Meds & Orders section of the refill encounter.              Passed - Medication is active on med list        Passed - Patient is age 18 or older        Passed - No active pregnancy on record        Passed - No positive pregnancy test in past 12 months           Medication is being filled for 1 time refill only due to:  Patient needs labs annual. Patient needs to be seen because it has been more than one year since last visit.    "

## 2022-06-23 ENCOUNTER — TELEPHONE (OUTPATIENT)
Dept: INTERNAL MEDICINE | Facility: CLINIC | Age: 57
End: 2022-06-23

## 2022-06-23 ENCOUNTER — OFFICE VISIT (OUTPATIENT)
Dept: INTERNAL MEDICINE | Facility: CLINIC | Age: 57
End: 2022-06-23
Payer: COMMERCIAL

## 2022-06-23 VITALS
HEART RATE: 95 BPM | RESPIRATION RATE: 14 BRPM | TEMPERATURE: 97.5 F | WEIGHT: 238 LBS | SYSTOLIC BLOOD PRESSURE: 128 MMHG | BODY MASS INDEX: 45.64 KG/M2 | OXYGEN SATURATION: 96 % | DIASTOLIC BLOOD PRESSURE: 64 MMHG

## 2022-06-23 DIAGNOSIS — E78.5 HYPERLIPIDEMIA WITH TARGET LDL LESS THAN 100: ICD-10-CM

## 2022-06-23 DIAGNOSIS — D06.9 SEVERE DYSPLASIA OF CERVIX (CIN III): ICD-10-CM

## 2022-06-23 DIAGNOSIS — Z87.891 EX-SMOKER: ICD-10-CM

## 2022-06-23 DIAGNOSIS — K76.0 FATTY LIVER DISEASE, NONALCOHOLIC: ICD-10-CM

## 2022-06-23 DIAGNOSIS — Z12.2 ENCOUNTER FOR SCREENING FOR LUNG CANCER: ICD-10-CM

## 2022-06-23 DIAGNOSIS — Z12.4 CERVICAL CANCER SCREENING: ICD-10-CM

## 2022-06-23 DIAGNOSIS — J30.2 SEASONAL ALLERGIC RHINITIS, UNSPECIFIED TRIGGER: ICD-10-CM

## 2022-06-23 DIAGNOSIS — Z23 NEED FOR DIPHTHERIA-TETANUS-PERTUSSIS (TDAP) VACCINE: ICD-10-CM

## 2022-06-23 DIAGNOSIS — Z79.624 ENCOUNTER FOR LONG TERM CURRENT USE OF AZATHIOPRINE: ICD-10-CM

## 2022-06-23 DIAGNOSIS — I10 ESSENTIAL HYPERTENSION WITH GOAL BLOOD PRESSURE LESS THAN 140/90: ICD-10-CM

## 2022-06-23 DIAGNOSIS — E55.9 HYPOVITAMINOSIS D: ICD-10-CM

## 2022-06-23 DIAGNOSIS — E11.9 CONTROLLED TYPE 2 DIABETES MELLITUS WITHOUT COMPLICATION, WITHOUT LONG-TERM CURRENT USE OF INSULIN (H): Primary | ICD-10-CM

## 2022-06-23 DIAGNOSIS — E66.01 MORBID OBESITY (H): ICD-10-CM

## 2022-06-23 DIAGNOSIS — K75.4 AUTOIMMUNE HEPATITIS (H): ICD-10-CM

## 2022-06-23 LAB
ALBUMIN SERPL-MCNC: 3.4 G/DL (ref 3.4–5)
ALP SERPL-CCNC: 113 U/L (ref 40–150)
ALT SERPL W P-5'-P-CCNC: 119 U/L (ref 0–50)
ANION GAP SERPL CALCULATED.3IONS-SCNC: 9 MMOL/L (ref 3–14)
AST SERPL W P-5'-P-CCNC: 80 U/L (ref 0–45)
BILIRUB DIRECT SERPL-MCNC: 0.1 MG/DL (ref 0–0.2)
BILIRUB SERPL-MCNC: 0.4 MG/DL (ref 0.2–1.3)
BUN SERPL-MCNC: 12 MG/DL (ref 7–30)
CALCIUM SERPL-MCNC: 9.2 MG/DL (ref 8.5–10.1)
CHLORIDE BLD-SCNC: 103 MMOL/L (ref 94–109)
CHOLEST SERPL-MCNC: 96 MG/DL
CO2 SERPL-SCNC: 24 MMOL/L (ref 20–32)
CREAT SERPL-MCNC: 0.48 MG/DL (ref 0.52–1.04)
ERYTHROCYTE [DISTWIDTH] IN BLOOD BY AUTOMATED COUNT: 13.4 % (ref 10–15)
FASTING STATUS PATIENT QL REPORTED: ABNORMAL
GFR SERPL CREATININE-BSD FRML MDRD: >90 ML/MIN/1.73M2
GLUCOSE BLD-MCNC: 214 MG/DL (ref 70–99)
HBA1C MFR BLD: 8.1 % (ref 0–5.6)
HCT VFR BLD AUTO: 43.5 % (ref 35–47)
HDLC SERPL-MCNC: 35 MG/DL
HGB BLD-MCNC: 14.9 G/DL (ref 11.7–15.7)
LDLC SERPL CALC-MCNC: 35 MG/DL
MCH RBC QN AUTO: 31.1 PG (ref 26.5–33)
MCHC RBC AUTO-ENTMCNC: 34.3 G/DL (ref 31.5–36.5)
MCV RBC AUTO: 91 FL (ref 78–100)
NONHDLC SERPL-MCNC: 61 MG/DL
PLATELET # BLD AUTO: 202 10E3/UL (ref 150–450)
POTASSIUM BLD-SCNC: 3.9 MMOL/L (ref 3.4–5.3)
PROT SERPL-MCNC: 7.2 G/DL (ref 6.8–8.8)
RBC # BLD AUTO: 4.79 10E6/UL (ref 3.8–5.2)
SODIUM SERPL-SCNC: 136 MMOL/L (ref 133–144)
TRIGL SERPL-MCNC: 128 MG/DL
WBC # BLD AUTO: 8.4 10E3/UL (ref 4–11)

## 2022-06-23 PROCEDURE — 82248 BILIRUBIN DIRECT: CPT | Performed by: INTERNAL MEDICINE

## 2022-06-23 PROCEDURE — 90715 TDAP VACCINE 7 YRS/> IM: CPT | Performed by: INTERNAL MEDICINE

## 2022-06-23 PROCEDURE — 80061 LIPID PANEL: CPT | Performed by: INTERNAL MEDICINE

## 2022-06-23 PROCEDURE — 80053 COMPREHEN METABOLIC PANEL: CPT | Performed by: INTERNAL MEDICINE

## 2022-06-23 PROCEDURE — 83036 HEMOGLOBIN GLYCOSYLATED A1C: CPT | Performed by: INTERNAL MEDICINE

## 2022-06-23 PROCEDURE — 85027 COMPLETE CBC AUTOMATED: CPT | Performed by: INTERNAL MEDICINE

## 2022-06-23 PROCEDURE — 90471 IMMUNIZATION ADMIN: CPT | Performed by: INTERNAL MEDICINE

## 2022-06-23 PROCEDURE — 99207 PR FOOT EXAM NO CHARGE: CPT | Performed by: INTERNAL MEDICINE

## 2022-06-23 PROCEDURE — 36415 COLL VENOUS BLD VENIPUNCTURE: CPT | Performed by: INTERNAL MEDICINE

## 2022-06-23 PROCEDURE — 99214 OFFICE O/P EST MOD 30 MIN: CPT | Mod: 25 | Performed by: INTERNAL MEDICINE

## 2022-06-23 RX ORDER — HYDROCHLOROTHIAZIDE 12.5 MG/1
12.5 TABLET ORAL EVERY MORNING
Qty: 90 TABLET | Refills: 1 | Status: SHIPPED | OUTPATIENT
Start: 2022-06-23 | End: 2023-05-08

## 2022-06-23 RX ORDER — FELODIPINE 5 MG/1
5 TABLET, EXTENDED RELEASE ORAL DAILY
Qty: 90 TABLET | Refills: 1 | Status: SHIPPED | OUTPATIENT
Start: 2022-06-23 | End: 2023-05-08

## 2022-06-23 RX ORDER — METFORMIN HCL 500 MG
TABLET, EXTENDED RELEASE 24 HR ORAL
Qty: 360 TABLET | Refills: 1 | Status: SHIPPED | OUTPATIENT
Start: 2022-06-23 | End: 2023-01-10

## 2022-06-23 RX ORDER — ALBUTEROL SULFATE 90 UG/1
2 AEROSOL, METERED RESPIRATORY (INHALATION) EVERY 6 HOURS
Qty: 18 G | Refills: 3 | Status: SHIPPED | OUTPATIENT
Start: 2022-06-23 | End: 2023-05-11

## 2022-06-23 RX ORDER — LOSARTAN POTASSIUM 100 MG/1
TABLET ORAL
Qty: 90 TABLET | Refills: 1 | Status: SHIPPED | OUTPATIENT
Start: 2022-06-23 | End: 2022-11-21

## 2022-06-23 RX ORDER — ROSUVASTATIN CALCIUM 10 MG/1
10 TABLET, COATED ORAL EVERY EVENING
Qty: 90 TABLET | Refills: 1 | Status: SHIPPED | OUTPATIENT
Start: 2022-06-23 | End: 2023-03-29

## 2022-06-23 ASSESSMENT — ENCOUNTER SYMPTOMS: HYPERTENSION: 1

## 2022-06-23 NOTE — TELEPHONE ENCOUNTER
Dipak Hooper MD  [facility] [place]  Ask patient if she is interested to consider lung cancer screening with annual low dose CT for scanning high risk pt for lung cancer , if she is, please get a detailed smoking history and reroute with answers to these questions

## 2022-06-23 NOTE — PATIENT INSTRUCTIONS
Nice to see you Mary !    Diabetes mellitus needs additional medication , we added a new medication called Sodium-glucose Cotransporter-2 (SGLT2) Inhibitors , Jardiance  (empagliflozin) 10 milligrams. Depending on how things go we might need to increase this further to 25 milligrams in 3 months when you come back to the lab for the recheck hemoglobin a1c  [ diabetes test ]. No appointment with me is necessary at that time   I will forward the information regarding Jardiance  (empagliflozin) to your gastroenterologist   We are giving the TDAP vaccination today   Will ask also gastroenterologist about safety of receiving Hepatitis B immunizations  You still need the pneumococcal vaccine 20-valent  Still need the zostavax / shingles vaccination   Still are recommended to have the 4th Coronavirus (COVID-19) vaccination

## 2022-06-23 NOTE — LETTER
June 24, 2022      Mary CHAVEZ Seb  [address]  [place] [address]        Dear ,    We are writing to inform you of your test results.    Other then the out of control diabetes mellitus which we already reviewed at your appointment , these tests are all within acceptable limits . Please write back if you'd like to discuss this more or call or MyChart message me.          Resulted Orders   Hemoglobin A1c   Result Value Ref Range    Hemoglobin A1C 8.1 (H) 0.0 - 5.6 %      Comment:      Normal <5.7%   Prediabetes 5.7-6.4%    Diabetes 6.5% or higher     Note: Adopted from ADA consensus guidelines.   Lipid panel reflex to direct LDL Non-fasting   Result Value Ref Range    Cholesterol 96 <200 mg/dL    Triglycerides 128 <150 mg/dL    Direct Measure HDL 35 (L) >=50 mg/dL    LDL Cholesterol Calculated 35 <=100 mg/dL    Non HDL Cholesterol 61 <130 mg/dL    Patient Fasting > 8hrs? Unknown     Narrative    Cholesterol  Desirable:  <200 mg/dL    Triglycerides  Normal:  Less than 150 mg/dL  Borderline High:  150-199 mg/dL  High:  200-499 mg/dL  Very High:  Greater than or equal to 500 mg/dL    Direct Measure HDL  Female:  Greater than or equal to 50 mg/dL   Male:  Greater than or equal to 40 mg/dL    LDL Cholesterol  Desirable:  <100mg/dL  Above Desirable:  100-129 mg/dL   Borderline High:  130-159 mg/dL   High:  160-189 mg/dL   Very High:  >= 190 mg/dL    Non HDL Cholesterol  Desirable:  130 mg/dL  Above Desirable:  130-159 mg/dL  Borderline High:  160-189 mg/dL  High:  190-219 mg/dL  Very High:  Greater than or equal to 220 mg/dL   Basic metabolic panel   Result Value Ref Range    Sodium 136 133 - 144 mmol/L    Potassium 3.9 3.4 - 5.3 mmol/L    Chloride 103 94 - 109 mmol/L    Carbon Dioxide (CO2) 24 20 - 32 mmol/L    Anion Gap 9 3 - 14 mmol/L    Urea Nitrogen 12 7 - 30 mg/dL    Creatinine 0.48 (L) 0.52 - 1.04 mg/dL    Calcium 9.2 8.5 - 10.1 mg/dL    Glucose 214 (H) 70 - 99 mg/dL    GFR Estimate >90  >60 mL/min/1.73m2      Comment:      Effective December 21, 2021 eGFRcr in adults is calculated using the 2021 CKD-EPI creatinine equation which includes age and gender ( et al., NEJM, DOI: 10.1056/UBQSon8079411)       If you have any questions or concerns, please call the clinic at the number listed above.       Sincerely,      Dipak Hooper MD/ben

## 2022-06-23 NOTE — PROGRESS NOTES
Assessment & Plan     Controlled type 2 diabetes mellitus without complication, without long-term current use of insulin (H)  We agreed that her efforts with lifestyle changes isn't bringing her diabetes mellitus type 2  Within acceptable limits. Hemoglobin a1c  [ diabetes test ] not at goal. She is scared of certain anti-diabetic medications and we elected to use the addition of a Sodium-glucose Cotransporter-2 (SGLT2) Inhibitor, namely Jardiance  (empagliflozin) although this may change base don her health insurance  To one of the other agents. A follow up hemoglobin a1c  [ diabetes test ] in 3 months is needed with or without an appointment with me.  - Hemoglobin A1c; Future  - Basic metabolic panel; Future  - FOOT EXAM  - blood glucose (NO BRAND SPECIFIED) lancets standard; Use to test blood sugar 3 times daily or as directed.  - blood glucose (NO BRAND SPECIFIED) test strip; 1 strip by In Vitro route 3 times daily  - metFORMIN (GLUCOPHAGE XR) 500 MG 24 hr tablet; TAKE TWO TABLETS (1000MG) BY MOUTH TWICE A DAY WITH MEALS  - OPTOMETRY REFERRAL; Future  - Hemoglobin A1c  - Basic metabolic panel  - empagliflozin (JARDIANCE) 10 MG TABS tablet; Take 1 tablet (10 mg) by mouth daily  - **A1C FUTURE 3mo; Future    Hypovitaminosis D  This has been normal 4 years in a row and I am not checking this today   - Basic metabolic panel; Future  - Basic metabolic panel    Hyperlipidemia with target LDL less than 100  Due for recheck   - Lipid panel reflex to direct LDL Non-fasting; Future  - rosuvastatin (CRESTOR) 10 MG tablet; Take 1 tablet (10 mg) by mouth every evening TAKE 1 TABLET (10 MG) BY MOUTH EVERY EVENING  - Lipid panel reflex to direct LDL Non-fasting    obesity  Wt Readings from Last 5 Encounters:   06/23/22 108 kg (238 lb)   10/07/21 112.5 kg (248 lb)   03/26/21 112.3 kg (247 lb 9.6 oz)   01/15/21 109.3 kg (241 lb)   03/26/20 109.3 kg (241 lb)     Body mass index is 45.64 kg/m .   Weight loss measures reviewed      Encounter for screening for lung cancer  Postponed     Severe dysplasia of cervix (GUSTABO III)  She is seeing Dr. Petey Peraza, gynecologist next door at [facility]  For this care and says she is following her recommended plan of care. She's status post vaginal hysterectomy with no uterus but still has a cervix    Ex-smoker  Noted as a point of historical importance . Lung cancer screening postponed     Seasonal allergic rhinitis, unspecified trigger  Refills provided   - albuterol (PROAIR HFA/PROVENTIL HFA/VENTOLIN HFA) 108 (90 Base) MCG/ACT inhaler; Inhale 2 puffs into the lungs every 6 hours Not to be refilled until patient calls    Essential hypertension with goal blood pressure less than 140/90  cwal  - felodipine ER (PLENDIL) 5 MG 24 hr tablet; Take 1 tablet (5 mg) by mouth daily  - hydrochlorothiazide (HYDRODIURIL) 12.5 MG tablet; Take 1 tablet (12.5 mg) by mouth every morning  - losartan (COZAAR) 100 MG tablet; TAKE 1 TABLET (100 MG) BY MOUTH DAILY.    Cervical cancer screening  As above     Autoimmune hepatitis (H)  As detailed above, seeing Dr. Melissa Gutierrez with [facility]   - CBC with platelets  - Hepatic panel    Fatty liver disease, nonalcoholic  As above   - CBC with platelets  - Hepatic panel    Encounter for long term current use of azathioprine  As above   - CBC with platelets  - Hepatic panel    Need for diphtheria-tetanus-pertussis (Tdap) vaccine  Administered , see patient after-visit summary with discussions about other vaccinations. Specifically patient asked if it was ok to receive the hepatitis B series and I think it's a good idea, will also include gastroenterologist in this discussion   - TDAP VACCINE (Adacel, Boostrix)  [[id number]]    Review of the result(s) of each unique test - todays tests  Prescription drug management  32 minutes spent on the date of the encounter doing chart review, history and exam, documentation and further activities  per the note  6}     Tobacco Cessation:   reports that she has been smoking cigarettes. She started smoking about 42 years ago. She has a 10.00 pack-year smoking history. She uses smokeless tobacco.      Return in about 3 months (around 9/23/2022).    Dipak Hooper MD  [facility] GARRISON Hernandez is a 56 year old presenting for the following health issues:  Diabetes and Hypertension    Last appointment with me was 10/7/2021    Health Maintenance Due   Topic Date Due     ZOSTER IMMUNIZATION (1 of 2) Never done     HEPATITIS B IMMUNIZATION (1 of 3 - Risk 3-dose series) Never done     DTAP/TDAP/TD IMMUNIZATION (2 - Td or Tdap) 02/05/2014     LUNG CANCER SCREENING  Never done     Pneumococcal Vaccine: Pediatrics (0 to 5 Years) and At-Risk Patients (6 to 64 Years) (2 - PPSV23 or PCV20) 05/12/2017     MAMMO SCREENING  09/19/2018     EYE EXAM  09/29/2018     COLPOSCOPY  03/20/2020     HPV TEST  06/20/2020     PAP  06/20/2020     PREVENTIVE CARE VISIT  12/20/2020     PHQ-2 (once per calendar year)  01/01/2022     COVID-19 Vaccine (4 - Booster for Pfizer series) 02/24/2022     BMP  03/26/2022     LIPID  03/26/2022     DIABETIC FOOT EXAM  03/26/2022     A1C  04/07/2022      Rather then order Health Maintenance Care Gaps . I will review Healthcare maintenance section / Healthcare Gaps individually with patient / family and see what if anything they are interested to do.     Normal vitamin D since 2018, not going to check this year    Wt Readings from Last 5 Encounters:   06/23/22 108 kg (238 lb)   10/07/21 112.5 kg (248 lb)   03/26/21 112.3 kg (247 lb 9.6 oz)   01/15/21 109.3 kg (241 lb)   03/26/20 109.3 kg (241 lb)         Lab Results   Component Value Date    A1C 8.1 06/23/2022    A1C 7.9 10/07/2021    A1C 8.0 07/02/2021    A1C 8.3 03/26/2021    A1C 8.2 08/27/2020    A1C 7.0 12/20/2019    A1C 6.6 09/23/2019         diabetes mellitus not under control since 8/27/2020 - claims she has a new  glucometer and claims she does home blood glucose monitoring three times a week - she did not bring her machine this am   We need to discuss possible insulin therapy - we had recommended a 3 month follow up and have made no progress just with Metformin ( Glucophage)    Our decisions are based on what the follow up hemoglobin a1c  [ diabetes test ] shows  Note ; she hasn't had a refill request for lancets or test strips since 2018 suggesting no home blood glucose monitoring whatsoever which she denies    Tobacco Cessation:   reports that she has been smoking cigarettes. She started smoking about 41 years ago. She has a 10.00 pack-year smoking history. She uses smokeless tobacco.      Hypertension     History of Present Illness       Diabetes:   She presents for follow up of diabetes.  She is checking home blood glucose a few times a week. She checks blood glucose after meals.  Blood glucose is never over 200 and never under 70. She is aware of hypoglycemia symptoms including shakiness and dizziness. She has no concerns regarding her diabetes at this time.  She is not experiencing numbness or burning in feet, excessive thirst, blurry vision, weight changes or redness, sores or blisters on feet. The patient has not had a diabetic eye exam in the last 12 months.         Hypertension: She presents for follow up of hypertension.  She does not check blood pressure  regularly outside of the clinic. Outpatient blood pressures have not been over 140/90. She follows a low salt diet.           Review of Systems   Constitutional, HEENT, cardiovascular, pulmonary, gi and gu systems are negative, except as otherwise noted.      Objective    /64   Pulse 95   Temp 97.5  F (36.4  C) (Oral)   Resp 14   Wt 108 kg (238 lb)   LMP 06/24/2011   SpO2 96%   BMI 45.64 kg/m    Body mass index is 45.64 kg/m .  Physical Exam   GENERAL: healthy, alert and no distress  EYES: Eyes grossly normal to inspection, PERRL and conjunctivae  and sclerae normal  RESP: lungs clear to auscultation - no rales, rhonchi or wheezes  CV: regular rate and rhythm, normal S1 S2, no S3 or S4, no murmur, click or rub, no peripheral edema and peripheral pulses strong  MS: no gross musculoskeletal defects noted, no edema  SKIN: no suspicious lesions or rashes  NEURO: Normal strength and tone, mentation intact and speech normal  PSYCH: mentation appears normal, affect normal/bright  Diabetic foot exam: normal DP and PT pulses, no trophic changes or ulcerative lesions and normal sensory exam    Orders Placed This Encounter   Procedures     FOOT EXAM     TDAP VACCINE (Adacel, Boostrix)  [[id number]]     Hemoglobin A1c     Lipid panel reflex to direct LDL Non-fasting     Basic metabolic panel     **A1C FUTURE 3mo     OPTOMETRY REFERRAL           .  ..

## 2022-06-23 NOTE — Clinical Note
Ask patient if she is interested to consider lung cancer screening with annual low dose CT for scanning high risk pt for lung cancer , if she is, please get a detailed smoking history and reroute with answers to these questions

## 2022-06-23 NOTE — PROGRESS NOTES
Last appointment with me was 10/7/2021  Health Maintenance Due   Topic Date Due     ZOSTER IMMUNIZATION (1 of 2) Never done     HEPATITIS B IMMUNIZATION (1 of 3 - Risk 3-dose series) Never done     DTAP/TDAP/TD IMMUNIZATION (2 - Td or Tdap) 02/05/2014     LUNG CANCER SCREENING  Never done     Pneumococcal Vaccine: Pediatrics (0 to 5 Years) and At-Risk Patients (6 to 64 Years) (2 - PPSV23 or PCV20) 05/12/2017     MAMMO SCREENING  09/19/2018     EYE EXAM  09/29/2018     COLPOSCOPY  03/20/2020     HPV TEST  06/20/2020     PAP  06/20/2020     PREVENTIVE CARE VISIT  12/20/2020     PHQ-2 (once per calendar year)  01/01/2022     COVID-19 Vaccine (4 - Booster for Pfizer series) 02/24/2022     BMP  03/26/2022     LIPID  03/26/2022     DIABETIC FOOT EXAM  03/26/2022     A1C  04/07/2022    Rather then order Health Maintenance Care Gaps . I will review Healthcare maintenance section / Healthcare Gaps individually with patient / family and see what if anything they are interested to do.     Normal vitamin D since 2018, not going to check this year  Lab Results   Component Value Date    A1C 7.9 10/07/2021    A1C 8.0 07/02/2021    A1C 8.3 03/26/2021    A1C 8.2 08/27/2020    A1C 7.0 12/20/2019    A1C 6.6 09/23/2019     Absolutely atrocious diabetes mellitus control since 8/27/2020  We need to discuss insulin therapy - we had recommended a 3 month follow up and it is now 9 months later.  Our decisions will be based on what the follow up hemoglobin a1c  [ diabetes test ] shows  Note ; she hasn't had a refill request for lancets or test strips since 2018 suggesting no home blood glucose monitoring whatsoever     Tobacco Cessation:   reports that she has been smoking cigarettes. She started smoking about 41 years ago. She has a 10.00 pack-year smoking history. She uses smokeless tobacco.  Answers for HPI/ROS submitted by the patient on 6/23/2022  Do you check your blood pressure regularly outside of the clinic?: No  Are your blood  pressures ever more than 140 on the top number (systolic) OR more than 90 on the bottom number (diastolic)? (For example, greater than 140/90): No  Are you following a low salt diet?: Yes  Frequency of checking blood sugars:: a few times a week  What time of day are you checking your blood sugars : after meals  Have you had any blood sugars above 200?: No  Have you had any blood sugars below 70?: No  Hypoglycemia symptoms:: shakiness, dizziness  Diabetic concerns:: none  Paraesthesia present:: none of these symptoms  Have you had a diabetic eye exam within the last year?: No

## 2022-06-23 NOTE — Clinical Note
Keeping you in the information loop , starting Jardiance  (empagliflozin) hope that's ok. Do you support giving this patient a hepatitis B immunization series ?

## 2022-07-13 ENCOUNTER — MYC MEDICAL ADVICE (OUTPATIENT)
Dept: INTERNAL MEDICINE | Facility: CLINIC | Age: 57
End: 2022-07-13

## 2022-07-13 DIAGNOSIS — B37.31 YEAST INFECTION OF THE VAGINA: ICD-10-CM

## 2022-07-13 DIAGNOSIS — E11.9 CONTROLLED TYPE 2 DIABETES MELLITUS WITHOUT COMPLICATION, WITHOUT LONG-TERM CURRENT USE OF INSULIN (H): Primary | ICD-10-CM

## 2022-07-13 DIAGNOSIS — R30.0 DYSURIA: ICD-10-CM

## 2022-07-14 ENCOUNTER — LAB (OUTPATIENT)
Dept: LAB | Facility: CLINIC | Age: 57
End: 2022-07-14
Payer: COMMERCIAL

## 2022-07-14 DIAGNOSIS — Z79.899 LONG TERM USE OF DRUG: Primary | ICD-10-CM

## 2022-07-14 LAB
CLUE CELLS: ABNORMAL
TRICHOMONAS, WET PREP: ABNORMAL
WBC'S/HIGH POWER FIELD, WET PREP: ABNORMAL
YEAST, WET PREP: PRESENT

## 2022-07-14 PROCEDURE — 87210 SMEAR WET MOUNT SALINE/INK: CPT

## 2022-07-14 RX ORDER — FLUCONAZOLE 150 MG/1
150 TABLET ORAL
Qty: 3 TABLET | Refills: 0 | Status: SHIPPED | OUTPATIENT
Start: 2022-07-14 | End: 2022-07-21

## 2022-07-14 NOTE — TELEPHONE ENCOUNTER
Patient had self collect wet prep done, please send in medication for  Antibiotic to our pharmacy at [place] please.  Yeast is present.         Nina RUBALCAVA CMA ([facility])

## 2022-07-14 NOTE — TELEPHONE ENCOUNTER
Spoke with patient about her results     She's going to remain taking Jardiance  (empagliflozin) but is definitely positive on her self-collected wet prep and is treated with fluconazole [ Diflucan ]    Dipak Hooper MD

## 2022-07-18 ENCOUNTER — TELEPHONE (OUTPATIENT)
Dept: GASTROENTEROLOGY | Facility: CLINIC | Age: 57
End: 2022-07-18

## 2022-07-18 ENCOUNTER — TELEPHONE (OUTPATIENT)
Dept: FAMILY MEDICINE | Facility: CLINIC | Age: 57
End: 2022-07-18

## 2022-07-18 ENCOUNTER — MYC MEDICAL ADVICE (OUTPATIENT)
Dept: INTERNAL MEDICINE | Facility: CLINIC | Age: 57
End: 2022-07-18

## 2022-07-18 ENCOUNTER — OFFICE VISIT (OUTPATIENT)
Dept: INTERNAL MEDICINE | Facility: CLINIC | Age: 57
End: 2022-07-18
Payer: COMMERCIAL

## 2022-07-18 VITALS
HEART RATE: 95 BPM | SYSTOLIC BLOOD PRESSURE: 136 MMHG | DIASTOLIC BLOOD PRESSURE: 68 MMHG | TEMPERATURE: 98.1 F | BODY MASS INDEX: 44.11 KG/M2 | OXYGEN SATURATION: 97 % | RESPIRATION RATE: 14 BRPM | WEIGHT: 230 LBS

## 2022-07-18 DIAGNOSIS — E11.9 CONTROLLED TYPE 2 DIABETES MELLITUS WITHOUT COMPLICATION, WITHOUT LONG-TERM CURRENT USE OF INSULIN (H): Primary | ICD-10-CM

## 2022-07-18 DIAGNOSIS — M19.90 INFLAMMATORY ARTHRITIS: ICD-10-CM

## 2022-07-18 DIAGNOSIS — K75.4 AUTOIMMUNE HEPATITIS (H): ICD-10-CM

## 2022-07-18 PROCEDURE — 99214 OFFICE O/P EST MOD 30 MIN: CPT | Performed by: INTERNAL MEDICINE

## 2022-07-18 NOTE — Clinical Note
Chely Zimmerman, we love you and don't ever leave the clinic ok ? I am seeing Mary and she has a very interesting case. You may know her from the clinic as she has worked [facility] for many many years. She has this weird eruption of a polyarthralgia and this is on top of a prior history of autoimmune hepatitis. She needs to get seen and can take prednisone. I am wondering what you might recommend. Appreciate your input with this sweet lady.

## 2022-07-18 NOTE — PROGRESS NOTES
Assessment & Plan     Controlled type 2 diabetes mellitus without complication, without long-term current use of insulin (H)  Today's appointment was really quite difficult to wedge into an overbooked scheduled. I am seeing patient this afternoon on the heels of a fearful and difficult emergency room evaluation. This is available in care everywhere. To summarize. This patient has out of control diabetes mellitus and is only taking Metformin ( Glucophage) . She had developed a significant fear of the (glucagon-like peptide-1) GLP-1 agonist she had started several years back with a concern that this medication may been implicated in the developed of an autoimmune hepatitis. She had at one point a major spike with liver function tests into the 500's and was symptomatic. She has stopped any such diabetes mellitus medication and had been smoldering along with out of control diabetes mellitus until I had her get started with Jardiance  (empagliflozin). I had included Dr. Melissa Gutierrez with [facility] , her gastroenterologist in these discussions. When we selected Jardiance  (empagliflozin) as a new option things seemed promising and not worrisome . Her hemoglobin a1c  [ diabetes test ] data confirm the presence of out of control diabetes mellitus   Lab Results   Component Value Date    A1C 8.1 06/23/2022    A1C 7.9 10/07/2021    A1C 8.0 07/02/2021    A1C 8.3 03/26/2021    A1C 8.2 08/27/2020    A1C 7.0 12/20/2019    A1C 6.6 09/23/2019      unfortunately what's happened is after getting started on this medication for not even a month, the first thing is she had developed a raging vaginal yeast infection and this was treated with fluconazole [ Diflucan ] but much more concerning is that she developed over a very short time an interesting major flare-up of marked joint pains , symmetrical and certainly highly consistent with an inflammatory arthritis. It was her pain that brought her initially to  the   [facility] and she was also found with evidence of a flare-up with her autoimmune disease disease and her lipase is into the 60's and she's developed a whole major amount of pain consistent with pancreatitis with central / epigastrium pain radiating to the back like a knife. Today we had to do some trouble-shooting and brain storming and opted for the following . First of all Jardiance  (empagliflozin) is discontinued. For her diabetes mellitus control we are adding 10 units of Lantus (Insulin Glargine) to be started at evening and a further follow up with certified diabetes educator to consider insulin titration and possibly diagnostic continuous glucose monitor . She may need additionally some pre-meal insulin like Novolog or Humalog   - insulin glargine (LANTUS PEN) 100 UNIT/ML pen; Inject 10 Units Subcutaneous At Bedtime  - AMB Adult Diabetes Educator Referral; Future    Inflammatory arthritis  The exact etiologies here aren't clear to me at all but her history is certainly quite convincing evidence of inflammatory arthritis and I am assuming she has crossover with enhanced immunogenicity and plausibly is inching into a rheumatoid arthritis or other inflammatory arthritis. Needs rheumatological consultation and I am unwilling to add prednisone to this patient as it is rocket fuel for her out of control diabetes mellitus . I agreed to ask for some pointers from Dr. Preet Zimmerman, rheumatologist with [facility] and although she is referred to see him, I know he is quite difficult to get rapid apartments with unfortunately     Autoimmune hepatitis (H)  Patient plans to contact Dr. Gutierrez but meanwhile I forward this note to her as well.      Review of the result(s) of each unique test - see ER work up  Prescription drug management  28 minutes spent on the date of the encounter doing chart review, history and exam, documentation and further activities per the  note    Tobacco Cessation:   reports that she has been smoking cigarettes. She started smoking about 42 years ago. She has a 10.00 pack-year smoking history. She uses smokeless tobacco.    Return in about 3 months (around 10/18/2022).    Dipak Hooper MD  [facility] GARRISON Hernandez is a 57 year old accompanied by her daughter, presenting for the following health issues:  Hospital F/U ([place] /)        HPI     ED/UC Followup:    Facility:  [place] [facility]  Date of visit: 07/17/2022  Reason for visit: Polyarthralgia  Current Status: pt still in pain   Lipase is 600 and the emergency room blamed this on Jardiance  (empagliflozin)   Problems with joint pains   Wide open differential diagnosis but the main symptoms are for 2 weeks  Bilateral wrists smoldering   Then knees  Then ankles and hips and shoulders and then everything   Couldn't even walk yesterday   She did leave a message with Dr. Melissa Gutierrez with [facility]     1. Regarding diabetes mellitus, we discussed need for insulin at this point as I just don't feel good about any other diabetes mellitus oral medications at this point And will consider endocrine consultation depending on how things go    Lab Results   Component Value Date    A1C 8.1 06/23/2022    A1C 7.9 10/07/2021    A1C 8.0 07/02/2021    A1C 8.3 03/26/2021    A1C 8.2 08/27/2020    A1C 7.0 12/20/2019    A1C 6.6 09/23/2019     2. Widespread joints - rheumatologist . Explore options   3. Autoimmune hepatitis and autoimmune disease          Review of Systems   Constitutional, HEENT, cardiovascular, pulmonary, gi and gu systems are negative, except as otherwise noted.      Objective    /68   Pulse 95   Temp 98.1  F (36.7  C) (Oral)   Resp 14   Wt 104.3 kg (230 lb)   LMP 06/24/2011   SpO2 97%   BMI 44.11 kg/m    Body mass index is 44.11 kg/m .  Physical Exam   GENERAL: healthy, alert and no distress  EYES: Eyes grossly normal to  inspection, PERRL and conjunctivae and sclerae normal  MS: no gross musculoskeletal defects noted, no edema  SKIN: no suspicious lesions or rashes  NEURO: Normal strength and tone, mentation intact and speech normal  PSYCH: mentation appears normal, affect normal/bright    Orders Placed This Encounter   Procedures     AMB Adult Diabetes Educator Referral     Adult Rheumatology  Referral                   .  ..

## 2022-07-18 NOTE — TELEPHONE ENCOUNTER
Left VM with number to call to set up video visit with Martínez to discuss elevated LFTs.    Yareli YOUNG LPN  Hepatology Clinic     [facility] Call Center    Phone Message    May a detailed message be left on voicemail: yes     Reason for Call: Other:     Pt is calling to inform that she was in the hospital over the weekend, labs were competed.  Mary is requesting a review of the labs and a call back to discuss.    Action Taken: Message routed to:  [facility] hep    Travel Screening: Not Applicable

## 2022-07-20 ENCOUNTER — TELEPHONE (OUTPATIENT)
Dept: FAMILY MEDICINE | Facility: CLINIC | Age: 57
End: 2022-07-20

## 2022-07-20 ENCOUNTER — TELEPHONE (OUTPATIENT)
Dept: RHEUMATOLOGY | Facility: CLINIC | Age: 57
End: 2022-07-20

## 2022-07-20 ENCOUNTER — MYC MEDICAL ADVICE (OUTPATIENT)
Dept: INTERNAL MEDICINE | Facility: CLINIC | Age: 57
End: 2022-07-20

## 2022-07-20 NOTE — TELEPHONE ENCOUNTER
Diabetes Education Scheduling Outreach #1:    Call to patient to schedule. Patient declined to schedule.    Marcella Puga  [facility]  Diabetes and Nutrition Scheduling

## 2022-07-20 NOTE — TELEPHONE ENCOUNTER
Please do the following     Check with Dr. Zimmerman as he said in a staff message that he thought he was going to finagle an ability to see this patient this week    Reroute if additional input requested from me     Follow up with Mary,also with this information , I am back in the office tomorrow     Dipak Hooper MD

## 2022-07-20 NOTE — TELEPHONE ENCOUNTER
Called and spoke with patient to see if she would like to be seen with Dr. Zimmerman on Thursday 7/20/2022 at 11:20 as we had a cancellation and Dr. Zimmerman received a message from Dr. Hooper about seeing patient. Patient declined stating she is thinking the inflammation may be from her medication and she would like to see if it goes away on its own.    Itzel Taveras, [facility]  7/20/2022

## 2022-07-21 ENCOUNTER — LAB (OUTPATIENT)
Dept: LAB | Facility: CLINIC | Age: 57
End: 2022-07-21
Payer: COMMERCIAL

## 2022-07-21 ENCOUNTER — VIRTUAL VISIT (OUTPATIENT)
Dept: GASTROENTEROLOGY | Facility: CLINIC | Age: 57
End: 2022-07-21
Attending: INTERNAL MEDICINE
Payer: COMMERCIAL

## 2022-07-21 DIAGNOSIS — R10.13 EPIGASTRIC PAIN: ICD-10-CM

## 2022-07-21 DIAGNOSIS — K75.4 AUTOIMMUNE HEPATITIS (H): ICD-10-CM

## 2022-07-21 DIAGNOSIS — K75.4 AUTOIMMUNE HEPATITIS (H): Primary | ICD-10-CM

## 2022-07-21 LAB
ALBUMIN SERPL-MCNC: 3.6 G/DL (ref 3.4–5)
ALP SERPL-CCNC: 112 U/L (ref 40–150)
ALT SERPL W P-5'-P-CCNC: 117 U/L (ref 0–50)
AST SERPL W P-5'-P-CCNC: 48 U/L (ref 0–45)
BILIRUB DIRECT SERPL-MCNC: 0.3 MG/DL (ref 0–0.2)
BILIRUB SERPL-MCNC: 0.7 MG/DL (ref 0.2–1.3)
LIPASE SERPL-CCNC: 953 U/L (ref 73–393)
PROT SERPL-MCNC: 7.8 G/DL (ref 6.8–8.8)

## 2022-07-21 PROCEDURE — 83690 ASSAY OF LIPASE: CPT

## 2022-07-21 PROCEDURE — 80076 HEPATIC FUNCTION PANEL: CPT

## 2022-07-21 PROCEDURE — 36415 COLL VENOUS BLD VENIPUNCTURE: CPT

## 2022-07-21 PROCEDURE — 86381 MITOCHONDRIAL ANTIBODY EACH: CPT

## 2022-07-21 PROCEDURE — 82784 ASSAY IGA/IGD/IGG/IGM EACH: CPT

## 2022-07-21 PROCEDURE — 82784 ASSAY IGA/IGD/IGG/IGM EACH: CPT | Mod: 91

## 2022-07-21 PROCEDURE — 99214 OFFICE O/P EST MOD 30 MIN: CPT | Performed by: PHYSICIAN ASSISTANT

## 2022-07-21 NOTE — TELEPHONE ENCOUNTER
No, I don't determine dosages or prescribe prednisone for pancreatitis or for autoimmune hepatitis. The diagnosis and prescription needs to come from the [facility] gastroenterological department     Dipak Hooper MD

## 2022-07-21 NOTE — TELEPHONE ENCOUNTER
This is why we had arranged for patient to be seen by Dr. Preet Zimmerman, rheumatologist with [facility]. This will be the go to location if patient continues with severe ongoing joint pains     Dipak Hooper MD

## 2022-07-21 NOTE — PROGRESS NOTES
Mary is a 57 year old who is being evaluated via a billable telephone visit.      What phone number would you like to be contacted at? [phone]    How would you like to obtain your AVS? Neema   Phone call duration: 35 minutes    Hepatology Follow-up Clinic note  Mary Grigsby   Date of Birth 1965  Date of Service 7/21/2022    Reason for follow-up: AIH          Assessment/plan:   Mary Grigsby is a 57 year old female with history AIH/NAFLD who has historically been maintained on 75 mg Azathioprine with history on Stage 3-4 Fibrosis on liver biopsy. Had recent visit to the ER for polyarthralgia and findings of yeast infection. She has been having epigastric pain and bloating as well. Transaminases on 7/17 recently elevated to ,  and Alk Phos 120. No biochemical stigmata of advanced liver disease.    Unclear etiology for recent transaminase elevation and we discussed broad differential (systemic inflammation, pancreatitis, AIH flare/overlap, SHARMA). Overall, do not feel that joint pain related to AIH and should be worked up separately. HgA1c up in recent months. Weight is down 13 lbs in the last year. Will obtain updated hepatobiliary imaging      # Continue 75 mg Azathioprine for AIH  - Continue CBC every 3 months     - Labs today: Trend hepatic panel  - Check IgG, AMA, IgM, lipase  - US abdomen at next convenience for HCC Screening/abdominal pain. ADDENDUM after lipase returned elevated 953: Change imaging to CT abdomen with contrast    # History of NALFD:   - Optimize blood glucose levels  - Continue slow gradual weight loss     # Polyarthralgia   - Recommend further work-up with PCP  - Okay from a hepatology standpoint to have prednisone. This would help in any flare of autoimmune hepatitis, but no current   - Okay for NSAIDs in the short term   - Okay for Tylenol (<3000 mg)     # Constipation:   - Start MiraLax 1 capful day until regular stools    # Follow-up in clinic in 3-6  months based on above    Martínez Spencer PA-C   [facility] Hepatology clinic    -----------------------------------------------------       HPI:   Mary Grigsby is a 57 year old female :     Dx: AIH / NALFD  AIH diagnosed diagnosed in October 2018. She was hospitalized 10/3-10/5/18 at [facility] for elevated liver tests. Bili got up to 12 and ALT up to 700  Bx:  stage 3-4 fibrosis on bx in 2018  Treatment: Azathioprine 75 mg daily     Patient was last seen Dr. Gutierrez on 1/15/2021.     Went to the ER for swollen and joints.  ER for polyarthralgia and findings of yeast infection. She has been having epigastric pain and bloating as well. Transaminases recently elevated to ,  and Alk Phos 120.     Jardiance was stopped (started a few weeks ago). Started on 10 international unit(s) once daily. Last A1c was 8.1. Joint pains are improved, still uncomfortable by the end of the day.     Appetite is up and down. Feeling really bloated. Saturday to Wednesday. Had a yeast infection. Been taking Advil 400-600 every 4-6 hours, having breakthrough pain at 2 hours at times. Having some epigastric pain.      Patient denies jaundice, lower extremity edema, abdominal distension or confusion.  Patient also denies melena, hematochezia or hematemesis. Patient denies fevers, sweats or chills.    Weight is down 13 lbs since October on purpose. Reduction is calories.   Had amped up exercise.  She does not drink alcohol     Medical hx Surgical hx   Past Medical History:   Diagnosis Date     Abnormal Pap smear of vagina 2012, 2013     Autoimmune hepatitis (H) 10/2018     Cervical lymphadenopathy 7/29/2012     Cervical lymphadenopathy 7/29/2012     Cervical lymphadenopathy 7/29/2012     Cervical lymphadenopathy      Deviated septum      Family history of diabetes mellitus      Family history of stroke      House dust mite allergy      Hyperlipidemia LDL goal < 100      Hypertension goal BP (blood pressure) < 140/90       Obesity      Papanicolaou smear of cervix with low grade squamous intraepithelial lesion (LGSIL) 1/2011, 10/2011     Rhinitis, allergic to other allergen      Seasonal allergic rhinitis 12/16/08 skin tests     Smoking      Vaginal high risk HPV DNA test positive 2014, 2015, 2016, 2018, 2019    Past Surgical History:   Procedure Laterality Date     CRYOTHERAPY  3/30/2011    cervical     ESOPHAGOSCOPY, GASTROSCOPY, DUODENOSCOPY (EGD), COMBINED N/A 10/4/2018    Procedure: COMBINED ENDOSCOPIC ULTRASOUND, ESOPHAGOSCOPY, GASTROSCOPY, DUODENOSCOPY (EGD);  EUS;  Surgeon: John Carrera MD;  Location: [facility]     HC COLP CERVIX/UPPER VAGINA W BX CERVIX  1/2010, 3/2011    neg dysplasia, UGSTABO 1-2     HC REPAIR OF NASAL SEPTUM  2/3/09-per Dr. Harley    antrostomy, ethmoidectomy, septoplasty and turbinate reduction     LAPAROSCOPIC ASSISTED HYSTERECTOMY VAGINAL  3/12/12    [facility]     TONSILLECTOMY & ADENOIDECTOMY  1973    age 8                 Medications:     Current Outpatient Medications   Medication     albuterol (PROAIR HFA/PROVENTIL HFA/VENTOLIN HFA) 108 (90 Base) MCG/ACT inhaler     aspirin 81 MG tablet     azaTHIOprine (IMURAN) 50 MG tablet     blood glucose (NO BRAND SPECIFIED) lancets standard     blood glucose (NO BRAND SPECIFIED) test strip     blood glucose calibration (NO BRAND SPECIFIED) solution     blood glucose monitoring (NO BRAND SPECIFIED) meter device kit     cholecalciferol (VITAMIN D3) 1000 UNIT tablet     felodipine ER (PLENDIL) 5 MG 24 hr tablet     Fexofenadine HCl (ALLEGRA PO)     fluconazole (DIFLUCAN) 150 MG tablet     FREESTYLE LANCETS MISC     hydrochlorothiazide (HYDRODIURIL) 12.5 MG tablet     Hypertonic Nasal Wash (SINUS RINSE BOTTLE KIT NA)     insulin glargine (LANTUS PEN) 100 UNIT/ML pen     insulin pen needle (31G X 5 MM) 31G X 5 MM miscellaneous     losartan (COZAAR) 100 MG tablet     metFORMIN (GLUCOPHAGE XR) 500 MG 24 hr tablet     rosuvastatin (CRESTOR) 10 MG tablet      STATIN NOT PRESCRIBED (INTENTIONAL)     No current facility-administered medications for this visit.            Allergies:     Allergies   Allergen Reactions     Amoxicillin Hives     Augmentin      Erythromycin      Jardiance [Empagliflozin]      Caused flare of pancreatitis and autoimmune hepatitis.     Unknown [No Clinical Screening - See Comments]      Trulicity - pancreatitis.  Should not use any other GLP-1 agonist or DPP4 inhibitor            Review of Systems:   10 points ROS was obtained and highlighted in the HPI, otherwise negative.          Physical Exam:   PSYCH: Alert and oriented times 3; coherent speech, normal   rate and volume, able to articulate logical thoughts, able   to abstract reason, no tangential thoughts, no hallucinations   or delusions  His affect is normal  RESP: No cough, no audible wheezing, able to talk in full sentences  Remainder of exam unable to be completed due to telephone visits           Data:   Reviewed in person and significant for:    Lab Results   Component Value Date     06/23/2022     03/26/2021      Lab Results   Component Value Date    POTASSIUM 3.9 06/23/2022    POTASSIUM 4.1 03/26/2021     Lab Results   Component Value Date    CHLORIDE 103 06/23/2022    CHLORIDE 105 03/26/2021     Lab Results   Component Value Date    CO2 24 06/23/2022    CO2 29 03/26/2021     Lab Results   Component Value Date    BUN 12 06/23/2022    BUN 17 03/26/2021     Lab Results   Component Value Date    CR 0.48 06/23/2022    CR 0.81 03/26/2021       Lab Results   Component Value Date    WBC 8.4 06/23/2022    WBC 8.8 07/02/2021     Lab Results   Component Value Date    HGB 14.9 06/23/2022    HGB 14.8 07/02/2021     Lab Results   Component Value Date    HCT 43.5 06/23/2022    HCT 42.9 07/02/2021     Lab Results   Component Value Date    MCV 91 06/23/2022    MCV 89 07/02/2021     Lab Results   Component Value Date     06/23/2022     07/02/2021       Lab Results    Component Value Date    AST 80 06/23/2022    AST 38 07/02/2021     Lab Results   Component Value Date     06/23/2022    ALT 52 07/02/2021     No results found for: BILICONJ   Lab Results   Component Value Date    BILITOTAL 0.4 06/23/2022    BILITOTAL 0.4 07/02/2021       Lab Results   Component Value Date    ALBUMIN 3.4 06/23/2022    ALBUMIN 3.5 07/02/2021     Lab Results   Component Value Date    PROTTOTAL 7.2 06/23/2022    PROTTOTAL 7.2 07/02/2021      Lab Results   Component Value Date    ALKPHOS 113 06/23/2022    ALKPHOS 113 07/02/2021       Lab Results   Component Value Date    INR 1.03 07/22/2019       LIVER, NEEDLE BIOPSY 10/9/18:   - Active chronic hepatitis with features most suggestive of autoimmune hepatitis   - Can not rule out stage 3-4 fibrosis     ULTRASOUND ABDOMEN LIMITED  3/31/2021 7:18 AM     CLINICAL HISTORY: Autoimmune hepatitis (H). Fatty liver disease,  nonalcoholic. Encounter for long term current use of azathioprine.     TECHNIQUE: Limited abdominal ultrasound.     COMPARISON: 10/22/2020.     FINDINGS:  GALLBLADDER: The gallbladder is unremarkable. No gallstones, wall  thickening, or pericholecystic fluid. Negative sonographic Lainez's  sign.     BILE DUCTS: There is no biliary dilatation. The common duct measures 3  mm. Portions of the common duct could not be visualized due to  overlying bowel gas.     LIVER: There is diffuse fatty infiltration of the liver. No focal  hepatic masses are identified.     RIGHT KIDNEY: Unremarkable. No hydronephrosis.     PANCREAS: The visualized portions of the pancreas are normal.     No ascites.                                                                      IMPRESSION:  1.  Diffuse fatty infiltration of the liver.  2.  Otherwise unremarkable right upper quadrant ultrasound. No hepatic  masses are identified.

## 2022-07-21 NOTE — LETTER
7/21/2022         RE: Mary Grigsby  [address]  [facility] [address]        Dear Colleague,    Thank you for referring your patient, Mary Grigsby, to the [facility] [place]. Please see a copy of my visit note below.      Hepatology Follow-up Clinic note  Mary Grigsby   Date of Birth 1965  Date of Service 7/21/2022    Reason for follow-up: AIH          Assessment/plan:   Mary Grigsby is a 57 year old female with history AIH/NAFLD who has historically been maintained on 75 mg Azathioprine with history on Stage 3-4 Fibrosis on liver biopsy. Had recent visit to the ER for polyarthralgia and findings of yeast infection. She has been having epigastric pain and bloating as well. Transaminases on 7/17 recently elevated to ,  and Alk Phos 120. No biochemical stigmata of advanced liver disease.    Unclear etiology for recent transaminase elevation and we discussed broad differential (systemic inflammation, pancreatitis, AIH flare/overlap, SHARMA). Overall, do not feel that joint pain related to AIH and should be worked up separately. HgA1c up in recent months. Weight is down 13 lbs in the last year. Will obtain updated hepatobiliary imaging      # Continue 75 mg Azathioprine for AIH  - Continue CBC every 3 months     - Labs today: Trend hepatic panel  - Check IgG, AMA, IgM, lipase  - US abdomen at next convenience for HCC Screening/abdominal pain. ADDENDUM after lipase returned elevated 953: Change imaging to CT abdomen with contrast    # History of NALFD:   - Optimize blood glucose levels  - Continue slow gradual weight loss     # Polyarthralgia   - Recommend further work-up with PCP  - Okay from a hepatology standpoint to have prednisone. This would help in any flare of autoimmune hepatitis, but no current   - Okay for NSAIDs in the short term   - Okay for Tylenol (<3000 mg)     # Constipation:   - Start MiraLax 1 capful day until regular  stools    # Follow-up in clinic in 3-6 months based on above    Martínez Spencer PA-C   [facility] Hepatology clinic    -----------------------------------------------------       HPI:   Mary Grigsby is a 57 year old female :     Dx: AIH / NALFD  AIH diagnosed diagnosed in October 2018. She was hospitalized 10/3-10/5/18 at [facility] for elevated liver tests. Bili got up to 12 and ALT up to 700  Bx:  stage 3-4 fibrosis on bx in 2018  Treatment: Azathioprine 75 mg daily     Patient was last seen Dr. Gutierrez on 1/15/2021.     Went to the ER for swollen and joints.  ER for polyarthralgia and findings of yeast infection. She has been having epigastric pain and bloating as well. Transaminases recently elevated to ,  and Alk Phos 120.     Jardiance was stopped (started a few weeks ago). Started on 10 international unit(s) once daily. Last A1c was 8.1. Joint pains are improved, still uncomfortable by the end of the day.     Appetite is up and down. Feeling really bloated. Saturday to Wednesday. Had a yeast infection. Been taking Advil 400-600 every 4-6 hours, having breakthrough pain at 2 hours at times. Having some epigastric pain.      Patient denies jaundice, lower extremity edema, abdominal distension or confusion.  Patient also denies melena, hematochezia or hematemesis. Patient denies fevers, sweats or chills.    Weight is down 13 lbs since October on purpose. Reduction is calories.   Had amped up exercise.  She does not drink alcohol     Medical hx Surgical hx   Past Medical History:   Diagnosis Date     Abnormal Pap smear of vagina 2012, 2013     Autoimmune hepatitis (H) 10/2018     Cervical lymphadenopathy 7/29/2012     Cervical lymphadenopathy 7/29/2012     Cervical lymphadenopathy 7/29/2012     Cervical lymphadenopathy      Deviated septum      Family history of diabetes mellitus      Family history of stroke      House dust mite allergy      Hyperlipidemia LDL goal < 100      Hypertension  goal BP (blood pressure) < 140/90      Obesity      Papanicolaou smear of cervix with low grade squamous intraepithelial lesion (LGSIL) 1/2011, 10/2011     Rhinitis, allergic to other allergen      Seasonal allergic rhinitis 12/16/08 skin tests     Smoking      Vaginal high risk HPV DNA test positive 2014, 2015, 2016, 2018, 2019    Past Surgical History:   Procedure Laterality Date     CRYOTHERAPY  3/30/2011    cervical     ESOPHAGOSCOPY, GASTROSCOPY, DUODENOSCOPY (EGD), COMBINED N/A 10/4/2018    Procedure: COMBINED ENDOSCOPIC ULTRASOUND, ESOPHAGOSCOPY, GASTROSCOPY, DUODENOSCOPY (EGD);  EUS;  Surgeon: John Carrera MD;  Location: [facility]     HC COLP CERVIX/UPPER VAGINA W BX CERVIX  1/2010, 3/2011    neg dysplasia, GUSTABO 1-2     HC REPAIR OF NASAL SEPTUM  2/3/09-per Dr. Harley    antrostomy, ethmoidectomy, septoplasty and turbinate reduction     LAPAROSCOPIC ASSISTED HYSTERECTOMY VAGINAL  3/12/12    [facility]     TONSILLECTOMY & ADENOIDECTOMY  1973    age 8                 Medications:     Current Outpatient Medications   Medication     albuterol (PROAIR HFA/PROVENTIL HFA/VENTOLIN HFA) 108 (90 Base) MCG/ACT inhaler     aspirin 81 MG tablet     azaTHIOprine (IMURAN) 50 MG tablet     blood glucose (NO BRAND SPECIFIED) lancets standard     blood glucose (NO BRAND SPECIFIED) test strip     blood glucose calibration (NO BRAND SPECIFIED) solution     blood glucose monitoring (NO BRAND SPECIFIED) meter device kit     cholecalciferol (VITAMIN D3) 1000 UNIT tablet     felodipine ER (PLENDIL) 5 MG 24 hr tablet     Fexofenadine HCl (ALLEGRA PO)     fluconazole (DIFLUCAN) 150 MG tablet     FREESTYLE LANCETS MISC     hydrochlorothiazide (HYDRODIURIL) 12.5 MG tablet     Hypertonic Nasal Wash (SINUS RINSE BOTTLE KIT NA)     insulin glargine (LANTUS PEN) 100 UNIT/ML pen     insulin pen needle (31G X 5 MM) 31G X 5 MM miscellaneous     losartan (COZAAR) 100 MG tablet     metFORMIN (GLUCOPHAGE XR) 500 MG 24 hr tablet      rosuvastatin (CRESTOR) 10 MG tablet     STATIN NOT PRESCRIBED (INTENTIONAL)     No current facility-administered medications for this visit.            Allergies:     Allergies   Allergen Reactions     Amoxicillin Hives     Augmentin      Erythromycin      Jardiance [Empagliflozin]      Caused flare of pancreatitis and autoimmune hepatitis.     Unknown [No Clinical Screening - See Comments]      Trulicity - pancreatitis.  Should not use any other GLP-1 agonist or DPP4 inhibitor            Review of Systems:   10 points ROS was obtained and highlighted in the HPI, otherwise negative.          Physical Exam:   PSYCH: Alert and oriented times 3; coherent speech, normal   rate and volume, able to articulate logical thoughts, able   to abstract reason, no tangential thoughts, no hallucinations   or delusions  His affect is normal  RESP: No cough, no audible wheezing, able to talk in full sentences  Remainder of exam unable to be completed due to telephone visits           Data:   Reviewed in person and significant for:    Lab Results   Component Value Date     06/23/2022     03/26/2021      Lab Results   Component Value Date    POTASSIUM 3.9 06/23/2022    POTASSIUM 4.1 03/26/2021     Lab Results   Component Value Date    CHLORIDE 103 06/23/2022    CHLORIDE 105 03/26/2021     Lab Results   Component Value Date    CO2 24 06/23/2022    CO2 29 03/26/2021     Lab Results   Component Value Date    BUN 12 06/23/2022    BUN 17 03/26/2021     Lab Results   Component Value Date    CR 0.48 06/23/2022    CR 0.81 03/26/2021       Lab Results   Component Value Date    WBC 8.4 06/23/2022    WBC 8.8 07/02/2021     Lab Results   Component Value Date    HGB 14.9 06/23/2022    HGB 14.8 07/02/2021     Lab Results   Component Value Date    HCT 43.5 06/23/2022    HCT 42.9 07/02/2021     Lab Results   Component Value Date    MCV 91 06/23/2022    MCV 89 07/02/2021     Lab Results   Component Value Date     06/23/2022    PLT  215 07/02/2021       Lab Results   Component Value Date    AST 80 06/23/2022    AST 38 07/02/2021     Lab Results   Component Value Date     06/23/2022    ALT 52 07/02/2021     No results found for: BILICONJ   Lab Results   Component Value Date    BILITOTAL 0.4 06/23/2022    BILITOTAL 0.4 07/02/2021       Lab Results   Component Value Date    ALBUMIN 3.4 06/23/2022    ALBUMIN 3.5 07/02/2021     Lab Results   Component Value Date    PROTTOTAL 7.2 06/23/2022    PROTTOTAL 7.2 07/02/2021      Lab Results   Component Value Date    ALKPHOS 113 06/23/2022    ALKPHOS 113 07/02/2021       Lab Results   Component Value Date    INR 1.03 07/22/2019       LIVER, NEEDLE BIOPSY 10/9/18:   - Active chronic hepatitis with features most suggestive of autoimmune hepatitis   - Can not rule out stage 3-4 fibrosis     ULTRASOUND ABDOMEN LIMITED  3/31/2021 7:18 AM     CLINICAL HISTORY: Autoimmune hepatitis (H). Fatty liver disease,  nonalcoholic. Encounter for long term current use of azathioprine.     TECHNIQUE: Limited abdominal ultrasound.     COMPARISON: 10/22/2020.     FINDINGS:  GALLBLADDER: The gallbladder is unremarkable. No gallstones, wall  thickening, or pericholecystic fluid. Negative sonographic Lainez's  sign.     BILE DUCTS: There is no biliary dilatation. The common duct measures 3  mm. Portions of the common duct could not be visualized due to  overlying bowel gas.     LIVER: There is diffuse fatty infiltration of the liver. No focal  hepatic masses are identified.     RIGHT KIDNEY: Unremarkable. No hydronephrosis.     PANCREAS: The visualized portions of the pancreas are normal.     No ascites.                                                                      IMPRESSION:  1.  Diffuse fatty infiltration of the liver.  2.  Otherwise unremarkable right upper quadrant ultrasound. No hepatic  masses are identified.        Again, thank you for allowing me to participate in the care of your patient.         Sincerely,        Martínez Spencer PA-C

## 2022-07-21 NOTE — TELEPHONE ENCOUNTER
"Patient declined 7/21/2022 rheumatology evaluation.     Copied from Itzel Taveras's 7/20/2022 note:   \"Called and spoke with patient to see if she would like to be seen with Dr. Zimmerman on Thursday 7/20/2022 at 11:20 as we had a cancellation and Dr. Zimmerman received a message from Dr. Hooper about seeing patient. Patient declined stating she is thinking the inflammation may be from her medication and she would like to see if it goes away on its own.     Itzel Taveras [facility] Rheumatology  7/20/2022\"    Preet Zimmerman MD  7/21/2022    "

## 2022-07-22 LAB
IGG SERPL-MCNC: 1496 MG/DL (ref 610–1616)
IGM SERPL-MCNC: 122 MG/DL (ref 35–242)
MITOCHONDRIA M2 IGG SER-ACNC: 1.7 U/ML

## 2022-07-25 ENCOUNTER — ANCILLARY PROCEDURE (OUTPATIENT)
Dept: CT IMAGING | Facility: CLINIC | Age: 57
End: 2022-07-25
Attending: PHYSICIAN ASSISTANT
Payer: COMMERCIAL

## 2022-07-25 DIAGNOSIS — R10.13 EPIGASTRIC PAIN: Primary | ICD-10-CM

## 2022-07-25 DIAGNOSIS — K75.4 AUTOIMMUNE HEPATITIS (H): ICD-10-CM

## 2022-07-25 PROCEDURE — 74177 CT ABD & PELVIS W/CONTRAST: CPT | Mod: TC | Performed by: RADIOLOGY

## 2022-07-25 RX ORDER — IOPAMIDOL 755 MG/ML
122 INJECTION, SOLUTION INTRAVASCULAR ONCE
Status: COMPLETED | OUTPATIENT
Start: 2022-07-25 | End: 2022-07-25

## 2022-07-25 RX ADMIN — IOPAMIDOL 122 ML: 755 INJECTION, SOLUTION INTRAVASCULAR at 09:19

## 2022-07-26 LAB
CREAT BLD-MCNC: 0.5 MG/DL (ref 0.5–1)
GFR SERPL CREATININE-BSD FRML MDRD: >60 ML/MIN/1.73M2

## 2022-07-26 PROCEDURE — 82565 ASSAY OF CREATININE: CPT

## 2022-07-29 ENCOUNTER — MYC MEDICAL ADVICE (OUTPATIENT)
Dept: INTERNAL MEDICINE | Facility: CLINIC | Age: 57
End: 2022-07-29

## 2022-07-29 DIAGNOSIS — E11.9 CONTROLLED TYPE 2 DIABETES MELLITUS WITHOUT COMPLICATION, WITHOUT LONG-TERM CURRENT USE OF INSULIN (H): ICD-10-CM

## 2022-08-17 ENCOUNTER — MYC MEDICAL ADVICE (OUTPATIENT)
Dept: GASTROENTEROLOGY | Facility: CLINIC | Age: 57
End: 2022-08-17

## 2022-08-17 DIAGNOSIS — K75.4 AUTOIMMUNE HEPATITIS (H): Primary | ICD-10-CM

## 2022-08-24 ENCOUNTER — LAB (OUTPATIENT)
Dept: LAB | Facility: CLINIC | Age: 57
End: 2022-08-24
Payer: COMMERCIAL

## 2022-08-24 DIAGNOSIS — K75.4 AUTOIMMUNE HEPATITIS (H): ICD-10-CM

## 2022-08-24 LAB
ALBUMIN SERPL-MCNC: 3.6 G/DL (ref 3.4–5)
ALP SERPL-CCNC: 145 U/L (ref 40–150)
ALT SERPL W P-5'-P-CCNC: 286 U/L (ref 0–50)
AST SERPL W P-5'-P-CCNC: 188 U/L (ref 0–45)
BILIRUB DIRECT SERPL-MCNC: 0.2 MG/DL (ref 0–0.2)
BILIRUB SERPL-MCNC: 0.8 MG/DL (ref 0.2–1.3)
LIPASE SERPL-CCNC: 752 U/L (ref 73–393)
PROT SERPL-MCNC: 7.4 G/DL (ref 6.8–8.8)

## 2022-08-24 PROCEDURE — 83690 ASSAY OF LIPASE: CPT

## 2022-08-24 PROCEDURE — 36415 COLL VENOUS BLD VENIPUNCTURE: CPT

## 2022-08-24 PROCEDURE — 80076 HEPATIC FUNCTION PANEL: CPT

## 2022-08-25 ENCOUNTER — TELEPHONE (OUTPATIENT)
Dept: GASTROENTEROLOGY | Facility: CLINIC | Age: 57
End: 2022-08-25

## 2022-08-25 NOTE — TELEPHONE ENCOUNTER
Called patient to let her know Rodgerte would like to repeat liver biopsy to better assess why her liver inflammation labs have increased.     Patient would like to hold off on liver biopsy for now and get a 2nd opinion as she feels she has other issues going on that were not looked at thoroughly enough so would like another provider to review her case before deciding on such an invasive procedure.     Yareli YOUNG LPN  Hepatology Clinic

## 2022-08-29 ENCOUNTER — MYC MEDICAL ADVICE (OUTPATIENT)
Dept: INTERNAL MEDICINE | Facility: CLINIC | Age: 57
End: 2022-08-29

## 2022-09-07 ENCOUNTER — VIRTUAL VISIT (OUTPATIENT)
Dept: GASTROENTEROLOGY | Facility: CLINIC | Age: 57
End: 2022-09-07
Attending: PHYSICIAN ASSISTANT
Payer: COMMERCIAL

## 2022-09-07 DIAGNOSIS — K76.0 FATTY LIVER DISEASE, NONALCOHOLIC: ICD-10-CM

## 2022-09-07 DIAGNOSIS — E11.9 CONTROLLED TYPE 2 DIABETES MELLITUS WITHOUT COMPLICATION, WITHOUT LONG-TERM CURRENT USE OF INSULIN (H): ICD-10-CM

## 2022-09-07 DIAGNOSIS — K75.4 AUTOIMMUNE HEPATITIS (H): Primary | ICD-10-CM

## 2022-09-07 PROCEDURE — 99213 OFFICE O/P EST LOW 20 MIN: CPT | Mod: 95 | Performed by: PHYSICIAN ASSISTANT

## 2022-09-07 ASSESSMENT — PAIN SCALES - GENERAL: PAINLEVEL: MILD PAIN (3)

## 2022-09-07 NOTE — PROGRESS NOTES
Mary is a 57 year old who is being evaluated via a billable telephone visit.      What phone number would you like to be contacted at? [phone]  How would you like to obtain your AVS? Neema  Phone call duration: 26 minutes    Hepatology Follow-up Clinic note  Mary Grigsby   Date of Birth 1965  Date of Service 9/7/2022    Reason for follow-up: AIH         Assessment/plan:   Mary Grigsby is a 57 year old female with history of autoimmune hepatitis maintained on 75 mg azathioprine as well as NALFD. Had labs in July 2022 that trended up for unclear etiology, around the time of an ER visit for drug reaction to Jardiance and polyarthragia She has continued to have intermittent joint pains. Most recent transaminases on 8/24/22 trending up toe  and . Due to improved blood glucose control and on-going well loss, do not suspect these elevations are due to fatty liver but rather concerning for flare of autoimmune hepatitis. Liver function is otherwise normal.  Patient has some concern transaminases being elevated due to Crestor, since stopping joint pain/mylagias have also improved, We discussed moving forward with repeat labs one more time prior to moving forward with liver biopsy. She is up to date with HCC screening.     - Repeat hepatic panel in two weeks.  - If transaminases remain stable or trending up, recommend moving forward with liver biopsy.   - Continue 75 mg Azathioprine at this time  - Continue optimization of blood glucose levels/cholesterol, slow gradual weight loss   - HCC screening: due in Jan 2023  - Follow up in clinic in six months or sooner     Martínez Spencer PA-C   [facility]    -----------------------------------------------------       HPI:   Mary Grigsby is a 57 year old female presenting for follow-up.     Dx: AIH / NALFD  AIH diagnosed in October 2018. She was hospitalized 10/3-10/5/18 at [facility] for elevated liver tests. 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 No recent hospitalizations or ER visits. Started on long-acting insulin for blood glucose and blood glucose levels have continued to imporated.     Appetite is normal. Lost 22 lbs in a year, now down to 225 lbs.     Has continued to have very bothersome polyarthralgia paired with swelling at joints.   Have been doing advil and tylenol rotating. Was previously taking 1500 mg tylenol -  3 times a day and 600 mg advil   - 3 times a day. Morning stiff, not painful  By the end of the day, pain is much more. NEck shoulders and radiating down to hands. Feels like a pinching nerve. When move a certain, it tends to go away.     Crestor - restarted April or May of 2021. Stopped Crestor  - stiff and pain in joints/muscles has seemed to improved. Now just taking the ibuprofen in the evening.  Normal walk 30 minutes several times a week. Due to feet, knee and sometimes back pain, now  Walking 10 minutes a few times a week.      Appetite is okay. Not able to eat much at one time.  Mild swelling in legs at the end of day. Having regular bowel movements.Patient denies jaundice, lower extremity edema, abdominal distension or confusion.      Patient also denies melena, hematochezia or hematemesis.Patient denies weight loss, fevers, sweats or chills.    She does not drink alcohol          Medications:     Current Outpatient Medications   Medication     albuterol (PROAIR HFA/PROVENTIL HFA/VENTOLIN HFA) 108 (90 Base) MCG/ACT inhaler     aspirin 81 MG tablet     azaTHIOprine (IMURAN) 50 MG tablet     blood glucose (NO BRAND SPECIFIED) lancets standard     blood glucose (NO BRAND SPECIFIED) test strip     blood glucose calibration (NO BRAND SPECIFIED) solution     blood glucose monitoring (NO BRAND SPECIFIED) meter device kit      cholecalciferol (VITAMIN D3) 1000 UNIT tablet     felodipine ER (PLENDIL) 5 MG 24 hr tablet     Fexofenadine HCl (ALLEGRA PO)     hydrochlorothiazide (HYDRODIURIL) 12.5 MG tablet     Hypertonic Nasal Wash (SINUS RINSE BOTTLE KIT NA)     insulin glargine (LANTUS PEN) 100 UNIT/ML pen     insulin pen needle (31G X 5 MM) 31G X 5 MM miscellaneous     losartan (COZAAR) 100 MG tablet     metFORMIN (GLUCOPHAGE XR) 500 MG 24 hr tablet     rosuvastatin (CRESTOR) 10 MG tablet     STATIN NOT PRESCRIBED (INTENTIONAL)     No current facility-administered medications for this visit.            Review of Systems:   10 points ROS was obtained and highlighted in the HPI, otherwise negative.          Physical Exam:   PSYCH: Alert and oriented times 3; coherent speech, normal   rate and volume, able to articulate logical thoughts, able   to abstract reason, no tangential thoughts, no hallucinations   or delusions  His affect is normal  RESP: No cough, no audible wheezing, able to talk in full sentences  Remainder of exam unable to be completed due to telephone visits         Data:   Reviewed in person and significant for:    Lab Results   Component Value Date     06/23/2022     03/26/2021      Lab Results   Component Value Date    POTASSIUM 3.9 06/23/2022    POTASSIUM 4.1 03/26/2021     Lab Results   Component Value Date    CHLORIDE 103 06/23/2022    CHLORIDE 105 03/26/2021     Lab Results   Component Value Date    CO2 24 06/23/2022    CO2 29 03/26/2021     Lab Results   Component Value Date    BUN 12 06/23/2022    BUN 17 03/26/2021     Lab Results   Component Value Date    CR 0.5 07/26/2022    CR 0.48 06/23/2022    CR 0.81 03/26/2021       Lab Results   Component Value Date    WBC 8.4 06/23/2022    WBC 8.8 07/02/2021     Lab Results   Component Value Date    HGB 14.9 06/23/2022    HGB 14.8 07/02/2021     Lab Results   Component Value Date    HCT 43.5 06/23/2022    HCT 42.9 07/02/2021     Lab Results   Component Value Date     MCV 91 06/23/2022    MCV 89 07/02/2021     Lab Results   Component Value Date     06/23/2022     07/02/2021       Lab Results   Component Value Date     08/24/2022    AST 38 07/02/2021     Lab Results   Component Value Date     08/24/2022    ALT 52 07/02/2021     No results found for: BILICONJ   Lab Results   Component Value Date    BILITOTAL 0.8 08/24/2022    BILITOTAL 0.4 07/02/2021       Lab Results   Component Value Date    ALBUMIN 3.6 08/24/2022    ALBUMIN 3.5 07/02/2021     Lab Results   Component Value Date    PROTTOTAL 7.4 08/24/2022    PROTTOTAL 7.2 07/02/2021      Lab Results   Component Value Date    ALKPHOS 145 08/24/2022    ALKPHOS 113 07/02/2021       Lab Results   Component Value Date    INR 1.03 07/22/2019     CT ABDOMEN PELVIS W CONTRAST 7/25/2022 9:33 AM     CLINICAL HISTORY: epigastric pain, elevated ALT/AST with history of  autoimmune hepatitis, lipase 900; Autoimmune hepatitis (H); Epigastric  pain     TECHNIQUE: CT scan of the abdomen and pelvis was performed following  injection of IV contrast. Multiplanar reformats were obtained. Dose  reduction techniques were used.  CONTRAST: 122 mL Isovue-370     COMPARISON: Ultrasound on 3/21/2021 and CT on 10/3/2018     FINDINGS:   LOWER CHEST: Normal.     HEPATOBILIARY: Mild hepatic steatosis. No calcified gallstones or  biliary ductal dilatation. No focal hepatic masses.     PANCREAS: Mild fat infiltration of the pancreatic parenchyma. No  pancreatic duct dilatation, pancreatic masses or peripancreatic  inflammatory changes. No peripancreatic fluid collections.     SPLEEN: Normal.     ADRENAL GLANDS: Normal.     KIDNEYS/BLADDER: Normal.     BOWEL: No obstruction or inflammatory change.     PELVIC ORGANS: Hysterectomy. No pelvic or adnexal masses.     ADDITIONAL FINDINGS: No free fluid or fluid collections. No abdominal  aortic aneurysm. Aortobiiliac atherosclerotic calcifications. No  lymphadenopathy in the abdomen and  pelvis.     MUSCULOSKELETAL: Mild degenerative changes in the spine. No suspicious  lesions within the bones.                                                                      IMPRESSION:   1.  No CT evidence for acute pancreatitis. No peripancreatic fluid  collections.  2.  Hepatic steatosis. No calcified gallstones or biliary ductal  dilatation.

## 2022-09-07 NOTE — LETTER
9/7/2022         RE: Mary Grigsby  [address]  [place] [address]        Dear Colleague,    Thank you for referring your patient, Mary Grigsby, to the [facility] [place]. Please see a copy of my visit note below.    Mary is a 57 year old who is being evaluated via a billable telephone visit.      What phone number would you like to be contacted at? [phone]  How would you like to obtain your AVS? [email or web address]  Phone call duration: 26 minutes    Hepatology Follow-up Clinic note  Mary Grigsby   Date of Birth 1965  Date of Service 9/7/2022    Reason for follow-up: [facility]         Assessment/plan:   Mary Grigsby is a 57 year old female with history of autoimmune hepatitis maintained on 75 mg azathioprine as well as NALFD. Had labs in July 2022 that trended up for unclear etiology, around the time of an ER visit for drug reaction to Jardiance and polyarthragia She has continued to have intermittent joint pains. Most recent transaminases on 8/24/22 trending up toe  and . Due to improved blood glucose control and on-going well loss, do not suspect these elevations are due to fatty liver but rather concerning for flare of autoimmune hepatitis. Liver function is otherwise normal.  Patient has some concern transaminases being elevated due to Crestor, since stopping joint pain/mylagias have also improved, We discussed moving forward with repeat labs one more time prior to moving forward with liver biopsy. She is up to date with HCC screening.     - Repeat hepatic panel in two weeks.  - If transaminases remain stable or trending up, recommend moving forward with liver biopsy.   - Continue 75 mg Azathioprine at this time  - Continue optimization of blood glucose levels/cholesterol, slow gradual weight loss   - HCC screening: due in Jan 2023  - Follow up in clinic in six months or sooner     Martínez Spencer PA-C   [facility]  cl[facility]    -----------------------------------------------------       HPI:   Mary Grigsby is a 57 year old female presenting for follow-up.     Dx: AIH / NALFD  AIH diagnosed in 2018. She was hospitalized 10/3-10/5/18 at [facility] for elevated liver tests. 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 No recent hospitalizations or ER visits. Started on long-acting insulin for blood glucose and blood glucose levels have continued to imporated.     Appetite is normal. Lost 22 lbs in a year, now down to 225 lbs.     Has continued to have very bothersome polyarthralgia paired with swelling at joints.   Have been doing advil and tylenol rotating. Was previously taking 1500 mg tylenol -  3 times a day and 600 mg advil   - 3 times a day. Morning stiff, not painful  By the end of the day, pain is much more. NEck shoulders and radiating down to hands. Feels like a pinching nerve. When move a certain, it tends to go away.     Crestor - restarted April or May of 2021. Stopped Crestor  - stiff and pain in joints/muscles has seemed to improved. Now just taking the ibuprofen in the evening.  Normal walk 30 minutes several times a week. Due to feet, knee and sometimes back pain, now  Walking 10 minutes a few times a week.      Appetite is okay. Not able to eat much at one time.  Mild swelling in legs at the end of day. Having regular bowel movements.Patient denies jaundice, lower extremity edema, abdominal distension or confusion.      Patient also denies melena, hematochezia or hematemesis.Patient denies weight loss, fevers, sweats or chills.    She does not drink alcohol          Medications:     Current Outpatient Medications   Medication     albuterol (PROAIR HFA/PROVENTIL HFA/VENTOLIN HFA) 108 (90 Base) MCG/ACT inhaler     aspirin 81 MG tablet      azaTHIOprine (IMURAN) 50 MG tablet     blood glucose (NO BRAND SPECIFIED) lancets standard     blood glucose (NO BRAND SPECIFIED) test strip     blood glucose calibration (NO BRAND SPECIFIED) solution     blood glucose monitoring (NO BRAND SPECIFIED) meter device kit     cholecalciferol (VITAMIN D3) 1000 UNIT tablet     felodipine ER (PLENDIL) 5 MG 24 hr tablet     Fexofenadine HCl (ALLEGRA PO)     hydrochlorothiazide (HYDRODIURIL) 12.5 MG tablet     Hypertonic Nasal Wash (SINUS RINSE BOTTLE KIT NA)     insulin glargine (LANTUS PEN) 100 UNIT/ML pen     insulin pen needle (31G X 5 MM) 31G X 5 MM miscellaneous     losartan (COZAAR) 100 MG tablet     metFORMIN (GLUCOPHAGE XR) 500 MG 24 hr tablet     rosuvastatin (CRESTOR) 10 MG tablet     STATIN NOT PRESCRIBED (INTENTIONAL)     No current facility-administered medications for this visit.            Review of Systems:   10 points ROS was obtained and highlighted in the HPI, otherwise negative.          Physical Exam:   PSYCH: Alert and oriented times 3; coherent speech, normal   rate and volume, able to articulate logical thoughts, able   to abstract reason, no tangential thoughts, no hallucinations   or delusions  His affect is normal  RESP: No cough, no audible wheezing, able to talk in full sentences  Remainder of exam unable to be completed due to telephone visits         Data:   Reviewed in person and significant for:    Lab Results   Component Value Date     06/23/2022     03/26/2021      Lab Results   Component Value Date    POTASSIUM 3.9 06/23/2022    POTASSIUM 4.1 03/26/2021     Lab Results   Component Value Date    CHLORIDE 103 06/23/2022    CHLORIDE 105 03/26/2021     Lab Results   Component Value Date    CO2 24 06/23/2022    CO2 29 03/26/2021     Lab Results   Component Value Date    BUN 12 06/23/2022    BUN 17 03/26/2021     Lab Results   Component Value Date    CR 0.5 07/26/2022    CR 0.48 06/23/2022    CR 0.81 03/26/2021       Lab Results    Component Value Date    WBC 8.4 06/23/2022    WBC 8.8 07/02/2021     Lab Results   Component Value Date    HGB 14.9 06/23/2022    HGB 14.8 07/02/2021     Lab Results   Component Value Date    HCT 43.5 06/23/2022    HCT 42.9 07/02/2021     Lab Results   Component Value Date    MCV 91 06/23/2022    MCV 89 07/02/2021     Lab Results   Component Value Date     06/23/2022     07/02/2021       Lab Results   Component Value Date     08/24/2022    AST 38 07/02/2021     Lab Results   Component Value Date     08/24/2022    ALT 52 07/02/2021     No results found for: BILICONJ   Lab Results   Component Value Date    BILITOTAL 0.8 08/24/2022    BILITOTAL 0.4 07/02/2021       Lab Results   Component Value Date    ALBUMIN 3.6 08/24/2022    ALBUMIN 3.5 07/02/2021     Lab Results   Component Value Date    PROTTOTAL 7.4 08/24/2022    PROTTOTAL 7.2 07/02/2021      Lab Results   Component Value Date    ALKPHOS 145 08/24/2022    ALKPHOS 113 07/02/2021       Lab Results   Component Value Date    INR 1.03 07/22/2019     CT ABDOMEN PELVIS W CONTRAST 7/25/2022 9:33 AM     CLINICAL HISTORY: epigastric pain, elevated ALT/AST with history of  autoimmune hepatitis, lipase 900; Autoimmune hepatitis (H); Epigastric  pain     TECHNIQUE: CT scan of the abdomen and pelvis was performed following  injection of IV contrast. Multiplanar reformats were obtained. Dose  reduction techniques were used.  CONTRAST: 122 mL Isovue-370     COMPARISON: Ultrasound on 3/21/2021 and CT on 10/3/2018     FINDINGS:   LOWER CHEST: Normal.     HEPATOBILIARY: Mild hepatic steatosis. No calcified gallstones or  biliary ductal dilatation. No focal hepatic masses.     PANCREAS: Mild fat infiltration of the pancreatic parenchyma. No  pancreatic duct dilatation, pancreatic masses or peripancreatic  inflammatory changes. No peripancreatic fluid collections.     SPLEEN: Normal.     ADRENAL GLANDS: Normal.     KIDNEYS/BLADDER: Normal.     BOWEL: No  obstruction or inflammatory change.     PELVIC ORGANS: Hysterectomy. No pelvic or adnexal masses.     ADDITIONAL FINDINGS: No free fluid or fluid collections. No abdominal  aortic aneurysm. Aortobiiliac atherosclerotic calcifications. No  lymphadenopathy in the abdomen and pelvis.     MUSCULOSKELETAL: Mild degenerative changes in the spine. No suspicious  lesions within the bones.                                                                      IMPRESSION:   1.  No CT evidence for acute pancreatitis. No peripancreatic fluid  collections.  2.  Hepatic steatosis. No calcified gallstones or biliary ductal  dilatation.         Again, thank you for allowing me to participate in the care of your patient.        Sincerely,        Martínez Spencer PA-C

## 2022-09-23 ENCOUNTER — LAB (OUTPATIENT)
Dept: LAB | Facility: CLINIC | Age: 57
End: 2022-09-23
Payer: COMMERCIAL

## 2022-09-23 DIAGNOSIS — K76.0 FATTY LIVER DISEASE, NONALCOHOLIC: ICD-10-CM

## 2022-09-23 DIAGNOSIS — E11.9 CONTROLLED TYPE 2 DIABETES MELLITUS WITHOUT COMPLICATION, WITHOUT LONG-TERM CURRENT USE OF INSULIN (H): Primary | ICD-10-CM

## 2022-09-23 DIAGNOSIS — K75.4 AUTOIMMUNE HEPATITIS (H): ICD-10-CM

## 2022-09-23 LAB
ALBUMIN SERPL-MCNC: 3.7 G/DL (ref 3.4–5)
ALP SERPL-CCNC: 137 U/L (ref 40–150)
ALT SERPL W P-5'-P-CCNC: 84 U/L (ref 0–50)
AST SERPL W P-5'-P-CCNC: 55 U/L (ref 0–45)
BILIRUB DIRECT SERPL-MCNC: 0.1 MG/DL (ref 0–0.2)
BILIRUB SERPL-MCNC: 0.5 MG/DL (ref 0.2–1.3)
CREAT UR-MCNC: 114 MG/DL
ERYTHROCYTE [DISTWIDTH] IN BLOOD BY AUTOMATED COUNT: 14.8 % (ref 10–15)
HBA1C MFR BLD: 7.3 % (ref 0–5.6)
HCT VFR BLD AUTO: 42.7 % (ref 35–47)
HGB BLD-MCNC: 14.8 G/DL (ref 11.7–15.7)
MCH RBC QN AUTO: 31.8 PG (ref 26.5–33)
MCHC RBC AUTO-ENTMCNC: 34.7 G/DL (ref 31.5–36.5)
MCV RBC AUTO: 92 FL (ref 78–100)
MICROALBUMIN UR-MCNC: 32 MG/L
MICROALBUMIN/CREAT UR: 28.07 MG/G CR (ref 0–25)
PLATELET # BLD AUTO: 242 10E3/UL (ref 150–450)
PROT SERPL-MCNC: 7.7 G/DL (ref 6.8–8.8)
RBC # BLD AUTO: 4.65 10E6/UL (ref 3.8–5.2)
WBC # BLD AUTO: 8.2 10E3/UL (ref 4–11)

## 2022-09-23 PROCEDURE — 82043 UR ALBUMIN QUANTITATIVE: CPT

## 2022-09-23 PROCEDURE — 80076 HEPATIC FUNCTION PANEL: CPT

## 2022-09-23 PROCEDURE — 83036 HEMOGLOBIN GLYCOSYLATED A1C: CPT

## 2022-09-23 PROCEDURE — 85027 COMPLETE CBC AUTOMATED: CPT

## 2022-09-23 PROCEDURE — 36415 COLL VENOUS BLD VENIPUNCTURE: CPT

## 2022-11-15 DIAGNOSIS — K75.4 AUTOIMMUNE HEPATITIS (H): ICD-10-CM

## 2022-11-15 DIAGNOSIS — R74.8 ELEVATED LIVER ENZYMES: ICD-10-CM

## 2022-11-15 RX ORDER — AZATHIOPRINE 50 MG/1
TABLET ORAL
Qty: 135 TABLET | Refills: 3 | OUTPATIENT
Start: 2022-11-15

## 2022-11-15 RX ORDER — AZATHIOPRINE 50 MG/1
TABLET ORAL
Qty: 135 TABLET | Refills: 3 | Status: SHIPPED | OUTPATIENT
Start: 2022-11-15 | End: 2023-10-30

## 2022-11-21 DIAGNOSIS — I10 ESSENTIAL HYPERTENSION WITH GOAL BLOOD PRESSURE LESS THAN 140/90: ICD-10-CM

## 2022-11-21 RX ORDER — LOSARTAN POTASSIUM 100 MG/1
TABLET ORAL
Qty: 90 TABLET | Refills: 0 | Status: SHIPPED | OUTPATIENT
Start: 2022-11-21 | End: 2023-02-14

## 2023-01-20 DIAGNOSIS — E11.9 CONTROLLED TYPE 2 DIABETES MELLITUS WITHOUT COMPLICATION, WITHOUT LONG-TERM CURRENT USE OF INSULIN (H): Primary | ICD-10-CM

## 2023-01-20 RX ORDER — PEN NEEDLE, DIABETIC 32GX 5/32"
NEEDLE, DISPOSABLE MISCELLANEOUS
Qty: 50 EACH | Refills: 0 | Status: SHIPPED | OUTPATIENT
Start: 2023-01-20 | End: 2023-04-27

## 2023-02-04 DIAGNOSIS — E11.9 CONTROLLED TYPE 2 DIABETES MELLITUS WITHOUT COMPLICATION, WITHOUT LONG-TERM CURRENT USE OF INSULIN (H): ICD-10-CM

## 2023-02-07 ENCOUNTER — TELEPHONE (OUTPATIENT)
Dept: FAMILY MEDICINE | Facility: CLINIC | Age: 58
End: 2023-02-07
Payer: COMMERCIAL

## 2023-02-07 DIAGNOSIS — E11.9 CONTROLLED TYPE 2 DIABETES MELLITUS WITHOUT COMPLICATION, WITHOUT LONG-TERM CURRENT USE OF INSULIN (H): ICD-10-CM

## 2023-02-07 RX ORDER — INSULIN GLARGINE 100 [IU]/ML
13 INJECTION, SOLUTION SUBCUTANEOUS AT BEDTIME
Qty: 5 ML | Refills: 0 | Status: SHIPPED | OUTPATIENT
Start: 2023-02-07 | End: 2023-02-07

## 2023-02-07 NOTE — TELEPHONE ENCOUNTER
We can't break the box of basaglar so the rx has to be for the quantity of 15ml. Please send a new rx or deny it. Thank you

## 2023-02-08 RX ORDER — INSULIN GLARGINE 100 [IU]/ML
13 INJECTION, SOLUTION SUBCUTANEOUS AT BEDTIME
Qty: 15 ML | Refills: 0 | Status: SHIPPED | OUTPATIENT
Start: 2023-02-08 | End: 2023-05-11

## 2023-02-09 DIAGNOSIS — E11.9 CONTROLLED TYPE 2 DIABETES MELLITUS WITHOUT COMPLICATION, WITHOUT LONG-TERM CURRENT USE OF INSULIN (H): ICD-10-CM

## 2023-02-09 RX ORDER — INSULIN GLARGINE 100 [IU]/ML
13 INJECTION, SOLUTION SUBCUTANEOUS AT BEDTIME
Qty: 15 ML | Refills: 0 | Status: CANCELLED | OUTPATIENT
Start: 2023-02-09

## 2023-02-09 NOTE — TELEPHONE ENCOUNTER
Pending Prescriptions:                       Disp   Refills    insulin glargine (BASAGLAR KWIKPEN) 100 U*15 mL  0            Sig: Inject 13 Units Subcutaneous At Bedtime No           further refills until appointment    Request for new prescription

## 2023-02-14 DIAGNOSIS — I10 ESSENTIAL HYPERTENSION WITH GOAL BLOOD PRESSURE LESS THAN 140/90: ICD-10-CM

## 2023-02-14 RX ORDER — LOSARTAN POTASSIUM 100 MG/1
TABLET ORAL
Qty: 60 TABLET | Refills: 0 | Status: SHIPPED | OUTPATIENT
Start: 2023-02-14 | End: 2023-04-17

## 2023-02-14 NOTE — TELEPHONE ENCOUNTER
Last appointment with me was 7/2022    Lab Results   Component Value Date    A1C 7.3 09/23/2022    A1C 8.1 06/23/2022    A1C 7.9 10/07/2021    A1C 8.0 07/02/2021    A1C 8.3 03/26/2021    A1C 8.2 08/27/2020    A1C 7.0 12/20/2019    A1C 6.6 09/23/2019        We need face to face encounter next month     One refill without extra refills    Dipak Hooper MD

## 2023-03-29 ENCOUNTER — OFFICE VISIT (OUTPATIENT)
Dept: RHEUMATOLOGY | Facility: CLINIC | Age: 58
End: 2023-03-29
Attending: INTERNAL MEDICINE
Payer: COMMERCIAL

## 2023-03-29 VITALS
DIASTOLIC BLOOD PRESSURE: 80 MMHG | HEART RATE: 94 BPM | OXYGEN SATURATION: 97 % | BODY MASS INDEX: 44.8 KG/M2 | SYSTOLIC BLOOD PRESSURE: 132 MMHG | WEIGHT: 233.6 LBS

## 2023-03-29 DIAGNOSIS — G89.29 CHRONIC BILATERAL LOW BACK PAIN WITH BILATERAL SCIATICA: ICD-10-CM

## 2023-03-29 DIAGNOSIS — M21.41 PES PLANUS OF BOTH FEET: ICD-10-CM

## 2023-03-29 DIAGNOSIS — Z72.820 POOR SLEEP: ICD-10-CM

## 2023-03-29 DIAGNOSIS — R76.8 POSITIVE ANA (ANTINUCLEAR ANTIBODY): ICD-10-CM

## 2023-03-29 DIAGNOSIS — K75.4 AUTOIMMUNE HEPATITIS (H): ICD-10-CM

## 2023-03-29 DIAGNOSIS — M54.41 CHRONIC BILATERAL LOW BACK PAIN WITH BILATERAL SCIATICA: ICD-10-CM

## 2023-03-29 DIAGNOSIS — M54.42 CHRONIC BILATERAL LOW BACK PAIN WITH BILATERAL SCIATICA: ICD-10-CM

## 2023-03-29 DIAGNOSIS — M25.50 MULTIPLE JOINT PAIN: Primary | ICD-10-CM

## 2023-03-29 DIAGNOSIS — M21.42 PES PLANUS OF BOTH FEET: ICD-10-CM

## 2023-03-29 DIAGNOSIS — M18.0 PRIMARY OSTEOARTHRITIS OF BOTH FIRST CARPOMETACARPAL JOINTS: ICD-10-CM

## 2023-03-29 LAB
ALBUMIN SERPL-MCNC: 4 G/DL (ref 3.4–5)
ALBUMIN UR-MCNC: NEGATIVE MG/DL
ALP SERPL-CCNC: 120 U/L (ref 40–150)
ALT SERPL W P-5'-P-CCNC: 50 U/L (ref 0–50)
ANION GAP SERPL CALCULATED.3IONS-SCNC: 8 MMOL/L (ref 3–14)
APPEARANCE UR: CLEAR
AST SERPL W P-5'-P-CCNC: 32 U/L (ref 0–45)
BASOPHILS # BLD AUTO: 0.1 10E3/UL (ref 0–0.2)
BASOPHILS NFR BLD AUTO: 1 %
BILIRUB SERPL-MCNC: 0.4 MG/DL (ref 0.2–1.3)
BILIRUB UR QL STRIP: NEGATIVE
BUN SERPL-MCNC: 15 MG/DL (ref 7–30)
CALCIUM SERPL-MCNC: 9.8 MG/DL (ref 8.5–10.1)
CHLORIDE BLD-SCNC: 106 MMOL/L (ref 94–109)
CO2 SERPL-SCNC: 24 MMOL/L (ref 20–32)
COLOR UR AUTO: YELLOW
CREAT SERPL-MCNC: 0.47 MG/DL (ref 0.52–1.04)
EOSINOPHIL # BLD AUTO: 0.3 10E3/UL (ref 0–0.7)
EOSINOPHIL NFR BLD AUTO: 3 %
ERYTHROCYTE [DISTWIDTH] IN BLOOD BY AUTOMATED COUNT: 13.9 % (ref 10–15)
GFR SERPL CREATININE-BSD FRML MDRD: >90 ML/MIN/1.73M2
GLUCOSE BLD-MCNC: 120 MG/DL (ref 70–99)
GLUCOSE UR STRIP-MCNC: NEGATIVE MG/DL
HCT VFR BLD AUTO: 45.6 % (ref 35–47)
HGB BLD-MCNC: 15.6 G/DL (ref 11.7–15.7)
HGB UR QL STRIP: ABNORMAL
KETONES UR STRIP-MCNC: NEGATIVE MG/DL
LEUKOCYTE ESTERASE UR QL STRIP: NEGATIVE
LYMPHOCYTES # BLD AUTO: 3.3 10E3/UL (ref 0.8–5.3)
LYMPHOCYTES NFR BLD AUTO: 31 %
MCH RBC QN AUTO: 31.7 PG (ref 26.5–33)
MCHC RBC AUTO-ENTMCNC: 34.2 G/DL (ref 31.5–36.5)
MCV RBC AUTO: 93 FL (ref 78–100)
MONOCYTES # BLD AUTO: 0.7 10E3/UL (ref 0–1.3)
MONOCYTES NFR BLD AUTO: 7 %
NEUTROPHILS # BLD AUTO: 6.3 10E3/UL (ref 1.6–8.3)
NEUTROPHILS NFR BLD AUTO: 59 %
NITRATE UR QL: NEGATIVE
PH UR STRIP: 6 [PH] (ref 5–7)
PLATELET # BLD AUTO: 263 10E3/UL (ref 150–450)
POTASSIUM BLD-SCNC: 4 MMOL/L (ref 3.4–5.3)
PROT SERPL-MCNC: 8.2 G/DL (ref 6.8–8.8)
RBC # BLD AUTO: 4.92 10E6/UL (ref 3.8–5.2)
RBC #/AREA URNS AUTO: ABNORMAL /HPF
SODIUM SERPL-SCNC: 138 MMOL/L (ref 133–144)
SP GR UR STRIP: 1.01 (ref 1–1.03)
SQUAMOUS #/AREA URNS AUTO: ABNORMAL /LPF
UROBILINOGEN UR STRIP-ACNC: 0.2 E.U./DL
WBC # BLD AUTO: 10.7 10E3/UL (ref 4–11)
WBC #/AREA URNS AUTO: ABNORMAL /HPF

## 2023-03-29 PROCEDURE — 36415 COLL VENOUS BLD VENIPUNCTURE: CPT | Performed by: INTERNAL MEDICINE

## 2023-03-29 PROCEDURE — 84156 ASSAY OF PROTEIN URINE: CPT | Performed by: INTERNAL MEDICINE

## 2023-03-29 PROCEDURE — 86225 DNA ANTIBODY NATIVE: CPT | Performed by: INTERNAL MEDICINE

## 2023-03-29 PROCEDURE — 86235 NUCLEAR ANTIGEN ANTIBODY: CPT | Performed by: INTERNAL MEDICINE

## 2023-03-29 PROCEDURE — 80053 COMPREHEN METABOLIC PANEL: CPT | Performed by: INTERNAL MEDICINE

## 2023-03-29 PROCEDURE — 86160 COMPLEMENT ANTIGEN: CPT | Performed by: INTERNAL MEDICINE

## 2023-03-29 PROCEDURE — 85025 COMPLETE CBC W/AUTO DIFF WBC: CPT | Performed by: INTERNAL MEDICINE

## 2023-03-29 PROCEDURE — 86431 RHEUMATOID FACTOR QUANT: CPT | Performed by: INTERNAL MEDICINE

## 2023-03-29 PROCEDURE — 99204 OFFICE O/P NEW MOD 45 MIN: CPT | Performed by: INTERNAL MEDICINE

## 2023-03-29 PROCEDURE — 86200 CCP ANTIBODY: CPT | Performed by: INTERNAL MEDICINE

## 2023-03-29 PROCEDURE — 81001 URINALYSIS AUTO W/SCOPE: CPT | Performed by: INTERNAL MEDICINE

## 2023-03-29 NOTE — NURSING NOTE
RAPID3 (0-30) Cumulative Score  2.7          RAPID3 Weighted Score (divide #4 by 3 and that is the weighted score)  0.9

## 2023-03-29 NOTE — PATIENT INSTRUCTIONS
RHEUMATOLOGY    Dr. Preet Zimmerman    [facility]  [address] [place] [id number]  Phone number: [phone]  Fax number: [phone]      Thank you for choosing [facility]!

## 2023-03-29 NOTE — PROGRESS NOTES
Rheumatology Clinic Visit      Mary Grigsby MRN# [id number]   YOB: 1965 Age: 57 year old      Date of visit: 3/29/23   PCP: Dr. Dipak Hooper    Chief Complaint   Patient presents with:  Consult: Right hand joint pain and right ankle pain. Family history of arthritis. 2018 autoimmune hepatitis diagnosed. Some medications give her joint pain.     Assessment and Plan     1.  Primary osteoarthritis of both first carpometacarpal joints: Reviewed the diagnosis and treatment options.  Advised hand therapy.  May benefit from intermittent nighttime splinting.  If needed in the future may consider intra-articular steroid injections.    - Hand therapy referral    2.  Bilateral trochanteric bursitis and chronic bilateral low back pain with bilateral sciatica: Chronic low back pain that occasionally radiates to the legs.  Low back pain is degenerative in nature.  Suspect bilateral trochanteric bursitis is related to altered gait due to chronic low back pain.  Advised physical therapy  - Physical therapy referral    3.  Ankle pain, R>L: Suspect that this is due to pes planus with ankle eversion.  Discussed shoes, and that she wear shoes whenever ambulatory, indoors and outdoors    4.  Autoimmune hepatitis: Currently on azathioprine 75 mg daily at direction of hepatology.  She would like liver enzymes rechecked today and states that she is overdue for hepatology follow-up so will be calling to schedule an appointment.     5.  Positive WILBERT, multiple joint pain: positive WILBERT possibly associated with autoimmune hepatitis.  There was a concern for inflammatory arthritis, prompting rheumatology referral.  No synovitis on exam today and no inflammatory joint symptoms at this time.  It is possible that azathioprine that is used for autoimmune hepatitis could be treating an inflammatory arthritis, and if so then it is well controlled at this time.  I asked that she return to this clinic for evaluation of having a flare  of arthritis in the future.  She does not have other symptoms to suggest an WILBERT-associated rheumatologic disorder.  Assess with labs as noted below.  - Labs: CBC, CMP, ESR, CRP, PASCUAL, dsDNA, C3, C4, RF, CCP, UA, Uprotein:creatinine    6.  Fatigue: Multifactorial.  Nonrestorative sleep.  Snores.  No witnessed apneic episodes.  I advised sleep evaluation and she says that she will consider this but has declined referral at this time.    Total minutes spent in evaluation with patient, documentation, , and review of pertinent studies and chart notes: 48     Ms. Grigsby verbalized agreement with and understanding of the rational for the diagnosis and treatment plan.  All questions were answered to best of my ability and the patient's satisfaction. Ms. Grigsby was advised to contact the clinic with any questions that may arise after the clinic visit.      Thank you for involving me in the care of the patient    Return to clinic: RICHARD HART   Mary Grigsby is a 57 year old female with a past medical history significant for hypertension, hyperlipidemia, seasonal allergic rhinitis, diabetes, history of pancreatitis, autoimmune hepatitis, and joint pain who presents for initial rheumatology evaluation of joint pain.    Today, 3/29/2023: In 2018 she started Trulicity for diabetes that was associated with the onset of pancreatitis and liver failure of which she was hospitalized for and during that time she was taken off of her statin and Trulicity.  She was given prednisone and azathioprine for autoimmune hepatitis.  Later metformin was started.  Morning sugars were still bad so another diabetes medication was started in July 2022 with diffuse joint pain associated with that medication so it was discontinued.  She also realized around the time that his statin had been started and retrospectively associated with LFT elevations so the statin was stopped again.  Joint pain improved when she stopped the statin.   She reports having pain at her right ankle and the bilateral first CMC joints.  Morning stiffness may last for 30-60 minutes.  Currently without joint swelling.  She also reports that she has pain in her hips and low back with a rare radiation down both legs; back pain is worse when she is sitting/standing/walking for too long and has been an issue ever since she cracked her tailbone during delivery of her son.  No issues with sun exposure but she says she gets a blotchy rash on her arms if she is in high heat or humidity environments and that the high heat and humidity can cause more fatigue.  Occasional canker sores after dental work.  Currently on azathioprine 75 mg daily for autoimmune hepatitis.  Mother and sister have rheumatoid arthritis.    Denies fevers, chills, nausea, vomiting. On and off constipation and diarrhea. No abdominal pain.  No black or bloody stools.  No chest pain/pressure, palpitations, or shortness of breath. No LE swelling. No neck pain. No oral or nasal sores.  No rash. No sicca symptoms. No photosensitivity or photophobia. No eye pain or redness. No history of inflammatory eye disease.  No history of inflammatory bowel disease.  No history of DVT, pulmonary embolism, or miscarriage.   No history of serositis.  No history of Raynaud's Phenomenon.  No known seizure disorder.  No known renal disorder.      Tired throughout the day.  Wakes up tired.  Snores.  Has not had a sleep study in the past.      Tobacco: Cigarette smoking  EtOH: None  Drugs: None  Occupation:  at [employer]    ROS   12 point review of system was completed and negative except as noted in the HPI     Active Problem List     Patient Active Problem List   Diagnosis     Hypertension goal BP (blood pressure) < 140/90     Ex-smoker     Family history of diabetes mellitus     Hyperlipidemia with target LDL less than 100     Seasonal allergic rhinitis     Rhinitis, allergic to other allergen     House dust mite  allergy     obesity     Severe dysplasia of cervix (GUSTABO III)     Family history of thyroid disease     Cervical lymphadenopathy     Controlled type 2 diabetes mellitus without complication, without long-term current use of insulin (H)     Bilateral lower extremity edema     Hypovitaminosis D     Pancreatitis     Autoimmune hepatitis (H)     Past Medical History     Past Medical History:   Diagnosis Date     Abnormal Pap smear of vagina 2012, 2013    see problem list     Autoimmune hepatitis (H) 10/2018     Cervical lymphadenopathy 7/29/2012     Cervical lymphadenopathy 7/29/2012     Cervical lymphadenopathy 7/29/2012     Cervical lymphadenopathy      Deviated septum     had septoplasty and endo. sinus surg. 2/09 per [facility]     Family history of diabetes mellitus     mother     Family history of stroke     multiple tiny cva's     House dust mite allergy      Hyperlipidemia LDL goal < 100      Hypertension goal BP (blood pressure) < 140/90      Obesity      Papanicolaou smear of cervix with low grade squamous intraepithelial lesion (LGSIL) 1/2011, 10/2011    colp 3/22/11 - GUSTABO 1-2     Rhinitis, allergic to other allergen      Seasonal allergic rhinitis 12/16/08 skin tests    CR/DM/M/T/G/W     Smoking      Vaginal high risk HPV DNA test positive 2014, 2015, 2016, 2018, 2019    see problem list     Past Surgical History     Past Surgical History:   Procedure Laterality Date     CRYOTHERAPY  3/30/2011    cervical     ESOPHAGOSCOPY, GASTROSCOPY, DUODENOSCOPY (EGD), COMBINED N/A 10/4/2018    Procedure: COMBINED ENDOSCOPIC ULTRASOUND, ESOPHAGOSCOPY, GASTROSCOPY, DUODENOSCOPY (EGD);  EUS;  Surgeon: John Carrera MD;  Location: [facility]     HC COLP CERVIX/UPPER VAGINA W BX CERVIX  1/2010, 3/2011    neg dysplasia, GUSTABO 1-2     HC REPAIR OF NASAL SEPTUM  2/3/09-per Dr. Harley    antrostomy, ethmoidectomy, septoplasty and turbinate reduction     LAPAROSCOPIC ASSISTED HYSTERECTOMY VAGINAL  3/12/12    [facility]      TONSILLECTOMY & ADENOIDECTOMY  1973    age 8     Allergy     Allergies   Allergen Reactions     Amoxicillin Hives     Augmentin      Dulaglutide      pancreatitis     Erythromycin      Jardiance [Empagliflozin]      Caused flare of pancreatitis and autoimmune hepatitis.     Unknown [No Clinical Screening - See Comments]      Trulicity - pancreatitis.  Should not use any other GLP-1 agonist or DPP4 inhibitor     Current Medication List     Current Outpatient Medications   Medication Sig     albuterol (PROAIR HFA/PROVENTIL HFA/VENTOLIN HFA) 108 (90 Base) MCG/ACT inhaler Inhale 2 puffs into the lungs every 6 hours Not to be refilled until patient calls     aspirin 81 MG tablet Take 1 tablet (81 mg) by mouth daily Indication: primary prevention for heart disease     azaTHIOprine (IMURAN) 50 MG tablet TAKE ONE AND ONE HALF TABLETS BY MOUTH EVERY DAY Strength 50 mg     blood glucose (NO BRAND SPECIFIED) lancets standard Use to test blood sugar 3 times daily or as directed.     blood glucose (NO BRAND SPECIFIED) test strip 1 strip by In Vitro route 3 times daily     blood glucose calibration (NO BRAND SPECIFIED) solution Use to calibrate blood glucose monitor as directed.     blood glucose monitoring (NO BRAND SPECIFIED) meter device kit Use to test blood sugar 3 times daily or as directed.     cholecalciferol (VITAMIN D3) 1000 UNIT tablet Take 3 tablets (3,000 Units) by mouth daily     felodipine ER (PLENDIL) 5 MG 24 hr tablet Take 1 tablet (5 mg) by mouth daily     Fexofenadine HCl (ALLEGRA PO) Take 90 mg by mouth daily as needed      hydrochlorothiazide (HYDRODIURIL) 12.5 MG tablet Take 1 tablet (12.5 mg) by mouth every morning     Hypertonic Nasal Wash (SINUS RINSE BOTTLE KIT NA) Spray 1 Bottle in nostril as needed.     insulin glargine (BASAGLAR KWIKPEN) 100 UNIT/ML pen Inject 13 Units Subcutaneous At Bedtime No further refills until appointment     insulin pen needle (31G X 5 MM) 31G X 5 MM miscellaneous Use 1 pen  "needles daily or as directed.     insulin pen needle (PENTIPS) 32G X 4 MM miscellaneous Use 1 pen daily, no further refills until appointment     losartan (COZAAR) 100 MG tablet TAKE ONE TABLET BY MOUTH ONCE DAILY     metFORMIN (GLUCOPHAGE XR) 500 MG 24 hr tablet TAKE TWO TABLETS BY MOUTH TWICE A DAY WITH MEALS     rosuvastatin (CRESTOR) 10 MG tablet Take 1 tablet (10 mg) by mouth every evening TAKE 1 TABLET (10 MG) BY MOUTH EVERY EVENING     STATIN NOT PRESCRIBED (INTENTIONAL) Statin not prescribed intentionally due to Active liver disease (liver failure, cirrhosis, hepatitis)     No current facility-administered medications for this visit.     Social History   See HPI    Family History     Family History   Problem Relation Age of Onset     Cancer Father      Other Cancer Father         passed Nov.2016     Hypertension Father         age 72     Hypertension Sister         age 35     Thyroid Disease Sister      Cerebrovascular Disease Maternal Grandmother      Asthma Son      Thyroid Disease Sister      Thyroid Disease Sister      Thyroid Disease Sister      Diabetes Mother      Hypertension Mother         age 34     Hypertension Sister      Thyroid Disease Sister      Asthma Son      Glaucoma No family hx of      Macular Degeneration No family hx of      Mother and sister: Rheumatoid arthritis    Physical Exam     Temp Readings from Last 3 Encounters:   07/18/22 98.1  F (36.7  C) (Oral)   06/23/22 97.5  F (36.4  C) (Oral)   10/07/21 98.6  F (37  C) (Oral)     BP Readings from Last 5 Encounters:   03/29/23 132/80   07/18/22 136/68   06/23/22 128/64   10/07/21 133/80   03/26/21 124/72     Pulse Readings from Last 1 Encounters:   03/29/23 94     Resp Readings from Last 1 Encounters:   07/18/22 14     Estimated body mass index is 44.8 kg/m  as calculated from the following:    Height as of 12/20/19: 1.538 m (5' 0.55\").    Weight as of this encounter: 106 kg (233 lb 9.6 oz).    GEN: NAD.    HEENT:  Anicteric, " noninjected sclera. No obvious external lesions of the ear and nose. Hearing intact.  CV: S1, S2. RRR. No m/r/g  PULM: No increased work of breathing. CTA bilaterally   MSK: MCPs, PIPs, DIPs without swelling or tenderness to palpation.  Tender to palpation with squaring at the first CMC joints bilaterally; no effusion, increased warmth, or overlying erythema at these joints.  Wrists without swelling or tenderness to palpation.  Elbows and shoulders without swelling or tenderness to palpation.  Hips tender to palpation over the trochanteric bursa bilaterally; negative straight leg test bilaterally.  No pain with internal or external rotation of either hip.  Knees with medial joint line tenderness but no effusion or increased warmth.  Ankles and MTPs without swelling or tenderness to palpation.  Pes planus bilaterally with ankle eversion when standing.  SKIN: No rash or jaundice seen  PSYCH: Alert. Appropriate.      Labs / Imaging (select studies)     WILBERT  Recent Labs   Lab Test 10/05/18  0538   TIFFANY Positive*   ANAP1 NUCLEOLAR   ANAT1 1:640     CBC  Recent Labs   Lab Test 09/23/22  0702 06/23/22  0838 07/02/21  0707 08/05/19  0705 07/22/19  0814 10/09/18  1053 10/05/18  0538 10/04/18  0540 10/03/18  1525   WBC 8.2 8.4 8.8   < > 10.4   < > 8.1   < > 11.3*   RBC 4.65 4.79 4.81   < > 4.98   < > 3.97   < > 4.53   HGB 14.8 14.9 14.8   < > 15.7   < > 12.2   < > 14.2   HCT 42.7 43.5 42.9   < > 45.5   < > 36.4   < > 42.0   MCV 92 91 89   < > 91   < > 92   < > 93   RDW 14.8 13.4 13.5   < > 13.8   < > 16.1*   < > 15.7*    202 215   < > 232   < > 238   < > 284   MCH 31.8 31.1 30.8   < > 31.5   < > 30.7   < > 31.3   MCHC 34.7 34.3 34.5   < > 34.5   < > 33.5   < > 33.8   NEUTROPHIL  --   --   --   --  47.3  --  52.9  --  53.4   LYMPH  --   --   --   --  39.7  --  35.6  --  38.0   MONOCYTE  --   --   --   --  7.6  --  8.3  --  6.5   EOSINOPHIL  --   --   --   --  4.4  --  2.2  --  1.3   BASOPHIL  --   --   --   --  1.0  --   0.9  --  0.6   ANEU  --   --   --   --  5.0  --  4.3  --  6.1   ALYM  --   --   --   --  4.1  --  2.9  --  4.3   JONO  --   --   --   --  0.8  --  0.7  --  0.7   AEOS  --   --   --   --  0.5  --  0.2  --  0.2   ABAS  --   --   --   --  0.1  --  0.1  --  0.1    < > = values in this interval not displayed.     CMP  Recent Labs   Lab Test 09/23/22  0702 08/24/22  0738 07/26/22  1221 07/21/22  1231 06/23/22  0838 07/02/21  0707 03/26/21  1249 01/05/21  0734 10/21/20  0839 08/27/20  0854 08/05/19  0705 07/22/19  0814 12/24/18  0825 12/24/18  0823   NA  --   --   --   --  136  --  138  --  137 135  --  138  --  140   POTASSIUM  --   --   --   --  3.9  --  4.1  --  4.0 4.0  --  3.7  --  3.3*   CHLORIDE  --   --   --   --  103  --  105  --  104 104  --  102  --  106   CO2  --   --   --   --  24  --  29  --  29 26  --  31  --  23   ANIONGAP  --   --   --   --  9  --  4  --  4 5  --  5  --  11   GLC  --   --   --   --  214*  --  154*  --  171* 167*  --  97  --  143*   BUN  --   --   --   --  12  --  17  --  16 14  --  14  --  8   CR  --   --  0.5  --  0.48*  --  0.81  --  0.57 0.57  --  0.62  --  0.46*   GFRESTIMATED  --   --  >60  --  >90  --  82  --  >90 >90  --  >90  --  >90   GFRESTBLACK  --   --   --   --   --   --  >90  --  >90 >90  --  >90  --  >90   HOWARD  --   --   --   --  9.2  --  9.6  --  9.1 9.4  --  9.6  --  9.4   BILITOTAL 0.5 0.8  --  0.7 0.4   < > 0.5   < > 0.4  --    < > 0.6   < >  --    ALBUMIN 3.7 3.6  --  3.6 3.4   < > 3.7   < > 3.6  --    < > 3.9   < >  --    PROTTOTAL 7.7 7.4  --  7.8 7.2   < > 7.6   < > 7.4  --    < > 8.1   < >  --    ALKPHOS 137 145  --  112 113   < > 155*   < > 155*  --    < > 130   < >  --    AST 55* 188*  --  48* 80*   < > 33   < > 41  --    < > 44   < >  --    ALT 84* 286*  --  117* 119*   < > 56*   < > 72*  --    < > 70*   < >  --     < > = values in this interval not displayed.     GGT  Recent Labs   Lab Test 10/03/18  1525   *     HgA1c  Recent Labs   Lab Test  09/23/22  0702 06/23/22  0839 10/07/21  1004   A1C 7.3* 8.1* 7.9*     Calcium/VitaminD  Recent Labs   Lab Test 06/23/22  0838 03/26/21  1249 10/21/20  0839 08/27/20  0854 07/22/19  0814 12/24/18  0825 10/03/18  1525 10/02/18  0928   HOWARD 9.2 9.6 9.1 9.4   < >  --    < > 9.5   VITDT  --   --   --  29  --  29  --  34    < > = values in this interval not displayed.     ESR/CRP  Recent Labs   Lab Test 10/04/18  0540 10/02/18  0928   SED  --  18   CRP 33.0* 37.8*     CK/Aldolase  Recent Labs   Lab Test 10/03/18  1525   CKT 53     TSH/T4  Recent Labs   Lab Test 03/26/21  1249 06/15/18  1017 09/24/15  1605 05/22/15  0753   TSH 4.93* 4.51* 3.62 4.50*   T4 0.94 1.00  --  0.93     Lipid Panel  Recent Labs   Lab Test 06/23/22  0838 03/26/21  1249 08/27/20  0854 05/27/16  0714 05/22/15  0753   CHOL 96 218* 212*   < > 161   TRIG 128 172* 182*   < > 133   HDL 35* 47* 43*   < > 44*   LDL 35 137* 133*   < > 90   VLDL  --   --   --   --  27   CHOLHDLRATIO  --   --   --   --  3.7   NHDL 61 171* 169*   < >  --     < > = values in this interval not displayed.     Hepatitis B  Recent Labs   Lab Test 10/05/18  1643 10/04/18  0540   AUSAB  --  0.45   HBCAB Nonreactive  --    HEPBANG Nonreactive  --      Hepatitis C  Recent Labs   Lab Test 10/04/18  0540 05/27/16  0714   HCVAB Nonreactive Nonreactive   Assay performance characteristics have not been established for newborns,   infants, and children       HIV Screening  Recent Labs   Lab Test 06/15/18  1017   HIAGAB Nonreactive     UA  Recent Labs   Lab Test 10/03/18  2220   COLOR Yellow   APPEARANCE Clear   URINEGLC Negative   URINEBILI Small*   SG 1.008   URINEPH 5.5   PROTEIN Negative   NITRITE Negative   UBLD Trace*   LEUKEST Large*   WBCU 24*   RBCU 3*   BACTERIA Few*   MUCOUS Present*     Urine Microscopic  Recent Labs   Lab Test 10/03/18  2220   WBCU 24*   RBCU 3*   BACTERIA Few*   MUCOUS Present*     Immunization History     Immunization History   Administered Date(s) Administered      COVID-19 Vaccine 12+ (Pfizer) 01/08/2021, 01/29/2021, 11/26/2021     Influenza (IIV3) PF 11/18/2020     Influenza Vaccine 50-64 or 18-64 w/egg allergy (Flublok) 10/22/2021     Influenza Vaccine >6 months (Alfuria,Fluzone) 10/24/2019     Pneumo Conj 13-V (2010&after) 05/12/2016     TD,PF 7+ (Tenivac) 02/05/2004     TDAP (Adacel,Boostrix) 06/23/2022          Chart documentation done in part with Dragon Voice recognition Software. Although reviewed after completion, some word and grammatical error may remain.    Preet Zimmerman MD

## 2023-03-30 LAB
ALBUMIN MFR UR ELPH: 11.7 MG/DL (ref 1–14)
C3 SERPL-MCNC: 144 MG/DL (ref 81–157)
C4 SERPL-MCNC: 18 MG/DL (ref 13–39)
CREAT UR-MCNC: 28.5 MG/DL
PROT/CREAT 24H UR: 0.41 MG/MG CR (ref 0–0.2)
RHEUMATOID FACT SER NEPH-ACNC: <7 IU/ML

## 2023-03-31 LAB
CCP AB SER IA-ACNC: 1.3 U/ML
DSDNA AB SER-ACNC: <0.6 IU/ML
ENA SM IGG SER IA-ACNC: <0.7 U/ML
ENA SM IGG SER IA-ACNC: NEGATIVE
ENA SS-A AB SER IA-ACNC: <0.5 U/ML
ENA SS-A AB SER IA-ACNC: NEGATIVE
ENA SS-B IGG SER IA-ACNC: <0.6 U/ML
ENA SS-B IGG SER IA-ACNC: NEGATIVE
U1 SNRNP IGG SER IA-ACNC: 1.5 U/ML
U1 SNRNP IGG SER IA-ACNC: NEGATIVE

## 2023-04-04 DIAGNOSIS — E11.9 CONTROLLED TYPE 2 DIABETES MELLITUS WITHOUT COMPLICATION, WITHOUT LONG-TERM CURRENT USE OF INSULIN (H): ICD-10-CM

## 2023-04-04 RX ORDER — METFORMIN HCL 500 MG
TABLET, EXTENDED RELEASE 24 HR ORAL
Qty: 120 TABLET | Refills: 0 | Status: SHIPPED | OUTPATIENT
Start: 2023-04-04 | End: 2023-05-01

## 2023-04-04 NOTE — LETTER
April 5, 2023      Mary Grigsby  [address]  [facility] [address]        Dear Mary,     Your provider has sent a refill of metFORMIN (GLUCOPHAGE XR) 500 MG 24 hr tablet. You are due for an appointment for further refills. Please contact the clinic or use [facility] to schedule an appointment for further refills.     Sincerely,     SHRUTHI Estrada/JOSE ANGEL

## 2023-04-12 ENCOUNTER — THERAPY VISIT (OUTPATIENT)
Dept: PHYSICAL THERAPY | Facility: CLINIC | Age: 58
End: 2023-04-12
Payer: COMMERCIAL

## 2023-04-12 DIAGNOSIS — M54.42 CHRONIC BILATERAL LOW BACK PAIN WITH BILATERAL SCIATICA: ICD-10-CM

## 2023-04-12 DIAGNOSIS — M54.41 CHRONIC BILATERAL LOW BACK PAIN WITH BILATERAL SCIATICA: ICD-10-CM

## 2023-04-12 DIAGNOSIS — M54.50 CHRONIC LOW BACK PAIN: Primary | ICD-10-CM

## 2023-04-12 DIAGNOSIS — G89.29 CHRONIC BILATERAL LOW BACK PAIN WITH BILATERAL SCIATICA: ICD-10-CM

## 2023-04-12 DIAGNOSIS — G89.29 CHRONIC LOW BACK PAIN: Primary | ICD-10-CM

## 2023-04-12 PROCEDURE — 97161 PT EVAL LOW COMPLEX 20 MIN: CPT | Mod: GP | Performed by: PHYSICAL THERAPIST

## 2023-04-12 PROCEDURE — 97110 THERAPEUTIC EXERCISES: CPT | Mod: GP | Performed by: PHYSICAL THERAPIST

## 2023-04-12 NOTE — PROGRESS NOTES
Physical Therapy Initial Evaluation  Subjective:    Therapist Generated HPI Evaluation  Problem details: Patient reports a long history of low back pain that has been present as long as she can remember. She reports a worsening of low back pain after she was diagnosed with an autoimmune hepatitis followed by a numerous medication changes which she feels contributes to the back pain. .         Type of problem:  Lumbar.      Condition occurred with:  Insidious onset.    Patient reports pain:  Lumbar spine right and lumbar spine left.  Pain is described as aching and is constant.  Pain radiates to:  Thigh left, knee left, thigh right, knee right, lower leg left, lower leg right, foot left, gluteals right, gluteals left and foot right (radiating pain occurs occasionally, generally following long periods of low back pain ). Pain is the same all the time.  Since onset symptoms are gradually worsening.  Associated symptoms:  Loss of motion/stiffness and loss of strength. Symptoms are exacerbated by bending, standing, sitting, lifting and walking  and relieved by NSAID's.  Imaging testing: none of low back.  Past treatment: none.   Restrictions due to condition include:  Working in normal job without restrictions.  Barriers include:  None as reported by patient.    Patient Health History  Mary Grigsby being seen for Joint pain and low back pain.     Date of Onset: July 2022.      Pain is reported as 3/10 on pain scale.  General health as reported by patient is good.  Pertinent medical history includes: high blood pressure, diabetes and overweight.         Other surgery history details: sinus, tonsils, hysterectomy.        Current occupation is .   Primary job tasks include:  Computer work.                                    Objective:  System         Lumbar/SI Evaluation  ROM:    AROM Lumbar:   Flexion:            Finger tips to floor, no pain  Ext:                    Modeate limitation, mild pain  but reported as feeling like a good stretch   Side Bend:        Left:  Finger tips to thigh, no pain    Right:  Finger tips to thigh, no pain  Rotation:           Left:  WNL    Right:  WNL  Side Glide:        Left:     Right:           Lumbar Myotomes:  Lumbar myotomes: grossly 4+/5 bilaterally.                Lumbar Dermtomes:  normal                Neural Tension/Mobility:  Lumbar:  Normal        Lumbar Palpation:    Tenderness present at Left:    Erector Spinae and PSIS  Tenderness present at Right: Erector Spinae and PSIS    Lumbar Provocation:  normal        SI joint/Sacrum:    Unremarkable                                                         General     ROS    Assessment/Plan:    Patient is a 57 year old female with lumbar complaints.    Patient has the following significant findings with corresponding treatment plan.                Diagnosis 1:  Chronic low back pain  Pain -  hot/cold therapy, mechanical traction, manual therapy, self management, education and home program  Decreased ROM/flexibility - manual therapy, therapeutic exercise, therapeutic activity and home program  Decreased strength - therapeutic exercise, therapeutic activities and home program  Decreased function - therapeutic activities and home program  Impaired posture - neuro re-education, therapeutic activities and home program    Therapy Evaluation Codes:   1) History comprised of:   Personal factors that impact the plan of care:      Time since onset of symptoms.    Comorbidity factors that impact the plan of care are:      Overweight.     Medications impacting care: None.  2) Examination of Body Systems comprised of:   Body structures and functions that impact the plan of care:      Lumbar spine.   Activity limitations that impact the plan of care are:      Driving, Dressing, Lifting, Reading/Computer work, Sitting, Squatting/kneeling, Stairs, Standing, Walking, Working and Laying down.  3) Clinical presentation characteristics  are:   Stable/Uncomplicated.  4) Decision-Making    Low complexity using standardized patient assessment instrument and/or measureable assessment of functional outcome.  Cumulative Therapy Evaluation is: Low complexity.    Previous and current functional limitations:  (See Goal Flow Sheet for this information)    Short term and Long term goals: (See Goal Flow Sheet for this information)     Communication ability:  Patient appears to be able to clearly communicate and understand verbal and written communication and follow directions correctly.  Treatment Explanation - The following has been discussed with the patient:   RX ordered/plan of care  Anticipated outcomes  Possible risks and side effects  This patient would benefit from PT intervention to resume normal activities.   Rehab potential is good.    Frequency:  1 X week, once daily  Duration:  for 6 weeks  Discharge Plan:  Achieve all LTG.  Independent in home treatment program.  Reach maximal therapeutic benefit.    Please refer to the daily flowsheet for treatment today, total treatment time and time spent performing 1:1 timed codes.

## 2023-04-15 DIAGNOSIS — I10 ESSENTIAL HYPERTENSION WITH GOAL BLOOD PRESSURE LESS THAN 140/90: ICD-10-CM

## 2023-04-17 RX ORDER — LOSARTAN POTASSIUM 100 MG/1
TABLET ORAL
Qty: 60 TABLET | Refills: 0 | Status: SHIPPED | OUTPATIENT
Start: 2023-04-17 | End: 2023-05-11

## 2023-04-23 ENCOUNTER — HEALTH MAINTENANCE LETTER (OUTPATIENT)
Age: 58
End: 2023-04-23

## 2023-04-26 ENCOUNTER — THERAPY VISIT (OUTPATIENT)
Dept: PHYSICAL THERAPY | Facility: CLINIC | Age: 58
End: 2023-04-26
Payer: COMMERCIAL

## 2023-04-26 DIAGNOSIS — M54.50 CHRONIC LOW BACK PAIN: Primary | ICD-10-CM

## 2023-04-26 DIAGNOSIS — G89.29 CHRONIC LOW BACK PAIN: Primary | ICD-10-CM

## 2023-04-26 DIAGNOSIS — M54.41 CHRONIC BILATERAL LOW BACK PAIN WITH BILATERAL SCIATICA: ICD-10-CM

## 2023-04-26 DIAGNOSIS — M54.42 CHRONIC BILATERAL LOW BACK PAIN WITH BILATERAL SCIATICA: ICD-10-CM

## 2023-04-26 DIAGNOSIS — G89.29 CHRONIC BILATERAL LOW BACK PAIN WITH BILATERAL SCIATICA: ICD-10-CM

## 2023-04-26 PROCEDURE — 97110 THERAPEUTIC EXERCISES: CPT | Mod: GP | Performed by: PHYSICAL THERAPIST

## 2023-05-01 DIAGNOSIS — E11.9 CONTROLLED TYPE 2 DIABETES MELLITUS WITHOUT COMPLICATION, WITHOUT LONG-TERM CURRENT USE OF INSULIN (H): ICD-10-CM

## 2023-05-01 RX ORDER — METFORMIN HCL 500 MG
TABLET, EXTENDED RELEASE 24 HR ORAL
Qty: 120 TABLET | Refills: 0 | Status: SHIPPED | OUTPATIENT
Start: 2023-05-01 | End: 2023-05-11

## 2023-05-06 DIAGNOSIS — I10 ESSENTIAL HYPERTENSION WITH GOAL BLOOD PRESSURE LESS THAN 140/90: ICD-10-CM

## 2023-05-08 RX ORDER — FELODIPINE 5 MG/1
5 TABLET, EXTENDED RELEASE ORAL DAILY
Qty: 90 TABLET | Refills: 0 | Status: SHIPPED | OUTPATIENT
Start: 2023-05-08 | End: 2023-05-11

## 2023-05-08 RX ORDER — HYDROCHLOROTHIAZIDE 12.5 MG/1
TABLET ORAL
Qty: 90 TABLET | Refills: 0 | Status: SHIPPED | OUTPATIENT
Start: 2023-05-08 | End: 2023-05-11

## 2023-05-11 ENCOUNTER — TELEPHONE (OUTPATIENT)
Dept: FAMILY MEDICINE | Facility: CLINIC | Age: 58
End: 2023-05-11

## 2023-05-11 ENCOUNTER — OFFICE VISIT (OUTPATIENT)
Dept: INTERNAL MEDICINE | Facility: CLINIC | Age: 58
End: 2023-05-11
Payer: COMMERCIAL

## 2023-05-11 VITALS
HEART RATE: 94 BPM | TEMPERATURE: 97.4 F | OXYGEN SATURATION: 95 % | BODY MASS INDEX: 44.3 KG/M2 | SYSTOLIC BLOOD PRESSURE: 130 MMHG | RESPIRATION RATE: 16 BRPM | WEIGHT: 231 LBS | DIASTOLIC BLOOD PRESSURE: 80 MMHG

## 2023-05-11 DIAGNOSIS — F17.200 SMOKER: ICD-10-CM

## 2023-05-11 DIAGNOSIS — E66.01 MORBID OBESITY (H): ICD-10-CM

## 2023-05-11 DIAGNOSIS — Z12.4 CERVICAL CANCER SCREENING: ICD-10-CM

## 2023-05-11 DIAGNOSIS — K85.90 ACUTE PANCREATITIS, UNSPECIFIED COMPLICATION STATUS, UNSPECIFIED PANCREATITIS TYPE: ICD-10-CM

## 2023-05-11 DIAGNOSIS — Z12.31 VISIT FOR SCREENING MAMMOGRAM: ICD-10-CM

## 2023-05-11 DIAGNOSIS — E11.9 CONTROLLED TYPE 2 DIABETES MELLITUS WITHOUT COMPLICATION, WITHOUT LONG-TERM CURRENT USE OF INSULIN (H): ICD-10-CM

## 2023-05-11 DIAGNOSIS — J30.2 SEASONAL ALLERGIC RHINITIS, UNSPECIFIED TRIGGER: ICD-10-CM

## 2023-05-11 DIAGNOSIS — Z12.2 ENCOUNTER FOR SCREENING FOR LUNG CANCER: ICD-10-CM

## 2023-05-11 DIAGNOSIS — E11.9 CONTROLLED TYPE 2 DIABETES MELLITUS WITHOUT COMPLICATION, WITHOUT LONG-TERM CURRENT USE OF INSULIN (H): Primary | ICD-10-CM

## 2023-05-11 DIAGNOSIS — Z12.11 SCREEN FOR COLON CANCER: ICD-10-CM

## 2023-05-11 DIAGNOSIS — I10 ESSENTIAL HYPERTENSION WITH GOAL BLOOD PRESSURE LESS THAN 140/90: ICD-10-CM

## 2023-05-11 DIAGNOSIS — R06.2 WHEEZING: Primary | ICD-10-CM

## 2023-05-11 DIAGNOSIS — K75.4 AUTOIMMUNE HEPATITIS (H): ICD-10-CM

## 2023-05-11 LAB — HBA1C MFR BLD: 6.6 % (ref 0–5.6)

## 2023-05-11 PROCEDURE — 99214 OFFICE O/P EST MOD 30 MIN: CPT | Performed by: INTERNAL MEDICINE

## 2023-05-11 PROCEDURE — 83036 HEMOGLOBIN GLYCOSYLATED A1C: CPT | Performed by: INTERNAL MEDICINE

## 2023-05-11 PROCEDURE — 36415 COLL VENOUS BLD VENIPUNCTURE: CPT | Performed by: INTERNAL MEDICINE

## 2023-05-11 RX ORDER — INSULIN GLARGINE-YFGN 100 [IU]/ML
13 INJECTION, SOLUTION SUBCUTANEOUS AT BEDTIME
Qty: 15 ML | Refills: 1 | Status: SHIPPED | OUTPATIENT
Start: 2023-05-11 | End: 2023-11-20

## 2023-05-11 RX ORDER — METFORMIN HCL 500 MG
TABLET, EXTENDED RELEASE 24 HR ORAL
Qty: 180 TABLET | Refills: 1 | Status: SHIPPED | OUTPATIENT
Start: 2023-05-11 | End: 2023-08-24

## 2023-05-11 RX ORDER — HYDROCHLOROTHIAZIDE 12.5 MG/1
12.5 TABLET ORAL EVERY MORNING
Qty: 90 TABLET | Refills: 1 | Status: SHIPPED | OUTPATIENT
Start: 2023-05-11 | End: 2023-11-20

## 2023-05-11 RX ORDER — LOSARTAN POTASSIUM 100 MG/1
100 TABLET ORAL DAILY
Qty: 90 TABLET | Refills: 1 | Status: SHIPPED | OUTPATIENT
Start: 2023-05-11 | End: 2023-09-20

## 2023-05-11 RX ORDER — PREDNISONE 20 MG/1
TABLET ORAL
Qty: 20 TABLET | Refills: 0 | Status: SHIPPED | OUTPATIENT
Start: 2023-05-11 | End: 2023-11-20

## 2023-05-11 RX ORDER — INSULIN GLARGINE-YFGN 100 [IU]/ML
13 INJECTION, SOLUTION SUBCUTANEOUS AT BEDTIME
Qty: 6 ML | Refills: 1 | Status: SHIPPED | OUTPATIENT
Start: 2023-05-11 | End: 2023-11-20

## 2023-05-11 RX ORDER — FELODIPINE 5 MG/1
5 TABLET, EXTENDED RELEASE ORAL DAILY
Qty: 90 TABLET | Refills: 1 | Status: SHIPPED | OUTPATIENT
Start: 2023-05-11 | End: 2023-11-20

## 2023-05-11 RX ORDER — ALBUTEROL SULFATE 90 UG/1
2 AEROSOL, METERED RESPIRATORY (INHALATION) EVERY 6 HOURS
Qty: 18 G | Refills: 3 | Status: SHIPPED | OUTPATIENT
Start: 2023-05-11 | End: 2023-11-20

## 2023-05-11 NOTE — TELEPHONE ENCOUNTER
For the semglee order, they come in boxes of 15ml, could we get an order that reflects that we don't require clarification each time?    Thank you,  Valdo Jones, [facility]  @~~~~~~

## 2023-05-11 NOTE — PROGRESS NOTES
Assessment & Plan     Wheezing  Today's appointment involved a large number of different issues including preventative health measures / healthcare maintenance , routine follow up medical care regarding multiple chronic diagnosis and finally, also having acute and moderately severe symptoms of shortness of breath and coughing and wheezing. Patient is also overdue for follow up so there was really a lot to cover. Mary Grigsby does her best to keep up with everything but needs ongoing monitoring and I had a concern with her current symptoms. She is on room air, having a oxygen saturation of 95% and this is at rest. She's coughing and wheezing and isn't ready to go to the hospital but feels sick sick sick. She blames her symptoms on a cold and on hay fever type sx symptoms but she has a 20+ year history of smoking and the clinical presentation today seems most likely to be obstructive lung disease . She's only a half a pack cigarettes smoker however she's smoked for greater then 40 years at this rate. Her exam is classic with dense coarse rhonchi and scattered wheezing and seems worse with exhalation, clinically this is chronic obstructive pulmonary disease. Prescribed prednisone and she'll continue with her albuterol which she absolutely says works wonders for her for short term. It is necessary now to have spirometry done and if we detect obstructive lung disease  She will receive a new diagnosis of chronic obstructive pulmonary disease on her problem list and further follow up depending on how things go   - predniSONE (DELTASONE) 20 MG tablet; Take 3 tabs by mouth daily x 3 days, then 2 tabs daily x 3 days, then 1 tab daily x 3 days, then 1/2 tab daily x 3 days.  - Spirometry, Breathing Capacity    Screen for colon cancer  Postponed by patient   - REVIEW OF HEALTH MAINTENANCE PROTOCOL ORDERS    Visit for screening mammogram  She agrees with need  - MA SCREENING DIGITAL BILAT - Future  (s+30);  Future    Controlled type 2 diabetes mellitus without complication, without long-term current use of insulin (H)  Needs eye exams , otherwise refills provided and recheck in 6 months   - Adult Eye  Referral; Future  - HEMOGLOBIN A1C; Future  - HEMOGLOBIN A1C  - Insulin Glargine-yfgn (SEMGLEE, YFGN,) 100 UNIT/ML SOPN; Inject 13 Units Subcutaneous At Bedtime Inject 13 Units Subcutaneous At Bedtime - this is intended as a 3 month supply with refill  - insulin pen needle (31G X 5 MM) 31G X 5 MM miscellaneous; Use 1 pen needles daily or as directed.  - metFORMIN (GLUCOPHAGE XR) 500 MG 24 hr tablet; TAKE TWO TABLETS BY MOUTH TWICE A DAY WITH MEALS.    Cervical cancer screening  History of cervical dysplasia and need for further follow up, see office visit notes with Dr. Petey Peraza, gynecologist next door at [facility]  , patient is made fully aware of risks and follow up with this gynecologist is emphasized     Encounter for screening for lung cancer  This patient would qualify for lung cancer screening [ with annual low dose CT for scanning high risk pt for lunca ] but declines today     Autoimmune hepatitis (H)  This patient has a complicated history with autoimmune hepatitis and she is disenchanted with the care she's received from [facility] gastroenterological consultation and wants a referral to [facility] for second opinion / further follow up care.  - Adult GI  Referral - Consult Only; Future    Morbid obesity (H)  Weight loss measures reviewed     Acute pancreatitis, unspecified complication status, unspecified pancreatitis type  Another reason for the [facility] referral   - Adult GI  Referral - Consult Only; Future    Smoker  Smoking cessation encouraged    Seasonal allergic rhinitis, unspecified trigger  See wheezing entry as detailed above    - albuterol (PROAIR HFA/PROVENTIL HFA/VENTOLIN  HFA) 108 (90 Base) MCG/ACT inhaler; Inhale 2 puffs into the lungs every 6 hours Not to be refilled until patient calls    Essential hypertension with goal blood pressure less than 140/90  Controlled within acceptable limits, continue current plan of care    - felodipine ER (PLENDIL) 5 MG 24 hr tablet; Take 1 tablet (5 mg) by mouth daily  - hydrochlorothiazide (HYDRODIURIL) 12.5 MG tablet; Take 1 tablet (12.5 mg) by mouth every morning  - losartan (COZAAR) 100 MG tablet; Take 1 tablet (100 mg) by mouth daily    Review of the result(s) of each unique test - todays tests  Prescription drug management  38 minutes spent by me on the date of the encounter doing chart review, history and exam, documentation and further activities per the note  56}     Nicotine/Tobacco Cessation:  She reports that she has been smoking cigarettes. She started smoking about 42 years ago. She has a 10.00 pack-year smoking history. She uses smokeless tobacco.  Nicotine/Tobacco Cessation Plan:   Information offered: Patient not interested at this time      Dipak Hooper MD  [facility] CenterAYAAN Hernandez is a 57 year old, presenting for the following health issues:  Hypertension and Diabetes         View : No data to display.              HPI       Review of Systems   Constitutional, HEENT, cardiovascular, pulmonary, gi and gu systems are negative, except as otherwise noted.      Objective    /80   Pulse 94   Temp 97.4  F (36.3  C) (Temporal)   Resp 16   Wt 104.8 kg (231 lb)   LMP 06/24/2011   SpO2 95%   BMI 44.30 kg/m    Body mass index is 44.3 kg/m .   .takes 13 units of Basaglar  (insulin glargine injection) at bedtime   Wt Readings from Last 5 Encounters:   05/11/23 104.8 kg (231 lb)   03/29/23 106 kg (233 lb 9.6 oz)   07/18/22 104.3 kg (230 lb)   06/23/22 108 kg (238 lb)   10/07/21 112.5 kg (248 lb)     Body mass index is 44.3 kg/m .   Patient feels she has been better with activity  Physical Exam    GENERAL: healthy, alert and no distress  EYES: Eyes grossly normal to inspection, PERRL and conjunctivae and sclerae normal  HENT: ear canals and TM's normal, nose and mouth without ulcers or lesions  NECK: no adenopathy, no asymmetry, masses, or scars and thyroid normal to palpation  Lungs- dense coarse rhonchi and scattered wheezing and seems worse with exhalation, clinically this is chronic obstructive pulmonary disease.  CV: regular rate and rhythm, normal S1 S2, no S3 or S4, no murmur, click or rub, no peripheral edema and peripheral pulses strong  ABDOMEN: soft, nontender, no hepatosplenomegaly, no masses and bowel sounds normal  MS: no gross musculoskeletal defects noted, no edema    Too sick to go to work  Feels worst in the throat  Draining down her throat  Has hay fever type sx  Nothing getting out  Wheezy  Very short of breath , not interested to go to the hospital   Last similar illness was a long time ago.  Sometimes has a tickle cough and uses a inhaler I prescribed some time in the past. Albuterol is used only prn , maybe 3-5 times per year , she ties these to allergies  Started smoking at 15 I,maybe an average of half packs per day . So we have roughly 40 years of 1/2 packs per day and patient acknowledges this as correct  Inhaler used to help alto and symptoms are worsened       Orders Placed This Encounter   Procedures     Spirometry, Breathing Capacity     REVIEW OF HEALTH MAINTENANCE PROTOCOL ORDERS     MA SCREENING DIGITAL BILAT - Future  (s+30)     HEMOGLOBIN A1C     Adult Eye  Referral     Adult GI  Referral - Consult Only

## 2023-05-11 NOTE — TELEPHONE ENCOUNTER
Can you place the order and reroute for signing ? Or specific just what you need ?    Dipak Hooper MD

## 2023-05-16 DIAGNOSIS — I10 ESSENTIAL HYPERTENSION WITH GOAL BLOOD PRESSURE LESS THAN 140/90: ICD-10-CM

## 2023-05-16 RX ORDER — HYDROCHLOROTHIAZIDE 12.5 MG/1
TABLET ORAL
Qty: 90 TABLET | Refills: 1 | OUTPATIENT
Start: 2023-05-16

## 2023-05-16 RX ORDER — FELODIPINE 5 MG/1
TABLET, EXTENDED RELEASE ORAL
Qty: 90 TABLET | Refills: 1 | OUTPATIENT
Start: 2023-05-16

## 2023-07-16 ENCOUNTER — HEALTH MAINTENANCE LETTER (OUTPATIENT)
Age: 58
End: 2023-07-16

## 2023-07-31 DIAGNOSIS — E11.9 CONTROLLED TYPE 2 DIABETES MELLITUS WITHOUT COMPLICATION, WITHOUT LONG-TERM CURRENT USE OF INSULIN (H): ICD-10-CM

## 2023-07-31 RX ORDER — PEN NEEDLE, DIABETIC 32GX 5/32"
NEEDLE, DISPOSABLE MISCELLANEOUS
Refills: 0 | OUTPATIENT
Start: 2023-07-31

## 2023-08-24 DIAGNOSIS — E11.9 CONTROLLED TYPE 2 DIABETES MELLITUS WITHOUT COMPLICATION, WITHOUT LONG-TERM CURRENT USE OF INSULIN (H): ICD-10-CM

## 2023-08-24 RX ORDER — METFORMIN HCL 500 MG
TABLET, EXTENDED RELEASE 24 HR ORAL
Qty: 360 TABLET | Refills: 0 | Status: SHIPPED | OUTPATIENT
Start: 2023-08-24 | End: 2023-11-20

## 2023-09-20 DIAGNOSIS — I10 ESSENTIAL HYPERTENSION WITH GOAL BLOOD PRESSURE LESS THAN 140/90: ICD-10-CM

## 2023-09-20 RX ORDER — LOSARTAN POTASSIUM 100 MG/1
100 TABLET ORAL DAILY
Qty: 90 TABLET | Refills: 1 | Status: SHIPPED | OUTPATIENT
Start: 2023-09-20 | End: 2023-11-20

## 2023-09-20 RX ORDER — LOSARTAN POTASSIUM 100 MG/1
100 TABLET ORAL DAILY
Qty: 90 TABLET | Refills: 1 | OUTPATIENT
Start: 2023-09-20

## 2023-09-20 NOTE — TELEPHONE ENCOUNTER
This refill request is a duplicate request, previously received or sent.   Sent denial notification to pharmacy.  WALT Gao  [facility]

## 2023-09-24 ENCOUNTER — HEALTH MAINTENANCE LETTER (OUTPATIENT)
Age: 58
End: 2023-09-24

## 2023-10-30 ENCOUNTER — TELEPHONE (OUTPATIENT)
Dept: GASTROENTEROLOGY | Facility: CLINIC | Age: 58
End: 2023-10-30
Payer: COMMERCIAL

## 2023-10-30 DIAGNOSIS — K75.4 AUTOIMMUNE HEPATITIS (H): ICD-10-CM

## 2023-10-30 DIAGNOSIS — R74.8 ELEVATED LIVER ENZYMES: ICD-10-CM

## 2023-10-30 RX ORDER — AZATHIOPRINE 50 MG/1
75 TABLET ORAL DAILY
Qty: 135 TABLET | Refills: 0 | Status: SHIPPED | OUTPATIENT
Start: 2023-10-30 | End: 2024-03-10 | Stop reason: DRUGHIGH

## 2023-11-01 ENCOUNTER — TELEPHONE (OUTPATIENT)
Dept: GASTROENTEROLOGY | Facility: CLINIC | Age: 58
End: 2023-11-01
Payer: COMMERCIAL

## 2023-11-19 DIAGNOSIS — E11.9 CONTROLLED TYPE 2 DIABETES MELLITUS WITHOUT COMPLICATION, WITHOUT LONG-TERM CURRENT USE OF INSULIN (H): ICD-10-CM

## 2023-11-20 ENCOUNTER — OFFICE VISIT (OUTPATIENT)
Dept: INTERNAL MEDICINE | Facility: CLINIC | Age: 58
End: 2023-11-20
Payer: COMMERCIAL

## 2023-11-20 VITALS
DIASTOLIC BLOOD PRESSURE: 83 MMHG | TEMPERATURE: 97.9 F | WEIGHT: 236 LBS | RESPIRATION RATE: 18 BRPM | BODY MASS INDEX: 45.26 KG/M2 | OXYGEN SATURATION: 97 % | HEART RATE: 105 BPM | SYSTOLIC BLOOD PRESSURE: 137 MMHG

## 2023-11-20 DIAGNOSIS — Z12.31 ENCOUNTER FOR SCREENING MAMMOGRAM FOR BREAST CANCER: ICD-10-CM

## 2023-11-20 DIAGNOSIS — Z12.4 CERVICAL CANCER SCREENING: ICD-10-CM

## 2023-11-20 DIAGNOSIS — I10 ESSENTIAL HYPERTENSION WITH GOAL BLOOD PRESSURE LESS THAN 140/90: ICD-10-CM

## 2023-11-20 DIAGNOSIS — E11.9 CONTROLLED TYPE 2 DIABETES MELLITUS WITHOUT COMPLICATION, WITHOUT LONG-TERM CURRENT USE OF INSULIN (H): Primary | ICD-10-CM

## 2023-11-20 DIAGNOSIS — Z13.29 SCREENING FOR HYPOTHYROIDISM: ICD-10-CM

## 2023-11-20 DIAGNOSIS — J30.2 SEASONAL ALLERGIC RHINITIS, UNSPECIFIED TRIGGER: ICD-10-CM

## 2023-11-20 DIAGNOSIS — Z12.11 SCREEN FOR COLON CANCER: ICD-10-CM

## 2023-11-20 DIAGNOSIS — K75.4 AUTOIMMUNE HEPATITIS (H): ICD-10-CM

## 2023-11-20 DIAGNOSIS — E78.5 HYPERLIPIDEMIA WITH TARGET LDL LESS THAN 100: ICD-10-CM

## 2023-11-20 DIAGNOSIS — E55.9 HYPOVITAMINOSIS D: ICD-10-CM

## 2023-11-20 LAB — HBA1C MFR BLD: 7.8 % (ref 0–5.6)

## 2023-11-20 PROCEDURE — 80061 LIPID PANEL: CPT | Performed by: INTERNAL MEDICINE

## 2023-11-20 PROCEDURE — 83036 HEMOGLOBIN GLYCOSYLATED A1C: CPT | Performed by: INTERNAL MEDICINE

## 2023-11-20 PROCEDURE — 82306 VITAMIN D 25 HYDROXY: CPT | Performed by: INTERNAL MEDICINE

## 2023-11-20 PROCEDURE — 36415 COLL VENOUS BLD VENIPUNCTURE: CPT | Performed by: INTERNAL MEDICINE

## 2023-11-20 PROCEDURE — 84443 ASSAY THYROID STIM HORMONE: CPT | Performed by: INTERNAL MEDICINE

## 2023-11-20 PROCEDURE — 82274 ASSAY TEST FOR BLOOD FECAL: CPT | Performed by: INTERNAL MEDICINE

## 2023-11-20 PROCEDURE — 82570 ASSAY OF URINE CREATININE: CPT | Performed by: INTERNAL MEDICINE

## 2023-11-20 PROCEDURE — 82043 UR ALBUMIN QUANTITATIVE: CPT | Performed by: INTERNAL MEDICINE

## 2023-11-20 PROCEDURE — 80053 COMPREHEN METABOLIC PANEL: CPT | Performed by: INTERNAL MEDICINE

## 2023-11-20 PROCEDURE — 99214 OFFICE O/P EST MOD 30 MIN: CPT | Performed by: INTERNAL MEDICINE

## 2023-11-20 RX ORDER — LOSARTAN POTASSIUM 100 MG/1
100 TABLET ORAL DAILY
Qty: 90 TABLET | Refills: 1 | Status: SHIPPED | OUTPATIENT
Start: 2023-11-20 | End: 2024-05-15

## 2023-11-20 RX ORDER — METFORMIN HCL 500 MG
TABLET, EXTENDED RELEASE 24 HR ORAL
Qty: 360 TABLET | Refills: 0 | Status: SHIPPED | OUTPATIENT
Start: 2023-11-20 | End: 2024-02-12

## 2023-11-20 RX ORDER — INSULIN GLARGINE-YFGN 100 [IU]/ML
INJECTION, SOLUTION SUBCUTANEOUS
Qty: 15 ML | Refills: 1 | Status: SHIPPED | OUTPATIENT
Start: 2023-11-20 | End: 2024-03-10

## 2023-11-20 RX ORDER — FELODIPINE 5 MG/1
5 TABLET, EXTENDED RELEASE ORAL DAILY
Qty: 90 TABLET | Refills: 1 | Status: SHIPPED | OUTPATIENT
Start: 2023-11-20 | End: 2024-05-21

## 2023-11-20 RX ORDER — INSULIN GLARGINE-YFGN 100 [IU]/ML
15 INJECTION, SOLUTION SUBCUTANEOUS AT BEDTIME
Qty: 6 ML | Refills: 1 | Status: SHIPPED | OUTPATIENT
Start: 2023-11-20 | End: 2024-05-07

## 2023-11-20 RX ORDER — HYDROCHLOROTHIAZIDE 12.5 MG/1
12.5 TABLET ORAL EVERY MORNING
Qty: 90 TABLET | Refills: 1 | Status: SHIPPED | OUTPATIENT
Start: 2023-11-20 | End: 2024-05-21

## 2023-11-20 RX ORDER — ALBUTEROL SULFATE 90 UG/1
2 AEROSOL, METERED RESPIRATORY (INHALATION) EVERY 6 HOURS
Qty: 18 G | Refills: 11 | Status: SHIPPED | OUTPATIENT
Start: 2023-11-20

## 2023-11-20 RX ORDER — METFORMIN HCL 500 MG
TABLET, EXTENDED RELEASE 24 HR ORAL
Qty: 360 TABLET | Refills: 0 | Status: SHIPPED | OUTPATIENT
Start: 2023-11-20 | End: 2023-11-20

## 2023-11-20 NOTE — PROGRESS NOTES
Assessment & Plan     Controlled type 2 diabetes mellitus without complication, without long-term current use of insulin (H)  This patient maintains good diabetes mellitus control with Metformin ( Glucophage)  and insulin . She had issues with Sodium-glucose Cotransporter-2 (SGLT2) Inhibitors and also with (glucagon-like peptide-1) GLP-1 agonist .   - Adult Eye  Referral; Future  - Lipid panel reflex to direct LDL Non-fasting; Future  - HEMOGLOBIN A1C; Future  - Albumin Random Urine Quantitative with Creat Ratio; Future  - Insulin Glargine-yfgn (SEMGLEE, YFGN,) 100 UNIT/ML SOPN; Inject 15 Units Subcutaneous at bedtime Inject 13 Units Subcutaneous At Bedtime - this is intended as a 3 month supply with refill  - insulin pen needle (31G X 5 MM) 31G X 5 MM miscellaneous; Use 1 pen needles daily or as directed.  - metFORMIN (GLUCOPHAGE XR) 500 MG 24 hr tablet; TAKE TWO TABLETS BY MOUTH TWICE A DAY WITH MEALS.  - Hemoglobin A1c; Future  - Comprehensive metabolic panel (BMP + Alb, Alk Phos, ALT, AST, Total. Bili, TP); Future  - Hemoglobin A1c  - Comprehensive metabolic panel (BMP + Alb, Alk Phos, ALT, AST, Total. Bili, TP)  - Albumin Random Urine Quantitative with Creat Ratio    Screen for colon cancer  She declines colonoscopy but agrees to FIT test   - Fecal colorectal cancer screen (FIT); Future  - Fecal colorectal cancer screen (FIT)    Cervical cancer screening  She had some heated words as she explains to me that she did have a hysterectomy but apparently has had some type of Atypical cells of undetermined significance (ASCUS) for which she was asked to continue current plan of care   with regular pap and pelvic examinations with Dr. Petey Peraza, gynecologist next door at [facility]  . But she has made a personal decision that she refuses to do any further of these    Seasonal allergic rhinitis, unspecified trigger  Reordered  - albuterol (PROAIR HFA/PROVENTIL HFA/VENTOLIN HFA) 108 (90  Base) MCG/ACT inhaler; Inhale 2 puffs into the lungs every 6 hours Not to be refilled until patient calls    Essential hypertension with goal blood pressure less than 140/90  Controlled within acceptable limits   - felodipine ER (PLENDIL) 5 MG 24 hr tablet; Take 1 tablet (5 mg) by mouth daily  - hydrochlorothiazide (HYDRODIURIL) 12.5 MG tablet; Take 1 tablet (12.5 mg) by mouth every morning  - losartan (COZAAR) 100 MG tablet; Take 1 tablet (100 mg) by mouth daily    Encounter for screening mammogram for breast cancer  Already ordered. She needs to schedule !    Hypovitaminosis D  Due for recheck , follow up as indicated on results   - Vitamin D Deficiency; Future  - Vitamin D Deficiency    Hyperlipidemia with target LDL less than 100  Due for recheck , she is statin intentionally not prescribed   - Lipid panel reflex to direct LDL Fasting; Future  - Lipid panel reflex to direct LDL Fasting    Screening for hypothyroidism  Routine screening at patient request  - TSH with free T4 reflex; Future  - TSH with free T4 reflex    Autoimmune hepatitis (H)  She had a sense of being let down by her [facility] care team and has an upcoming new Hepatologist with [facility] next month   - Comprehensive metabolic panel (BMP + Alb, Alk Phos, ALT, AST, Total. Bili, TP); Future  - Comprehensive metabolic panel (BMP + Alb, Alk Phos, ALT, AST, Total. Bili, TP)    Review of the result(s) of each unique test - today's tests  Prescription drug management  25 minutes spent by me on the date of the encounter doing chart review, history and exam, documentation and further activities per the note        Dipak Hooper MD  [facility] GARRISON Hernandez is a 58 year old, presenting for the following health issues:  Diabetes and Hypertension         No data to display                History of Present Illness       Diabetes:   She presents for follow up of diabetes.  She is  checking home blood glucose a few times a week.   She checks blood glucose before and after meals.  Blood glucose is never over 200 and never under 70. She is aware of hypoglycemia symptoms including shakiness, dizziness and weakness.    She has no concerns regarding her diabetes at this time.   She is not experiencing numbness or burning in feet, excessive thirst, blurry vision, weight changes or redness, sores or blisters on feet. The patient has not had a diabetic eye exam in the last 12 months.          Hypertension: She presents for follow up of hypertension.  She does not check blood pressure  regularly outside of the clinic. Outpatient blood pressures have not been over 140/90. She follows a low salt diet.     Reason for visit:  Med check/ BP and Diabetes    She eats 2-3 servings of fruits and vegetables daily.She consumes 2 sweetened beverage(s) daily.She exercises with enough effort to increase her heart rate 30 to 60 minutes per day.  She exercises with enough effort to increase her heart rate 6 days per week.   She is taking medications regularly.     Checks home blood glucose monitoring , sometimes am sometimes random   She forgot her glucometer   From memory she sees 120ish 80ish  Denies any hypoglycemia or hyperglycemic symptoms   Had a few times with higher in the mornings   She has adjusted the time to every single night at the same time and this is helped.  Has done some two hour post prandial [ 2 hours after eating ] and these are under 180.  She exercises    Wt Readings from Last 5 Encounters:   11/20/23 107 kg (236 lb)   05/11/23 104.8 kg (231 lb)   03/29/23 106 kg (233 lb 9.6 oz)   07/18/22 104.3 kg (230 lb)   06/23/22 108 kg (238 lb)     Body mass index is 45.26 kg/m .     BP Readings from Last 3 Encounters:   11/20/23 137/83   05/11/23 130/80   03/29/23 132/80     Patient has a pending appointment with [facility] for her autoimmune disease liver disease   Last emergency  room evaluation was July 2022 and patient felt bad problems secondary the taking Jardiance  (empagliflozin) , she'd also had problems with a (glucagon-like peptide-1) GLP-1 agonist.  She saw Dr. Preet Zimmerman, rheumatologist with [facility] for her joint pains that have since resolved.     She saw physical therapy for back pain and is doing better now    Quit smoking ? It's been 2 weeks    TSH   Date Value Ref Range Status   03/26/2021 4.93 (H) 0.40 - 4.00 mU/L Final         Lab Results   Component Value Date    CHOL 96 06/23/2022    CHOL 218 03/26/2021     Lab Results   Component Value Date    HDL 35 06/23/2022    HDL 47 03/26/2021     Lab Results   Component Value Date    LDL 35 06/23/2022     03/26/2021     Lab Results   Component Value Date    TRIG 128 06/23/2022    TRIG 172 03/26/2021     Lab Results   Component Value Date    CHOLHDLRATIO 3.7 05/22/2015         Lab Results   Component Value Date    A1C 6.6 05/11/2023    A1C 7.3 09/23/2022    A1C 8.1 06/23/2022    A1C 7.9 10/07/2021    A1C 8.0 07/02/2021    A1C 8.3 03/26/2021    A1C 8.2 08/27/2020    A1C 7.0 12/20/2019    A1C 6.6 09/23/2019         Review of Systems   Constitutional, HEENT, cardiovascular, pulmonary, gi and gu systems are negative, except as otherwise noted.      Objective    /83   Pulse 105   Temp 97.9  F (36.6  C) (Temporal)   Resp 18   Wt 107 kg (236 lb)   LMP 06/24/2011   SpO2 97%   BMI 45.26 kg/m    Body mass index is 45.26 kg/m .  Physical Exam   GENERAL: healthy, alert and no distress  EYES: Eyes grossly normal to inspection, PERRL and conjunctivae and sclerae normal  RESP: lungs clear to auscultation - no rales, rhonchi or wheezes  CV: regular rate and rhythm, normal S1 S2, no S3 or S4, no murmur, click or rub, no peripheral edema and peripheral pulses strong  ABDOMEN: soft, nontender, no hepatosplenomegaly, no masses and bowel sounds normal  MS: no gross musculoskeletal defects noted, no  edema    Orders Placed This Encounter   Procedures    Lipid panel reflex to direct LDL Non-fasting    HEMOGLOBIN A1C    Albumin Random Urine Quantitative with Creat Ratio    Fecal colorectal cancer screen (FIT)    Hemoglobin A1c    Comprehensive metabolic panel (BMP + Alb, Alk Phos, ALT, AST, Total. Bili, TP)    Vitamin D Deficiency    Lipid panel reflex to direct LDL Fasting    TSH with free T4 reflex    Adult Eye  Referral

## 2023-11-20 NOTE — LETTER
January 18, 2024    Mary Grigsby  [address]  [place] [address]          Dear ,    We are writing to inform you of your test results.  his is a negative FIT test .The fecal occult blood testing is negative, good ! But please remember, the fecal occult blood testing is only considered effective if you faithfully have this done every single year.         Resulted Orders   Fecal colorectal cancer screen (FIT)   Result Value Ref Range    Occult Blood Screen FIT Negative Negative   Hemoglobin A1c   Result Value Ref Range    Hemoglobin A1C 7.8 (H) 0.0 - 5.6 %      Comment:      Normal <5.7%   Prediabetes 5.7-6.4%    Diabetes 6.5% or higher     Note: Adopted from ADA consensus guidelines.   Comprehensive metabolic panel (BMP + Alb, Alk Phos, ALT, AST, Total. Bili, TP)   Result Value Ref Range    Sodium 139 135 - 145 mmol/L      Comment:      Reference intervals for this test were updated on 09/26/2023 to more accurately reflect our healthy population. There may be differences in the flagging of prior results with similar values performed with this method. Interpretation of those prior results can be made in the context of the updated reference intervals.     Potassium 4.1 3.4 - 5.3 mmol/L    Carbon Dioxide (CO2) 25 22 - 29 mmol/L    Anion Gap 14 7 - 15 mmol/L    Urea Nitrogen 18.5 6.0 - 20.0 mg/dL    Creatinine 0.66 0.51 - 0.95 mg/dL    GFR Estimate >90 >60 mL/min/1.73m2    Calcium 9.6 8.6 - 10.0 mg/dL    Chloride 100 98 - 107 mmol/L    Glucose 148 (H) 70 - 99 mg/dL    Alkaline Phosphatase 123 40 - 150 U/L      Comment:      Reference intervals for this test were updated on 11/14/2023 to more accurately reflect our healthy population. There may be differences in the flagging of prior results with similar values performed with this method. Interpretation of those prior results can be made in the context of the updated reference intervals.    AST 77 (H) 0 - 45 U/L      Comment:      Reference  intervals for this test were updated on 6/12/2023 to more accurately reflect our healthy population. There may be differences in the flagging of prior results with similar values performed with this method. Interpretation of those prior results can be made in the context of the updated reference intervals.    ALT 84 (H) 0 - 50 U/L      Comment:      Reference intervals for this test were updated on 6/12/2023 to more accurately reflect our healthy population. There may be differences in the flagging of prior results with similar values performed with this method. Interpretation of those prior results can be made in the context of the updated reference intervals.      Protein Total 7.5 6.4 - 8.3 g/dL    Albumin 4.3 3.5 - 5.2 g/dL    Bilirubin Total 0.4 <=1.2 mg/dL   Vitamin D Deficiency   Result Value Ref Range    Vitamin D, Total (25-Hydroxy) 53 (H) 20 - 50 ng/mL      Comment:      indicates supplementation, with increased risk of hypercalciuria    Narrative    Season, race, dietary intake, and treatment affect the concentration of 25-hydroxy-Vitamin D. Values may decrease during winter months and increase during summer months.    Vitamin D determination is routinely performed by an immunoassay specific for 25 hydroxyvitamin D3.  If an individual is on vitamin D2(ergocalciferol) supplementation, please specify 25 OH vitamin D2 and D3 level determination by LCMSMS test [id number].     Lipid panel reflex to direct LDL Fasting   Result Value Ref Range    Cholesterol 202 (H) <200 mg/dL    Triglycerides 141 <150 mg/dL    Direct Measure HDL 46 (L) >=50 mg/dL    LDL Cholesterol Calculated 128 (H) <=100 mg/dL    Non HDL Cholesterol 156 (H) <130 mg/dL    Narrative    Cholesterol  Desirable:  <200 mg/dL    Triglycerides  Normal:  Less than 150 mg/dL  Borderline High:  150-199 mg/dL  High:  200-499 mg/dL  Very High:  Greater than or equal to 500 mg/dL    Direct Measure HDL  Female:  Greater than or equal to 50 mg/dL   Male:   Greater than or equal to 40 mg/dL    LDL Cholesterol  Desirable:  <100mg/dL  Above Desirable:  100-129 mg/dL   Borderline High:  130-159 mg/dL   High:  160-189 mg/dL   Very High:  >= 190 mg/dL    Non HDL Cholesterol  Desirable:  130 mg/dL  Above Desirable:  130-159 mg/dL  Borderline High:  160-189 mg/dL  High:  190-219 mg/dL  Very High:  Greater than or equal to 220 mg/dL   TSH with free T4 reflex   Result Value Ref Range    TSH 3.94 0.30 - 4.20 uIU/mL   Albumin Random Urine Quantitative with Creat Ratio   Result Value Ref Range    Creatinine Urine mg/dL 102.0 mg/dL      Comment:      The reference ranges have not been established in urine creatinine. The results should be integrated into the clinical context for interpretation.    Albumin Urine mg/L 36.3 mg/L      Comment:      The reference ranges have not been established in urine albumin. The results should be integrated into the clinical context for interpretation.    Albumin Urine mg/g Cr 35.59 (H) 0.00 - 25.00 mg/g Cr      Comment:      Microalbuminuria is defined as an albumin:creatinine ratio of 17 to 299 for males and 25 to 299 for females. A ratio of albumin:creatinine of 300 or higher is indicative of overt proteinuria.  Due to biologic variability, positive results should be confirmed by a second, first-morning random or 24-hour timed urine specimen. If there is discrepancy, a third specimen is recommended. When 2 out of 3 results are in the microalbuminuria range, this is evidence for incipient nephropathy and warrants increased efforts at glucose control, blood pressure control, and institution of therapy with an angiotensin-converting-enzyme (ACE) inhibitor (if the patient can tolerate it).         If you have any questions or concerns, please call the clinic at the number listed above.       Sincerely,      Dipak Hooper MD

## 2023-11-21 LAB
ALBUMIN SERPL BCG-MCNC: 4.3 G/DL (ref 3.5–5.2)
ALP SERPL-CCNC: 123 U/L (ref 40–150)
ALT SERPL W P-5'-P-CCNC: 84 U/L (ref 0–50)
ANION GAP SERPL CALCULATED.3IONS-SCNC: 14 MMOL/L (ref 7–15)
AST SERPL W P-5'-P-CCNC: 77 U/L (ref 0–45)
BILIRUB SERPL-MCNC: 0.4 MG/DL
BUN SERPL-MCNC: 18.5 MG/DL (ref 6–20)
CALCIUM SERPL-MCNC: 9.6 MG/DL (ref 8.6–10)
CHLORIDE SERPL-SCNC: 100 MMOL/L (ref 98–107)
CHOLEST SERPL-MCNC: 202 MG/DL
CREAT SERPL-MCNC: 0.66 MG/DL (ref 0.51–0.95)
CREAT UR-MCNC: 102 MG/DL
DEPRECATED HCO3 PLAS-SCNC: 25 MMOL/L (ref 22–29)
EGFRCR SERPLBLD CKD-EPI 2021: >90 ML/MIN/1.73M2
GLUCOSE SERPL-MCNC: 148 MG/DL (ref 70–99)
HDLC SERPL-MCNC: 46 MG/DL
LDLC SERPL CALC-MCNC: 128 MG/DL
MICROALBUMIN UR-MCNC: 36.3 MG/L
MICROALBUMIN/CREAT UR: 35.59 MG/G CR (ref 0–25)
NONHDLC SERPL-MCNC: 156 MG/DL
POTASSIUM SERPL-SCNC: 4.1 MMOL/L (ref 3.4–5.3)
PROT SERPL-MCNC: 7.5 G/DL (ref 6.4–8.3)
SODIUM SERPL-SCNC: 139 MMOL/L (ref 135–145)
TRIGL SERPL-MCNC: 141 MG/DL
TSH SERPL DL<=0.005 MIU/L-ACNC: 3.94 UIU/ML (ref 0.3–4.2)
VIT D+METAB SERPL-MCNC: 53 NG/ML (ref 20–50)

## 2023-12-19 ENCOUNTER — TRANSFERRED RECORDS (OUTPATIENT)
Dept: HEALTH INFORMATION MANAGEMENT | Facility: CLINIC | Age: 58
End: 2023-12-19
Payer: COMMERCIAL

## 2023-12-19 LAB
ALT SERPL-CCNC: 52 IU/L (ref 0–32)
AST SERPL-CCNC: 51 IU/L (ref 0–40)
CREATININE (EXTERNAL): 0.56 MG/DL (ref 0.57–1)
GFR ESTIMATED (EXTERNAL): 106 ML/MIN/1.73
GLUCOSE (EXTERNAL): 158 MG/DL (ref 70–99)
INR (EXTERNAL): 1 (ref 0.9–1.2)
POTASSIUM (EXTERNAL): 4.1 MMOL/L (ref 3.5–5.2)

## 2023-12-28 ENCOUNTER — ANCILLARY ORDERS (OUTPATIENT)
Dept: ULTRASOUND IMAGING | Facility: CLINIC | Age: 58
End: 2023-12-28

## 2023-12-28 DIAGNOSIS — K75.4 AUTOIMMUNE HEPATITIS (H): Primary | ICD-10-CM

## 2024-01-02 ENCOUNTER — TRANSFERRED RECORDS (OUTPATIENT)
Dept: HEALTH INFORMATION MANAGEMENT | Facility: CLINIC | Age: 59
End: 2024-01-02
Payer: COMMERCIAL

## 2024-01-04 DIAGNOSIS — K75.4 AUTOIMMUNE HEPATITIS (H): Primary | ICD-10-CM

## 2024-01-18 LAB — HEMOCCULT STL QL IA: NEGATIVE

## 2024-01-31 ENCOUNTER — TRANSFERRED RECORDS (OUTPATIENT)
Dept: HEALTH INFORMATION MANAGEMENT | Facility: CLINIC | Age: 59
End: 2024-01-31
Payer: COMMERCIAL

## 2024-02-11 DIAGNOSIS — E11.9 CONTROLLED TYPE 2 DIABETES MELLITUS WITHOUT COMPLICATION, WITHOUT LONG-TERM CURRENT USE OF INSULIN (H): ICD-10-CM

## 2024-02-12 RX ORDER — METFORMIN HCL 500 MG
TABLET, EXTENDED RELEASE 24 HR ORAL
Qty: 360 TABLET | Refills: 0 | Status: SHIPPED | OUTPATIENT
Start: 2024-02-12 | End: 2024-05-07

## 2024-02-13 ENCOUNTER — ANCILLARY PROCEDURE (OUTPATIENT)
Dept: ULTRASOUND IMAGING | Facility: CLINIC | Age: 59
End: 2024-02-13
Attending: INTERNAL MEDICINE
Payer: COMMERCIAL

## 2024-02-13 DIAGNOSIS — K75.4 AUTOIMMUNE HEPATITIS (H): ICD-10-CM

## 2024-02-13 PROCEDURE — 76705 ECHO EXAM OF ABDOMEN: CPT | Mod: TC | Performed by: RADIOLOGY

## 2024-03-10 ENCOUNTER — OFFICE VISIT (OUTPATIENT)
Dept: URGENT CARE | Facility: URGENT CARE | Age: 59
End: 2024-03-10
Payer: COMMERCIAL

## 2024-03-10 VITALS
HEART RATE: 94 BPM | OXYGEN SATURATION: 97 % | RESPIRATION RATE: 24 BRPM | BODY MASS INDEX: 43.84 KG/M2 | WEIGHT: 228.6 LBS | DIASTOLIC BLOOD PRESSURE: 79 MMHG | TEMPERATURE: 97.8 F | SYSTOLIC BLOOD PRESSURE: 144 MMHG

## 2024-03-10 DIAGNOSIS — M54.6 ACUTE RIGHT-SIDED THORACIC BACK PAIN: Primary | ICD-10-CM

## 2024-03-10 PROCEDURE — 99213 OFFICE O/P EST LOW 20 MIN: CPT | Performed by: FAMILY MEDICINE

## 2024-03-10 RX ORDER — CYCLOBENZAPRINE HCL 5 MG
5 TABLET ORAL
Qty: 30 TABLET | Refills: 0 | Status: SHIPPED | OUTPATIENT
Start: 2024-03-10 | End: 2024-07-11

## 2024-03-10 RX ORDER — AZATHIOPRINE 100 MG/1
100 TABLET ORAL DAILY
COMMUNITY
Start: 2024-03-06

## 2024-03-10 ASSESSMENT — PAIN SCALES - GENERAL: PAINLEVEL: EXTREME PAIN (8)

## 2024-03-10 NOTE — PATIENT INSTRUCTIONS
Send message to physical therapy to treat your upper back      Start For pain    Continue heat, stop the heat     Acetaminophen (Tylenol ) 1000 mg am and bedtime  times a day for the next 3 to 5 days until pain resolves-maximum dose of acetaminophen (tylenol)  is 2000 mg in 24-hours     Start flexeril at bedtime-muscle relaxant -will make you sleepy, take second dose if wake up in pain        to ER if symptoms worsen     Follow up with your primary care provider or clinic in about 2-3 days  if your symptoms do not improve

## 2024-03-10 NOTE — PROGRESS NOTES
ASSESSMENT/PLAN:      ICD-10-CM    1. Acute right-sided thoracic back pain  M54.6 cyclobenzaprine (FLEXERIL) 5 MG tablet    Right upper thoracic paraspinous muscle tenderness T1/T6 , no hx of injury,no hx in past   no radicular symptoms except intermittent brief episodes of pain radiating into the lower right  paraspinous muscles of neck with flexion/movement of upper torso/thoracic spine, could reproduce on exam with flexion of upper torso. Neck exam normal- paraspionus muscle non tender to palpation bilateral, FROM of neck without pain, plan/instructions as noted on AVS/patient instructions      Reviewed medication instructions and side effects. Follow up if experiences side effects.     I reviewed supportive care, otc meds to use if needed, expected course, and signs of concern.  Follow up as needed or if she does not improve within  1-2 days or if worsens in any way.  Reviewed red flag symptoms and is to go to the ER if experiences any of these.       Patient Instructions   Send message to physical therapy to treat your upper back      Start For pain    Continue heat, stop the heat     Acetaminophen (Tylenol ) 1000 mg am and bedtime  times a day for the next 3 to 5 days until pain resolves-maximum dose of acetaminophen (tylenol)  is 2000 mg in 24-hours     Start flexeril at bedtime-muscle relaxant -will make you sleepy, take second dose if wake up in pain        to ER if symptoms worsen     Follow up with your primary care provider or clinic in about 2-3 days  if your symptoms do not improve              On the day of the encounter, time spend on chart review, patient visit, review of testing, documentation was 30 minutes       The use of Dragon/[employer] dictation services may have been used to construct the content in this note; any grammatical or spelling errors are non-intentional. Please contact the author of this note directly if you are in need of any clarification.                  Patient presents  with:  Back Pain       Subjective     Mary Grigsby is a 58 year old female who presents to clinic today for the following health issues:    HPI       Back Pain     Duration: onset Tuesday--6 days aog        Specific cause: no injury, hx of similar in the past  Description:   Location of pain: right upper back , pain radiates into right side of neck   Character of pain: aching dull at rest, sharp pain when moving  torso-  no pain/numbness tingling  radiating into chest or upper arms, no weakness,numbness or tingling of upper arms or legs   Pain occasional radiates from the upper thoracic area into right side of neck-  New numbness or weakness in legs, not attributed to pain:  no     History:   Pain interferes with job: occasional  History of back problems: recurrent self limited episodes of low back pain in the past and upper back/neck in the past as well  Any previous MRI or X-rays: Yes- at Chiropractor   Sees a specialist for back pain:  chiropractor-has not seen chiropractor in over a month   Therapies tried without relief: none-patient with autoimmune hepatitis and diabetes, can take a few doses of tylenol -no better, tried ice no improvement, muscle spasm worse   Alleviating factors:   Improved by: rest, stretching moving and not stay in a static position for long periods of time  Precipitating factors:  Worsened by: Lifting, Bending,   Standing, Sitting, for long periods of time and Lying Flat long periods of time, short periods of laying flat has helped with pain         Past Medical History:   Diagnosis Date    Abnormal Pap smear of vagina 2012, 2013    see problem list    Autoimmune hepatitis (H) 10/2018    Cervical lymphadenopathy 7/29/2012    Cervical lymphadenopathy 7/29/2012    Cervical lymphadenopathy 7/29/2012    Cervical lymphadenopathy     Deviated septum     had septoplasty and endo. sinus surg. 2/09 per [facility]    Family history of diabetes mellitus     mother    Family history of stroke      multiple tiny cva's    House dust mite allergy     Hyperlipidemia LDL goal < 100     Hypertension goal BP (blood pressure) < 140/90     Obesity     Papanicolaou smear of cervix with low grade squamous intraepithelial lesion (LGSIL) 2011, 10/2011    colp 3/22/11 - GUSTABO 1-2    Rhinitis, allergic to other allergen     Seasonal allergic rhinitis 08 skin tests    CR/DM/M/T/G/W    Smoking     Vaginal high risk HPV DNA test positive , , , ,     see problem list     Social History     Tobacco Use    Smoking status: Every Day     Current packs/day: 0.00     Average packs/day: 0.5 packs/day for 32.7 years (16.4 ttl pk-yrs)     Types: Cigarettes     Start date: 1980     Last attempt to quit: 2013     Years since quittin.2    Smokeless tobacco: Current    Tobacco comments:     still has occasionally    Substance Use Topics    Alcohol use: No       Current Outpatient Medications   Medication Sig Dispense Refill    albuterol (PROAIR HFA/PROVENTIL HFA/VENTOLIN HFA) 108 (90 Base) MCG/ACT inhaler Inhale 2 puffs into the lungs every 6 hours Not to be refilled until patient calls 18 g 11    aspirin 81 MG tablet Take 1 tablet (81 mg) by mouth daily Indication: primary prevention for heart disease 1 tablet 3    azaTHIOprine 100 MG TABS Take 100 mg by mouth daily      cholecalciferol (VITAMIN D3) 1000 UNIT tablet Take 3 tablets (3,000 Units) by mouth daily      cyclobenzaprine (FLEXERIL) 5 MG tablet Take 1 tablet (5 mg) by mouth at bedtime as needed, may repeat once for muscle spasms 30 tablet 0    Fexofenadine HCl (ALLEGRA PO) Take 90 mg by mouth daily as needed       Hypertonic Nasal Wash (SINUS RINSE BOTTLE KIT NA) Spray 1 Bottle in nostril as needed.      blood glucose (NO BRAND SPECIFIED) lancets standard Use to test blood sugar 3 times daily or as directed. 300 each 11    blood glucose (NO BRAND SPECIFIED) test strip 1 strip by In Vitro route 3 times daily 300 strip 11    blood glucose  calibration (NO BRAND SPECIFIED) solution Use to calibrate blood glucose monitor as directed. 1 each 11    blood glucose monitoring (NO BRAND SPECIFIED) meter device kit Use to test blood sugar 3 times daily or as directed. 1 kit 0    Continuous Glucose  (FREESTYLE MARGRET 3 READER) JULIETTE 1 each daily Use daily to monitor blood sugars per manufactures guidelines. 1 each 1    Continuous Glucose Sensor (FREESTYLE MARGRET 2 SENSOR) MISC Change every 14 days. 2 each 5    Continuous Glucose Sensor (FREESTYLE MARGRET 3 SENSOR) MISC 1 each every 14 days Use 1 sensor every 14 days. Use to read blood sugars per 's instructions. 2 each 5    felodipine ER (PLENDIL) 5 MG 24 hr tablet TAKE ONE TABLET BY MOUTH ONCE DAILY 90 tablet 0    hydroCHLOROthiazide 12.5 MG tablet TAKE ONE TABLET BY MOUTH EVERY MORNING 90 tablet 0    insulin pen needle (31G X 5 MM) 31G X 5 MM miscellaneous Use 1 pen needles daily or as directed. 100 each 1    insulin pen needle (PENTIPS) 32G X 4 MM miscellaneous USE 1 PEN NEEDLE DAILY, NEED APPOINTMENT FOR FURTHER REFILLS. 50 each 0    losartan (COZAAR) 100 MG tablet TAKE ONE TABLET BY MOUTH ONCE DAILY 90 tablet 0    metFORMIN (GLUCOPHAGE XR) 500 MG 24 hr tablet TAKE TWO TABLETS BY MOUTH TWICE A DAY WITH MEALS 360 tablet 0    SEMGLEE, YFGN, 100 UNIT/ML SOPN INJECT 15 UNITS SUBCUTANEOUSLY AT BEDTIME 15 mL 1    STATIN NOT PRESCRIBED (INTENTIONAL) Statin not prescribed intentionally due to Active liver disease (liver failure, cirrhosis, hepatitis)       Allergies   Allergen Reactions    Amoxicillin Hives    Amoxicillin-Pot Clavulanate     Dulaglutide      pancreatitis    Erythromycin     Jardiance [Empagliflozin]      Caused flare of pancreatitis and autoimmune hepatitis.    Unknown [No Clinical Screening - See Comments]      Trulicity - pancreatitis.  Should not use any other GLP-1 agonist or DPP4 inhibitor             ROS are negative, except as otherwise noted HPI      Objective    BP (!)  144/79   Pulse 94   Temp 97.8  F (36.6  C) (Tympanic)   Resp 24   Wt 103.7 kg (228 lb 9.6 oz)   LMP 06/24/2011   SpO2 97%   BMI 43.84 kg/m    Body mass index is 43.84 kg/m .  Physical Exam   GENERAL: alert and no distress  NECK: no adenopathy, no asymmetry, masses, or scars  MS:   Back  Thoracic spine-tender to palpation paraspinous muscles,on the right , nontender on the left from T1-T6,  nontender to palpation over vertebral bodies for the entire thoracic spine/upper lumbar spine,   nontender to palpation over the paraspinous muscles from T6/7 to T12/L1  Pain with flexion of the upper thoracic spine greater than pain with extension  With flexion of the upper thoracic spine, pain radiates into the lower paraspinous muscles, base of the right side of the neck     Neck-nontender to palpation the paraspinous muscles bilateral, full range of motion without pain, nontender to palpation over the upper borders of the trapezius bilateral    NEURO: Normal strength and tone, mentation intact and speech normal, sensory exam normal, DTR's normal and symmetric +2 at knees, biceps, triceps and brachioradialis bilateral and gait normal including heel/toe/tandem walking      Diagnostic Test Results:  Labs reviewed in Epic  No results found for any visits on 03/10/24.

## 2024-03-25 ENCOUNTER — LAB (OUTPATIENT)
Dept: LAB | Facility: CLINIC | Age: 59
End: 2024-03-25
Payer: COMMERCIAL

## 2024-03-25 DIAGNOSIS — E11.9 CONTROLLED TYPE 2 DIABETES MELLITUS WITHOUT COMPLICATION, WITHOUT LONG-TERM CURRENT USE OF INSULIN (H): ICD-10-CM

## 2024-03-25 LAB — HBA1C MFR BLD: 8.3 % (ref 0–5.6)

## 2024-03-25 PROCEDURE — 83036 HEMOGLOBIN GLYCOSYLATED A1C: CPT

## 2024-03-25 PROCEDURE — 36415 COLL VENOUS BLD VENIPUNCTURE: CPT

## 2024-03-26 ENCOUNTER — TELEPHONE (OUTPATIENT)
Dept: FAMILY MEDICINE | Facility: CLINIC | Age: 59
End: 2024-03-26
Payer: COMMERCIAL

## 2024-03-26 NOTE — TELEPHONE ENCOUNTER
"Spoke with patient and gave result message:    \"We need to let patient know that her diabetes mellitus is getting away from her and is into out of control territory.     In my opinion the best action is to meet with diabetes education team for insulin adjustments     The only viable alternative choices include in my opinion is to become more involved in collecting a whole bunch of home blood glucose monitoring data  and sending it to me for review. I could adjust her insulin but need data     See what she wants to do.\"    Patient will send in BG readings and have PCP adjust    Beverly Randhawa RN  [facility]    "

## 2024-04-09 ENCOUNTER — MYC MEDICAL ADVICE (OUTPATIENT)
Dept: INTERNAL MEDICINE | Facility: CLINIC | Age: 59
End: 2024-04-09
Payer: COMMERCIAL

## 2024-04-09 DIAGNOSIS — E11.9 CONTROLLED TYPE 2 DIABETES MELLITUS WITHOUT COMPLICATION, WITHOUT LONG-TERM CURRENT USE OF INSULIN (H): Primary | ICD-10-CM

## 2024-05-06 DIAGNOSIS — E11.9 CONTROLLED TYPE 2 DIABETES MELLITUS WITHOUT COMPLICATION, WITHOUT LONG-TERM CURRENT USE OF INSULIN (H): ICD-10-CM

## 2024-05-07 ENCOUNTER — TELEPHONE (OUTPATIENT)
Dept: EDUCATION SERVICES | Facility: CLINIC | Age: 59
End: 2024-05-07

## 2024-05-07 ENCOUNTER — ALLIED HEALTH/NURSE VISIT (OUTPATIENT)
Dept: EDUCATION SERVICES | Facility: CLINIC | Age: 59
End: 2024-05-07
Attending: INTERNAL MEDICINE
Payer: COMMERCIAL

## 2024-05-07 DIAGNOSIS — E11.9 DIABETES MELLITUS (H): Primary | ICD-10-CM

## 2024-05-07 DIAGNOSIS — E11.9 CONTROLLED TYPE 2 DIABETES MELLITUS WITHOUT COMPLICATION, WITHOUT LONG-TERM CURRENT USE OF INSULIN (H): ICD-10-CM

## 2024-05-07 PROCEDURE — G0108 DIAB MANAGE TRN  PER INDIV: HCPCS | Performed by: DIETITIAN, REGISTERED

## 2024-05-07 RX ORDER — INSULIN GLARGINE-YFGN 100 [IU]/ML
INJECTION, SOLUTION SUBCUTANEOUS
Qty: 15 ML | Refills: 1 | Status: SHIPPED | OUTPATIENT
Start: 2024-05-07

## 2024-05-07 RX ORDER — METFORMIN HCL 500 MG
TABLET, EXTENDED RELEASE 24 HR ORAL
Qty: 360 TABLET | Refills: 0 | Status: SHIPPED | OUTPATIENT
Start: 2024-05-07 | End: 2024-07-11

## 2024-05-07 RX ORDER — BLOOD-GLUCOSE SENSOR
1 EACH MISCELLANEOUS
Qty: 2 EACH | Refills: 5 | Status: SHIPPED | OUTPATIENT
Start: 2024-05-07 | End: 2024-07-11

## 2024-05-07 RX ORDER — KETOROLAC TROMETHAMINE 30 MG/ML
1 INJECTION, SOLUTION INTRAMUSCULAR; INTRAVENOUS DAILY
Qty: 1 EACH | Refills: 1 | Status: SHIPPED | OUTPATIENT
Start: 2024-05-07 | End: 2024-07-11

## 2024-05-07 NOTE — PATIENT INSTRUCTIONS
Glucose goals:  Fastin-130 mg/dL  2 hours after a meal (meter starts with the first bite of food): below 180 mg/dL  If you start the meal with an elevated glucose, 2 hours later you should not be more than about 40 mg/dL above where you started    CGM glucose goals   -time in  mg/dL target of above 70%   -time above 180 mg/dL of less than 25%   -time above 250 mg/dL of less than 5%   -time below 70 mg/dL of less than 4%   -time below 54 mg/dL of less than 1%    2. Meal Planning   Aim for 30-45 grams of carbohydrate per meal   Up to 15 grams of carbohydrate per snack   Use resources to count carbohydrates: food labels, written materials, [employer] Anton web site or doni    3. Keep something with you for treating a low blood sugar (70 mg/dL or below).  If your blood sugar is low, eat/drink ONE of the following:   -1/2 cup juice  -1/2 cup regular soda  -1 package fruit snacks  -4 glucose tablets  Then, wait 15 minutes and re-check blood sugar.  If it is not above 70 mg/dL, repeat the above.    4.  Please call or send a [employer] message with any questions or concerns or low blood sugars.    Zakia Sharma, MPH, RD, DON, LD  Diabetes Education Triage Line: [phone]  Diabetes Education Appointment [phone]     Vidya 2 & 3 Instructions:     1.  The first hour after you place the sensor is the warm up period (black out period).  You will need to carry your blood glucose meter and check blood glucose during this time.     2.  Check blood sugar if feeling symptoms of hypoglycemia but meter is not displaying a low number- may be some variability between sensor and meter at times.     3.  Change sensor every 14 days.     4.  Read/scan blood sugars every 8 hours to ensure entirety of data is available and keep phone/reader near you.     Tip: Scan before each meal and at bedtime.     5.  Watch trend arrows- will let you know if blood sugars are rising, falling or stable at the time you check.     6.   Vidya 2 & 3 have alarms. You can adjust both the high and low blood glucose alarm (when they will go off) or turn off high blood glucose alarm if alarming too much.     You cannot turn off the critical low blood glucose alarm     7.  You can shower, bathe, and swim with sensor on. Do not get reader wet.     8.  Remove the sensor if you need to have an MRI or CT scan.     9.  Do not cover the sensor with extra adhesive (the small hole in the center of the sensor must remain uncovered), unless it is made for the Vidya.  If you have trouble with sensor falling off, these are approved products to help with sensor adhesion: Torbot Skin Tac, SKIN-PREP Protective Barrier Wipe and Mastisol Liquid Adhesive     10.  Check the dose if you take a multivitamin or Vitamin C (ascorbic acid). You want to limit daily intake of ascorbic acid to 500 mg/day or less, or it may falsely raise sensor glucose readings. This is more of a concern if you are on insulin or medication that can cause low blood glucose as you may miss a severe low glucose event.     Main [employer] Vidya website:   [email or web address]    If you go to the support tab you can pick Vidya 2 or 3.  From this page you can access instructions and how to videos.       Support:   If you have any trouble with use or if the sensor falls off early, call the customer service number for Vidya located on the sensor's box. They can often help troubleshoot or replace sensor if needed. Keep your Vidya sensor box with lot and/or serial numbers.    Website for vidya replacement due to failure or falling off:   [email or web address]/us-en/support/sensor-support-form-questions.[email or web address]    Vidya Customer Service: [phone]  (7 days a week 8am-8pm ET excluding holidays)     Discount Programs are available through [employer]:   [employer]uz[employer] Program ([email or web address])   To save on sensors, if told cost is more than $75:  call [facility] Voucher line at [phone]     If you are using your phone you can connect and share your data with [facility].    In the phone doni go into menu --> connected apps --> Olista -->connect to a practice -->enter practice ID --> [id number]

## 2024-05-07 NOTE — TELEPHONE ENCOUNTER
Sharon calling, saw Zakia today and picked up Vidya 3, unfortunately this is not compatible with her phone so she is wondering what she needs to do.     Please call.    Martine Bolaños RD, LD, [facility]

## 2024-05-07 NOTE — LETTER
5/7/2024         RE: Mary Grigsby  [address]  [place] [address]        Dear Colleague,    Thank you for referring your patient, Mary Grigsby, to the [facility]. Please see a copy of my visit note below.    Diabetes Self-Management Education & Support    Presents for: Individual review    Type of Service: In Person Visit    ASSESSMENT:  Patient did not bring meter or log book to visit today.  Reviewed recent glucoses available in chart which show 14% fasting glucoses (n=7), 0% before meal (n=2), and 100% two hours post meal (n=2) in target.   In agreement with Continuous Glucose Monitor (CGM) to more fully evaluate glycemic control.  Referral for professional CGM.  Writer voiced potential concern for large bill to patient for professional CGM.  Offered to call insurance to learn coverage for professional CGM at time of visit and patient declined.  Discussed per pharmacy liaison, Vidya sensors are $18.96/month for patient.  Patient did not have personal cell phone available at time of visit and declined to obtain to ensure she can download needed doni in order to avoid paying for a . Discussed with patient the difference between personal and professional CGM.  Patient agreed to proceed with personal CGM.  Patient preference for Vidya 3 because of it's size.  Instructed patient on Vidya sensor placement, sensor activation, use of the doni including alarms and reports, and glucose goals.     Expresses familiarity with carbohydrate counting and label reading.  Reviewed recommendations for carbohydrates at meals and snacks.  Not ready to further decrease intake of regular soda; currently 24 oz/day which patient reports is down from 72 oz/day.    Declined to schedule follow up visit to review/discuss CGM data/reports. States she will reach out with questions/concerns.     Patient's most recent   Lab Results   Component Value Date    A1C 8.3 03/25/2024    A1C 8.0  2021     is not meeting goal of <7.0    Diabetes knowledge and skills assessment:   Patient is knowledgeable in diabetes management concepts related to: Taking Medication    Continue education with the following diabetes management concepts: Healthy Eating, Being Active, Monitoring, and Reducing Risks    Based on learning assessment above, most appropriate setting for further diabetes education would be: Individual setting.      PLAN  Glucose goals:  Fastin-130 mg/dL  2 hours after a meal (meter starts with the first bite of food): below 180 mg/dL  If you start the meal with an elevated glucose, 2 hours later you should not be more than about 40 mg/dL above where you started    CGM glucose goals   -time in  mg/dL target of above 70%   -time above 180 mg/dL of less than 25%   -time above 250 mg/dL of less than 5%   -time below 70 mg/dL of less than 4%   -time below 54 mg/dL of less than 1%    2. Meal Planning   Aim for 30-45 grams of carbohydrate per meal   Up to 15 grams of carbohydrate per snack   Use resources to count carbohydrates: food labels, written materials, Cal[employer] Anton web site or doni    3. Keep something with you for treating a low blood sugar (70 mg/dL or below).  If your blood sugar is low, eat/drink ONE of the following:   -1/2 cup juice  -1/2 cup regular soda  -1 package fruit snacks  -4 glucose tablets  Then, wait 15 minutes and re-check blood sugar.  If it is not above 70 mg/dL, repeat the above.    4.  Please call or send a [employer] message with any questions or concerns or low blood sugars.    Topics to cover at upcoming visits: Healthy Eating    Follow-up: declined    See Care Plan for co-developed, patient-state behavior change goals.  AVS provided for patient today.    Education Materials Provided:  Carbohydrate Counting and Vidya 3 booklet      SUBJECTIVE/OBJECTIVE:  Presents for: Individual review  Accompanied by: Self  Diabetes education in the past 24mo: No  Focus of Visit:  "Monitoring, Healthy Eating  Diabetes type: Type 2  Disease course: Getting harder to manage  How confident are you filling out medical forms by yourself:: Not Assessed  Cultural Influences/Ethnic Background:  Choose not to answer    Diabetes Symptoms & Complications:  Disease course: Getting harder to manage  Complications assessed today?: No    Patient Problem List and Family Medical History reviewed for relevant medical history, current medical status, and diabetes risk factors.    Vitals:  LMP 06/24/2011   Estimated body mass index is 43.84 kg/m  as calculated from the following:    Height as of 12/20/19: 1.538 m (5' 0.55\").    Weight as of 3/10/24: 103.7 kg (228 lb 9.6 oz).   Last 3 BP:   BP Readings from Last 3 Encounters:   03/10/24 (!) 144/79   11/20/23 137/83   05/11/23 130/80       History   Smoking Status     Every Day     Packs/day: 0.50     Years: 20.00     Types: Cigarettes     Start date: 6/1/1980     Last attempt to quit: 2/24/2013   Smokeless Tobacco     Current       Labs:  Lab Results   Component Value Date    A1C 8.3 03/25/2024    A1C 8.0 07/02/2021     Lab Results   Component Value Date     11/20/2023     03/29/2023     03/26/2021     Lab Results   Component Value Date     11/20/2023     03/26/2021     HDL Cholesterol   Date Value Ref Range Status   03/26/2021 47 (L) >49 mg/dL Final     Direct Measure HDL   Date Value Ref Range Status   11/20/2023 46 (L) >=50 mg/dL Final   ]  GFR Estimate   Date Value Ref Range Status   11/20/2023 >90 >60 mL/min/1.73m2 Final   03/26/2021 82 >60 mL/min/[1.73_m2] Final     Comment:     Non  GFR Calc  Starting 12/18/2018, serum creatinine based estimated GFR (eGFR) will be   calculated using the Chronic Kidney Disease Epidemiology Collaboration   (CKD-EPI) equation.       GFR, ESTIMATED POCT   Date Value Ref Range Status   07/26/2022 >60 >60 mL/min/1.73m2 Final     GFR Estimate If Black   Date Value Ref Range Status " "  03/26/2021 >90 >60 mL/min/[1.73_m2] Final     Comment:      GFR Calc  Starting 12/18/2018, serum creatinine based estimated GFR (eGFR) will be   calculated using the Chronic Kidney Disease Epidemiology Collaboration   (CKD-EPI) equation.       Lab Results   Component Value Date    CR 0.66 11/20/2023    CR 0.81 03/26/2021     No results found for: \"MICROALBUMIN\"    Healthy Eating:  Healthy Eating Assessed Today: Yes  How many times a week on average do you eat food made away from home (restaurant/take-out)?:  (once every 2 weeks)  Breakfast: 4:30 AM: granola bar; 6-6:15 AM cheese stick and almonds  Lunch: 11:30 AM - 1 PM: shredded turkey, unseeded rye bread (1), ranch, usually veggies  Dinner: 5:30-6:30 PM: last night = chicken perfecto on 1/2 bun, 6 French fries, water  Snacks: no PM snack, HS snack: last night= 6 pretzels  Other: up at 4 AM; 2 cans regular soda/day (Mountain Dew)  Beverages: Soda  Has patient met with a dietitian in the past?: Yes    Being Active:  Being Active Assessed Today: No    Monitoring:  Monitoring Assessed Today: Yes  Did patient bring glucose meter to appointment? : No    From 4/9 Medical Messaging Encounter with Dr. Hooper:   03/26/24 5:30pm 181 4hrs no food  03/26/24 8:30pm  171 2hrs after dinner  03/27/24 6:30pm  146  6 hrs no food  03/28/24  4:30am  123  7 hrs no food  03/28/24   8:00pm  169  2 hrs after dinner  03/29/24   8:30pm   158  2 1/2 hrs after dinner  03/30/24   7:00 am   144  10 hrs no food  04/01/24   4:00 am   168   7 hrs no food  04/03/24    4:30am    137   9 1/2 hrs no food  04/04/24    4:00 am    153   10 hrs no food  04/05/24   4:00 am     136    7 hrs no food  04/06/24    4:30am     168     7 1/2 hrs no food    Taking Medications:  Diabetes Medication(s)       Biguanides       metFORMIN (GLUCOPHAGE XR) 500 MG 24 hr tablet TAKE TWO TABLETS BY MOUTH TWICE A DAY WITH MEALS       Insulin       SEMGLEE, YFGN, 100 UNIT/ML SOPN INJECT 15 UNITS SUBCUTANEOUSLY AT " BEDTIME            Taking Medication Assessed Today: Yes  Current Treatments: Diet, Insulin Injections, Oral Medication (taken by mouth)  Given by: Patient  Diabetes medication side effects?: No    Problem Solving:  Problem Solving Assessed Today: Yes  Is the patient at risk for hypoglycemia?: Yes    Reducing Risks:  Reducing Risks Assessed Today: No  Diabetes Risks: Age over 45 years, Hyperlipidemia  CAD Risks: Dyslipidemia, Diabetes Mellitus, Hypertension, Obesity    Healthy Coping:  Healthy Coping Assessed Today: Yes  Emotional response to diabetes: Ready to learn  Informal Support system:: Children, Spouse  Stage of change: PREPARATION (Decided to change - considering how)  Patient Activation Measure Survey Score:       No data to display                Care Plan and Education Provided:  Healthy Eating: Carbohydrate Counting and Label reading and Monitoring: Blood glucose versus Continuous Glucose Monitoring, Individual glucose targets, and CGM instruction: Patient was instructed on Freestyle Vidya System: Freestyle Vidya sensor: insertion technique, sensor site location, reasons to remove sensor (MRI, CT, diathermy), Vitamin C effects on sensor and Use of trends and graphs for pattern management and problem solving    Zakia Sharma, MPH, RD, CDCES, LD 5/7/2024    Time Spent: 60 minutes  Encounter Type: Individual    Any diabetes medication dose changes were made via the CDE Protocol per the patient's referring provider. A copy of this encounter was shared with the provider.

## 2024-05-08 NOTE — PROGRESS NOTES
Diabetes Self-Management Education & Support    Presents for: Individual review    Type of Service: In Person Visit    ASSESSMENT:  Patient did not bring meter or log book to visit today.  Reviewed recent glucoses available in chart which show 14% fasting glucoses (n=7), 0% before meal (n=2), and 100% two hours post meal (n=2) in target.   In agreement with Continuous Glucose Monitor (CGM) to more fully evaluate glycemic control.  Referral for professional CGM.  Writer voiced potential concern for large bill to patient for professional CGM.  Offered to call insurance to learn coverage for professional CGM at time of visit and patient declined.  Discussed per pharmacy liaison, Vidya sensors are $18.96/month for patient.  Patient did not have personal cell phone available at time of visit and declined to obtain to ensure she can download needed doni in order to avoid paying for a . Discussed with patient the difference between personal and professional CGM.  Patient agreed to proceed with personal CGM.  Patient preference for Vidya 3 because of it's size.  Instructed patient on Vidya sensor placement, sensor activation, use of the doni including alarms and reports, and glucose goals.     Expresses familiarity with carbohydrate counting and label reading.  Reviewed recommendations for carbohydrates at meals and snacks.  Not ready to further decrease intake of regular soda; currently 24 oz/day which patient reports is down from 72 oz/day.    Declined to schedule follow up visit to review/discuss CGM data/reports. States she will reach out with questions/concerns.     Patient's most recent   Lab Results   Component Value Date    A1C 8.3 03/25/2024    A1C 8.0 07/02/2021     is not meeting goal of <7.0    Diabetes knowledge and skills assessment:   Patient is knowledgeable in diabetes management concepts related to: Taking Medication    Continue education with the following diabetes management concepts: Healthy  Eating, Being Active, Monitoring, and Reducing Risks    Based on learning assessment above, most appropriate setting for further diabetes education would be: Individual setting.      PLAN  Glucose goals:  Fastin-130 mg/dL  2 hours after a meal (meter starts with the first bite of food): below 180 mg/dL  If you start the meal with an elevated glucose, 2 hours later you should not be more than about 40 mg/dL above where you started    CGM glucose goals   -time in  mg/dL target of above 70%   -time above 180 mg/dL of less than 25%   -time above 250 mg/dL of less than 5%   -time below 70 mg/dL of less than 4%   -time below 54 mg/dL of less than 1%    2. Meal Planning   Aim for 30-45 grams of carbohydrate per meal   Up to 15 grams of carbohydrate per snack   Use resources to count carbohydrates: food labels, written materials, Calorie [employer] web site or doni    3. Keep something with you for treating a low blood sugar (70 mg/dL or below).  If your blood sugar is low, eat/drink ONE of the following:   -1/2 cup juice  -1/2 cup regular soda  -1 package fruit snacks  -4 glucose tablets  Then, wait 15 minutes and re-check blood sugar.  If it is not above 70 mg/dL, repeat the above.    4.  Please call or send a [employer] message with any questions or concerns or low blood sugars.    Topics to cover at upcoming visits: Healthy Eating    Follow-up: declined    See Care Plan for co-developed, patient-state behavior change goals.  AVS provided for patient today.    Education Materials Provided:  Carbohydrate Counting and Vidya 3 booklet      SUBJECTIVE/OBJECTIVE:  Presents for: Individual review  Accompanied by: Self  Diabetes education in the past 24mo: No  Focus of Visit: Monitoring, Healthy Eating  Diabetes type: Type 2  Disease course: Getting harder to manage  How confident are you filling out medical forms by yourself:: Not Assessed  Cultural Influences/Ethnic Background:  Choose not to answer    Diabetes Symptoms &  "Complications:  Disease course: Getting harder to manage  Complications assessed today?: No    Patient Problem List and Family Medical History reviewed for relevant medical history, current medical status, and diabetes risk factors.    Vitals:  LMP 06/24/2011   Estimated body mass index is 43.84 kg/m  as calculated from the following:    Height as of 12/20/19: 1.538 m (5' 0.55\").    Weight as of 3/10/24: 103.7 kg (228 lb 9.6 oz).   Last 3 BP:   BP Readings from Last 3 Encounters:   03/10/24 (!) 144/79   11/20/23 137/83   05/11/23 130/80       History   Smoking Status    Every Day    Packs/day: 0.50    Years: 20.00    Types: Cigarettes    Start date: 6/1/1980    Last attempt to quit: 2/24/2013   Smokeless Tobacco    Current       Labs:  Lab Results   Component Value Date    A1C 8.3 03/25/2024    A1C 8.0 07/02/2021     Lab Results   Component Value Date     11/20/2023     03/29/2023     03/26/2021     Lab Results   Component Value Date     11/20/2023     03/26/2021     HDL Cholesterol   Date Value Ref Range Status   03/26/2021 47 (L) >49 mg/dL Final     Direct Measure HDL   Date Value Ref Range Status   11/20/2023 46 (L) >=50 mg/dL Final   ]  GFR Estimate   Date Value Ref Range Status   11/20/2023 >90 >60 mL/min/1.73m2 Final   03/26/2021 82 >60 mL/min/[1.73_m2] Final     Comment:     Non  GFR Calc  Starting 12/18/2018, serum creatinine based estimated GFR (eGFR) will be   calculated using the Chronic Kidney Disease Epidemiology Collaboration   (CKD-EPI) equation.       GFR, ESTIMATED POCT   Date Value Ref Range Status   07/26/2022 >60 >60 mL/min/1.73m2 Final     GFR Estimate If Black   Date Value Ref Range Status   03/26/2021 >90 >60 mL/min/[1.73_m2] Final     Comment:      GFR Calc  Starting 12/18/2018, serum creatinine based estimated GFR (eGFR) will be   calculated using the Chronic Kidney Disease Epidemiology Collaboration   (CKD-EPI) equation.   " "    Lab Results   Component Value Date    CR 0.66 11/20/2023    CR 0.81 03/26/2021     No results found for: \"MICROALBUMIN\"    Healthy Eating:  Healthy Eating Assessed Today: Yes  How many times a week on average do you eat food made away from home (restaurant/take-out)?:  (once every 2 weeks)  Breakfast: 4:30 AM: granola bar; 6-6:15 AM cheese stick and almonds  Lunch: 11:30 AM - 1 PM: shredded turkey, unseeded rye bread (1), ranch, usually veggies  Dinner: 5:30-6:30 PM: last night = chicken perfecto on 1/2 bun, 6 French fries, water  Snacks: no PM snack, HS snack: last night= 6 pretzels  Other: up at 4 AM; 2 cans regular soda/day (Mountain Dew)  Beverages: Soda  Has patient met with a dietitian in the past?: Yes    Being Active:  Being Active Assessed Today: No    Monitoring:  Monitoring Assessed Today: Yes  Did patient bring glucose meter to appointment? : No    From 4/9 Medical Messaging Encounter with Dr. Hooper:   03/26/24 5:30pm 181 4hrs no food  03/26/24 8:30pm  171 2hrs after dinner  03/27/24 6:30pm  146  6 hrs no food  03/28/24  4:30am  123  7 hrs no food  03/28/24   8:00pm  169  2 hrs after dinner  03/29/24   8:30pm   158  2 1/2 hrs after dinner  03/30/24   7:00 am   144  10 hrs no food  04/01/24   4:00 am   168   7 hrs no food  04/03/24    4:30am    137   9 1/2 hrs no food  04/04/24    4:00 am    153   10 hrs no food  04/05/24   4:00 am     136    7 hrs no food  04/06/24    4:30am     168     7 1/2 hrs no food    Taking Medications:  Diabetes Medication(s)       Biguanides       metFORMIN (GLUCOPHAGE XR) 500 MG 24 hr tablet TAKE TWO TABLETS BY MOUTH TWICE A DAY WITH MEALS       Insulin       SEMGLEE, YFGN, 100 UNIT/ML SOPN INJECT 15 UNITS SUBCUTANEOUSLY AT BEDTIME            Taking Medication Assessed Today: Yes  Current Treatments: Diet, Insulin Injections, Oral Medication (taken by mouth)  Given by: Patient  Diabetes medication side effects?: No    Problem Solving:  Problem Solving Assessed Today: Yes  Is " the patient at risk for hypoglycemia?: Yes    Reducing Risks:  Reducing Risks Assessed Today: No  Diabetes Risks: Age over 45 years, Hyperlipidemia  CAD Risks: Dyslipidemia, Diabetes Mellitus, Hypertension, Obesity    Healthy Coping:  Healthy Coping Assessed Today: Yes  Emotional response to diabetes: Ready to learn  Informal Support system:: Children, Spouse  Stage of change: PREPARATION (Decided to change - considering how)  Patient Activation Measure Survey Score:       No data to display                Care Plan and Education Provided:  Healthy Eating: Carbohydrate Counting and Label reading and Monitoring: Blood glucose versus Continuous Glucose Monitoring, Individual glucose targets, and CGM instruction: Patient was instructed on Freestyle Vidya System: Freestyle Vidya sensor: insertion technique, sensor site location, reasons to remove sensor (MRI, CT, diathermy), Vitamin C effects on sensor and Use of trends and graphs for pattern management and problem solving    Zakia Sharma, MPH, RD, CDCES, LD 5/7/2024    Time Spent: 60 minutes  Encounter Type: Individual    Any diabetes medication dose changes were made via the CDE Protocol per the patient's referring provider. A copy of this encounter was shared with the provider.

## 2024-05-10 ENCOUNTER — TELEPHONE (OUTPATIENT)
Dept: FAMILY MEDICINE | Facility: CLINIC | Age: 59
End: 2024-05-10

## 2024-05-10 NOTE — TELEPHONE ENCOUNTER
Patient Quality Outreach    Patient is due for the following:   Breast Cancer Screening - Mammogram  Cervical Cancer Screening - PAP Needed  Physical Preventive Adult Physical      Topic Date Due    Zoster (Shingles) Vaccine (1 of 2) Never done    Hepatitis B Vaccine (1 of 3 - 19+ 3-dose series) Never done    Pneumococcal Vaccine (2 of 2 - PPSV23 or PCV20) 07/07/2016    COVID-19 Vaccine (4 - 2023-24 season) 09/01/2023       Next Steps:   Schedule a Adult Preventative    Type of outreach:    Sent [facility] [employer] message.    Next Steps:  Reach out within 90 days via Letter.    Max number of attempts reached: No. Will try again in 90 days if patient still on fail list.    Questions for provider review:    None           Nina Lord, LEANA  Chart routed to none.

## 2024-05-15 DIAGNOSIS — I10 ESSENTIAL HYPERTENSION WITH GOAL BLOOD PRESSURE LESS THAN 140/90: ICD-10-CM

## 2024-05-15 RX ORDER — LOSARTAN POTASSIUM 100 MG/1
100 TABLET ORAL DAILY
Qty: 90 TABLET | Refills: 0 | Status: SHIPPED | OUTPATIENT
Start: 2024-05-15 | End: 2024-07-11

## 2024-05-21 DIAGNOSIS — I10 ESSENTIAL HYPERTENSION WITH GOAL BLOOD PRESSURE LESS THAN 140/90: ICD-10-CM

## 2024-05-21 RX ORDER — HYDROCHLOROTHIAZIDE 12.5 MG/1
12.5 TABLET ORAL EVERY MORNING
Qty: 90 TABLET | Refills: 0 | Status: SHIPPED | OUTPATIENT
Start: 2024-05-21 | End: 2024-07-11

## 2024-05-21 RX ORDER — FELODIPINE 5 MG/1
5 TABLET, EXTENDED RELEASE ORAL DAILY
Qty: 90 TABLET | Refills: 0 | Status: SHIPPED | OUTPATIENT
Start: 2024-05-21 | End: 2024-07-11

## 2024-05-21 RX ORDER — LOSARTAN POTASSIUM 100 MG/1
100 TABLET ORAL DAILY
Qty: 90 TABLET | Refills: 1 | OUTPATIENT
Start: 2024-05-21

## 2024-05-30 ENCOUNTER — MYC MEDICAL ADVICE (OUTPATIENT)
Dept: EDUCATION SERVICES | Facility: CLINIC | Age: 59
End: 2024-05-30
Payer: COMMERCIAL

## 2024-05-30 DIAGNOSIS — E11.9 CONTROLLED TYPE 2 DIABETES MELLITUS WITHOUT COMPLICATION, WITHOUT LONG-TERM CURRENT USE OF INSULIN (H): Primary | ICD-10-CM

## 2024-06-30 ENCOUNTER — HEALTH MAINTENANCE LETTER (OUTPATIENT)
Age: 59
End: 2024-06-30

## 2024-07-11 ENCOUNTER — OFFICE VISIT (OUTPATIENT)
Dept: INTERNAL MEDICINE | Facility: CLINIC | Age: 59
End: 2024-07-11
Payer: COMMERCIAL

## 2024-07-11 ENCOUNTER — LAB (OUTPATIENT)
Dept: LAB | Facility: CLINIC | Age: 59
End: 2024-07-11
Payer: COMMERCIAL

## 2024-07-11 VITALS
HEART RATE: 85 BPM | OXYGEN SATURATION: 98 % | BODY MASS INDEX: 43.43 KG/M2 | RESPIRATION RATE: 18 BRPM | DIASTOLIC BLOOD PRESSURE: 78 MMHG | HEIGHT: 61 IN | TEMPERATURE: 97.2 F | WEIGHT: 230 LBS | SYSTOLIC BLOOD PRESSURE: 128 MMHG

## 2024-07-11 DIAGNOSIS — Z12.31 VISIT FOR SCREENING MAMMOGRAM: ICD-10-CM

## 2024-07-11 DIAGNOSIS — E66.01 MORBID OBESITY (H): ICD-10-CM

## 2024-07-11 DIAGNOSIS — F17.200 SMOKER: ICD-10-CM

## 2024-07-11 DIAGNOSIS — K75.4 AUTOIMMUNE HEPATITIS (H): ICD-10-CM

## 2024-07-11 DIAGNOSIS — E11.9 CONTROLLED TYPE 2 DIABETES MELLITUS WITHOUT COMPLICATION, WITHOUT LONG-TERM CURRENT USE OF INSULIN (H): Primary | ICD-10-CM

## 2024-07-11 DIAGNOSIS — Z12.4 CERVICAL CANCER SCREENING: ICD-10-CM

## 2024-07-11 DIAGNOSIS — E11.9 CONTROLLED TYPE 2 DIABETES MELLITUS WITHOUT COMPLICATION, WITHOUT LONG-TERM CURRENT USE OF INSULIN (H): ICD-10-CM

## 2024-07-11 DIAGNOSIS — K75.4 HEPATITIS, AUTOIMMUNE (H): Primary | ICD-10-CM

## 2024-07-11 DIAGNOSIS — I10 ESSENTIAL HYPERTENSION WITH GOAL BLOOD PRESSURE LESS THAN 140/90: ICD-10-CM

## 2024-07-11 LAB
ALBUMIN SERPL BCG-MCNC: 4.4 G/DL (ref 3.5–5.2)
ALP SERPL-CCNC: 102 U/L (ref 40–150)
ALT SERPL W P-5'-P-CCNC: 48 U/L (ref 0–50)
ANION GAP SERPL CALCULATED.3IONS-SCNC: 11 MMOL/L (ref 7–15)
AST SERPL W P-5'-P-CCNC: 40 U/L (ref 0–45)
BILIRUB SERPL-MCNC: 0.4 MG/DL
BUN SERPL-MCNC: 14.9 MG/DL (ref 8–23)
CALCIUM SERPL-MCNC: 9.8 MG/DL (ref 8.6–10)
CHLORIDE SERPL-SCNC: 101 MMOL/L (ref 98–107)
CREAT SERPL-MCNC: 0.6 MG/DL (ref 0.51–0.95)
DEPRECATED HCO3 PLAS-SCNC: 27 MMOL/L (ref 22–29)
EGFRCR SERPLBLD CKD-EPI 2021: >90 ML/MIN/1.73M2
ERYTHROCYTE [DISTWIDTH] IN BLOOD BY AUTOMATED COUNT: 12.8 % (ref 10–15)
GLUCOSE SERPL-MCNC: 167 MG/DL (ref 70–99)
HBA1C MFR BLD: 6.8 % (ref 0–5.6)
HCT VFR BLD AUTO: 45 % (ref 35–47)
HGB BLD-MCNC: 15.2 G/DL (ref 11.7–15.7)
HOLD SPECIMEN: NORMAL
MCH RBC QN AUTO: 32 PG (ref 26.5–33)
MCHC RBC AUTO-ENTMCNC: 33.8 G/DL (ref 31.5–36.5)
MCV RBC AUTO: 95 FL (ref 78–100)
PLATELET # BLD AUTO: 281 10E3/UL (ref 150–450)
POTASSIUM SERPL-SCNC: 4.3 MMOL/L (ref 3.4–5.3)
PROT SERPL-MCNC: 7.8 G/DL (ref 6.4–8.3)
RBC # BLD AUTO: 4.75 10E6/UL (ref 3.8–5.2)
SODIUM SERPL-SCNC: 139 MMOL/L (ref 135–145)
WBC # BLD AUTO: 8.6 10E3/UL (ref 4–11)

## 2024-07-11 PROCEDURE — 83036 HEMOGLOBIN GLYCOSYLATED A1C: CPT

## 2024-07-11 PROCEDURE — G2211 COMPLEX E/M VISIT ADD ON: HCPCS | Performed by: INTERNAL MEDICINE

## 2024-07-11 PROCEDURE — 85027 COMPLETE CBC AUTOMATED: CPT

## 2024-07-11 PROCEDURE — 80053 COMPREHEN METABOLIC PANEL: CPT

## 2024-07-11 PROCEDURE — 99207 PR FOOT EXAM NO CHARGE: CPT | Performed by: INTERNAL MEDICINE

## 2024-07-11 PROCEDURE — 95251 CONT GLUC MNTR ANALYSIS I&R: CPT | Performed by: INTERNAL MEDICINE

## 2024-07-11 PROCEDURE — 99214 OFFICE O/P EST MOD 30 MIN: CPT | Performed by: INTERNAL MEDICINE

## 2024-07-11 PROCEDURE — 36415 COLL VENOUS BLD VENIPUNCTURE: CPT

## 2024-07-11 RX ORDER — HYDROCHLOROTHIAZIDE 12.5 MG/1
12.5 TABLET ORAL EVERY MORNING
Qty: 90 TABLET | Refills: 1 | Status: SHIPPED | OUTPATIENT
Start: 2024-07-11

## 2024-07-11 RX ORDER — METFORMIN HCL 500 MG
TABLET, EXTENDED RELEASE 24 HR ORAL
Qty: 360 TABLET | Refills: 1 | Status: SHIPPED | OUTPATIENT
Start: 2024-07-11

## 2024-07-11 RX ORDER — FELODIPINE 5 MG/1
5 TABLET, EXTENDED RELEASE ORAL DAILY
Qty: 90 TABLET | Refills: 1 | Status: SHIPPED | OUTPATIENT
Start: 2024-07-11

## 2024-07-11 RX ORDER — LOSARTAN POTASSIUM 100 MG/1
100 TABLET ORAL DAILY
Qty: 90 TABLET | Refills: 1 | Status: SHIPPED | OUTPATIENT
Start: 2024-07-11

## 2024-07-11 NOTE — Clinical Note
Just keeping you in the information loop , this patient has been lost to further follow up with you for her continued issues with cervical dysplasia. I am sharing that she has no intent to follow up anymore and I wanted to hear from you if this concerns you slightly, just a little bit, or a lot. Thank you so very much ! ! I could pressure her if you told me this is something serious in your opinion at this point

## 2024-07-11 NOTE — LETTER
July 11, 2024    Mary CHAVEZ Seb  [address]  [place] [address]          Dear ,    We are writing to inform you of your test results.    What we are seeing here is absolutely great ! You've significantly improved your overall diabetes mellitus 2 control with an hemoglobin a1c  [ diabetes test ] today of 6.8% and so From the look of things whatever you are doing is the right thing for your diabetes mellitus !       Resulted Orders   HEMOGLOBIN A1C   Result Value Ref Range    Hemoglobin A1C 6.8 (H) 0.0 - 5.6 %      Comment:      Normal <5.7%   Prediabetes 5.7-6.4%    Diabetes 6.5% or higher     Note: Adopted from ADA consensus guidelines.       If you have any questions or concerns, please call the clinic at the number listed above.       Sincerely,      Dipak Hooper MD

## 2024-07-11 NOTE — PROGRESS NOTES
Assessment & Plan     Controlled type 2 diabetes mellitus without complication, without long-term current use of insulin (H)  This patient has had a very nice overall improvement with her diabetes mellitus control and the only clear cut change we find with her therapy is that she's somewhat increased her physical activity. At any rate we see a nice drop from an hemoglobin a1c  [ diabetes test ] greater then 8% now to within recommended guidelines at 6.8%. she's also lost just a little bit of weight , around 5 pounds and congratulations given. We are also reviewing her home blood glucose monitoring data  through the report from her [employer] home monitor for blood glucose monitoring which is also quite nice to see. All questions answered. Refills provided . Follow up in 6 months. She's to please write back if she'd like to discuss this more or call or MyChart message me.   - Adult Eye  Referral; Future  - REVIEW OF HEALTH MAINTENANCE PROTOCOL ORDERS  - metFORMIN (GLUCOPHAGE XR) 500 MG 24 hr tablet; TAKE TWO TABLETS BY MOUTH TWICE A DAY WITH MEALS.  - FOOT EXAM    Visit for screening mammogram  Ordered   - MA Screening Bilateral w/ Kaveh; Future  - REVIEW OF HEALTH MAINTENANCE PROTOCOL ORDERS    Cervical cancer screening  Patient declines services. She's had a hysterectomy but has some residual cervical tissue and a history of a cervical dysplasia and has been recommended to continue colposcopies and pap and pelvic examination but she has decided she is done with this treatment. I will mention this to her gynecologist to keep him in the information loop in this patients decision making   - REVIEW OF HEALTH MAINTENANCE PROTOCOL ORDERS    Essential hypertension with goal blood pressure less than 140/90  Controlled within acceptable limits, problem is stable and ongoing monitoring    - felodipine ER (PLENDIL) 5 MG 24 hr tablet; Take 1 tablet (5 mg) by mouth daily  - hydroCHLOROthiazide 12.5 MG tablet; Take 1 tablet  "(12.5 mg) by mouth every morning  - losartan (COZAAR) 100 MG tablet; Take 1 tablet (100 mg) by mouth daily    Morbid obesity (H)  Weight loss measures reviewed, tlcr     Autoimmune hepatitis (H)  Now is seeing [facility] and no longer a [facility] provider whom she used to see.    Smoker  Smoking cessation encouraged           Nicotine/Tobacco Cessation  She reports that she has been smoking cigarettes. She started smoking about 44 years ago. She has a 16.4 pack-year smoking history. She uses smokeless tobacco.  Nicotine/Tobacco Cessation Plan  Information offered: Patient not interested at this time      BMI  Estimated body mass index is 44.18 kg/m  as calculated from the following:    Height as of this encounter: 1.537 m (5' 0.5\").    Weight as of this encounter: 104.3 kg (230 lb).   Weight management plan: Discussed healthy diet and exercise guidelines      Loren Hernandez is a 59 year old, presenting for the following health issues:  Diabetes and Hypertension        7/11/2024     1:59 PM   Additional Questions   Roomed by kylie     History of Present Illness       Diabetes:   She presents for follow up of diabetes.   She is checking home blood glucose with a continuous glucose monitor.   She checks blood glucose before and after meals.  Blood glucose is sometimes over 200 and sometimes under 70. She is aware of hypoglycemia symptoms including shakiness, dizziness and other.    She has no concerns regarding her diabetes at this time.   She is not experiencing numbness or burning in feet, excessive thirst, blurry vision, weight changes or redness, sores or blisters on feet. The patient has not had a diabetic eye exam in the last 12 months.          Hypertension: She presents for follow up of hypertension.  She does not check blood pressure  regularly outside of the clinic. Outpatient blood pressures have not been over 140/90. She follows a low salt diet.     She " "eats 4 or more servings of fruits and vegetables daily.She consumes 2 sweetened beverage(s) daily.She exercises with enough effort to increase her heart rate 30 to 60 minutes per day.  She exercises with enough effort to increase her heart rate 5 days per week.   She is taking medications regularly.                     Current treatment for diabetes mellitus includes  Uses SEMGLEE - (insulin glargine-yfgn) injection 15 units at 9:30 pm , no recent changes  And also uses Metformin ( Glucophage)  full dose   What has changed is just the [employer] home monitor for blood glucose monitoring  and increased physical activity with some modest weight loss     Lab Results   Component Value Date    A1C 6.8 07/11/2024    A1C 8.3 03/25/2024    A1C 7.8 11/20/2023    A1C 6.6 05/11/2023    A1C 7.3 09/23/2022    A1C 8.0 07/02/2021    A1C 8.3 03/26/2021    A1C 8.2 08/27/2020    A1C 7.0 12/20/2019    A1C 6.6 09/23/2019       Wt Readings from Last 5 Encounters:   07/11/24 104.3 kg (230 lb)   03/10/24 103.7 kg (228 lb 9.6 oz)   11/20/23 107 kg (236 lb)   05/11/23 104.8 kg (231 lb)   03/29/23 106 kg (233 lb 9.6 oz)         Review of Systems  Constitutional, HEENT, cardiovascular, pulmonary, gi and gu systems are negative, except as otherwise noted.      Objective    /78   Pulse 85   Temp 97.2  F (36.2  C) (Temporal)   Resp 18   Ht 1.537 m (5' 0.5\")   Wt 104.3 kg (230 lb)   LMP 06/24/2011   SpO2 98%   BMI 44.18 kg/m    Body mass index is 44.18 kg/m .  Physical Exam   GENERAL: alert and no distress  EYES: Eyes grossly normal to inspection, PERRL and conjunctivae and sclerae normal  RESP: lungs clear to auscultation - no rales, rhonchi or wheezes  CV: regular rate and rhythm, normal S1 S2, no S3 or S4, no murmur, click or rub, no peripheral edema  MS: no gross musculoskeletal defects noted, no edema  Diabetic foot exam: normal DP and PT pulses, no trophic changes or ulcerative lesions, and normal sensory exam    Orders Placed This " Encounter   Procedures    REVIEW OF HEALTH MAINTENANCE PROTOCOL ORDERS    FOOT EXAM    MA Screening Bilateral w/ Kaveh    Adult Eye  Referral             Signed Electronically by: Dipak Hooper MD

## 2024-07-12 ENCOUNTER — TRANSFERRED RECORDS (OUTPATIENT)
Dept: HEALTH INFORMATION MANAGEMENT | Facility: CLINIC | Age: 59
End: 2024-07-12
Payer: COMMERCIAL

## 2024-07-15 ENCOUNTER — TRANSFERRED RECORDS (OUTPATIENT)
Dept: HEALTH INFORMATION MANAGEMENT | Facility: CLINIC | Age: 59
End: 2024-07-15
Payer: COMMERCIAL

## 2024-08-26 ENCOUNTER — TRANSFERRED RECORDS (OUTPATIENT)
Dept: HEALTH INFORMATION MANAGEMENT | Facility: CLINIC | Age: 59
End: 2024-08-26
Payer: COMMERCIAL

## 2024-09-04 ENCOUNTER — TRANSCRIBE ORDERS (OUTPATIENT)
Dept: OTHER | Age: 59
End: 2024-09-04

## 2024-09-04 DIAGNOSIS — K76.89 LIVER DYSFUNCTION: Primary | ICD-10-CM

## 2024-09-05 DIAGNOSIS — E11.9 CONTROLLED TYPE 2 DIABETES MELLITUS WITHOUT COMPLICATION, WITHOUT LONG-TERM CURRENT USE OF INSULIN (H): ICD-10-CM

## 2024-09-05 RX ORDER — FLURBIPROFEN SODIUM 0.3 MG/ML
SOLUTION/ DROPS OPHTHALMIC
Qty: 100 EACH | Refills: 1 | Status: SHIPPED | OUTPATIENT
Start: 2024-09-05

## 2024-09-08 ENCOUNTER — HEALTH MAINTENANCE LETTER (OUTPATIENT)
Age: 59
End: 2024-09-08

## 2024-10-17 ENCOUNTER — TELEPHONE (OUTPATIENT)
Dept: FAMILY MEDICINE | Facility: CLINIC | Age: 59
End: 2024-10-17
Payer: COMMERCIAL

## 2024-10-17 NOTE — TELEPHONE ENCOUNTER
Patient Quality Outreach    Patient is due for the following:   Breast Cancer Screening - Mammogram  Cervical Cancer Screening - PAP Needed    Next Steps:   Schedule a office visit for mammo, pap    Type of outreach:    Sent [employer] message.      Questions for provider review:    None           Nina Lord CMA  Chart routed to none.

## 2024-10-22 DIAGNOSIS — E11.9 CONTROLLED TYPE 2 DIABETES MELLITUS WITHOUT COMPLICATION, WITHOUT LONG-TERM CURRENT USE OF INSULIN (H): ICD-10-CM

## 2024-10-22 RX ORDER — INSULIN GLARGINE-YFGN 100 [IU]/ML
INJECTION, SOLUTION SUBCUTANEOUS
Qty: 15 ML | Refills: 2 | Status: SHIPPED | OUTPATIENT
Start: 2024-10-22

## 2024-11-04 DIAGNOSIS — E11.9 CONTROLLED TYPE 2 DIABETES MELLITUS WITHOUT COMPLICATION, WITHOUT LONG-TERM CURRENT USE OF INSULIN (H): ICD-10-CM

## 2024-11-17 ENCOUNTER — HEALTH MAINTENANCE LETTER (OUTPATIENT)
Age: 59
End: 2024-11-17

## 2024-12-16 DIAGNOSIS — K76.89 AUTOIMMUNE LIVER DISEASE: Primary | ICD-10-CM

## 2024-12-17 ENCOUNTER — LAB (OUTPATIENT)
Dept: LAB | Facility: CLINIC | Age: 59
End: 2024-12-17
Payer: COMMERCIAL

## 2024-12-17 DIAGNOSIS — K76.89 AUTOIMMUNE LIVER DISEASE: ICD-10-CM

## 2024-12-17 DIAGNOSIS — E11.9 CONTROLLED TYPE 2 DIABETES MELLITUS WITHOUT COMPLICATION, WITHOUT LONG-TERM CURRENT USE OF INSULIN (H): Primary | ICD-10-CM

## 2024-12-17 LAB
ALBUMIN SERPL BCG-MCNC: 4.2 G/DL (ref 3.5–5.2)
ALP SERPL-CCNC: 108 U/L (ref 40–150)
ALT SERPL W P-5'-P-CCNC: 51 U/L (ref 0–50)
ANION GAP SERPL CALCULATED.3IONS-SCNC: 14 MMOL/L (ref 7–15)
AST SERPL W P-5'-P-CCNC: 36 U/L (ref 0–45)
BASOPHILS # BLD AUTO: 0.1 10E3/UL (ref 0–0.2)
BASOPHILS NFR BLD AUTO: 1 %
BILIRUB SERPL-MCNC: 0.4 MG/DL
BUN SERPL-MCNC: 18.4 MG/DL (ref 8–23)
CALCIUM SERPL-MCNC: 9.6 MG/DL (ref 8.8–10.4)
CHLORIDE SERPL-SCNC: 101 MMOL/L (ref 98–107)
CREAT SERPL-MCNC: 0.62 MG/DL (ref 0.51–0.95)
EGFRCR SERPLBLD CKD-EPI 2021: >90 ML/MIN/1.73M2
EOSINOPHIL # BLD AUTO: 0.4 10E3/UL (ref 0–0.7)
EOSINOPHIL NFR BLD AUTO: 5 %
ERYTHROCYTE [DISTWIDTH] IN BLOOD BY AUTOMATED COUNT: 13.5 % (ref 10–15)
GLUCOSE SERPL-MCNC: 133 MG/DL (ref 70–99)
HCO3 SERPL-SCNC: 24 MMOL/L (ref 22–29)
HCT VFR BLD AUTO: 42.6 % (ref 35–47)
HGB BLD-MCNC: 14.9 G/DL (ref 11.7–15.7)
IMM GRANULOCYTES # BLD: 0 10E3/UL
IMM GRANULOCYTES NFR BLD: 0 %
INR PPP: 1.01 (ref 0.85–1.15)
LYMPHOCYTES # BLD AUTO: 2.8 10E3/UL (ref 0.8–5.3)
LYMPHOCYTES NFR BLD AUTO: 32 %
MCH RBC QN AUTO: 32.7 PG (ref 26.5–33)
MCHC RBC AUTO-ENTMCNC: 35 G/DL (ref 31.5–36.5)
MCV RBC AUTO: 93 FL (ref 78–100)
MONOCYTES # BLD AUTO: 0.6 10E3/UL (ref 0–1.3)
MONOCYTES NFR BLD AUTO: 7 %
NEUTROPHILS # BLD AUTO: 5 10E3/UL (ref 1.6–8.3)
NEUTROPHILS NFR BLD AUTO: 56 %
PLATELET # BLD AUTO: 253 10E3/UL (ref 150–450)
POTASSIUM SERPL-SCNC: 4.3 MMOL/L (ref 3.4–5.3)
PROT SERPL-MCNC: 7.3 G/DL (ref 6.4–8.3)
RBC # BLD AUTO: 4.56 10E6/UL (ref 3.8–5.2)
SODIUM SERPL-SCNC: 139 MMOL/L (ref 135–145)
WBC # BLD AUTO: 8.9 10E3/UL (ref 4–11)

## 2024-12-17 PROCEDURE — 36415 COLL VENOUS BLD VENIPUNCTURE: CPT

## 2024-12-17 PROCEDURE — 85610 PROTHROMBIN TIME: CPT

## 2024-12-17 PROCEDURE — 80053 COMPREHEN METABOLIC PANEL: CPT

## 2024-12-17 PROCEDURE — 85025 COMPLETE CBC W/AUTO DIFF WBC: CPT

## 2024-12-20 ENCOUNTER — TRANSFERRED RECORDS (OUTPATIENT)
Dept: HEALTH INFORMATION MANAGEMENT | Facility: CLINIC | Age: 59
End: 2024-12-20
Payer: COMMERCIAL

## 2024-12-23 DIAGNOSIS — I10 ESSENTIAL HYPERTENSION WITH GOAL BLOOD PRESSURE LESS THAN 140/90: ICD-10-CM

## 2024-12-23 RX ORDER — LOSARTAN POTASSIUM 100 MG/1
100 TABLET ORAL DAILY
Qty: 90 TABLET | Refills: 1 | Status: SHIPPED | OUTPATIENT
Start: 2024-12-23

## 2024-12-29 ENCOUNTER — OFFICE VISIT (OUTPATIENT)
Dept: URGENT CARE | Facility: URGENT CARE | Age: 59
End: 2024-12-29
Payer: COMMERCIAL

## 2024-12-29 VITALS
RESPIRATION RATE: 18 BRPM | BODY MASS INDEX: 43.22 KG/M2 | HEART RATE: 88 BPM | SYSTOLIC BLOOD PRESSURE: 139 MMHG | TEMPERATURE: 97.3 F | DIASTOLIC BLOOD PRESSURE: 77 MMHG | WEIGHT: 225 LBS | OXYGEN SATURATION: 98 %

## 2024-12-29 DIAGNOSIS — M54.42 ACUTE LEFT-SIDED LOW BACK PAIN WITH LEFT-SIDED SCIATICA: Primary | ICD-10-CM

## 2024-12-29 PROCEDURE — 99213 OFFICE O/P EST LOW 20 MIN: CPT | Performed by: PHYSICIAN ASSISTANT

## 2024-12-29 RX ORDER — PREDNISONE 20 MG/1
TABLET ORAL
Qty: 20 TABLET | Refills: 0 | Status: SHIPPED | OUTPATIENT
Start: 2024-12-29

## 2024-12-29 ASSESSMENT — PAIN SCALES - GENERAL: PAINLEVEL_OUTOF10: EXTREME PAIN (8)

## 2024-12-29 NOTE — PROGRESS NOTES
Assessment & Plan   (M54.42) Acute left-sided low back pain with left-sided sciatica  (primary encounter diagnosis)  Comment:   Plan: predniSONE (DELTASONE) 20 MG tablet    The patient is a 59-year-old female who presents for evaluation of left-sided low back pain with sciatica on the left side.  Starting a prednisone taper which she has tolerated well in the past.  May continue using Flexeril at home as desired.  No findings consistent with cauda equina syndrome or conus medullaris today.  No focal neurologic deficits on exam today.          Recommend urgent medical evaluation if you develop worsening pain, worsening numbness or tingling or loss of feeling in the back or lower extremities, or saddle anesthesia (numbness or tingling in the groin area) with urinary or bowel incontinence or retention.  Apply  heat in 15 to 20-minute intervals 4 times per day.  May use Voltaren gel 4 times per day or a lidocaine patches (apply to the low back for 12 hours per day) for additional pain control. Recommend not using heat at the same time as lidocaine patches or Voltaren gel to avoid excessive irritation.            Prabhakar Horne PA-C  [facility]     Mary is a 59 year old female who presents to clinic today for the following health issues:  Chief Complaint   Patient presents with    Urgent Care    Back Pain     Lower back pain left leg pain         12/29/2024    12:57 PM   Additional Questions   Roomed by ca   Accompanied by self     HPI  The patient is a 59-year-old female with past medical history of type 2 diabetes without complication without long-term use of insulin, hypertension, autoimmune hepatitis, pancreatitis who presents for evaluation of left-sided low back pain ongoing for the past 11 days.  Has pain shooting down her left leg oftentimes past the knee.  Has difficulty walking due to the pain but able to ambulate without any assistance.  Had a CT scan completed.   Describes having CT imaging of the lumbar spine completed at least 2 years ago, showed some degenerative changes.  She has tried Flexeril at home with no relief of symptoms.  Has also tried ibuprofen and Tylenol with limited relief of pain.  No saddle anesthesia or numbness or tingling in the groin area.  Normal bowel and bladder function.  She has had a prednisone taper in the past for the same concern with good relief of symptoms.  Last prednisone taper was prescribed on 5/11/2023.          Review of Systems  Constitutional, HEENT, cardiovascular, pulmonary, gi and gu systems are negative, except as otherwise noted.      Objective    /77   Pulse 88   Temp 97.3  F (36.3  C) (Tympanic)   Resp 18   Wt 102.1 kg (225 lb)   LMP 06/24/2011   SpO2 98%   BMI 43.22 kg/m    Physical Exam  Constitutional:       General: She is not in acute distress.     Appearance: Normal appearance. She is normal weight.   Cardiovascular:      Pulses: Normal pulses.           Posterior tibial pulses are 2+ on the right side and 2+ on the left side.   Musculoskeletal:         General: No swelling.      Cervical back: Normal. No muscular tenderness.      Thoracic back: Normal.      Lumbar back: Tenderness present. No swelling, edema, deformity, signs of trauma, lacerations, spasms or bony tenderness. Decreased range of motion. Positive left straight leg raise test. No scoliosis.      Comments: Decreased range of motion of the lumbar spine, likely due to pain.  Mild tenderness over the sciatic nerve distribution on the left side, no paraspinal muscle tenderness or spasming.  Normal sensation to light touch bilaterally in the lower extremities.   Skin:     General: Skin is warm and dry.      Capillary Refill: Capillary refill takes less than 2 seconds.   Neurological:      General: No focal deficit present.      Mental Status: She is alert and oriented to person, place, and time.      Sensory: No sensory deficit.

## 2025-01-15 DIAGNOSIS — E11.9 CONTROLLED TYPE 2 DIABETES MELLITUS WITHOUT COMPLICATION, WITHOUT LONG-TERM CURRENT USE OF INSULIN (H): ICD-10-CM

## 2025-01-15 RX ORDER — METFORMIN HYDROCHLORIDE 500 MG/1
TABLET, EXTENDED RELEASE ORAL
Qty: 360 TABLET | Refills: 0 | Status: SHIPPED | OUTPATIENT
Start: 2025-01-15

## 2025-03-20 DIAGNOSIS — E11.9 CONTROLLED TYPE 2 DIABETES MELLITUS WITHOUT COMPLICATION, WITHOUT LONG-TERM CURRENT USE OF INSULIN (H): ICD-10-CM

## 2025-03-20 RX ORDER — FLURBIPROFEN SODIUM 0.3 MG/ML
SOLUTION/ DROPS OPHTHALMIC
Qty: 100 EACH | Refills: 1 | Status: SHIPPED | OUTPATIENT
Start: 2025-03-20

## 2025-04-12 DIAGNOSIS — E11.9 CONTROLLED TYPE 2 DIABETES MELLITUS WITHOUT COMPLICATION, WITHOUT LONG-TERM CURRENT USE OF INSULIN (H): ICD-10-CM

## 2025-04-15 DIAGNOSIS — E11.9 CONTROLLED TYPE 2 DIABETES MELLITUS WITHOUT COMPLICATION, WITHOUT LONG-TERM CURRENT USE OF INSULIN (H): ICD-10-CM

## 2025-04-15 RX ORDER — METFORMIN HYDROCHLORIDE 500 MG/1
TABLET, EXTENDED RELEASE ORAL
Qty: 360 TABLET | Refills: 0 | Status: SHIPPED | OUTPATIENT
Start: 2025-04-15

## 2025-04-20 ENCOUNTER — HEALTH MAINTENANCE LETTER (OUTPATIENT)
Age: 60
End: 2025-04-20

## 2025-04-23 ENCOUNTER — LAB (OUTPATIENT)
Dept: LAB | Facility: CLINIC | Age: 60
End: 2025-04-23
Payer: COMMERCIAL

## 2025-04-23 DIAGNOSIS — E11.9 CONTROLLED TYPE 2 DIABETES MELLITUS WITHOUT COMPLICATION, WITHOUT LONG-TERM CURRENT USE OF INSULIN (H): ICD-10-CM

## 2025-04-23 DIAGNOSIS — K75.4 AUTOIMMUNE HEPATITIS (H): ICD-10-CM

## 2025-04-23 LAB
BASOPHILS # BLD AUTO: 0.1 10E3/UL (ref 0–0.2)
BASOPHILS NFR BLD AUTO: 1 %
EOSINOPHIL # BLD AUTO: 0.4 10E3/UL (ref 0–0.7)
EOSINOPHIL NFR BLD AUTO: 3 %
ERYTHROCYTE [DISTWIDTH] IN BLOOD BY AUTOMATED COUNT: 13.5 % (ref 10–15)
EST. AVERAGE GLUCOSE BLD GHB EST-MCNC: 160 MG/DL
HBA1C MFR BLD: 7.2 % (ref 0–5.6)
HCT VFR BLD AUTO: 46.3 % (ref 35–47)
HGB BLD-MCNC: 15.1 G/DL (ref 11.7–15.7)
IMM GRANULOCYTES # BLD: 0 10E3/UL
IMM GRANULOCYTES NFR BLD: 0 %
LYMPHOCYTES # BLD AUTO: 3.4 10E3/UL (ref 0.8–5.3)
LYMPHOCYTES NFR BLD AUTO: 31 %
MCH RBC QN AUTO: 30.8 PG (ref 26.5–33)
MCHC RBC AUTO-ENTMCNC: 32.6 G/DL (ref 31.5–36.5)
MCV RBC AUTO: 95 FL (ref 78–100)
MONOCYTES # BLD AUTO: 0.9 10E3/UL (ref 0–1.3)
MONOCYTES NFR BLD AUTO: 8 %
NEUTROPHILS # BLD AUTO: 6.2 10E3/UL (ref 1.6–8.3)
NEUTROPHILS NFR BLD AUTO: 57 %
PLATELET # BLD AUTO: 283 10E3/UL (ref 150–450)
RBC # BLD AUTO: 4.9 10E6/UL (ref 3.8–5.2)
WBC # BLD AUTO: 10.9 10E3/UL (ref 4–11)

## 2025-04-23 PROCEDURE — 85025 COMPLETE CBC W/AUTO DIFF WBC: CPT

## 2025-04-23 PROCEDURE — 83036 HEMOGLOBIN GLYCOSYLATED A1C: CPT

## 2025-04-23 PROCEDURE — 36415 COLL VENOUS BLD VENIPUNCTURE: CPT

## 2025-04-24 ENCOUNTER — TRANSFERRED RECORDS (OUTPATIENT)
Facility: HOSPITAL | Age: 60
End: 2025-04-24

## 2025-04-25 NOTE — PROGRESS NOTES
2025        Mary Grigsby   [address] [place] [address]      Mary Grigsby,  :  1965    Your labs look normal.    This is good news!    - Dr Bo  .      Thank you.    Electronically signed by:  Summer Bo MD 2025 06:16 PM  Document generated by:  Summer Bo MD  2025  If your provider ordered multiple tests; the results may not become available at the same time.  If multiple test results are received within 14 days of one another, you may receive a duplicate.  cc:  Dipak Hooper MD  cc:  Dipak Hooper MD

## 2025-06-20 DIAGNOSIS — K75.4 CHRONIC AGGRESSIVE HEPATITIS (H): Primary | ICD-10-CM

## 2025-06-23 ENCOUNTER — TRANSFERRED RECORDS (OUTPATIENT)
Dept: ADMINISTRATIVE | Facility: CLINIC | Age: 60
End: 2025-06-23
Payer: COMMERCIAL

## 2025-06-25 NOTE — PROGRESS NOTES
2025        Mary Grigsby   [address]  [place]  [place] [address]      Mary Grigsby,  :  1965    I'm writing to let you know about the tests that were taken recently.   Thank you for allowing [facility] the opportunity to take part in your healthcare.  At [facility] we strive to provide each patient with the finest gastroenterology care available.  We hope your experience was pleasant and informative.    Liver and kidney functions are normal except for elevated blood glucose level. Proceed with MRI liver to assess the liver stiffness.      Thank you.    Electronically signed by:  Sheila Valle NP 2025 12:49 PM  Document generated by:  Sheila Valle NP  2025  If your provider ordered multiple tests; the results may not become available at the same time.  If multiple test results are received within 14 days of one another, you may receive a duplicate.  cc:  Dipak Hooper MD  cc:  Dipak Hooper MD

## 2025-06-26 DIAGNOSIS — I10 ESSENTIAL HYPERTENSION WITH GOAL BLOOD PRESSURE LESS THAN 140/90: ICD-10-CM

## 2025-06-26 RX ORDER — LOSARTAN POTASSIUM 100 MG/1
100 TABLET ORAL DAILY
Qty: 30 TABLET | Refills: 0 | Status: SHIPPED | OUTPATIENT
Start: 2025-06-26

## 2025-07-02 DIAGNOSIS — I10 ESSENTIAL HYPERTENSION WITH GOAL BLOOD PRESSURE LESS THAN 140/90: ICD-10-CM

## 2025-07-02 RX ORDER — LOSARTAN POTASSIUM 100 MG/1
100 TABLET ORAL DAILY
Qty: 30 TABLET | Refills: 0 | OUTPATIENT
Start: 2025-07-02

## 2025-07-09 DIAGNOSIS — E11.9 CONTROLLED TYPE 2 DIABETES MELLITUS WITHOUT COMPLICATION, WITHOUT LONG-TERM CURRENT USE OF INSULIN (H): ICD-10-CM

## 2025-07-09 RX ORDER — INSULIN GLARGINE-YFGN 100 [IU]/ML
INJECTION, SOLUTION SUBCUTANEOUS
Qty: 15 ML | Refills: 0 | Status: SHIPPED | OUTPATIENT
Start: 2025-07-09

## 2025-07-10 DIAGNOSIS — E11.9 CONTROLLED TYPE 2 DIABETES MELLITUS WITHOUT COMPLICATION, WITHOUT LONG-TERM CURRENT USE OF INSULIN (H): ICD-10-CM

## 2025-07-10 RX ORDER — METFORMIN HYDROCHLORIDE 500 MG/1
1000 TABLET, EXTENDED RELEASE ORAL 2 TIMES DAILY WITH MEALS
Qty: 360 TABLET | Refills: 0 | Status: SHIPPED | OUTPATIENT
Start: 2025-07-10

## 2025-07-12 DIAGNOSIS — E11.9 CONTROLLED TYPE 2 DIABETES MELLITUS WITHOUT COMPLICATION, WITHOUT LONG-TERM CURRENT USE OF INSULIN (H): ICD-10-CM

## 2025-07-13 ENCOUNTER — HEALTH MAINTENANCE LETTER (OUTPATIENT)
Age: 60
End: 2025-07-13

## 2025-07-14 RX ORDER — METFORMIN HYDROCHLORIDE 500 MG/1
1000 TABLET, EXTENDED RELEASE ORAL 2 TIMES DAILY WITH MEALS
Qty: 360 TABLET | Refills: 0 | OUTPATIENT
Start: 2025-07-14

## 2025-07-17 ENCOUNTER — TELEPHONE (OUTPATIENT)
Dept: FAMILY MEDICINE | Facility: CLINIC | Age: 60
End: 2025-07-17
Payer: COMMERCIAL

## 2025-07-17 NOTE — TELEPHONE ENCOUNTER
Patient Quality Outreach    Patient is due for the following:   Diabetes -  A1C  Physical Preventive Adult Physical    Action(s) Taken:   Patient has upcoming appointment, these items will be addressed at that time.    Type of outreach:    Chart review performed, no outreach needed.    Questions for provider review:    None         Nina Lord CMA  Chart routed to None.

## 2025-07-21 ENCOUNTER — PATIENT OUTREACH (OUTPATIENT)
Dept: CARE COORDINATION | Facility: CLINIC | Age: 60
End: 2025-07-21
Payer: COMMERCIAL

## 2025-07-22 PROBLEM — K85.90 PANCREATITIS: Status: RESOLVED | Noted: 2018-10-03 | Resolved: 2025-07-22

## 2025-07-22 PROBLEM — Z72.0 TOBACCO ABUSE: Status: ACTIVE | Noted: 2025-07-22

## 2025-07-23 ENCOUNTER — PATIENT OUTREACH (OUTPATIENT)
Dept: CARE COORDINATION | Facility: CLINIC | Age: 60
End: 2025-07-23
Payer: COMMERCIAL

## (undated) RX ORDER — FENTANYL CITRATE 50 UG/ML
INJECTION, SOLUTION INTRAMUSCULAR; INTRAVENOUS
Status: DISPENSED
Start: 2018-10-04

## (undated) RX ORDER — FENTANYL CITRATE 50 UG/ML
INJECTION, SOLUTION INTRAMUSCULAR; INTRAVENOUS
Status: DISPENSED
Start: 2018-10-09

## (undated) RX ORDER — LIDOCAINE HYDROCHLORIDE 10 MG/ML
INJECTION, SOLUTION INFILTRATION; PERINEURAL
Status: DISPENSED
Start: 2018-10-09

## (undated) RX ORDER — ONDANSETRON 2 MG/ML
INJECTION INTRAMUSCULAR; INTRAVENOUS
Status: DISPENSED
Start: 2018-10-04

## (undated) RX ORDER — SODIUM CHLORIDE 9 MG/ML
INJECTION, SOLUTION INTRAVENOUS
Status: DISPENSED
Start: 2018-10-09

## (undated) RX ORDER — NALOXONE HYDROCHLORIDE 0.4 MG/ML
INJECTION, SOLUTION INTRAMUSCULAR; INTRAVENOUS; SUBCUTANEOUS
Status: DISPENSED
Start: 2018-10-04

## (undated) RX ORDER — PROPOFOL 10 MG/ML
INJECTION, EMULSION INTRAVENOUS
Status: DISPENSED
Start: 2018-10-04

## (undated) RX ORDER — LIDOCAINE HYDROCHLORIDE 20 MG/ML
INJECTION, SOLUTION EPIDURAL; INFILTRATION; INTRACAUDAL; PERINEURAL
Status: DISPENSED
Start: 2018-10-04

## (undated) RX ORDER — GLYCOPYRROLATE 0.2 MG/ML
INJECTION, SOLUTION INTRAMUSCULAR; INTRAVENOUS
Status: DISPENSED
Start: 2018-10-04